# Patient Record
Sex: FEMALE | Race: BLACK OR AFRICAN AMERICAN | NOT HISPANIC OR LATINO | Employment: OTHER | URBAN - METROPOLITAN AREA
[De-identification: names, ages, dates, MRNs, and addresses within clinical notes are randomized per-mention and may not be internally consistent; named-entity substitution may affect disease eponyms.]

---

## 2017-01-26 DIAGNOSIS — E03.9 HYPOTHYROIDISM, UNSPECIFIED TYPE: Primary | ICD-10-CM

## 2017-01-26 NOTE — TELEPHONE ENCOUNTER
LOV  11-    Component      Latest Ref Rng & Units 2/21/2013   TSH      0.4 - 4.0 uIU/ml 4.31 (H)   Free T4      0.71 - 1.51 ng/dl 1.37

## 2017-01-26 NOTE — TELEPHONE ENCOUNTER
----- Message from Tamra Desai sent at 1/26/2017  1:40 PM CST -----  Contact: Daughter    Refill on: levothyroxine (SYNTHROID) 50 MCG tablet  3 month supply       :Patient would like her medication sent to [facility] on [address]. Please call

## 2017-01-27 ENCOUNTER — TELEPHONE (OUTPATIENT)
Dept: FAMILY MEDICINE | Facility: CLINIC | Age: 82
End: 2017-01-27

## 2017-01-27 DIAGNOSIS — G89.29 OTHER CHRONIC PAIN: ICD-10-CM

## 2017-01-27 DIAGNOSIS — R53.81 DEBILITY: Primary | ICD-10-CM

## 2017-01-27 DIAGNOSIS — R60.0 BILATERAL LEG EDEMA: ICD-10-CM

## 2017-01-27 RX ORDER — LEVOTHYROXINE SODIUM 50 UG/1
50 TABLET ORAL DAILY
Qty: 90 TABLET | Refills: 0 | Status: SHIPPED | OUTPATIENT
Start: 2017-01-27 | End: 2017-05-26 | Stop reason: SDUPTHER

## 2017-01-27 NOTE — TELEPHONE ENCOUNTER
Spoke with Jose Eduardo Goodson. She states that prescription was last filled by Dr. Calderon. She states that patient no longer wants to see Dr. Calderon. Advised will need labs to check TSH level.     Jose Eduardo states that patient has been very weak and having lots of hip pain and is unable to come in at this time. Patient has been seen by orthopedic. Hip surgery was not an option due to other medical conditions. She is only on pain medication which does not give her much relief.     Advised to contact [facility], may be able to have nurse come out to see the patient. Also advised possible appointment with Pain Management re: other options for hip pain    Please advise

## 2017-01-27 NOTE — TELEPHONE ENCOUNTER
----- Message from Saranyanoam Gandhi sent at 1/27/2017 10:36 AM CST -----  Contact: [facility] request an order for health service from Dr. Prieto upon the request of the pt. Please contact [phone] if any question. Thanks!

## 2017-01-30 ENCOUNTER — TELEPHONE (OUTPATIENT)
Dept: FAMILY MEDICINE | Facility: CLINIC | Age: 82
End: 2017-01-30

## 2017-01-30 DIAGNOSIS — M25.559 ARTHRALGIA OF HIP, UNSPECIFIED LATERALITY: Primary | ICD-10-CM

## 2017-01-30 DIAGNOSIS — M25.552 PAIN OF LEFT HIP JOINT: ICD-10-CM

## 2017-01-30 NOTE — TELEPHONE ENCOUNTER
W/c ordered, as far as her pain goes, we're limited in treatment because her kidney function is down, so she cannot take NSAIDs.  Since she's got home health, can she try PT at home?

## 2017-01-30 NOTE — TELEPHONE ENCOUNTER
Spoke with patient's daughter Jose Eduardo and she states that patient has prescription for Hydrocodone/Acetaminophen 5/325 mg, one tablet by mouth twice daily for pain . She is taking one one tablet daily. She states that she is only getting very minimal relief. She states that she does not want to get addicted to the medication.     She would like to know if there are any options to takinf the medication to get relief or take as prescribed.     She would also like an order for wheel chair ASAP to go to her sister's . Patient has a Rollator at home but she needs something with foot rest. Her feet drag the ground with her Rollator which the family paid out of pocket for.     Advised refill sent to [facility] 2017    Please advise

## 2017-01-30 NOTE — TELEPHONE ENCOUNTER
----- Message from Tamra Desai sent at 1/30/2017  8:34 AM CST -----  Contact: Jose Eduardo Goodson [phone]  Jose Eduardo Goodson [phone] returning your phone call. Please call thank you

## 2017-01-31 ENCOUNTER — TELEPHONE (OUTPATIENT)
Dept: FAMILY MEDICINE | Facility: CLINIC | Age: 82
End: 2017-01-31

## 2017-01-31 NOTE — TELEPHONE ENCOUNTER
----- Message from Jacqueline Alvarado sent at 1/30/2017 10:57 AM CST -----  [facility] states member called requesting home health services. Please include order, medical necessity form, diagnosis code, homebound status, and recent clinical information. Please fax to [phone]. [email or web address] phone [phone]. Thank you!

## 2017-02-07 ENCOUNTER — TELEPHONE (OUTPATIENT)
Dept: FAMILY MEDICINE | Facility: CLINIC | Age: 82
End: 2017-02-07

## 2017-02-07 DIAGNOSIS — I50.9 CONGESTIVE HEART FAILURE, UNSPECIFIED CONGESTIVE HEART FAILURE CHRONICITY, UNSPECIFIED CONGESTIVE HEART FAILURE TYPE: Primary | ICD-10-CM

## 2017-02-07 NOTE — TELEPHONE ENCOUNTER
----- Message from Thais Baca sent at 2/7/2017 11:39 AM CST -----  Contact: Daughter Jennifer  Please call daughter in regards to HH orders. PH states that they did not receive orders. Orders should also include PT orders. [phone]  Please fax to [phone]

## 2017-02-20 ENCOUNTER — TELEPHONE (OUTPATIENT)
Dept: FAMILY MEDICINE | Facility: CLINIC | Age: 82
End: 2017-02-20

## 2017-02-20 NOTE — TELEPHONE ENCOUNTER
----- Message from Maddison Arroyo sent at 2/20/2017 11:55 AM CST -----  Contact: self  [phone]  Pt is requesting to speak to you regarding home health orders. Pls call pt [phone]. Thanks.....Nan

## 2017-02-20 NOTE — TELEPHONE ENCOUNTER
----- Message from Lety Garcia sent at 2/20/2017 11:46 AM CST -----  Contact: [facility]  [facility] calling to get signed order for HH with PT, medical necessity, and clinic notes to be faxed to [phone]. This was request back in January.

## 2017-02-22 NOTE — TELEPHONE ENCOUNTER
Spoke with Tyler. Requesting chart notes from sooner visit. Advised last office visit 11-7-16.    Also need orders for Hh cosigned by MD and faxed back to N

## 2017-02-22 NOTE — TELEPHONE ENCOUNTER
----- Message from Saranya Gandhi sent at 2/21/2017  4:15 PM CST -----  Contact: [facility]  Request to speak to the nurse regarding about the order from home health nurse for the pt. Please contact [phone] ext [phone] Tyler. Thanks!

## 2017-02-24 NOTE — TELEPHONE ENCOUNTER
Orders co signed by Dr. Robb Cunningham and faxed to [facility] [phone]    Left message to voicemail for Tyler with [facility] [phone] ext [phone] re: above    Left message to patient's daughter, Jose Eduardo Goodson voicemail at [phone] re: above

## 2017-03-03 DIAGNOSIS — M16.0 PRIMARY OSTEOARTHRITIS OF BOTH HIPS: ICD-10-CM

## 2017-03-03 DIAGNOSIS — N18.30 CHRONIC KIDNEY DISEASE, STAGE III (MODERATE): Primary | ICD-10-CM

## 2017-03-03 RX ORDER — HYDRALAZINE HYDROCHLORIDE 50 MG/1
TABLET, FILM COATED ORAL
Refills: 3 | COMMUNITY
Start: 2017-01-04 | End: 2017-08-30 | Stop reason: ALTCHOICE

## 2017-03-03 RX ORDER — OXYCODONE AND ACETAMINOPHEN 5; 325 MG/1; MG/1
1 TABLET ORAL EVERY 12 HOURS PRN
Qty: 30 TABLET | Refills: 0 | Status: SHIPPED | OUTPATIENT
Start: 2017-03-03 | End: 2017-03-06

## 2017-03-03 RX ORDER — OXYCODONE AND ACETAMINOPHEN 5; 325 MG/1; MG/1
1 TABLET ORAL EVERY 6 HOURS PRN
Qty: 15 TABLET | Refills: 0 | Status: CANCELLED | OUTPATIENT
Start: 2017-03-03

## 2017-03-03 RX ORDER — LABETALOL 300 MG/1
TABLET, FILM COATED ORAL
Refills: 3 | COMMUNITY
Start: 2017-01-04

## 2017-03-03 NOTE — TELEPHONE ENCOUNTER
----- Message from Mary Kate Huffman sent at 3/3/2017  2:40 PM CST -----  Contact: daughter  Pt needs hydrocodone-acetaminophen 5-325mg (NORCO) 5-325 mg per tablet refilled. Please call [phone]. Thanks

## 2017-03-03 NOTE — TELEPHONE ENCOUNTER
----- Message from Jacqueline Alvarado sent at 3/3/2017  1:23 PM CST -----  Refill: hydrocodone-acetaminophen 5-325mg (NORCO) 5-325 mg per tablet    Please call at [phone] when done. Thank you!

## 2017-03-06 DIAGNOSIS — M25.50 ARTHRALGIA, UNSPECIFIED JOINT: ICD-10-CM

## 2017-03-06 RX ORDER — HYDROCODONE BITARTRATE AND ACETAMINOPHEN 5; 325 MG/1; MG/1
1 TABLET ORAL 2 TIMES DAILY PRN
Qty: 30 TABLET | Refills: 0 | Status: SHIPPED | OUTPATIENT
Start: 2017-03-06 | End: 2017-03-16

## 2017-03-06 NOTE — TELEPHONE ENCOUNTER
Phone call from patient's daughter.     She states that prescription was sent in for Oxycodone    She would like refill for Hydrocodone instead    Please advise

## 2017-03-24 ENCOUNTER — OFFICE VISIT (OUTPATIENT)
Dept: FAMILY MEDICINE | Facility: CLINIC | Age: 82
End: 2017-03-24
Payer: MEDICARE

## 2017-03-24 ENCOUNTER — HOSPITAL ENCOUNTER (OUTPATIENT)
Dept: RADIOLOGY | Facility: HOSPITAL | Age: 82
Discharge: HOME | End: 2017-03-24
Attending: FAMILY MEDICINE
Payer: MEDICARE

## 2017-03-24 VITALS
RESPIRATION RATE: 16 BRPM | SYSTOLIC BLOOD PRESSURE: 142 MMHG | BODY MASS INDEX: 29.44 KG/M2 | DIASTOLIC BLOOD PRESSURE: 58 MMHG | WEIGHT: 160 LBS | HEART RATE: 68 BPM | HEIGHT: 62 IN

## 2017-03-24 DIAGNOSIS — L98.9 LEG SKIN LESION, LEFT: ICD-10-CM

## 2017-03-24 DIAGNOSIS — L30.9 DERMATITIS: ICD-10-CM

## 2017-03-24 DIAGNOSIS — M25.552 LEFT HIP PAIN: ICD-10-CM

## 2017-03-24 DIAGNOSIS — L98.421 SACRAL ULCER, LIMITED TO BREAKDOWN OF SKIN: Primary | ICD-10-CM

## 2017-03-24 DIAGNOSIS — Z99.3 WHEELCHAIR DEPENDENCE: ICD-10-CM

## 2017-03-24 PROBLEM — I27.20 PULMONARY HYPERTENSION: Status: ACTIVE | Noted: 2017-03-24

## 2017-03-24 PROCEDURE — 1125F AMNT PAIN NOTED PAIN PRSNT: CPT | Mod: S$GLB,,, | Performed by: FAMILY MEDICINE

## 2017-03-24 PROCEDURE — 99215 OFFICE O/P EST HI 40 MIN: CPT | Mod: S$GLB,,, | Performed by: FAMILY MEDICINE

## 2017-03-24 PROCEDURE — 3077F SYST BP >= 140 MM HG: CPT | Mod: S$GLB,,, | Performed by: FAMILY MEDICINE

## 2017-03-24 PROCEDURE — 99999 PR PBB SHADOW E&M-EST. PATIENT-LVL IV: CPT | Mod: PBBFAC,,, | Performed by: FAMILY MEDICINE

## 2017-03-24 PROCEDURE — 1160F RVW MEDS BY RX/DR IN RCRD: CPT | Mod: S$GLB,,, | Performed by: FAMILY MEDICINE

## 2017-03-24 PROCEDURE — 3078F DIAST BP <80 MM HG: CPT | Mod: S$GLB,,, | Performed by: FAMILY MEDICINE

## 2017-03-24 PROCEDURE — 1159F MED LIST DOCD IN RCRD: CPT | Mod: S$GLB,,, | Performed by: FAMILY MEDICINE

## 2017-03-24 PROCEDURE — 1157F ADVNC CARE PLAN IN RCRD: CPT | Mod: S$GLB,,, | Performed by: FAMILY MEDICINE

## 2017-03-24 PROCEDURE — 76882 US LMTD JT/FCL EVL NVASC XTR: CPT | Mod: 26,,, | Performed by: RADIOLOGY

## 2017-03-24 PROCEDURE — 99499 UNLISTED E&M SERVICE: CPT | Mod: S$GLB,,, | Performed by: FAMILY MEDICINE

## 2017-03-24 PROCEDURE — 76882 US LMTD JT/FCL EVL NVASC XTR: CPT | Mod: TC

## 2017-03-24 RX ORDER — NYSTATIN 100000 U/G
CREAM TOPICAL 2 TIMES DAILY
Qty: 30 G | Refills: 2 | Status: SHIPPED | OUTPATIENT
Start: 2017-03-24

## 2017-03-24 RX ORDER — ASPIRIN 81 MG/1
81 TABLET ORAL DAILY
Status: ON HOLD | COMMUNITY
End: 2017-07-01

## 2017-03-24 RX ORDER — TRIAMCINOLONE ACETONIDE 1 MG/G
CREAM TOPICAL 2 TIMES DAILY
Qty: 80 G | Refills: 0 | Status: SHIPPED | OUTPATIENT
Start: 2017-03-24 | End: 2017-07-24

## 2017-03-24 NOTE — PROGRESS NOTES
"Routine Office Visit    Patient Name: Christal Goodson    : 12/3/1931  MRN: [id number]    Subjective:  Christal is a 85 y.o. female who presents today for:  Patient here with 2 sisters.    1.  L leg lesion - noticed it an hour ago when she got here. +tenderness to palpation. States that she bumped it on the way here but unsure if it was there before.  No breakdown of skin.  Sisters are unaware of this as well.     2.  L hip pain -  - hip replacement. She states that when she sits on it, it hurts.  She hasn't seen a specialist for follow up in a few years.  In the last few weeks she's unable to have the strength to get up and transfers have been difficult for the family.  And so now she's wheelchair bound.  Family members state that they would have brought her in sooner but it is very difficult to transfer patient.  Do not have home health and wonder if patient would qualify.      3.  About 3-4 months ago she had to switch from walker to wheelchair because she lower body feels weak and "I can't stand"  Family members concerned about this decrease in strength and unable to ambulate on her own.     4.  Itchy skin - on her upper back, patient is constantly scratching.  Also on buttocks, patient has lesions and complaints of scratching there too.  Sister says there are light colored patches on her bottom.    Past Medical History  Past Medical History:   Diagnosis Date    Arthritis     Blood transfusion     CHF (congestive heart failure)     Coronary artery disease     Diabetes mellitus     General anesthetics causing adverse effect in therapeutic use     "woke up as they were putting me on the table"    Hyperlipidemia     Hypertension     Renal disorder     Thyroid disease        Past Surgical History  Past Surgical History:   Procedure Laterality Date    CATARACT EXTRACTION EXTRACAPSULAR W/ INTRAOCULAR LENS IMPLANTATION  2012    right eye    CORONARY ANGIOPLASTY WITH STENT PLACEMENT      JOINT " REPLACEMENT      Left total hip replacement in 1986    TONSILLECTOMY         Family History  Family History   Problem Relation Age of Onset    Diabetes Daughter     Hypertension Daughter        Social History  Social History     Social History    Marital status: Single     Spouse name: N/A    Number of children: N/A    Years of education: N/A     Occupational History    Not on file.     Social History Main Topics    Smoking status: Never Smoker    Smokeless tobacco: Not on file    Alcohol use No    Drug use: No    Sexual activity: Not Currently     Other Topics Concern    Not on file     Social History Narrative       Current Medications  Current Outpatient Prescriptions on File Prior to Visit   Medication Sig Dispense Refill    allopurinol (ZYLOPRIM) 100 MG tablet Take 1 tablet (100 mg total) by mouth once daily. 30 tablet 2    ferrous sulfate 325 mg (65 mg iron) Tab Take 1 tablet (325 mg total) by mouth daily with breakfast. 30 tablet 3    flaxseed oil Oil by Misc.(Non-Drug; Combo Route) route Daily.      hydrALAZINE (APRESOLINE) 50 MG tablet TAKE 1 TABLET 3 TIMES DAILY.  3    labetalol (NORMODYNE) 300 MG tablet TAKE 1 TABLET EVERY 12 HOURS DAILY.  3    levothyroxine (SYNTHROID) 50 MCG tablet Take 1 tablet (50 mcg total) by mouth once daily. 90 tablet 0    magnesium oxide (MAG-OX) 250 mg Tab Take 1 tablet (250 mg total) by mouth once daily. 30 tablet 2    vitamin E 400 UNIT capsule Take 400 Units by mouth once daily.      furosemide (LASIX) 40 MG tablet Take 1 tablet (40 mg total) by mouth 2 (two) times daily. 60 tablet 3    [DISCONTINUED] aspirin 325 MG tablet Take 1 tablet (325 mg total) by mouth once daily.  0    [DISCONTINUED] pantoprazole (PROTONIX) 40 MG tablet Take 1 tablet (40 mg total) by mouth 2 (two) times daily. 60 tablet 11     No current facility-administered medications on file prior to visit.        Allergies   Review of patient's allergies indicates:   Allergen Reactions  "   Bystolic [nebivolol]     Losartan     Diovan [valsartan] Rash       Review of Systems (Pertinent positives)  Constititutional: Weight loss, excess fatigue, chills, fever, night sweats, weakness, loss of appetite  Skin: Rash, itching, lesions, color changes  Ears: Earache, ringing in ears, discharge, hearing loss, hearing aid, popping, infection Nose: stuffy nose, mouth breathing, post-nasal drip,   Throat: hoarseness, bad breath, swelling, sore throat  Lungs: Cough, sputum, wheeze, frequent URI, SOA, Asthma  Heart: Chest pain, angina, palpitations, extra heart beats  Stomach/Intestine: Heartburn, Nausea, vomiting, diarrhea, indigestion, bloating, constipation  Bones/Muscles/Joints: Joint pain,, deformities, back pain, swelling, stiffness  Brain: Numbness, tingling, tremor, fainting, headaches, muscle weakness, frequent falls, paralysis  Kidney/Bladder: Pain with urination, frequent urination, urinating often at night, urgency, dribbling, blood in urine, discharge, infections.    BP (!) 142/58  Pulse 68  Resp 16  Ht 5' 2" (1.575 m)  Wt 72.6 kg (160 lb)  BMI 29.26 kg/m2    GENERAL APPEARANCE: in no apparent distress and well developed and well nourished.  In wheelchair.  UNABLE TO STAND OR BEAR WEIGHT AT ALL on legs even with 2 person assist.  NECK: normal, supple, no adenopathy, thyroid normal in size  RESPIRATORY: appears well, vitals normal, no respiratory distress, acyanotic, normal RR, chest clear, no wheezing, crepitations, rhonchi, normal symmetric air entry  HEART: regular rate and rhythm, S1, S2 normal, no murmur, click, rub or gallop.    NEUROLOGIC: normal without focal findings, CN II-XII are intact.    Extremities: warm/well perfused.  No abnormal hair patterns.  No clubbing, cyanosis.  +L leg - lateral shin - golf ball sized mass that is tender to palpation.  No bruising or erythema in that area.  No break in skin  SKIN: +large areas of hypopigmented skin over buttocks, one dime sized area of " stage 1 ulcer on L buttocks.   Lower back diffusely dry and white flaky areas.   Then upper back, with evidence of excoriations, mild dry skin  PSYCH: Alert, oriented x 3, thought content appropriate, speech normal, pleasant and cooperative, good eye contact, well groomed, recall good, concentration/judgement good and apparently average intelligence.    Assessment/Plan:  Christal Goodson is a 85 y.o. female who presents today for :    Christal was seen today for leg cramps, hip pain, extremity weakness and dry skin.    Diagnoses and all orders for this visit:    Sacral ulcer, limited to breakdown of skin  -     Ambulatory referral to Outpatient Case Management  -     nystatin (MYCOSTATIN) cream; Apply topically 2 (two) times daily.    Wheelchair dependence  -     Ambulatory referral to Outpatient Case Management  -     Patient with decreased ambulation over the last few weeks and she was getting around w/ a cane before.  May be due to her hip bothering her, so having her see orthopedics.    Leg skin lesion, left  -     US Soft Tissue Misc; Future  -     Ambulatory referral to Outpatient Case Management    Left hip pain  -     Ambulatory consult to Orthopedics  -     Ambulatory referral to Outpatient Case Management    Dermatitis  -     triamcinolone acetonide 0.1% (KENALOG) 0.1 % cream; Apply topically 2 (two) times daily.    F/u Ortho for hip pain  F/u in 2 weeks for sacral ulcer  Outpatient case management ordered.  Home PT appropriate for patient, and also should be seen by Ortho as well.

## 2017-03-24 NOTE — MR AVS SNAPSHOT
[facility]  [address] [place] [address]  Phone: [phone]                  Christal Goodson   3/24/2017 2:40 PM   Office Visit    Description:  Female : 12/3/1931   Provider:  Dianna Hillman MD   Department:  [facility]           Reason for Visit     Leg cramps     Hip Pain     Extremity Weakness     Dry Skin           Diagnoses this Visit        Comments    Leg skin lesion, left    -  Primary     Left hip pain         Wheelchair dependence         Sacral ulcer, limited to breakdown of skin         Dermatitis                To Do List           Future Appointments        Provider Department Dept Phone    3/24/2017 5:30 PM [facility] - ROOM 3 [facility] [phone]      Goals (5 Years of Data)     None       These Medications        Disp Refills Start End    nystatin (MYCOSTATIN) cream 30 g 2 3/24/2017     Apply topically 2 (two) times daily. - Topical (Top)    Pharmacy: [facility]/pharmacy #[id number] - [address] Ph #: [phone]       triamcinolone acetonide 0.1% (KENALOG) 0.1 % cream 80 g 0 3/24/2017 4/3/2017    Apply topically 2 (two) times daily. - Topical (Top)    [id number] [address] Ph #: [phone]         [facility] On Call     [facility] On Call Nurse Care Line -  Assistance  Registered nurses in the [facility] provide clinical advisement, health education, appointment booking, and other advisory services.  Call for this free service at [phone].             Medications           Message regarding Medications     Verify the changes and/or additions to your medication regime listed below are the same as discussed with your clinician today.  If any of these changes or additions are incorrect, please notify your healthcare provider.        START taking these NEW medications        Refills    nystatin (MYCOSTATIN) cream 2     "Sig: Apply topically 2 (two) times daily.    Class: Normal    Route: Topical (Top)    triamcinolone acetonide 0.1% (KENALOG) 0.1 % cream 0    Sig: Apply topically 2 (two) times daily.    Class: Normal    Route: Topical (Top)      STOP taking these medications     aspirin 325 MG tablet Take 1 tablet (325 mg total) by mouth once daily.    pantoprazole (PROTONIX) 40 MG tablet Take 1 tablet (40 mg total) by mouth 2 (two) times daily.           Verify that the below list of medications is an accurate representation of the medications you are currently taking.  If none reported, the list may be blank. If incorrect, please contact your healthcare provider. Carry this list with you in case of emergency.           Current Medications     allopurinol (ZYLOPRIM) 100 MG tablet Take 1 tablet (100 mg total) by mouth once daily.    aspirin (ECOTRIN) 81 MG EC tablet Take 81 mg by mouth once daily.    ferrous sulfate 325 mg (65 mg iron) Tab Take 1 tablet (325 mg total) by mouth daily with breakfast.    flaxseed oil Oil by Misc.(Non-Drug; Combo Route) route Daily.    hydrALAZINE (APRESOLINE) 50 MG tablet TAKE 1 TABLET 3 TIMES DAILY.    labetalol (NORMODYNE) 300 MG tablet TAKE 1 TABLET EVERY 12 HOURS DAILY.    levothyroxine (SYNTHROID) 50 MCG tablet Take 1 tablet (50 mcg total) by mouth once daily.    magnesium oxide (MAG-OX) 250 mg Tab Take 1 tablet (250 mg total) by mouth once daily.    vitamin E 400 UNIT capsule Take 400 Units by mouth once daily.    furosemide (LASIX) 40 MG tablet Take 1 tablet (40 mg total) by mouth 2 (two) times daily.    nystatin (MYCOSTATIN) cream Apply topically 2 (two) times daily.    triamcinolone acetonide 0.1% (KENALOG) 0.1 % cream Apply topically 2 (two) times daily.           Clinical Reference Information           Your Vitals Were     BP Pulse Resp Height Weight BMI    142/58 68 16 5' 2" (1.575 m) 72.6 kg (160 lb) 29.26 kg/m2      Blood Pressure          Most Recent Value    BP  (!)  142/58    "   Allergies as of 3/24/2017     Bystolic [Nebivolol]    Losartan    Diovan [Valsartan]      Immunizations Administered on Date of Encounter - 3/24/2017     None      Orders Placed During Today's Visit      Normal Orders This Visit    Ambulatory consult to Orthopedics     Ambulatory referral to Outpatient Case Management     Future Labs/Procedures Expected by Expires    US Soft Tissue Misc  3/24/2017 3/24/2018      [email or web address] Sign-Up     Activating your [facility] account is as easy as 1-2-3!     1) Visit [email or web address], select Sign Up Now, enter this activation code and your date of birth, then select Next.  [email or web address]  Expires: 5/8/2017  3:18 PM      2) Create a username and password to use when you visit [facility] in the future and select a security question in case you lose your password and select Next.    3) Enter your e-mail address and click Sign Up!    Additional Information  If you have questions, please e-mail [email or web address] or call [phone] to talk to our [facility] staff. Remember, [facility] is NOT to be used for urgent needs. For medical emergencies, dial 911.         Language Assistance Services     ATTENTION: Language assistance services are available, free of charge. Please call [phone].      ATENCIÓN: Si habla español, tiene a olvera disposición servicios gratuitos de asistencia lingüística. Llame al [phone].     CHÚ Ý: N?u b?n nói Ti?ng Vi?t, có các d?ch v? h? tr? ngôn ng? mi?n phí dành cho b?n. G?i s? [phone].         [facility] complies with applicable Federal civil rights laws and does not discriminate on the basis of race, color, national origin, age, disability, or sex.

## 2017-03-27 ENCOUNTER — TELEPHONE (OUTPATIENT)
Dept: FAMILY MEDICINE | Facility: CLINIC | Age: 82
End: 2017-03-27

## 2017-03-27 NOTE — LETTER
March 28, 2017    Christal DAVID Kellee  [address] [place] [address]             [facility]  [address] [facility] [address]  Phone: [phone]  Fax: [phone] Dear MsDarryl Kellee:    I have been unable to reach you by phone for your appointment to Orthopedic .  Please call me at the clinic [phone] to book your appointment.        If you have any questions or concerns, please don't hesitate to call.    Sincerely,        Cheryl Valle MA

## 2017-03-29 ENCOUNTER — TELEPHONE (OUTPATIENT)
Dept: FAMILY MEDICINE | Facility: CLINIC | Age: 82
End: 2017-03-29

## 2017-03-29 ENCOUNTER — OUTPATIENT CASE MANAGEMENT (OUTPATIENT)
Dept: ADMINISTRATIVE | Facility: OTHER | Age: 82
End: 2017-03-29

## 2017-03-29 NOTE — PROGRESS NOTES
Please note the following patients information has been forwarded to [facility] for case mgmt or  by Outpatient complex care mgmt.    Please see the media section of patients chart for additional details.    Please contact ext [phone] with any questions.    Thank you,    Janice Dean, SSC

## 2017-03-29 NOTE — TELEPHONE ENCOUNTER
Please contact patient or family members and inform them that:    - ultrasound of her leg showed a hematoma of her lower leg (blood collection) which is NOT a blood clot in the vein. Likely due to trauma because she bumped her leg. This should improve with ice, rest, and elevation of the leg.    Sincerely  Dr Hillman

## 2017-03-30 ENCOUNTER — TELEPHONE (OUTPATIENT)
Dept: FAMILY MEDICINE | Facility: CLINIC | Age: 82
End: 2017-03-30

## 2017-03-30 NOTE — TELEPHONE ENCOUNTER
Left voicemail on patient's daughter Jose Eduardo advising her that both her and her sister FMLA forms are ready for pickup. Also spoke with Marilee, she states that she will come by and get them.

## 2017-03-30 NOTE — TELEPHONE ENCOUNTER
----- Message from Janice Dean sent at 3/29/2017  6:08 PM CDT -----  Please note the following patients information has been forwarded to [facility] for case mgmt or  by Outpatient complex care mgmt.    Please see the media section of patients chart for additional details.    Please contact ext [phone] with any questions.    Thank you,    Janice Dean, SSC

## 2017-04-04 ENCOUNTER — TELEPHONE (OUTPATIENT)
Dept: FAMILY MEDICINE | Facility: CLINIC | Age: 82
End: 2017-04-04

## 2017-04-04 DIAGNOSIS — R53.81 DEBILITY: ICD-10-CM

## 2017-04-04 DIAGNOSIS — Z96.649 HISTORY OF HIP REPLACEMENT, UNSPECIFIED LATERALITY: ICD-10-CM

## 2017-04-04 DIAGNOSIS — Z96.641 HISTORY OF RIGHT HIP REPLACEMENT: ICD-10-CM

## 2017-04-04 DIAGNOSIS — R26.2 UNABLE TO AMBULATE: Primary | ICD-10-CM

## 2017-04-04 NOTE — TELEPHONE ENCOUNTER
----- Message from Lety Garcia sent at 4/4/2017  9:47 AM CDT -----  Contact: PHN  PHN calling to discuss findings in pt home. Please call Lexi at [phone] til 1p or 3 to 5

## 2017-04-04 NOTE — TELEPHONE ENCOUNTER
Spoke with maria guadalupe and she informed me that she went out to see patient and notice that patient is bed bound and has not been out of bed since November last year. Patient is not weight bearing. Patient is in need of wheel chair and patient will need medical necessity orders for transportation due to her being non- weight bearing and can't make her appointments due to this. She also recommend Home health service for Physical therapy and Nurse evaluation for care planning.Please submit notes to support orders.

## 2017-04-17 ENCOUNTER — TELEPHONE (OUTPATIENT)
Dept: FAMILY MEDICINE | Facility: CLINIC | Age: 82
End: 2017-04-17

## 2017-04-17 DIAGNOSIS — Z74.8 ASSISTANCE WITH TRANSPORTATION: Primary | ICD-10-CM

## 2017-04-17 NOTE — TELEPHONE ENCOUNTER
Ginger from [facility] called and stated she received paperwork for pt for non emergency ambulance transportation to  with [facility] on 4/25/17; she needs signed orders in order to be approved; please fax to [facility] once completed

## 2017-04-28 ENCOUNTER — HOSPITAL ENCOUNTER (INPATIENT)
Facility: HOSPITAL | Age: 82
LOS: 10 days | Discharge: HOME | DRG: 853 | End: 2017-05-08
Attending: EMERGENCY MEDICINE | Admitting: INTERNAL MEDICINE
Payer: MEDICARE

## 2017-04-28 DIAGNOSIS — R53.1 WEAKNESS: ICD-10-CM

## 2017-04-28 DIAGNOSIS — K85.10 ACUTE BILIARY PANCREATITIS: ICD-10-CM

## 2017-04-28 DIAGNOSIS — R60.0 BILATERAL LEG EDEMA: ICD-10-CM

## 2017-04-28 DIAGNOSIS — N30.00 ACUTE CYSTITIS WITHOUT HEMATURIA: ICD-10-CM

## 2017-04-28 DIAGNOSIS — E03.9 HYPOTHYROIDISM, UNSPECIFIED TYPE: Chronic | ICD-10-CM

## 2017-04-28 DIAGNOSIS — A41.9 SEPSIS, DUE TO UNSPECIFIED ORGANISM: ICD-10-CM

## 2017-04-28 DIAGNOSIS — I27.20 PULMONARY HYPERTENSION: ICD-10-CM

## 2017-04-28 DIAGNOSIS — I25.10 CORONARY ATHEROSCLEROSIS OF UNSPECIFIED TYPE OF VESSEL, NATIVE OR GRAFT: ICD-10-CM

## 2017-04-28 DIAGNOSIS — I50.30 DIASTOLIC CONGESTIVE HEART FAILURE: ICD-10-CM

## 2017-04-28 DIAGNOSIS — E03.4 HYPOTHYROIDISM DUE TO ACQUIRED ATROPHY OF THYROID: ICD-10-CM

## 2017-04-28 DIAGNOSIS — E78.00 HYPERCHOLESTEREMIA: Chronic | ICD-10-CM

## 2017-04-28 DIAGNOSIS — N18.30 CHRONIC KIDNEY DISEASE, STAGE III (MODERATE): ICD-10-CM

## 2017-04-28 DIAGNOSIS — D63.8 ANEMIA OF CHRONIC DISEASE: ICD-10-CM

## 2017-04-28 DIAGNOSIS — I10 ESSENTIAL HYPERTENSION, BENIGN: Chronic | ICD-10-CM

## 2017-04-28 DIAGNOSIS — E11.9 DIABETES MELLITUS TYPE 2 IN NONOBESE: ICD-10-CM

## 2017-04-28 DIAGNOSIS — I25.10 CORONARY ARTERY DISEASE, ANGINA PRESENCE UNSPECIFIED, UNSPECIFIED VESSEL OR LESION TYPE, UNSPECIFIED WHETHER NATIVE OR TRANSPLANTED HEART: Chronic | ICD-10-CM

## 2017-04-28 DIAGNOSIS — K85.10 ACUTE BILIARY PANCREATITIS, UNSPECIFIED COMPLICATION STATUS: Primary | ICD-10-CM

## 2017-04-28 DIAGNOSIS — I12.9 BENIGN HYPERTENSIVE KIDNEY DISEASE WITH CHRONIC KIDNEY DISEASE STAGE I THROUGH STAGE IV, OR UNSPECIFIED(403.10): ICD-10-CM

## 2017-04-28 DIAGNOSIS — E55.9 MILD VITAMIN D DEFICIENCY: ICD-10-CM

## 2017-04-28 LAB
ALBUMIN SERPL BCP-MCNC: 3.3 G/DL
ALP SERPL-CCNC: 150 U/L
ALT SERPL W/O P-5'-P-CCNC: 64 U/L
AMORPH CRY URNS QL MICRO: ABNORMAL
AMYLASE SERPL-CCNC: 592 U/L
ANION GAP SERPL CALC-SCNC: 9 MMOL/L
APTT BLDCRRT: 29.5 SEC
AST SERPL-CCNC: 105 U/L
BACTERIA #/AREA URNS HPF: ABNORMAL /HPF
BASOPHILS # BLD AUTO: 0 K/UL
BASOPHILS NFR BLD: 0 %
BILIRUB SERPL-MCNC: 7.7 MG/DL
BILIRUB UR QL STRIP: NEGATIVE
BUN SERPL-MCNC: 25 MG/DL
CALCIUM SERPL-MCNC: 8.9 MG/DL
CHLORIDE SERPL-SCNC: 108 MMOL/L
CLARITY UR: ABNORMAL
CO2 SERPL-SCNC: 24 MMOL/L
COLOR UR: ABNORMAL
CREAT SERPL-MCNC: 1.5 MG/DL
DIFFERENTIAL METHOD: ABNORMAL
EOSINOPHIL # BLD AUTO: 0 K/UL
EOSINOPHIL NFR BLD: 0.1 %
ERYTHROCYTE [DISTWIDTH] IN BLOOD BY AUTOMATED COUNT: 15.8 %
EST. GFR  (AFRICAN AMERICAN): 36 ML/MIN/1.73 M^2
EST. GFR  (NON AFRICAN AMERICAN): 32 ML/MIN/1.73 M^2
GLUCOSE SERPL-MCNC: 69 MG/DL
GLUCOSE UR QL STRIP: NEGATIVE
HCT VFR BLD AUTO: 24.1 %
HGB BLD-MCNC: 8.6 G/DL
HGB UR QL STRIP: ABNORMAL
HYALINE CASTS #/AREA URNS LPF: 0 /LPF
INR PPP: 1.3
KETONES UR QL STRIP: NEGATIVE
LACTATE SERPL-SCNC: 1 MMOL/L
LEUKOCYTE ESTERASE UR QL STRIP: NEGATIVE
LIPASE SERPL-CCNC: >1000 U/L
LYMPHOCYTES # BLD AUTO: 0.2 K/UL
LYMPHOCYTES NFR BLD: 1.9 %
MAGNESIUM SERPL-MCNC: 1.8 MG/DL
MCH RBC QN AUTO: 30.8 PG
MCHC RBC AUTO-ENTMCNC: 35.7 %
MCV RBC AUTO: 86 FL
MICROSCOPIC COMMENT: ABNORMAL
MONOCYTES # BLD AUTO: 0.3 K/UL
MONOCYTES NFR BLD: 3 %
NEUTROPHILS # BLD AUTO: 8.6 K/UL
NEUTROPHILS NFR BLD: 94.8 %
NITRITE UR QL STRIP: NEGATIVE
PH UR STRIP: 6 [PH] (ref 5–8)
PLATELET # BLD AUTO: 114 K/UL
PMV BLD AUTO: 11 FL
POTASSIUM SERPL-SCNC: 4.3 MMOL/L
PROT SERPL-MCNC: 6.2 G/DL
PROT UR QL STRIP: ABNORMAL
PROTHROMBIN TIME: 13.4 SEC
RBC # BLD AUTO: 2.79 M/UL
RBC #/AREA URNS HPF: 2 /HPF (ref 0–4)
SODIUM SERPL-SCNC: 141 MMOL/L
SP GR UR STRIP: 1.01 (ref 1–1.03)
SQUAMOUS #/AREA URNS HPF: 5 /HPF
T4 FREE SERPL-MCNC: 1.33 NG/DL
TSH SERPL DL<=0.005 MIU/L-ACNC: 4.41 UIU/ML
URN SPEC COLLECT METH UR: ABNORMAL
UROBILINOGEN UR STRIP-ACNC: ABNORMAL EU/DL
WBC # BLD AUTO: 9.06 K/UL
WBC #/AREA URNS HPF: 2 /HPF (ref 0–5)

## 2017-04-28 PROCEDURE — 87086 URINE CULTURE/COLONY COUNT: CPT

## 2017-04-28 PROCEDURE — 83735 ASSAY OF MAGNESIUM: CPT

## 2017-04-28 PROCEDURE — 84439 ASSAY OF FREE THYROXINE: CPT

## 2017-04-28 PROCEDURE — 80053 COMPREHEN METABOLIC PANEL: CPT

## 2017-04-28 PROCEDURE — 96365 THER/PROPH/DIAG IV INF INIT: CPT

## 2017-04-28 PROCEDURE — 87088 URINE BACTERIA CULTURE: CPT

## 2017-04-28 PROCEDURE — 85730 THROMBOPLASTIN TIME PARTIAL: CPT

## 2017-04-28 PROCEDURE — 36415 COLL VENOUS BLD VENIPUNCTURE: CPT

## 2017-04-28 PROCEDURE — 25000003 PHARM REV CODE 250: Performed by: EMERGENCY MEDICINE

## 2017-04-28 PROCEDURE — 84443 ASSAY THYROID STIM HORMONE: CPT

## 2017-04-28 PROCEDURE — 83690 ASSAY OF LIPASE: CPT

## 2017-04-28 PROCEDURE — 83605 ASSAY OF LACTIC ACID: CPT

## 2017-04-28 PROCEDURE — 96361 HYDRATE IV INFUSION ADD-ON: CPT

## 2017-04-28 PROCEDURE — 81000 URINALYSIS NONAUTO W/SCOPE: CPT

## 2017-04-28 PROCEDURE — 87040 BLOOD CULTURE FOR BACTERIA: CPT | Mod: 59

## 2017-04-28 PROCEDURE — 63600175 PHARM REV CODE 636 W HCPCS: Performed by: EMERGENCY MEDICINE

## 2017-04-28 PROCEDURE — 82150 ASSAY OF AMYLASE: CPT

## 2017-04-28 PROCEDURE — 96372 THER/PROPH/DIAG INJ SC/IM: CPT

## 2017-04-28 PROCEDURE — 99285 EMERGENCY DEPT VISIT HI MDM: CPT | Mod: 25

## 2017-04-28 PROCEDURE — 93005 ELECTROCARDIOGRAM TRACING: CPT

## 2017-04-28 PROCEDURE — 11000001 HC ACUTE MED/SURG PRIVATE ROOM

## 2017-04-28 PROCEDURE — 85025 COMPLETE CBC W/AUTO DIFF WBC: CPT

## 2017-04-28 PROCEDURE — 96376 TX/PRO/DX INJ SAME DRUG ADON: CPT

## 2017-04-28 PROCEDURE — 83036 HEMOGLOBIN GLYCOSYLATED A1C: CPT

## 2017-04-28 PROCEDURE — 87186 SC STD MICRODIL/AGAR DIL: CPT | Mod: 59

## 2017-04-28 PROCEDURE — 96375 TX/PRO/DX INJ NEW DRUG ADDON: CPT

## 2017-04-28 PROCEDURE — 87077 CULTURE AEROBIC IDENTIFY: CPT

## 2017-04-28 PROCEDURE — 85610 PROTHROMBIN TIME: CPT

## 2017-04-28 RX ORDER — VALSARTAN 160 MG/1
160 TABLET ORAL DAILY
Status: ON HOLD | COMMUNITY
End: 2017-05-08 | Stop reason: HOSPADM

## 2017-04-28 RX ORDER — PANTOPRAZOLE SODIUM 40 MG/1
80 TABLET, DELAYED RELEASE ORAL
Status: COMPLETED | OUTPATIENT
Start: 2017-04-28 | End: 2017-04-28

## 2017-04-28 RX ORDER — HYDROCODONE BITARTRATE AND ACETAMINOPHEN 5; 325 MG/1; MG/1
1 TABLET ORAL EVERY 6 HOURS PRN
Status: ON HOLD | COMMUNITY
End: 2017-05-08 | Stop reason: HOSPADM

## 2017-04-28 RX ORDER — LABETALOL 100 MG/1
300 TABLET, FILM COATED ORAL EVERY 12 HOURS
Status: DISCONTINUED | OUTPATIENT
Start: 2017-04-28 | End: 2017-04-28

## 2017-04-28 RX ORDER — CEFEPIME HYDROCHLORIDE 1 G/50ML
1 INJECTION, SOLUTION INTRAVENOUS
Status: COMPLETED | OUTPATIENT
Start: 2017-04-28 | End: 2017-04-28

## 2017-04-28 RX ORDER — VALSARTAN 80 MG/1
160 TABLET ORAL DAILY
Status: DISCONTINUED | OUTPATIENT
Start: 2017-04-29 | End: 2017-04-28

## 2017-04-28 RX ORDER — HYDROMORPHONE HYDROCHLORIDE 2 MG/ML
1 INJECTION, SOLUTION INTRAMUSCULAR; INTRAVENOUS; SUBCUTANEOUS
Status: COMPLETED | OUTPATIENT
Start: 2017-04-28 | End: 2017-04-28

## 2017-04-28 RX ORDER — MAG HYDROX/ALUMINUM HYD/SIMETH 200-200-20
60 SUSPENSION, ORAL (FINAL DOSE FORM) ORAL
Status: COMPLETED | OUTPATIENT
Start: 2017-04-28 | End: 2017-04-28

## 2017-04-28 RX ORDER — METRONIDAZOLE 500 MG/100ML
500 INJECTION, SOLUTION INTRAVENOUS
Status: DISCONTINUED | OUTPATIENT
Start: 2017-04-28 | End: 2017-04-29 | Stop reason: ALTCHOICE

## 2017-04-28 RX ORDER — CEFEPIME HYDROCHLORIDE 1 G/50ML
1 INJECTION, SOLUTION INTRAVENOUS DAILY
Status: DISCONTINUED | OUTPATIENT
Start: 2017-04-29 | End: 2017-04-29 | Stop reason: ALTCHOICE

## 2017-04-28 RX ORDER — ACETAMINOPHEN 500 MG
1000 TABLET ORAL
Status: COMPLETED | OUTPATIENT
Start: 2017-04-28 | End: 2017-04-28

## 2017-04-28 RX ORDER — HYDRALAZINE HYDROCHLORIDE 20 MG/ML
10 INJECTION INTRAMUSCULAR; INTRAVENOUS EVERY 6 HOURS PRN
Status: DISCONTINUED | OUTPATIENT
Start: 2017-04-28 | End: 2017-05-08 | Stop reason: HOSPADM

## 2017-04-28 RX ORDER — DEXTROSE MONOHYDRATE 25 G/50ML
12.5 INJECTION, SOLUTION INTRAVENOUS
Status: DISCONTINUED | OUTPATIENT
Start: 2017-04-28 | End: 2017-05-06

## 2017-04-28 RX ORDER — CHOLECALCIFEROL (VITAMIN D3) 25 MCG
1000 TABLET ORAL DAILY
COMMUNITY
End: 2017-09-03

## 2017-04-28 RX ORDER — LEVOTHYROXINE SODIUM 50 UG/1
50 TABLET ORAL DAILY
Status: DISCONTINUED | OUTPATIENT
Start: 2017-04-29 | End: 2017-05-08 | Stop reason: HOSPADM

## 2017-04-28 RX ORDER — ACETAMINOPHEN 325 MG/1
650 TABLET ORAL EVERY 6 HOURS PRN
Status: DISCONTINUED | OUTPATIENT
Start: 2017-04-28 | End: 2017-05-02

## 2017-04-28 RX ORDER — DEXTROSE MONOHYDRATE AND SODIUM CHLORIDE 5; .9 G/100ML; G/100ML
INJECTION, SOLUTION INTRAVENOUS CONTINUOUS
Status: DISCONTINUED | OUTPATIENT
Start: 2017-04-28 | End: 2017-04-29

## 2017-04-28 RX ORDER — HYDROMORPHONE HYDROCHLORIDE 2 MG/ML
1 INJECTION, SOLUTION INTRAMUSCULAR; INTRAVENOUS; SUBCUTANEOUS
Status: DISCONTINUED | OUTPATIENT
Start: 2017-04-28 | End: 2017-05-02

## 2017-04-28 RX ORDER — GLUCAGON 1 MG
1 KIT INJECTION
Status: DISCONTINUED | OUTPATIENT
Start: 2017-04-28 | End: 2017-05-06

## 2017-04-28 RX ORDER — HYDRALAZINE HYDROCHLORIDE 20 MG/ML
20 INJECTION INTRAMUSCULAR; INTRAVENOUS
Status: DISCONTINUED | OUTPATIENT
Start: 2017-04-28 | End: 2017-04-28

## 2017-04-28 RX ORDER — PANTOPRAZOLE SODIUM 40 MG/1
40 TABLET, DELAYED RELEASE ORAL 2 TIMES DAILY
Status: DISCONTINUED | OUTPATIENT
Start: 2017-04-28 | End: 2017-05-08 | Stop reason: HOSPADM

## 2017-04-28 RX ORDER — MEROPENEM AND SODIUM CHLORIDE 500 MG/50ML
500 INJECTION, SOLUTION INTRAVENOUS
Status: DISCONTINUED | OUTPATIENT
Start: 2017-04-28 | End: 2017-04-29 | Stop reason: ALTCHOICE

## 2017-04-28 RX ORDER — HYDRALAZINE HYDROCHLORIDE 25 MG/1
50 TABLET, FILM COATED ORAL EVERY 8 HOURS
Status: DISCONTINUED | OUTPATIENT
Start: 2017-04-28 | End: 2017-04-28

## 2017-04-28 RX ORDER — DICYCLOMINE HYDROCHLORIDE 10 MG/ML
20 INJECTION INTRAMUSCULAR
Status: COMPLETED | OUTPATIENT
Start: 2017-04-28 | End: 2017-04-28

## 2017-04-28 RX ORDER — INSULIN ASPART 100 [IU]/ML
1-10 INJECTION, SOLUTION INTRAVENOUS; SUBCUTANEOUS EVERY 6 HOURS PRN
Status: DISCONTINUED | OUTPATIENT
Start: 2017-04-28 | End: 2017-05-06

## 2017-04-28 RX ADMIN — HYDROMORPHONE HYDROCHLORIDE 1 MG: 2 INJECTION INTRAMUSCULAR; INTRAVENOUS; SUBCUTANEOUS at 04:04

## 2017-04-28 RX ADMIN — PANTOPRAZOLE SODIUM 80 MG: 40 TABLET, DELAYED RELEASE ORAL at 04:04

## 2017-04-28 RX ADMIN — DICYCLOMINE HYDROCHLORIDE 20 MG: 10 INJECTION INTRAMUSCULAR at 05:04

## 2017-04-28 RX ADMIN — METRONIDAZOLE 500 MG: 500 INJECTION, SOLUTION INTRAVENOUS at 08:04

## 2017-04-28 RX ADMIN — MEROPENEM AND SODIUM CHLORIDE 500 MG: 500 INJECTION, SOLUTION INTRAVENOUS at 11:04

## 2017-04-28 RX ADMIN — HYDROMORPHONE HYDROCHLORIDE 1 MG: 2 INJECTION INTRAMUSCULAR; INTRAVENOUS; SUBCUTANEOUS at 08:04

## 2017-04-28 RX ADMIN — PANTOPRAZOLE SODIUM 40 MG: 40 TABLET, DELAYED RELEASE ORAL at 11:04

## 2017-04-28 RX ADMIN — SODIUM CHLORIDE 1000 ML: 0.9 INJECTION, SOLUTION INTRAVENOUS at 07:04

## 2017-04-28 RX ADMIN — CEFEPIME 1 G: 1 INJECTION, POWDER, FOR SOLUTION INTRAMUSCULAR; INTRAVENOUS at 05:04

## 2017-04-28 RX ADMIN — ACETAMINOPHEN 1000 MG: 500 TABLET ORAL at 04:04

## 2017-04-28 RX ADMIN — DEXTROSE AND SODIUM CHLORIDE: 5; .9 INJECTION, SOLUTION INTRAVENOUS at 11:04

## 2017-04-28 RX ADMIN — ALUMINUM HYDROXIDE, MAGNESIUM HYDROXIDE, AND SIMETHICONE 60 ML: 200; 200; 20 SUSPENSION ORAL at 04:04

## 2017-04-28 NOTE — ED PROVIDER NOTES
"Encounter Date: 4/28/2017    SCRIBE #1 NOTE: I, Yung Agrawal, am scribing for, and in the presence of, Joe Maradiaga MD. Other sections scribed: HPI, ROS.       History     Chief Complaint   Patient presents with    Abdominal Pain     pt c/o lower abd pain and nausea/vomiting since last night. Pt states that she has had to strain to have a bowel movement this morning.      Review of patient's allergies indicates:   Allergen Reactions    Bystolic [nebivolol]     Losartan     Diovan [valsartan] Rash     HPI Comments: CC: Multiple Medical Complaints  HPI: This 85 y.o. female with DM, CAD s/p stents, CHF, HTN, HLD, CKD, hypothyroidism and Hx of L hip replacement presents to the ED with numerous complaints. Pt c/o severe periumbilical abdominal pain that developed last night, chronic pain across lumbar back, frontal HA today that was severe and is now mild, nausea and vomiting (x2 last night, x2 today) since last night, constipation (strained with BM today), occasional cough, severe chronic L hip pain, and chronic spasms to bilateral lower extremities. Pt states that her L hip is becoming dislodged from joint, so she no longer walks. She denies fever, chest pain, SOB, dysuria.      The history is provided by the patient.     Past Medical History:   Diagnosis Date    Arthritis     Blood transfusion     CHF (congestive heart failure)     Coronary artery disease     Diabetes mellitus     General anesthetics causing adverse effect in therapeutic use     "woke up as they were putting me on the table"    Hyperlipidemia     Hypertension     Renal disorder     Thyroid disease      Past Surgical History:   Procedure Laterality Date    CATARACT EXTRACTION EXTRACAPSULAR W/ INTRAOCULAR LENS IMPLANTATION  Sept 2012    right eye    CORONARY ANGIOPLASTY WITH STENT PLACEMENT  2011    JOINT REPLACEMENT      Left total hip replacement in 1986    TONSILLECTOMY       Family History   Problem Relation Age of Onset    " Diabetes Daughter     Hypertension Daughter      Social History   Substance Use Topics    Smoking status: Never Smoker    Smokeless tobacco: None    Alcohol use No     Review of Systems   Constitutional: Negative for chills and fever.   HENT: Negative for ear pain and sore throat.    Eyes: Negative for pain.   Respiratory: Positive for cough (occasional). Negative for shortness of breath.    Cardiovascular: Negative for chest pain.   Gastrointestinal: Positive for abdominal pain (periumbilical), nausea and vomiting.   Genitourinary: Negative for dysuria.   Musculoskeletal: Positive for arthralgias (chronic, L hip), back pain (chronic, lumbar) and myalgias (chronic spasms to BLE).   Skin: Negative for wound.   Neurological: Positive for headaches (frontal). Negative for syncope.       Physical Exam   Initial Vitals   BP Pulse Resp Temp SpO2   04/28/17 1550 04/28/17 1550 04/28/17 1550 04/28/17 1550 04/28/17 1550   134/72 88 18 100.9 °F (38.3 °C) 96 %     Physical Exam  The patient was examined specifically for the following:   General:No significant distress, Good color, Warm and dry. Head and neck:Scalp atraumatic, Neck supple. Neurological:Appropriate conversation, Gross motor deficits. Eyes:Conjugate gaze, Clear corneas. ENT: No epistaxis. Cardiac: Regular rate and rhythm, Grossly normal heart tones. Pulmonary: Wheezing, Rales. Gastrointestinal: Abdominal tenderness, Abdominal distention. Musculoskeletal: Extremity deformity, Apparent pain with range of motion of the joints. Skin: Rash.   The findings on examination were normal except for the following: The patient has moderate low epigastric periumbilical abdominal tenderness.  There is no real guarding rebound mass or distention.  The lungs are clear and free wheezing rales of the rhonchi.  Heart tones are normal.  The patient is regular rate and rhythm.  There is no abdominal distention.  The patient's temperatures 100.9.  She has pale range of motion of the  left hip, really both lower extremities.  Lungs are clear and free wheezing rales of the rhonchi the neck is supple.  The patient has mild vague diffuse tenderness of her low back.  ED Course   Procedures  Labs Reviewed   TSH - Abnormal; Notable for the following:        Result Value    TSH 4.414 (*)     All other components within normal limits   LIPASE - Abnormal; Notable for the following:     Lipase >1000 (*)     All other components within normal limits   AMYLASE - Abnormal; Notable for the following:     Amylase 592 (*)     All other components within normal limits   COMPREHENSIVE METABOLIC PANEL - Abnormal; Notable for the following:     Glucose 69 (*)     BUN, Bld 25 (*)     Creatinine 1.5 (*)     Albumin 3.3 (*)     Total Bilirubin 7.7 (*)     Alkaline Phosphatase 150 (*)      (*)     ALT 64 (*)     eGFR if  36 (*)     eGFR if non  32 (*)     All other components within normal limits   PROTIME-INR - Abnormal; Notable for the following:     Prothrombin Time 13.4 (*)     INR 1.3 (*)     All other components within normal limits   CBC W/ AUTO DIFFERENTIAL - Abnormal; Notable for the following:     RBC 2.79 (*)     Hemoglobin 8.6 (*)     Hematocrit 24.1 (*)     RDW 15.8 (*)     Platelets 114 (*)     Gran # 8.6 (*)     Lymph # 0.2 (*)     Gran% 94.8 (*)     Lymph% 1.9 (*)     Mono% 3.0 (*)     All other components within normal limits   URINALYSIS - Abnormal; Notable for the following:     Appearance, UA Hazy (*)     Protein, UA 2+ (*)     Occult Blood UA 1+ (*)     Urobilinogen, UA 4.0-6.0 (*)     All other components within normal limits   URINALYSIS MICROSCOPIC - Abnormal; Notable for the following:     Bacteria, UA Moderate (*)     All other components within normal limits   CULTURE, URINE   CULTURE, BLOOD   CULTURE, BLOOD   LACTIC ACID, PLASMA   MAGNESIUM   APTT   T4, FREE   HEMOGLOBIN A1C   POCT GLUCOSE MONITORING CONTINUOUS     EKG Readings: (Independently  Interpreted)   This patient is in a normal sinus rhythm with a heart rate of 85.  The SD QRS and QT intervals are normal.  There are no significant ST segment or T-wave changes.  There is no definite evidence of acute myocardial infarction or malignant arrhythmia.       X-Rays:   Independently Interpreted Readings:   Other Readings:  CT of the abdomen reveals that stranding around the pancreas.  There are no dilated ducts.  The patient has gallstones.    Medical decision making: Given the above this patient has acute pancreatitis.  Her lipase is greater than 1000.  She also has an elevated bilirubin and alkaline phosphatase raising an issue of choledocholithiasis and ductal obstruction.  Gallstones of the most likely cause of this patient's pancreatitis.  The patient does have moderate bacteriuria.  She'll be treated with meropenem.  We will consult gastroenterology and general surgery.  We will treat the patient for pain.  Sepsis is considered but seems unlikely.  The patient does have a significant anemia.  The patient has an elevated bilirubin I'm concerned about cholestasis.             Scribe Attestation:   Scribe #1: I performed the above scribed service and the documentation accurately describes the services I performed. I attest to the accuracy of the note.    Attending Attestation:           Physician Attestation for Scribe:  Physician Attestation Statement for Scribe #1: I, Joe Maradiaga MD, reviewed documentation, as scribed by Yung Agrawal in my presence, and it is both accurate and complete.                 ED Course     Clinical Impression:   The encounter diagnosis was Acute biliary pancreatitis, unspecified complication status.          Joe Maradiaga MD  04/28/17 2005

## 2017-04-28 NOTE — ED TRIAGE NOTES
Pt arrived to Ed due to lower back pain, and constipation. Pt states having last BM yesterday. States having abdominal pain with nausea. Pt reports having Ha as well.

## 2017-04-28 NOTE — IP AVS SNAPSHOT
[place] [address] [place] [address]  Phone: [phone]           Patient Discharge Instructions   Our goal is to set you up for success. This packet includes information on your condition, medications, and your home care.  It will help you care for yourself to prevent having to return to the hospital.     Please ask your nurse if you have any questions.      There are many details to remember when preparing to leave the hospital. Here is what you will need to do:    1. Take your medicine. If you are prescribed medications, review your Medication List on the following pages. You may have new medications to  at the pharmacy and others that you'll need to stop taking. Review the instructions for how and when to take your medications. Talk with your doctor or nurses if you are unsure of what to do.     2. Go to your follow-up appointments. Specific follow-up information is listed in the following pages. Your may be contacted by a nurse or clinical provider about future appointments. Be sure we have all of the phone numbers to reach you. Please contact your provider's office if you are unable to make an appointment.     3. Watch for warning signs. Your doctor or nurse will give you detailed warning signs to watch for and when to call for assistance. These instructions may also include educational information about your condition. If you experience any of warning signs to your health, call your doctor.               ** Verify the list of medication(s) below is accurate and up to date. Carry this with you in case of emergency. If your medications have changed, please notify your healthcare provider.             Medication List      START taking these medications        Additional Info                      acetaminophen 325 MG tablet   Commonly known as:  TYLENOL   Refills:  0   Dose:  650 mg    Last time this was given:  1,000 mg on 4/28/2017  4:30 PM   Instructions:  Take 2  tablets (650 mg total) by mouth every 6 (six) hours as needed (Mild pain/Temp>100.4).     Begin Date    AM    Noon    PM    Bedtime       ciprofloxacin HCl 500 MG tablet   Commonly known as:  CIPRO   Quantity:  14 tablet   Refills:  0   Dose:  500 mg    Instructions:  Take 1 tablet (500 mg total) by mouth 2 (two) times daily.     Begin Date    AM    Noon    PM    Bedtime       polyethylene glycol 17 gram Pwpk   Commonly known as:  GLYCOLAX   Quantity:  24 each   Refills:  0   Dose:  17 g    Last time this was given:  17 g on 5/8/2017 12:33 PM   Instructions:  Take 17 g by mouth 2 (two) times daily as needed (Constipation).     Begin Date    AM    Noon    PM    Bedtime       tramadol 50 mg tablet   Commonly known as:  ULTRAM   Quantity:  20 tablet   Refills:  0   Dose:  50 mg    Instructions:  Take 1 tablet (50 mg total) by mouth every 6 (six) hours as needed (Moderate to severe pain).     Begin Date    AM    Noon    PM    Bedtime         CHANGE how you take these medications        Additional Info                      furosemide 40 MG tablet   Commonly known as:  LASIX   Quantity:  15 tablet   Refills:  3   Dose:  20 mg   What changed:  how much to take    Last time this was given:  20 mg on 5/8/2017  9:05 AM   Instructions:  Take 0.5 tablets (20 mg total) by mouth 2 (two) times daily.     Begin Date    AM    Noon    PM    Bedtime         CONTINUE taking these medications        Additional Info                      allopurinol 100 MG tablet   Commonly known as:  ZYLOPRIM   Quantity:  30 tablet   Refills:  2   Dose:  100 mg    Last time this was given:  100 mg on 5/8/2017  9:05 AM   Instructions:  Take 1 tablet (100 mg total) by mouth once daily.     Begin Date    AM    Noon    PM    Bedtime       aspirin 81 MG EC tablet   Commonly known as:  ECOTRIN   Refills:  0   Dose:  81 mg    Instructions:  Take 81 mg by mouth once daily.     Begin Date    AM    Noon    PM    Bedtime       ferrous sulfate 325 mg (65 mg iron)  Tab tablet   Quantity:  30 tablet   Refills:  3   Dose:  325 mg    Instructions:  Take 1 tablet (325 mg total) by mouth daily with breakfast.     Begin Date    AM    Noon    PM    Bedtime       flaxseed oil Oil   Refills:  0    Instructions:  by Misc.(Non-Drug; Combo Route) route Daily.     Begin Date    AM    Noon    PM    Bedtime       hydrALAZINE 50 MG tablet   Commonly known as:  APRESOLINE   Refills:  3    Last time this was given:  50 mg on 5/8/2017  4:36 PM   Instructions:  TAKE 1 TABLET 3 TIMES DAILY.     Begin Date    AM    Noon    PM    Bedtime       labetalol 300 MG tablet   Commonly known as:  NORMODYNE   Refills:  3    Last time this was given:  300 mg on 5/8/2017  9:04 AM   Instructions:  TAKE 1 TABLET EVERY 12 HOURS DAILY.     Begin Date    AM    Noon    PM    Bedtime       levothyroxine 50 MCG tablet   Commonly known as:  SYNTHROID   Quantity:  90 tablet   Refills:  0   Dose:  50 mcg    Last time this was given:  50 mcg on 5/8/2017  6:00 AM   Instructions:  Take 1 tablet (50 mcg total) by mouth once daily.     Begin Date    AM    Noon    PM    Bedtime       magnesium oxide 250 mg Tab   Commonly known as:  MAG-OX   Quantity:  30 tablet   Refills:  2   Dose:  250 mg    Instructions:  Take 1 tablet (250 mg total) by mouth once daily.     Begin Date    AM    Noon    PM    Bedtime       nystatin cream   Commonly known as:  MYCOSTATIN   Quantity:  30 g   Refills:  2    Instructions:  Apply topically 2 (two) times daily.     Begin Date    AM    Noon    PM    Bedtime       triamcinolone acetonide 0.1% 0.1 % cream   Commonly known as:  KENALOG   Quantity:  80 g   Refills:  0    Instructions:  Apply topically 2 (two) times daily.     Begin Date    AM    Noon    PM    Bedtime       vitamin D 1000 units Tab   Refills:  0   Dose:  1000 Units    Instructions:  Take 1,000 Units by mouth once daily.     Begin Date    AM    Noon    PM    Bedtime       vitamin E 400 UNIT capsule   Refills:  0   Dose:  400 Units     Instructions:  Take 400 Units by mouth once daily.     Begin Date    AM    Noon    PM    Bedtime         STOP taking these medications     hydrocodone-acetaminophen 5-325mg 5-325 mg per tablet   Commonly known as:  NORCO       valsartan 160 MG tablet   Commonly known as:  DIOVAN            Where to Get Your Medications      You can get these medications from any pharmacy     Bring a paper prescription for each of these medications     ciprofloxacin HCl 500 MG tablet    furosemide 40 MG tablet    polyethylene glycol 17 gram Pwpk    tramadol 50 mg tablet       You don't need a prescription for these medications     acetaminophen 325 MG tablet                  Please bring to all follow up appointments:    1. A copy of your discharge instructions.  2. All medicines you are currently taking in their original bottles.  3. Identification and insurance card.    Please arrive 15 minutes ahead of scheduled appointment time.    Please call 24 hours in advance if you must reschedule your appointment and/or time.        Your Scheduled Appointments     May 11, 2017  1:40 PM CDT   Consult with [facility] MD   [facility] ([facility] )    [address]  [place] [address]   [phone]            May 12, 2017  9:20 AM CDT   Hospital Follow Up with Juan Alberto Prieto MD   [facility] ([facility])    [address] [facility] [address]   [phone]            May 18, 2017 10:50 AM CDT   Post Op-OCC with Jalen Jacobo MD   [facility] ([facility])    [address] [facility] [address]  [facility] [address]   [phone]              Follow-up Information     Follow up with Juan Ablerto Prieto MD On 5/12/2017.    Specialty:  Internal Medicine    Why:  out patient services:  9:20 a.m,. follow up from the hospital    Contact information:    [address] [facility] [address]  [phone]          Follow up with Jalen Jacobo MD On 5/18/2017.     "Specialties:  General Surgery, Bariatrics    Why:  out patient services:  10:00 a.m. follow up from the hospital    Contact information:    [address]  [address]  [facility]  [place] [address]  [phone]          Follow up with [facility] On 5/9/2017.    Specialties:  DME Provider, Home Health Services    Why:  Home Health:      Contact information:    [address]  [address]  [place] [address]  [phone]          Follow up with [facility]    Specialty:  DME Provider    Why:  DME:  johnathan lift and wheel chair    Contact information:    [address] [facility]  [address]  [facility] [address]  [phone]          Discharge Instructions     Future Orders    Activity as tolerated     Call MD for:  difficulty breathing or increased cough     Call MD for:  increased confusion or weakness     Call MD for:  persistent dizziness, light-headedness, or visual disturbances     Call MD for:  persistent nausea and vomiting or diarrhea     Call MD for:  redness, tenderness, or signs of infection (pain, swelling, redness, odor or green/yellow discharge around incision site)     Call MD for:  severe uncontrolled pain     Call MD for:  temperature >100.4     Diet general     Questions:    Total calories:      Fat restriction, if any:      Protein restriction, if any:      Na restriction, if any:      Fluid restriction:      Additional restrictions:      PATIENT (JOHNATHAN) LIFT FOR HOME USE     Questions:    Height:  5' 2" (1.575 m)    Weight:  59.2 kg (130 lb 8.6 oz)    Does patient have medical equipment at home?:  rollator Comment - scooter / scooter / scooter    bedside commode    shower chair    Length of need (1-99 months):  99    WHEELCHAIR FOR HOME USE     Questions:    Hours in W/C per day:  8    Type of Wheelchair:  Standard    Size(Width):  18"(STD adult)    Leg Support:  STD footrests    Arm Height:      Desired seat depth:      Back height:      Lower leg length:      Actual seat " "depth:      Lap Belt:  Velcro    Accessories:      Cushion:  Basic    Justification for cushion:      Height:  5' 2" (1.575 m)    Weight:  59.2 kg (130 lb 8.6 oz)    Does patient have medical equipment at home?:  rollator Comment - scooter / scooter / scooter    bedside commode    shower chair    Length of need (1-99 months):  99    Please check all that apply:  Caregiver is capable and willing to operate wheelchair safely.        Primary Diagnosis     Your primary diagnosis was:  Pancreas Inflammation      Admission Information     Date & Time Provider Department CSN    4/28/2017  3:52 PM Max Faria MD [facility] [id number]      Care Providers     Provider Role Specialty Primary office phone    Max Faria MD Attending Provider Hospitalist [phone]    Jalen Jacobo MD Consulting Physician  General Surgery [phone]    Barrie Hui MD Consulting Physician  Gastroenterology [phone]    Ibrahima Linda MD Consulting Physician  Cardiology [phone]    Barrie Hui MD Surgeon  Gastroenterology [phone]    Jalen Jacobo MD Surgeon  General Surgery [phone]      Important Medicare Message          Most Recent Value    Important Message from Medicare Regarding Discharge Appeal Rights  Given to patient/caregiver, Explained to patient/caregiver, Signed/date by patient/caregiver, Other (comments) [DTR, Liana] yes 05/08/2017 1048      Your Vitals Were     BP Pulse Temp Resp Height Weight    167/72 73 98.2 °F (36.8 °C) (Oral) 18 5' 2" (1.575 m) 59.2 kg (130 lb 8.6 oz)    SpO2 BMI             96% 23.88 kg/m2         Recent Lab Values        11/10/2012 4/28/2017                        4:40 AM  4:56 PM          A1C 5.1 &lt;4.0 (L)          Comment for A1C at  4:56 PM on 4/28/2017:  According to ADA guidelines, hemoglobin A1C <7.0% represents  optimal control in non-pregnant diabetic patients.  Different  metrics may apply to specific populations.   Standards of " Medical Care in Diabetes - 2016.  For the purpose of screening for the presence of diabetes:  <5.7%     Consistent with the absence of diabetes  5.7-6.4%  Consistent with increasing risk for diabetes   (prediabetes)  >or=6.5%  Consistent with diabetes  Currently no consensus exists for use of hemoglobin A1C  for diagnosis of diabetes for children.        Pending Labs     Order Current Status    Specimen to Pathology - Surgery In process    Prepare RBC 1 Unit Preliminary result    Prepare RBC 1 Unit Preliminary result    Prepare RBC 1 Unit Preliminary result      Allergies as of 5/8/2017        Reactions    Bystolic [Nebivolol]     Losartan     Diovan [Valsartan] Rash      [facility]     [facility] Nurse Care Line - 24/7 Assistance  Unless otherwise directed by your provider, please contact [facility], our nurse care line that is available for 24/7 assistance.     Registered nurses in the [facility] provide clinical advisement, health education, appointment booking, and other advisory services.  Call for this free service at [phone].        Advance Directives     An advance directive is a document which, in the event you are no longer able to make decisions for yourself, tells your healthcare team what kind of treatment you do or do not want to receive, or who you would like to make those decisions for you.  If you do not currently have an advance directive, [facility] encourages you to create one.  For more information call:  [phone]), [phone] ([phone]),  or log on to [email or web address].        Language Assistance Services     ATTENTION: Language assistance services are available, free of charge. Please call [phone].      ATENCIÓN: Si habla español, tiene a olvera disposición servicios gratuitos de asistencia lingüística. Llame al [phone].     CHÚ Ý: N?u b?n nói Ti?ng Vi?t, có các d?ch v? h? tr? ngôn ng? mi?n phí dành cho b?n. G?i s?  [phone].        Blood Transfusion Reaction Signs and Symptoms     The blood you have received has been matched for you as carefully as possible. Most patients who receive a blood transfusion do not experience problems. However, there can be a delayed reaction that happens a few weeks after your blood transfusion. Contact your physician immediately if you experience any NEW SYMPTOMS listed below:     Fever greater than 100.4 degrees    Chills   Yellow color to your skin or eyes(Jaundice)   Back pain, chest pain, or pain at the infusion site   Weakness (more than usual)   Discomfort or uneasiness more than usual (Malaise)   Nausea or vomiting   Shortness of breath, wheezing, or coughing   Higher or lower blood pressure than normal   Skin rash, itching, skin redness, or localized swelling (example: hands or feet)   Urinating less than normal   Urine appears reddish or orange and is darker than normal      Remember that some these signs may already exist for you--such as having chronic back pain or high blood pressure. You only need to look for and report to your doctor any new occurrences since your blood transfusion that are of concern.        Chronic Kindey Disease Education             Diabetes Discharge Instructions                                   [facility] Sign-Up     Activating your [facility] account is as easy as 1-2-3!     1) Visit [email or web address], select Sign Up Now, enter this activation code and your date of birth, then select Next.  [email or web address]  Expires: 6/22/2017  7:31 PM      2) Create a username and password to use when you visit [facility] in the future and select a security question in case you lose your password and select Next.    3) Enter your e-mail address and click Sign Up!    Additional Information  If you have questions, please e-mail [email or web address] or call [phone] to talk to our [facility] staff. Remember, [facility] is NOT to be used for urgent needs. For  medical emergencies, dial 911.          [facility] complies with applicable Federal civil rights laws and does not discriminate on the basis of race, color, national origin, age, disability, or sex.

## 2017-04-29 PROBLEM — A41.9 SEPSIS: Status: ACTIVE | Noted: 2017-04-29

## 2017-04-29 LAB
ALBUMIN SERPL BCP-MCNC: 2.9 G/DL
ALP SERPL-CCNC: 121 U/L
ALT SERPL W/O P-5'-P-CCNC: 53 U/L
AMYLASE SERPL-CCNC: 606 U/L
ANION GAP SERPL CALC-SCNC: 9 MMOL/L
AST SERPL-CCNC: 75 U/L
BASOPHILS # BLD AUTO: 0 K/UL
BASOPHILS NFR BLD: 0 %
BILIRUB SERPL-MCNC: 7.7 MG/DL
BUN SERPL-MCNC: 29 MG/DL
CALCIUM SERPL-MCNC: 8.3 MG/DL
CHLORIDE SERPL-SCNC: 109 MMOL/L
CO2 SERPL-SCNC: 23 MMOL/L
CREAT SERPL-MCNC: 2.1 MG/DL
DIFFERENTIAL METHOD: ABNORMAL
EOSINOPHIL # BLD AUTO: 0 K/UL
EOSINOPHIL NFR BLD: 0.5 %
ERYTHROCYTE [DISTWIDTH] IN BLOOD BY AUTOMATED COUNT: 16.2 %
EST. GFR  (AFRICAN AMERICAN): 24 ML/MIN/1.73 M^2
EST. GFR  (NON AFRICAN AMERICAN): 21 ML/MIN/1.73 M^2
GLUCOSE SERPL-MCNC: 103 MG/DL
HCT VFR BLD AUTO: 22.6 %
HGB BLD-MCNC: 7.9 G/DL
LIPASE SERPL-CCNC: 375 U/L
LYMPHOCYTES # BLD AUTO: 0.3 K/UL
LYMPHOCYTES NFR BLD: 7.7 %
MCH RBC QN AUTO: 30.7 PG
MCHC RBC AUTO-ENTMCNC: 35 %
MCV RBC AUTO: 88 FL
MONOCYTES # BLD AUTO: 0.3 K/UL
MONOCYTES NFR BLD: 6.2 %
NEUTROPHILS # BLD AUTO: 3.5 K/UL
NEUTROPHILS NFR BLD: 85.4 %
PLATELET # BLD AUTO: 96 K/UL
PMV BLD AUTO: 11.1 FL
POCT GLUCOSE: 103 MG/DL (ref 70–110)
POCT GLUCOSE: 45 MG/DL (ref 70–110)
POCT GLUCOSE: 57 MG/DL (ref 70–110)
POCT GLUCOSE: 65 MG/DL (ref 70–110)
POCT GLUCOSE: 74 MG/DL (ref 70–110)
POTASSIUM SERPL-SCNC: 4.6 MMOL/L
PROT SERPL-MCNC: 5.8 G/DL
RBC # BLD AUTO: 2.57 M/UL
SODIUM SERPL-SCNC: 141 MMOL/L
WBC # BLD AUTO: 4.05 K/UL

## 2017-04-29 PROCEDURE — 83690 ASSAY OF LIPASE: CPT

## 2017-04-29 PROCEDURE — 36415 COLL VENOUS BLD VENIPUNCTURE: CPT

## 2017-04-29 PROCEDURE — 82150 ASSAY OF AMYLASE: CPT

## 2017-04-29 PROCEDURE — 11000001 HC ACUTE MED/SURG PRIVATE ROOM

## 2017-04-29 PROCEDURE — 63600175 PHARM REV CODE 636 W HCPCS: Performed by: HOSPITALIST

## 2017-04-29 PROCEDURE — 25000003 PHARM REV CODE 250: Performed by: HOSPITALIST

## 2017-04-29 PROCEDURE — 85025 COMPLETE CBC W/AUTO DIFF WBC: CPT

## 2017-04-29 PROCEDURE — 80053 COMPREHEN METABOLIC PANEL: CPT

## 2017-04-29 PROCEDURE — 25000003 PHARM REV CODE 250: Performed by: EMERGENCY MEDICINE

## 2017-04-29 RX ORDER — LABETALOL 100 MG/1
300 TABLET, FILM COATED ORAL EVERY 12 HOURS
Status: DISCONTINUED | OUTPATIENT
Start: 2017-04-29 | End: 2017-05-08 | Stop reason: HOSPADM

## 2017-04-29 RX ORDER — CIPROFLOXACIN 2 MG/ML
400 INJECTION, SOLUTION INTRAVENOUS
Status: DISCONTINUED | OUTPATIENT
Start: 2017-04-29 | End: 2017-05-08 | Stop reason: HOSPADM

## 2017-04-29 RX ORDER — SODIUM CHLORIDE, SODIUM LACTATE, POTASSIUM CHLORIDE, CALCIUM CHLORIDE 600; 310; 30; 20 MG/100ML; MG/100ML; MG/100ML; MG/100ML
INJECTION, SOLUTION INTRAVENOUS CONTINUOUS
Status: DISCONTINUED | OUTPATIENT
Start: 2017-04-29 | End: 2017-04-30

## 2017-04-29 RX ADMIN — LABETALOL HYDROCHLORIDE 300 MG: 100 TABLET, FILM COATED ORAL at 12:04

## 2017-04-29 RX ADMIN — PIPERACILLIN, TAZOBACTAM 4.5 G: 4; .5 INJECTION, POWDER, LYOPHILIZED, FOR SOLUTION INTRAVENOUS at 08:04

## 2017-04-29 RX ADMIN — LABETALOL HYDROCHLORIDE 300 MG: 100 TABLET, FILM COATED ORAL at 09:04

## 2017-04-29 RX ADMIN — CIPROFLOXACIN 400 MG: 2 INJECTION, SOLUTION INTRAVENOUS at 06:04

## 2017-04-29 RX ADMIN — CEFEPIME 1 G: 1 INJECTION, POWDER, FOR SOLUTION INTRAMUSCULAR; INTRAVENOUS at 05:04

## 2017-04-29 RX ADMIN — SODIUM CHLORIDE, SODIUM LACTATE, POTASSIUM CHLORIDE, AND CALCIUM CHLORIDE: .6; .31; .03; .02 INJECTION, SOLUTION INTRAVENOUS at 08:04

## 2017-04-29 RX ADMIN — PANTOPRAZOLE SODIUM 40 MG: 40 TABLET, DELAYED RELEASE ORAL at 09:04

## 2017-04-29 RX ADMIN — METRONIDAZOLE 500 MG: 500 INJECTION, SOLUTION INTRAVENOUS at 03:04

## 2017-04-29 RX ADMIN — PIPERACILLIN, TAZOBACTAM 4.5 G: 4; .5 INJECTION, POWDER, LYOPHILIZED, FOR SOLUTION INTRAVENOUS at 07:04

## 2017-04-29 RX ADMIN — DEXTROSE MONOHYDRATE 12.5 G: 25 INJECTION, SOLUTION INTRAVENOUS at 07:04

## 2017-04-29 RX ADMIN — LEVOTHYROXINE SODIUM 50 MCG: 50 TABLET ORAL at 05:04

## 2017-04-29 NOTE — PROGRESS NOTES
[facility] Hospital Medicine  Progress Note    Patient Name: Christal Goodson  MRN: [id number]  Patient Class: IP- Inpatient   Admission Date: 4/28/2017  Length of Stay: 1 days  Attending Physician: Tita Simpson MD  Primary Care Provider: Juan Alberto Prieto MD        Subjective:     Principal Problem:Acute biliary pancreatitis    HPI:  Christal Goodson is a 85 y.o. female that (in part)  has a past medical history of Arthritis; Blood transfusion; CHF (congestive heart failure); Coronary artery disease; Diabetes mellitus; General anesthetics causing adverse effect in therapeutic use; Hyperlipidemia; Hypertension; Renal disorder; and Thyroid disease. Presents to [facility] Emergency Department complaining of abdominal pain as described below in detail.   Description of symptoms    Location:  Periumbilical  Onset:  Acute onset last night  Character:  Severe deep aching cramping pain  Frequency:  First episode  Duration:  Constant  Associated Symptoms:  Headache, nausea, vomiting, constipation, fever, cough  Radiation:  Radiates to epigastrium  Exacerbating factors:  Palpation, vomiting  Relieving factors:  Antiemetics given in the ED along with the medicine     In the emergency department routine laboratory studies were obtained followed by CT of the abdomen which demonstrated that the pancreas is mildly enlarged with peripancreatic inflammatory stranding in addition to gallstones I would prefer to see some of the later asked.  Lipase levels were elevated.  Right upper quadrant ultrasound obtained.  Concern for gallstone pancreatitis.       Hospital Course:  Ms. Goodson was admitted with biliary pancreatitis and sepsis with bacteremia. Pt was treated with Zosyn/Cipro, given IVF with good response in BP. Pt was treated with bowel rest, pain control and supportive care. GI and Surgery consulted, and lipase/LFTs monitoring.    Interval History: Pt report improvement in midepigastric  abdominal pain as well as decrease in back pain.    Review of Systems   Constitutional: Negative for chills.   Respiratory: Negative for shortness of breath.    Cardiovascular: Negative for chest pain.   Gastrointestinal: Positive for abdominal pain and constipation. Negative for nausea and vomiting.     Objective:     Vital Signs (Most Recent):  Temp: 98.2 °F (36.8 °C) (04/29/17 0816)  Pulse: 66 (04/29/17 0816)  Resp: 18 (04/29/17 0816)  BP: (!) 182/83 (04/29/17 0816)  SpO2: 97 % (04/29/17 0816) Vital Signs (24h Range):  Temp:  [98 °F (36.7 °C)-100.9 °F (38.3 °C)] 98.2 °F (36.8 °C)  Pulse:  [58-88] 66  Resp:  [18] 18  SpO2:  [93 %-99 %] 97 %  BP: ()/(50-83) 182/83     Weight: 59.2 kg (130 lb 9.5 oz)  Body mass index is 23.89 kg/(m^2).    Intake/Output Summary (Last 24 hours) at 04/29/17 1152  Last data filed at 04/29/17 0600   Gross per 24 hour   Intake          3647.92 ml   Output                0 ml   Net          3647.92 ml      Physical Exam   Constitutional: She appears well-developed and well-nourished.   HENT:   Head: Normocephalic and atraumatic.   Eyes: EOM are normal. Pupils are equal, round, and reactive to light.   +scleral icterus   Neck: Normal range of motion. Neck supple.   Cardiovascular: Normal rate and regular rhythm.    Pulmonary/Chest: Effort normal and breath sounds normal.   Abdominal:   Soft, mild +TTP in the mid-epigastric region, no rebound or guarding, ND, +BS       Significant Labs: All pertinent labs within the past 24 hours have been reviewed.    Significant Imaging: I have reviewed and interpreted all pertinent imaging results/findings within the past 24 hours.    Assessment/Plan:      * Acute biliary pancreatitis  Pt with biliary pancreatitis, clinically improving with bowel rest and lipase trending down. Will change IVF to lactated ringers but continue NPO status, supportive care and pain control. Continue monitoring of LFTS and lipase daily, appreciate GI and Surgery input  with plans for ERCP on Monday and surgery soon thereafter. Continue Zosyn/Cipro given sepsis with bacteremia which will be discussed below.    Sepsis  Bacteremia  Assessment and Plan:  Pt met criteria for sepsis with fever, and hypotension. Pt with likely biliary source of bacteremia with GNR. Continue Zosyn/Cipro and plan for repeat blood cultures in am.    CAD (coronary artery disease)  Pt is without chest pain and initially blood pressures were low, will restart BP medications as tolerated with restarting of labetolol today.    Essential hypertension, benign  Initially held all anti-HTN due to hypotension, but BP now becoming hypertensive, resume labetolol.    Hypercholesteremia  Given abnormal LFTs, will hold statin for now.    Chronic kidney disease, stage III (moderate)  ARF   Assessment and Plan:  Pt with worsening creatinine likely due to sepsis with hypotension, continue IVF and will repeat labs in am.    Diabetes mellitus type 2 in nonobese  Closely monitor blood glucose and make adjustments to insulin regimen as necessary. HgbA1C pending.    Hypothyroidism  Clinically euthyroid, continue home regimen.     Pulmonary hypertension  No acute issues. Monitor.      VTE Risk Mitigation         Ordered     Medium Risk of VTE  Once      04/28/17 2228     Place sequential compression device  Until discontinued      04/28/17 2228          Tita Simpson MD  Department of Hospital Medicine   [facility]

## 2017-04-29 NOTE — ASSESSMENT & PLAN NOTE
Bacteremia  Assessment and Plan:  Pt met criteria for sepsis with fever, and hypotension. Pt with likely biliary source of bacteremia with GNR. Continue Zosyn/Cipro and plan for repeat blood cultures in am.

## 2017-04-29 NOTE — ASSESSMENT & PLAN NOTE
Closely monitor blood glucose and make adjustments to insulin regimen as necessary. HgbA1C pending.

## 2017-04-29 NOTE — ASSESSMENT & PLAN NOTE
Acute pancreatitis; likely biliary etiology  · Evidenced by hx, physical exam, imaging, and laboratory studies  · Differential of common etiology includes: alcohol, gallstones, metabolic disorders (hereditary pancreatitis, hypercalcemia, elevated triglycerides, malnutrition), abdominal trauma, penetrating ulcers, carcinoma of the head of pancreas, drugs (diuretics, gliptins, tetracycline, sulfonamides, estrogens, azathioprine and mercaptopurine, pentamidine, salicylates, steroids),  infections  (mumps, viral hepatitis, coxsackie B virus, cytomegalovirus, Mycoplasma pneumoniae, Ascaris), structural abnormalities (choledochocele, pancreas divisum), or autoimmune pancreatitis    · Lipase = >1,000    Pedro's Criteria @ TIME OF ADMISSION  · Age >55 = yes  · WBC >16K = no  · BG >200 = no  · AST > 250 = no  · LDH > 350 = not ordered at the time of admission    Conclusion:  Patient has less than three criteria; does not meet ICU admission criteria.    · NPO / Bowel rest  · IV fluids  · Monitor serial lipase levels  · GI consult for possible ERCP  · General surgery consult for gallstones

## 2017-04-29 NOTE — ASSESSMENT & PLAN NOTE
Pt is without chest pain and initially blood pressures were low, will restart BP medications as tolerated with restarting of labetolol today.

## 2017-04-29 NOTE — ED NOTES
General surgery consult called in to General Surgery. Spoke with Robb and Dr. Jacobo. He will see pt in the am.

## 2017-04-29 NOTE — CONSULTS
CC - abdominal pain    History of present illness: The patient is an 85 year old female with a history of HTN, DM, CHF and CKD presenting with abdominal pain, nausea and vomiting.  She reports acute onset of severe, constant, aching periumbilical pain with radiation to her back that began 2 days ago.  Associated nausea and non-bloody emesis.  No fever or chills.  She developed constipation over the last week associated with some straining.  No melena or hematochezia.  She denies a history of similar symptoms.  CT abd/pelvis consistent with acute, uncomplicated pancreatitis.  US shows cholelithiasis but no cholecystitis or biliary ductal dilatation.  Bilirubin = 7.7.  She denies ETOH use.      Past medical history:  Hypertension.  Diabetes mellitus, type 2.  Congestive heart failure.  Chronic kidney disease.    Surgical history:  Tonsillectomy.  Total hip replacement, left.  Cataract extraction, right eye.  Percutaneous coronary intervention with stent placement.    Social history:  Tobacco use: denies.  Alcohol use: denies.  Illicit drug use: denies.    Family history:   Sister recently  from either liver or colon cancer.    Medications:   aspirin (ECOTRIN) 81 MG EC tablet Take 81 mg by mouth once daily.    flaxseed oil Oil by Misc.(Non-Drug; Combo Route) route Daily.    furosemide (LASIX) 40 MG tablet Take 1 tablet (40 mg total) by mouth 2 (two) times daily.    hydrALAZINE (APRESOLINE) 50 MG tablet TAKE 1 TABLET 3 TIMES DAILY.    labetalol (NORMODYNE) 300 MG tablet TAKE 1 TABLET EVERY 12 HOURS DAILY.    vitamin E 400 UNIT capsule Take 400 Units by mouth once daily.    allopurinol (ZYLOPRIM) 100 MG tablet Take 1 tablet (100 mg total) by mouth once daily.    ferrous sulfate 325 mg (65 mg iron) Tab Take 1 tablet (325 mg total) by mouth daily with breakfast.    levothyroxine (SYNTHROID) 50 MCG tablet Take 1 tablet (50 mcg total) by mouth once daily.    magnesium oxide (MAG-OX) 250 mg Tab Take 1 tablet  (250 mg total) by mouth once daily.    nystatin (MYCOSTATIN) cream Apply topically 2 (two) times daily.    triamcinolone acetonide 0.1% (KENALOG) 0.1 % cream Apply topically 2 (two) times daily.     Allergies:   Bystolic.  Losartan.  Diovan.    Review of systems:  CONSTITUTIONAL: Negative for weakness, fever, weight loss, weight gain.  HEENT: Eyes: Negative for double/blurred vision. Ears: Negative pain or loss of hearing. Nose:Negative for nasal congestion. Negative for rhinorrhea Mouth: Negative for dry mouth/pain Throat: Negative for masses or hoarseness.  CARDIOVASCULAR: Negative for chest pain or palpitations.  RESPIRATORY: Negative for SOB, wheezes  GASTROINTESTINAL: See HPI  GENITOURINARY: Negative for dysuria/hematuria.  MUSCULOSKELETAL:+ joint pain (chronic), + back pain.  No muscle pain.  SKIN: Negative for rashes/lesions.  NEUROLOGIC: Negative for headaches, numbness/tingling..  ENDOCRINE: + diabetes.  HEMATOLOGIC: Negative for anemia or blood dyscrasias.    Physical exam:  General: Elderly female in no acute distress.  HEENT: PERRLA, EOMI, anicteric sclera  Cardiovascular: Non-displaced PMI, normal s1/s2, RRR, no M/G/R  Lungs: CTAB, normal excursion  Abdomen: soft, mild epigastric TTP, normoactive BS.  No rebound or guarding.  Extremities: No C/C/E, 2+ dorsalis pedis pulses bilaterally  Neuro: CN II-XII intact, no asterixes or tremors  Psych: AA&O x 3  Skin: No jaundice, rashes or lesions  Musculoskeletal: 5/5 strength bilaterally      Recent Labs  Lab 04/29/17  0328   WBC 4.05   RBC 2.57*   HGB 7.9*   HCT 22.6*   PLT 96*   MCV 88   MCH 30.7   MCHC 35.0   ALT= 64 --> 53  AST= 105 --> 75  ALP= 150 --> 121  Tbili= 7.7 --> 7.7  Lipase= > 1,000 --> 375    Imaging:   CT abd/pelvis reviewed.  US abd reviewed.    Impression: 85 year old female with a history of HTN, DM, CHF and CKD presenting with abdominal pain, nausea and vomiting.     Plan:  1.  Biliary pancreatitis - diagnosed based on imaging, labs  (lipase > 1,000 on admission) and clinical presentation.  No ETOH.  No new meds.  US shows cholelithiasis but no clear evidence of choledocholithiasis or biliary ductal dilatation (CBD 6 mm).  Tbili 7.7.  White count is normal and she is afebrile with a benign abdominal exam (no concerning signs for cholangitis).  Will plan for ERCP on Monday and watch her closely over the weekend.  Continue IVFs (would give LR), pain control and anti-emetics prn.  Surgery on board.  Addendum: Agree with above. She looks non-toxic and will follow closely with tyler on ERCP on Monday.

## 2017-04-29 NOTE — ASSESSMENT & PLAN NOTE
Initially held all anti-HTN due to hypotension, but BP now becoming hypertensive, resume labetolol.

## 2017-04-29 NOTE — ASSESSMENT & PLAN NOTE
· Goal while inpatient is a systolic blood pressure less than 130mmHg, given possible ACS  · BP in acceptable range at this time  · Continue current home regimen with hold parameters  · PRN antihypertensives available

## 2017-04-29 NOTE — H&P
[facility] Hospital Medicine  History & Physical    Patient Name: Christal Goodson  MRN: [id number]  Admission Date: 04/28/2017  Attending Physician: Jalen Barrientos MD, MPH      PCP:     Juan Alberto Prieto MD    CC:     Chief Complaint   Patient presents with    Abdominal Pain     pt c/o lower abd pain and nausea/vomiting since last night. Pt states that she has had to strain to have a bowel movement this morning.        HISTORY OF PRESENT ILLNESS:     Christal Goodson is a 85 y.o. female that (in part)  has a past medical history of Arthritis; Blood transfusion; CHF (congestive heart failure); Coronary artery disease; Diabetes mellitus; General anesthetics causing adverse effect in therapeutic use; Hyperlipidemia; Hypertension; Renal disorder; and Thyroid disease. Presents to [facility] Emergency Department complaining of abdominal pain as described below in detail.     Description of symptoms    Location:  Periumbilical  Onset:  Acute onset last night  Character:  Severe deep aching cramping pain  Frequency:  First episode  Duration:  Constant  Associated Symptoms:  Headache, nausea, vomiting, constipation, fever, cough  Radiation:  Radiates to epigastrium  Exacerbating factors:  Palpation, vomiting  Relieving factors:  Antiemetics given in the ED along with the medicine       In the emergency department routine laboratory studies were obtained followed by CT of the abdomen which demonstrated that the pancreas is mildly enlarged with peripancreatic inflammatory stranding in addition to gallstones I would prefer to see some of the later asked.  Lipase levels were elevated.  Right upper quadrant ultrasound obtained.  Concern for gallstone pancreatitis.     Hospital medicine has been asked to admit for further evaluation and treatment.       REVIEW OF SYSTEMS:     -- Constitutional: No fever or chills.  -- Eyes: No visual changes, diplopia, pain, tearing, blind spots, or  "discharge.   -- Ears, nose, mouth, throat, and face: No congestion, sore throat, epistaxis, d/c, bleeding gums, neck stiffness masses, or dental issues.  -- Respiratory: No cough, shortness of breath, hemoptysis, stridor, wheezing, or night sweats.  -- Cardiovascular: No chest pain, HERMAN, syncope, PND, edema, cyanosis, or palpitations.   -- Gastrointestinal: No vomiting, abdominal pain, dysphagia, hematemesis, melena, dyspepsia, or change in bowel habits.  -- Genitourinary: No hematuria, dysuria, frequency, urgency, nocturia, polyuria, stones, or incontinence.  -- Integument/breast: No rash, pruritis, pigmentation changes, dryness, or changes in hair  -- Hematologic/lymphatic: No easy bruising or lymphadenopathy.   -- Musculoskeletal: No acute arthralgias, acute myalgias, joint swelling, acute limitations of ROM, or acute muscular weakness.  -- Neurological: No seizures, headaches, incoordination, paraesthesias, ataxia, vertigo, or tremors.  -- Behavioral/Psych: No auditory or visual hallucinations, depression, or suicidal/homicidal ideations.  -- Endocrine: No heat or cold intolerance, polydipsia, or unintentional weight gain / loss.  -- Allergy/Immunologic: No recurrent infections or adverse reaction to food, insects, or difficulty breathing.    Pain Scale  6-7 on scale of 1 to 10    PAST MEDICAL / SURGICAL HISTORY:     Past Medical History:   Diagnosis Date    Arthritis     Blood transfusion     CHF (congestive heart failure)     Coronary artery disease     Diabetes mellitus     General anesthetics causing adverse effect in therapeutic use     "woke up as they were putting me on the table"    Hyperlipidemia     Hypertension     Renal disorder     Thyroid disease      Past Surgical History:   Procedure Laterality Date    CATARACT EXTRACTION EXTRACAPSULAR W/ INTRAOCULAR LENS IMPLANTATION  Sept 2012    right eye    CORONARY ANGIOPLASTY WITH STENT PLACEMENT  2011    JOINT REPLACEMENT      Left total hip " replacement in 1986    TONSILLECTOMY           FAMILY HISTORY:     Family History   Problem Relation Age of Onset    Diabetes Daughter     Hypertension Daughter          SOCIAL HISTORY:     Social History   Substance Use Topics    Smoking status: Never Smoker    Smokeless tobacco: None    Alcohol use No     Social History     Social History    Marital status: Single     Spouse name: N/A    Number of children: N/A    Years of education: N/A     Social History Main Topics    Smoking status: Never Smoker    Smokeless tobacco: None    Alcohol use No    Drug use: No    Sexual activity: Not Currently     Other Topics Concern    None     Social History Narrative         ALLERGIES:       Review of patient's allergies indicates:   Allergen Reactions    Bystolic [nebivolol]     Losartan     Diovan [valsartan] Rash         HEALTH SCREENING:     -- Prevnar 13 pneumonia vaccine =  no evidence of previous vaccination found in the medical record  -- Influenza vaccine =  no evidence of current flu vaccination found in the medical record for this flu season      HOME MEDICATIONS:     Prior to Admission medications    Medication Sig Start Date End Date Taking? Authorizing Provider   aspirin (ECOTRIN) 81 MG EC tablet Take 81 mg by mouth once daily.   Yes Historical Provider, MD   flaxseed oil Oil by Misc.(Non-Drug; Combo Route) route Daily.   Yes Historical Provider, MD   furosemide (LASIX) 40 MG tablet Take 1 tablet (40 mg total) by mouth 2 (two) times daily. 2/25/13  Yes Rosa Sawyer MD   hydrALAZINE (APRESOLINE) 50 MG tablet TAKE 1 TABLET 3 TIMES DAILY. 1/4/17  Yes Historical Provider, MD   hydrocodone-acetaminophen 5-325mg (NORCO) 5-325 mg per tablet Take 1 tablet by mouth every 6 (six) hours as needed for Pain.   Yes Historical Provider, MD   labetalol (NORMODYNE) 300 MG tablet TAKE 1 TABLET EVERY 12 HOURS DAILY. 1/4/17  Yes Historical Provider, MD   valsartan (DIOVAN) 160 MG tablet Take 160 mg by mouth  once daily.   Yes Historical Provider, MD   vitamin D 1000 units Tab Take 1,000 Units by mouth once daily.   Yes Historical Provider, MD   vitamin E 400 UNIT capsule Take 400 Units by mouth once daily.   Yes Historical Provider, MD   allopurinol (ZYLOPRIM) 100 MG tablet Take 1 tablet (100 mg total) by mouth once daily. 11/8/16   Juan Alberto Prieto MD   ferrous sulfate 325 mg (65 mg iron) Tab Take 1 tablet (325 mg total) by mouth daily with breakfast. 2/25/13   Rosa Sawyer MD   levothyroxine (SYNTHROID) 50 MCG tablet Take 1 tablet (50 mcg total) by mouth once daily. 1/27/17   Vesta Fernandez NP-C   magnesium oxide (MAG-OX) 250 mg Tab Take 1 tablet (250 mg total) by mouth once daily. 9/16/15   Miguel A Calderon MD   nystatin (MYCOSTATIN) cream Apply topically 2 (two) times daily. 3/24/17   Dianna Hillman MD   triamcinolone acetonide 0.1% (KENALOG) 0.1 % cream Apply topically 2 (two) times daily. 3/24/17 4/3/17  Dianna Hillman MD         [facility] MEDICATIONS:     Scheduled Meds:   metronidazole  500 mg Intravenous Q8H     Continuous Infusions:   PRN Meds:.dextrose 50%, glucagon (human recombinant), hydrALAZINE, HYDROmorphone, insulin aspart, sodium chloride 0.9%      PHYSICAL EXAM:     Wt Readings from Last 1 Encounters:   04/28/17 1550 59 kg (130 lb)     Body mass index is 23.78 kg/(m^2).  Vitals:    04/28/17 2048 04/28/17 2118 04/28/17 2128 04/28/17 2213   BP: (!) 110/59 (!) 79/50 (!) 107/56 121/62   BP Location:   Right arm    Patient Position:   Sitting    BP Method:   Automatic    Pulse: 74 74 74 72   Resp:   18 18   Temp:   99.1 °F (37.3 °C) 98 °F (36.7 °C)   TempSrc:   Oral Oral   SpO2: 96% 96% 96% 95%   Weight:       Height:              -- General appearance: Elderly female who is lying in bed.  Complains of mild to moderate abdominal discomfort.  well developed. appears stated age   -- Head: normocephalic, atraumatic   -- Eyes: conjunctivae clear. Extraocular muscles intact  -- Nose:  Nares normal. Septum midline.   -- Mouth/Throat: lips, mucosa, and tongue normal. no throat erythema.   -- Neck: supple, symmetrical, trachea midline, no JVD and thyroid not grossly enlarged, appears symmetric  -- Lungs: clear to auscultation bilaterally. normal respiratory effort. No use of accessory muscles.   -- Chest wall: no tenderness. equal bilateral chest rise   -- Heart: regular rate and rhythm. S1, S2 normal.  no click, rub or gallop   -- Abdomen: Epigastric tenderness.  Hypoactive bowel sounds.  soft,non-distended, non-tympanic  -- Extremities: Nonambulatory secondary to chronic left hip problems.  no cyanosis, clubbing or edema.   -- Pulses: 2+ and symmetric   -- Skin: color normal, texture normal, turgor normal. No rashes or lesions.   -- Neurologic: Globally decreased muscle strength and tone. No focal numbness or weakness. CNII-XII intact. [place] coma scale: eyes open spontaneously-4, oriented & converses-5, obeys commands-6.      LABORATORY STUDIES:     Recent Results (from the past 36 hour(s))   Lactic acid, plasma    Collection Time: 04/28/17  4:56 PM   Result Value Ref Range    Lactate (Lactic Acid) 1.0 0.5 - 2.2 mmol/L   Magnesium    Collection Time: 04/28/17  4:56 PM   Result Value Ref Range    Magnesium 1.8 1.6 - 2.6 mg/dL   TSH    Collection Time: 04/28/17  4:56 PM   Result Value Ref Range    TSH 4.414 (H) 0.400 - 4.000 uIU/mL   Lipase    Collection Time: 04/28/17  4:56 PM   Result Value Ref Range    Lipase >1000 (H) 4 - 60 U/L   Amylase    Collection Time: 04/28/17  4:56 PM   Result Value Ref Range    Amylase 592 (H) 20 - 110 U/L   Comprehensive metabolic panel    Collection Time: 04/28/17  4:56 PM   Result Value Ref Range    Sodium 141 136 - 145 mmol/L    Potassium 4.3 3.5 - 5.1 mmol/L    Chloride 108 95 - 110 mmol/L    CO2 24 23 - 29 mmol/L    Glucose 69 (L) 70 - 110 mg/dL    BUN, Bld 25 (H) 8 - 23 mg/dL    Creatinine 1.5 (H) 0.5 - 1.4 mg/dL    Calcium 8.9 8.7 - 10.5 mg/dL    Total Protein  6.2 6.0 - 8.4 g/dL    Albumin 3.3 (L) 3.5 - 5.2 g/dL    Total Bilirubin 7.7 (H) 0.1 - 1.0 mg/dL    Alkaline Phosphatase 150 (H) 55 - 135 U/L     (H) 10 - 40 U/L    ALT 64 (H) 10 - 44 U/L    Anion Gap 9 8 - 16 mmol/L    eGFR if African American 36 (A) >60 mL/min/1.73 m^2    eGFR if non African American 32 (A) >60 mL/min/1.73 m^2   APTT    Collection Time: 04/28/17  4:56 PM   Result Value Ref Range    aPTT 29.5 21.0 - 32.0 sec   Protime-INR    Collection Time: 04/28/17  4:56 PM   Result Value Ref Range    Prothrombin Time 13.4 (H) 9.0 - 12.5 sec    INR 1.3 (H) 0.8 - 1.2   CBC auto differential    Collection Time: 04/28/17  4:56 PM   Result Value Ref Range    WBC 9.06 3.90 - 12.70 K/uL    RBC 2.79 (L) 4.00 - 5.40 M/uL    Hemoglobin 8.6 (L) 12.0 - 16.0 g/dL    Hematocrit 24.1 (L) 37.0 - 48.5 %    MCV 86 82 - 98 fL    MCH 30.8 27.0 - 31.0 pg    MCHC 35.7 32.0 - 36.0 %    RDW 15.8 (H) 11.5 - 14.5 %    Platelets 114 (L) 150 - 350 K/uL    MPV 11.0 9.2 - 12.9 fL    Gran # 8.6 (H) 1.8 - 7.7 K/uL    Lymph # 0.2 (L) 1.0 - 4.8 K/uL    Mono # 0.3 0.3 - 1.0 K/uL    Eos # 0.0 0.0 - 0.5 K/uL    Baso # 0.00 0.00 - 0.20 K/uL    Gran% 94.8 (H) 38.0 - 73.0 %    Lymph% 1.9 (L) 18.0 - 48.0 %    Mono% 3.0 (L) 4.0 - 15.0 %    Eosinophil% 0.1 0.0 - 8.0 %    Basophil% 0.0 0.0 - 1.9 %    Differential Method Automated    T4, free    Collection Time: 04/28/17  4:56 PM   Result Value Ref Range    Free T4 1.33 0.71 - 1.51 ng/dL   Urinalysis Catheterized    Collection Time: 04/28/17  5:37 PM   Result Value Ref Range    Specimen UA Urine, Clean Catch     Color, UA Yenny Yellow, Straw, Yenny    Appearance, UA Hazy (A) Clear    pH, UA 6.0 5.0 - 8.0    Specific Gravity, UA 1.010 1.005 - 1.030    Protein, UA 2+ (A) Negative    Glucose, UA Negative Negative    Ketones, UA Negative Negative    Bilirubin (UA) Negative Negative    Occult Blood UA 1+ (A) Negative    Nitrite, UA Negative Negative    Urobilinogen, UA 4.0-6.0 (A) <2.0 EU/dL     Leukocytes, UA Negative Negative   Urinalysis Microscopic    Collection Time: 04/28/17  5:37 PM   Result Value Ref Range    RBC, UA 2 0 - 4 /hpf    WBC, UA 2 0 - 5 /hpf    Bacteria, UA Moderate (A) None-Occ /hpf    Squam Epithel, UA 5 /hpf    Hyaline Casts, UA 0 0-1/lpf /lpf    Amorphous, UA Few None-Moderate    Microscopic Comment SEE COMMENT        Lab Results   Component Value Date    INR 1.3 (H) 04/28/2017    INR 1.0 05/13/2016    INR 0.9 02/21/2013     Lab Results   Component Value Date    HGBA1C 5.1 11/10/2012     No results for input(s): POCTGLUCOSE in the last 72 hours.        MICROBIOLOGY DATA:     Urine Culture, Routine   Date Value Ref Range Status   02/21/2013 NO SIGNIFICANT GROWTH  Final   11/09/2012   Preliminary    MULTIPLE ORGANISMS ISOLATED. NONE IN PREDOMINANCE. REPEAT IF CLINICALLY NECESSARY.   09/20/2012   Final    40,000 ORGANISMS/ML -STREPTOCOCCUS AGALACTIAE (GROUP B)   09/20/2012 NO OTHER SIGNIFICANT ISOLATE.  Final       Microbiology x 7d:   Microbiology Results (last 7 days)     Procedure Component Value Units Date/Time    Urine culture [[id number]] Collected:  04/28/17 1737    Order Status:  Sent Specimen:  Urine from Urine, Catheterized Updated:  04/28/17 1748    Blood culture [[id number]] Collected:  04/28/17 1659    Order Status:  Sent Specimen:  Blood from Peripheral, Hand, Right Updated:  04/28/17 1714    Blood culture [[id number]] Collected:  04/28/17 1656    Order Status:  Sent Specimen:  Blood from Peripheral, Antecubital, Right Updated:  04/28/17 1713            IMAGING:     Imaging Results         US Abdomen Limited (Final result) Result time:  04/28/17 20:12:52    Final result by Kriss Alcantara MD (04/28/17 20:12:52)    Impression:      1. No acute cholecystitis.      Electronically signed by: KRISS ALCANTARA MD  Date:     04/28/17  Time:    20:12     Narrative:    Ultrasound abdomen Limited    Technique: Greyscale and color Doppler sonography    Comparison: CT April 28,  2017    Findings:  The pancreas is obscured by overlying bowel gas. The visualized aspects of the aorta and IVC are unremarkable.    The gallbladder contains stones and sludge. No gallbladder wall thickening or pericholecystic fluid. Sonographic Avila's sign is absent. The largest stone measures 0.6 cm in diameter. No intrahepatic or extrahepatic biliary dilation, and the common duct measures 0.6 cm in diameter.    The liver is normal in echotexture and size measuring 16 cm CC. The liver is not well-visualized, but no suspicious lesion.    The right kidney is mildly hyperechoic and small in size, measuring 8 cm CC. Midpole echogenic focus measures 1.2 cm consistent with renal cortical calcification seen on recent CT. No hydronephrosis.    Trace right pleural effusion.            CT Abdomen Pelvis  Without Contrast (Final result) Result time:  04/28/17 17:38:28    Final result by Petr Luna MD (04/28/17 17:38:28)    Impression:       The pancreas is mildly enlarged with peripancreatic inflammatory stranding. Findings could reflect acute pancreatitis. Recommend correlation with pancreatic enzymes.    Gallstones are seen within the gallbladder which is mildly distended. Ultrasound obtained as clinically warranted.    Trace ascites is seen within the right upper quadrant. Small amount of free fluid or ascites is seen within the pelvis.    Diffuse anasarca.     Chronic kidney disease.    Cardiomegaly and trace pericardial effusion.    Findings discussed with YINKA KIRKPATRICK at 17:35:45        Electronically signed by: PETR LUNA MD  Date:     04/28/17  Time:    17:38     Narrative:    Indication: Acute abdominal pain.    Routine noncontrast CT images of the abdomen and pelvis were obtained.    Findings: Heart is enlarged with trace pericardial effusion.    The lung bases are well aerated.    The liver is normal in size and attenuation on this noncontrast exam.  Layering gallstones are seen within the  gallbladder. Gallbladder is mildly distended. There is trace ascites. The pancreas is mildly enlarged with peripancreatic inflammatory stranding. Findings could reflect acute pancreatitis. Recommend correlation with pancreatic enzymes.  The stomach, spleen, adrenal glands are normal. Aorta demonstrates dense atherosclerotic disease and tortuosity consist with hypertension. Vascular calcifications are seen at the origins of the superior mesenteric and celiac arteries. Dystrophic calcification is seen within the right lower quadrant which appears chronic in nature.   Kidneys are small with dystrophic calcification seen throughout consistent with chronic kidney disease.  The bladder is incompletely distended.    The visualized loops of small and large bowel are unremarkable.The appendix is not definitely visualized in the right lower quadrant.  There is no inflammation. Free fluid is seen within the pelvis.    The uterus and ovaries are atrophic.   The bones demonstrate advanced degenerative changes and left hip replacement..            X-Ray Chest AP Portable (Final result) Result time:  04/28/17 16:29:36    Final result by Cherry Yoon MD (04/28/17 16:29:36)    Impression:     There is no focal pulmonary consolidation.      Electronically signed by: CHERRY YOON MD  Date:     04/28/17  Time:    16:29     Narrative:    Views: Portable    There is no pneumothorax, pleural effusion or focal consolidation.  The cardiac silhouette is enlarged. The trachea is midline. There are degenerative changes of the right shoulder.                CONSULTS:     IP CONSULT TO GI  IP CONSULT TO GENERAL SURGERY       ASSESSMENT & PLAN:     Primary Diagnosis:  Acute biliary pancreatitis    Active Hospital Problems    Diagnosis  POA    *Acute biliary pancreatitis [K85.10]  Yes     Priority: 1 - High    Diabetes mellitus type 2 in nonobese [E11.9]  Yes     Priority: 2     Chronic kidney disease, stage III (moderate) [N18.3]  Yes      Priority: 3      Followed by Dr. Hartley      Pulmonary hypertension [I27.2]  Yes    CAD (coronary artery disease) [I25.10]  Yes     Chronic    Essential hypertension, benign [I10]  Yes     Chronic    Hypercholesteremia [E78.00]  Yes     Chronic    Hypothyroidism [E03.9]  Yes     Chronic     Followed by Dr. Gandhi        Resolved Hospital Problems    Diagnosis Date Resolved POA   No resolved problems to display.         Acute biliary pancreatitis  Acute pancreatitis; likely biliary etiology  · Evidenced by hx, physical exam, imaging, and laboratory studies  · Differential of common etiology includes: alcohol, gallstones, metabolic disorders (hereditary pancreatitis, hypercalcemia, elevated triglycerides, malnutrition), abdominal trauma, penetrating ulcers, carcinoma of the head of pancreas, drugs (diuretics, gliptins, tetracycline, sulfonamides, estrogens, azathioprine and mercaptopurine, pentamidine, salicylates, steroids),  infections  (mumps, viral hepatitis, coxsackie B virus, cytomegalovirus, Mycoplasma pneumoniae, Ascaris), structural abnormalities (choledochocele, pancreas divisum), or autoimmune pancreatitis    · Lipase = >1,000    Marshall's Criteria @ TIME OF ADMISSION  · Age >55 = yes  · WBC >16K = no  · BG >200 = no  · AST > 250 = no  · LDH > 350 = not ordered at the time of admission    Conclusion:  Patient has less than three criteria; does not meet ICU admission criteria.    · NPO / Bowel rest  · IV fluids  · Monitor serial lipase levels  · GI consult for possible ERCP  · General surgery consult for gallstones        Diabetes mellitus type 2 in nonobese  · BG in acceptable range at this time  · Maintain w/ subcutaneous insulin management order set  · Hold oral diabetic meds  · NPO for now, then ADA 1800 kcal diet  · BG goal while in patient is <180mg/dL  · HgA1c = Pending          Chronic kidney disease, stage III (moderate)  Chronic Kidney Disease  · Renal dose medications  · Avoid nephrotoxic  agents  · Maintain euvolemic state        Hypothyroidism  · Clinically, patient is euthyroid   · Chemically, undetermined  · Obtain TSH, free T3, and free T4  · Continue current regimen      Hypercholesteremia  · Lipid panel - pending  · Cardiac diet  · Continue statin          CAD (coronary artery disease)  · Equivocal evidence of acute coronary syndrome at this time given presentation with mildly elevated troponin level and acute chest pain  · Maintain adequate blood pressure control  · Heart healthy diet  · Aspirin  · Statin  · Further management as outlined above          Essential hypertension, benign  · Goal while inpatient is a systolic blood pressure less than 130mmHg, given possible ACS  · BP in acceptable range at this time  · Continue current home regimen with hold parameters  · PRN antihypertensives available        Pulmonary hypertension  · No acute issues  · Maintain adequate blood pressure control  · keep euvolemic status            VTE Risk Mitigation     None            Adult PRN medications available   DVT prophylaxis given       DISPOSITION:     Will admit to the Hospital Medicine service for further evaluation and treatment.    Chart reviewed and updated where applicable.    High Risk Conditions:  Patient has a condition that poses threat to life and bodily function: Acute biliary pancreatitis      ===============================================================    Jalen Barrientos MD, MPH  Department of Hospital Medicine   [facility]  [phone] pg  (7pm - 6am)          This note is dictated using Dragon Medical 360 voice recognition software.  There are word recognition mistakes that are occasionally missed on review.

## 2017-04-29 NOTE — ASSESSMENT & PLAN NOTE
Pt with biliary pancreatitis, clinically improving with bowel rest and lipase trending down. Will change IVF to lactated ringers but continue NPO status, supportive care and pain control. Continue monitoring of LFTS and lipase daily, appreciate GI and Surgery input with plans for ERCP on Monday and surgery soon thereafter. Continue Zosyn/Cipro given sepsis with bacteremia which will be discussed below.

## 2017-04-29 NOTE — ASSESSMENT & PLAN NOTE
Chronic Kidney Disease  · Renal dose medications  · Avoid nephrotoxic agents  · Maintain euvolemic state

## 2017-04-29 NOTE — ASSESSMENT & PLAN NOTE
· BG in acceptable range at this time  · Maintain w/ subcutaneous insulin management order set  · Hold oral diabetic meds  · NPO for now, then ADA 1800 kcal diet  · BG goal while in patient is <180mg/dL  · HgA1c = Pending

## 2017-04-29 NOTE — SUBJECTIVE & OBJECTIVE
Interval History: Pt report improvement in midepigastric abdominal pain as well as decrease in back pain.    Review of Systems   Constitutional: Negative for chills.   Respiratory: Negative for shortness of breath.    Cardiovascular: Negative for chest pain.   Gastrointestinal: Positive for abdominal pain and constipation. Negative for nausea and vomiting.     Objective:     Vital Signs (Most Recent):  Temp: 98.2 °F (36.8 °C) (04/29/17 0816)  Pulse: 66 (04/29/17 0816)  Resp: 18 (04/29/17 0816)  BP: (!) 182/83 (04/29/17 0816)  SpO2: 97 % (04/29/17 0816) Vital Signs (24h Range):  Temp:  [98 °F (36.7 °C)-100.9 °F (38.3 °C)] 98.2 °F (36.8 °C)  Pulse:  [58-88] 66  Resp:  [18] 18  SpO2:  [93 %-99 %] 97 %  BP: ()/(50-83) 182/83     Weight: 59.2 kg (130 lb 9.5 oz)  Body mass index is 23.89 kg/(m^2).    Intake/Output Summary (Last 24 hours) at 04/29/17 1152  Last data filed at 04/29/17 0600   Gross per 24 hour   Intake          3647.92 ml   Output                0 ml   Net          3647.92 ml      Physical Exam   Constitutional: She appears well-developed and well-nourished.   HENT:   Head: Normocephalic and atraumatic.   Eyes: EOM are normal. Pupils are equal, round, and reactive to light.   +scleral icterus   Neck: Normal range of motion. Neck supple.   Cardiovascular: Normal rate and regular rhythm.    Pulmonary/Chest: Effort normal and breath sounds normal.   Abdominal:   Soft, mild +TTP in the mid-epigastric region, no rebound or guarding, ND, +BS       Significant Labs: All pertinent labs within the past 24 hours have been reviewed.    Significant Imaging: I have reviewed and interpreted all pertinent imaging results/findings within the past 24 hours.

## 2017-04-29 NOTE — CONSULTS
[facility]  Adult Nutrition  Consult Note    SUMMARY     Recommendations    Recommendation/Intervention:   1. Advance diet as able to 1800 ADA Cardiac   2. Boost glucose tid if po intake <50% with meals   3. RD to monitor     Goals: Initiate nutition in 24-48 hrs  Nutrition Goal Status: new  Communication of RD Recs: other (comment) (sticky note)    Continuum of Care Plan    Referral to Outpatient Services:  (D/C planning: ADA/Cardiac diet with supplements as needed)    Reason for Assessment    Reason for Assessment: nurse/nurse practitioner consult  Diagnosis:  (acute pancreatitis)  Relevent Medical History: DM, HTN, CAD, CHF, HLD   Interdisciplinary Rounds: did not attend     General Information Comments: Patient reports last po intake  in am. Denies problems chewing/swallowing. States she has had some weight loss but unsure of in how long a period. States UBW of 150#.         Nutrition Prescription Ordered    Current Diet Order: NPO         Evaluation of Received Nutrients/Fluid Intake     % Intake of Meals: NPO  % Intake of Estimated Needs: 0-25%    I/O: 3647/not recorded      Nutrition Risk Screen     Nutrition Risk Screen: no indicators present    Nutrition/Diet History     Food Preferences: Denies cultural, Caodaism, ethnic food preferences.  Factors Affecting Nutritional Intake: NPO    Labs/Tests/Procedures/Meds     Pertinent Labs Reviewed: reviewed  Pertinent Medications Reviewed: reviewed    Physical Findings    Overall Physical Appearance:  (GUADALUPE)  Oral/Mouth Cavity: WDL  Skin: intact (Zeeshan 14)    Anthropometrics     Height (inches): 61.97 in  Weight Method: Bed Scale  Weight (kg): 59.2 kg     Ideal Body Weight (IBW), Female: 109.85 lb     % Ideal Body Weight, Female (lb): 118.81 lb  BMI (kg/m2): 23.9  BMI Grade: 18.5-24.9 - normal  Usual Body Weight (UBW), k.18 kg  % Usual Body Weight: 86.83     Weight Loss: unintentional     Estimated/Assessed Needs    Weight Used For  Calorie Calculations: 59.2 kg (130 lb 8.2 oz)   Height (cm): 157.4 cm     Energy Need Method:  (1382-0369 kcal)    RMR ([place]-St. Jeor Equation): 995.96      Weight Used For Protein Calculations: 59.2 kg (130 lb 8.2 oz)  Protein Requirements: 60-70 g    Fluid Need Method: RDA Method       CHO Requirement: 200 g     Assessment and Plan    Nutrition Diagnosis    Problem: Inadequate Energy Intake  Etiology: Acute Pancreatitis  As Evidenced by: NPO   Nutrition Diagnosis Status: New      Monitor and Evaluation    Food and Nutrient Intake: energy intake, food and beverage intake  Food and Nutrient Adminstration: diet order  Anthropometric Measurements: weight, weight change  Biochemical Data, Medical Tests and Procedures: electrolyte and renal panel, glucose/endocrine profile  Nutrition-Focused Physical Findings: overall appearance    Nutrition Risk    Level of Risk:  (2 x week)    Nutrition Follow-Up    RD Follow-up?: Yes

## 2017-04-29 NOTE — PLAN OF CARE
04/29/17 1106   Discharge Assessment   Assessment Type Discharge Planning Assessment   Patient/Family In Agreement With Plan other (see comments)

## 2017-04-29 NOTE — CONSULTS
[facility]  General Surgery  Consult Note    Inpatient consult to General surgery  Consult performed by: JUJU ARRIOLA  Consult ordered by: YINKA KIRKPATRICK        Subjective:     Chief Complaint/Reason for Admission: biliary pancreatitis    History of Present Illness: 85 year old female with PMH of CHF, DM, HTN, and CKD presents with 2 day history of nausea vomiting and abdominal pain.  Improved with NPO and IVF.  No previous episodes.  Does not drink alcohol.    No current facility-administered medications on file prior to encounter.      Current Outpatient Prescriptions on File Prior to Encounter   Medication Sig    aspirin (ECOTRIN) 81 MG EC tablet Take 81 mg by mouth once daily.    flaxseed oil Oil by Misc.(Non-Drug; Combo Route) route Daily.    furosemide (LASIX) 40 MG tablet Take 1 tablet (40 mg total) by mouth 2 (two) times daily.    hydrALAZINE (APRESOLINE) 50 MG tablet TAKE 1 TABLET 3 TIMES DAILY.    labetalol (NORMODYNE) 300 MG tablet TAKE 1 TABLET EVERY 12 HOURS DAILY.    vitamin E 400 UNIT capsule Take 400 Units by mouth once daily.    allopurinol (ZYLOPRIM) 100 MG tablet Take 1 tablet (100 mg total) by mouth once daily.    ferrous sulfate 325 mg (65 mg iron) Tab Take 1 tablet (325 mg total) by mouth daily with breakfast.    levothyroxine (SYNTHROID) 50 MCG tablet Take 1 tablet (50 mcg total) by mouth once daily.    magnesium oxide (MAG-OX) 250 mg Tab Take 1 tablet (250 mg total) by mouth once daily.    nystatin (MYCOSTATIN) cream Apply topically 2 (two) times daily.    triamcinolone acetonide 0.1% (KENALOG) 0.1 % cream Apply topically 2 (two) times daily.       Review of patient's allergies indicates:   Allergen Reactions    Bystolic [nebivolol]     Losartan     Diovan [valsartan] Rash       Past Medical History:   Diagnosis Date    Arthritis     Blood transfusion     CHF (congestive heart failure)     Coronary artery disease     Diabetes mellitus     General  "anesthetics causing adverse effect in therapeutic use     "woke up as they were putting me on the table"    Hyperlipidemia     Hypertension     Renal disorder     Thyroid disease      Past Surgical History:   Procedure Laterality Date    CATARACT EXTRACTION EXTRACAPSULAR W/ INTRAOCULAR LENS IMPLANTATION  Sept 2012    right eye    CORONARY ANGIOPLASTY WITH STENT PLACEMENT  2011    JOINT REPLACEMENT      Left total hip replacement in 1986    TONSILLECTOMY       Family History     Problem Relation (Age of Onset)    Diabetes Daughter    Hypertension Daughter        Social History Main Topics    Smoking status: Never Smoker    Smokeless tobacco: Not on file    Alcohol use No    Drug use: No    Sexual activity: Not Currently     Review of Systems  Objective:     Vital Signs (Most Recent):  Temp: 98.2 °F (36.8 °C) (04/29/17 0816)  Pulse: 66 (04/29/17 0816)  Resp: 18 (04/29/17 0816)  BP: (!) 182/83 (04/29/17 0816)  SpO2: 97 % (04/29/17 0816) Vital Signs (24h Range):  Temp:  [98 °F (36.7 °C)-100.9 °F (38.3 °C)] 98.2 °F (36.8 °C)  Pulse:  [58-88] 66  Resp:  [18] 18  SpO2:  [93 %-99 %] 97 %  BP: ()/(50-83) 182/83     Weight: 59.2 kg (130 lb 9.5 oz)  Body mass index is 23.89 kg/(m^2).      Intake/Output Summary (Last 24 hours) at 04/29/17 1057  Last data filed at 04/29/17 0600   Gross per 24 hour   Intake          3647.92 ml   Output                0 ml   Net          3647.92 ml       Physical Exam    Significant Labs:  BMP:   Recent Labs  Lab 04/28/17  1656 04/29/17  0328   GLU 69* 103    141   K 4.3 4.6    109   CO2 24 23   BUN 25* 29*   CREATININE 1.5* 2.1*   CALCIUM 8.9 8.3*   MG 1.8  --      CBC:   Recent Labs  Lab 04/29/17  0328   WBC 4.05   RBC 2.57*   HGB 7.9*   HCT 22.6*   PLT 96*   MCV 88   MCH 30.7   MCHC 35.0       Significant Diagnostics:  CT: I have reviewed all pertinent results/findings within the past 24 hours.      Assessment/Plan:     Active Diagnoses:    Diagnosis Date Noted " POA    PRINCIPAL PROBLEM:  Acute biliary pancreatitis [K85.10] 04/28/2017 Yes    Diabetes mellitus type 2 in nonobese [E11.9] 04/28/2017 Yes    Pulmonary hypertension [I27.2] 03/24/2017 Yes    Chronic kidney disease, stage III (moderate) [N18.3] 09/12/2015 Yes    CAD (coronary artery disease) [I25.10] 02/25/2013 Yes     Chronic    Essential hypertension, benign [I10] 02/25/2013 Yes     Chronic    Hypercholesteremia [E78.00] 11/11/2012 Yes     Chronic    Hypothyroidism [E03.9] 11/09/2012 Yes     Chronic      Problems Resolved During this Admission:    Diagnosis Date Noted Date Resolved POA       Thank you for your consult. Will follow for resolution of pancreatitis and plan lap cholecystectomy prior to discharge as long as her medical problems can be controlled well enough to allow her to tolerate general anesthesia. will consult cardiology for cardiac optimization as well    Jalen Jacobo MD  General Surgery  [facility]

## 2017-04-29 NOTE — ED NOTES
Patient placed on continuous cardiac monitor, automatic blood pressure cuff and continuous pulse oximeter. Pt returned from ultrasound. Family at bedside.

## 2017-04-29 NOTE — ASSESSMENT & PLAN NOTE
· Equivocal evidence of acute coronary syndrome at this time given presentation with mildly elevated troponin level and acute chest pain  · Maintain adequate blood pressure control  · Heart healthy diet  · Aspirin  · Statin  · Further management as outlined above

## 2017-04-29 NOTE — ED NOTES
Gastro consult called in to [facility] GI 4/28 at 0116. Spoke with Bibi. Dr. Finch will see pt in the am.

## 2017-04-29 NOTE — ASSESSMENT & PLAN NOTE
ARF   Assessment and Plan:  Pt with worsening creatinine likely due to sepsis with hypotension, continue IVF and will repeat labs in am.

## 2017-04-30 PROBLEM — D63.8 ANEMIA OF CHRONIC DISEASE: Status: ACTIVE | Noted: 2017-04-30

## 2017-04-30 LAB
ABO + RH BLD: NORMAL
ALBUMIN SERPL BCP-MCNC: 2.8 G/DL
ALP SERPL-CCNC: 93 U/L
ALT SERPL W/O P-5'-P-CCNC: 39 U/L
ANION GAP SERPL CALC-SCNC: 7 MMOL/L
AST SERPL-CCNC: 37 U/L
BACTERIA BLD CULT: NORMAL
BASOPHILS # BLD AUTO: 0.01 K/UL
BASOPHILS NFR BLD: 0.2 %
BILIRUB SERPL-MCNC: 4.6 MG/DL
BLD GP AB SCN CELLS X3 SERPL QL: NORMAL
BLD PROD TYP BPU: NORMAL
BLOOD UNIT EXPIRATION DATE: NORMAL
BLOOD UNIT TYPE CODE: 5100
BLOOD UNIT TYPE: NORMAL
BUN SERPL-MCNC: 35 MG/DL
CALCIUM SERPL-MCNC: 8.3 MG/DL
CHLORIDE SERPL-SCNC: 111 MMOL/L
CO2 SERPL-SCNC: 22 MMOL/L
CODING SYSTEM: NORMAL
CREAT SERPL-MCNC: 2.3 MG/DL
DIFFERENTIAL METHOD: ABNORMAL
DISPENSE STATUS: NORMAL
EOSINOPHIL # BLD AUTO: 0.1 K/UL
EOSINOPHIL NFR BLD: 0.9 %
ERYTHROCYTE [DISTWIDTH] IN BLOOD BY AUTOMATED COUNT: 15.9 %
EST. GFR  (AFRICAN AMERICAN): 22 ML/MIN/1.73 M^2
EST. GFR  (NON AFRICAN AMERICAN): 19 ML/MIN/1.73 M^2
ESTIMATED AVG GLUCOSE: ABNORMAL MG/DL
GLUCOSE SERPL-MCNC: 85 MG/DL
HBA1C MFR BLD HPLC: <4 %
HCT VFR BLD AUTO: 20.9 %
HGB BLD-MCNC: 7.4 G/DL
LYMPHOCYTES # BLD AUTO: 0.4 K/UL
LYMPHOCYTES NFR BLD: 6.5 %
MCH RBC QN AUTO: 30.5 PG
MCHC RBC AUTO-ENTMCNC: 35.4 %
MCV RBC AUTO: 86 FL
MONOCYTES # BLD AUTO: 0.3 K/UL
MONOCYTES NFR BLD: 5.6 %
NEUTROPHILS # BLD AUTO: 5 K/UL
NEUTROPHILS NFR BLD: 86.6 %
PLATELET # BLD AUTO: 83 K/UL
PMV BLD AUTO: 11.4 FL
POCT GLUCOSE: 104 MG/DL (ref 70–110)
POCT GLUCOSE: 130 MG/DL (ref 70–110)
POCT GLUCOSE: 80 MG/DL (ref 70–110)
POCT GLUCOSE: 97 MG/DL (ref 70–110)
POTASSIUM SERPL-SCNC: 4.3 MMOL/L
PROT SERPL-MCNC: 5.6 G/DL
RBC # BLD AUTO: 2.43 M/UL
SODIUM SERPL-SCNC: 140 MMOL/L
TRANS ERYTHROCYTES VOL PATIENT: NORMAL ML
WBC # BLD AUTO: 5.72 K/UL

## 2017-04-30 PROCEDURE — 87040 BLOOD CULTURE FOR BACTERIA: CPT | Mod: 59

## 2017-04-30 PROCEDURE — 63600175 PHARM REV CODE 636 W HCPCS: Performed by: EMERGENCY MEDICINE

## 2017-04-30 PROCEDURE — 11000001 HC ACUTE MED/SURG PRIVATE ROOM

## 2017-04-30 PROCEDURE — 86920 COMPATIBILITY TEST SPIN: CPT

## 2017-04-30 PROCEDURE — 86850 RBC ANTIBODY SCREEN: CPT

## 2017-04-30 PROCEDURE — 25000003 PHARM REV CODE 250: Performed by: HOSPITALIST

## 2017-04-30 PROCEDURE — 85025 COMPLETE CBC W/AUTO DIFF WBC: CPT

## 2017-04-30 PROCEDURE — 63600175 PHARM REV CODE 636 W HCPCS: Performed by: HOSPITALIST

## 2017-04-30 PROCEDURE — 36430 TRANSFUSION BLD/BLD COMPNT: CPT

## 2017-04-30 PROCEDURE — 93306 TTE W/DOPPLER COMPLETE: CPT | Mod: 26,,, | Performed by: INTERNAL MEDICINE

## 2017-04-30 PROCEDURE — 25000003 PHARM REV CODE 250: Performed by: EMERGENCY MEDICINE

## 2017-04-30 PROCEDURE — 80053 COMPREHEN METABOLIC PANEL: CPT

## 2017-04-30 PROCEDURE — 86900 BLOOD TYPING SEROLOGIC ABO: CPT

## 2017-04-30 PROCEDURE — P9021 RED BLOOD CELLS UNIT: HCPCS

## 2017-04-30 PROCEDURE — 36415 COLL VENOUS BLD VENIPUNCTURE: CPT

## 2017-04-30 PROCEDURE — 93306 TTE W/DOPPLER COMPLETE: CPT

## 2017-04-30 RX ORDER — DEXTROSE MONOHYDRATE 50 MG/ML
INJECTION, SOLUTION INTRAVENOUS CONTINUOUS
Status: DISCONTINUED | OUTPATIENT
Start: 2017-04-30 | End: 2017-05-02

## 2017-04-30 RX ORDER — HYDROCODONE BITARTRATE AND ACETAMINOPHEN 500; 5 MG/1; MG/1
TABLET ORAL
Status: DISCONTINUED | OUTPATIENT
Start: 2017-04-30 | End: 2017-05-02 | Stop reason: SDUPTHER

## 2017-04-30 RX ADMIN — PANTOPRAZOLE SODIUM 40 MG: 40 TABLET, DELAYED RELEASE ORAL at 08:04

## 2017-04-30 RX ADMIN — PIPERACILLIN, TAZOBACTAM 4.5 G: 4; .5 INJECTION, POWDER, LYOPHILIZED, FOR SOLUTION INTRAVENOUS at 07:04

## 2017-04-30 RX ADMIN — HYDROMORPHONE HYDROCHLORIDE 1 MG: 2 INJECTION INTRAMUSCULAR; INTRAVENOUS; SUBCUTANEOUS at 02:04

## 2017-04-30 RX ADMIN — PIPERACILLIN, TAZOBACTAM 4.5 G: 4; .5 INJECTION, POWDER, LYOPHILIZED, FOR SOLUTION INTRAVENOUS at 08:04

## 2017-04-30 RX ADMIN — LABETALOL HYDROCHLORIDE 300 MG: 100 TABLET, FILM COATED ORAL at 08:04

## 2017-04-30 RX ADMIN — HYDRALAZINE HYDROCHLORIDE 10 MG: 20 INJECTION INTRAMUSCULAR; INTRAVENOUS at 09:04

## 2017-04-30 RX ADMIN — DEXTROSE: 5 SOLUTION INTRAVENOUS at 12:04

## 2017-04-30 RX ADMIN — LEVOTHYROXINE SODIUM 50 MCG: 50 TABLET ORAL at 06:04

## 2017-04-30 RX ADMIN — CIPROFLOXACIN 400 MG: 2 INJECTION, SOLUTION INTRAVENOUS at 06:04

## 2017-04-30 RX ADMIN — LABETALOL HYDROCHLORIDE 300 MG: 100 TABLET, FILM COATED ORAL at 09:04

## 2017-04-30 RX ADMIN — DEXTROSE MONOHYDRATE 12.5 G: 25 INJECTION, SOLUTION INTRAVENOUS at 12:04

## 2017-04-30 RX ADMIN — PANTOPRAZOLE SODIUM 40 MG: 40 TABLET, DELAYED RELEASE ORAL at 09:04

## 2017-04-30 NOTE — PROGRESS NOTES
[facility] Hospital Medicine  Progress Note    Patient Name: Christal Goodson  MRN: [id number]  Patient Class: IP- Inpatient   Admission Date: 4/28/2017  Length of Stay: 2 days  Attending Physician: Tita Simpson MD  Primary Care Provider: Juan Alberto Prieto MD        Subjective:     Principal Problem:Acute biliary pancreatitis    HPI:  Christal Goodson is a 85 y.o. female that (in part)  has a past medical history of Arthritis; Blood transfusion; CHF (congestive heart failure); Coronary artery disease; Diabetes mellitus; General anesthetics causing adverse effect in therapeutic use; Hyperlipidemia; Hypertension; Renal disorder; and Thyroid disease. Presents to [facility] Emergency Department complaining of abdominal pain as described below in detail.   Description of symptoms    Location:  Periumbilical  Onset:  Acute onset last night  Character:  Severe deep aching cramping pain  Frequency:  First episode  Duration:  Constant  Associated Symptoms:  Headache, nausea, vomiting, constipation, fever, cough  Radiation:  Radiates to epigastrium  Exacerbating factors:  Palpation, vomiting  Relieving factors:  Antiemetics given in the ED along with the medicine     In the emergency department routine laboratory studies were obtained followed by CT of the abdomen which demonstrated that the pancreas is mildly enlarged with peripancreatic inflammatory stranding in addition to gallstones I would prefer to see some of the later asked.  Lipase levels were elevated.  Right upper quadrant ultrasound obtained.  Concern for gallstone pancreatitis.       Hospital Course:  Ms. Goodson was admitted with biliary pancreatitis and sepsis with bacteremia. Pt was treated with Zosyn/Cipro, given IVF with good response in BP. Pt was treated with bowel rest, pain control and supportive care. GI and Surgery consulted, and lipase/LFTs monitoring.    Interval History: Pt report improvement in midepigastric  abdominal pain but almost fully resolved and much improved, passing lots of gas.    Review of Systems   Constitutional: Negative for chills.   Respiratory: Negative for shortness of breath.    Cardiovascular: Negative for chest pain.   Gastrointestinal: Positive for abdominal pain and constipation. Negative for nausea and vomiting.     Objective:     Vital Signs (Most Recent):  Temp: 97.5 °F (36.4 °C) (04/30/17 0809)  Pulse: 70 (04/30/17 0809)  Resp: 17 (04/30/17 0809)  BP: (!) 143/86 (04/30/17 0809)  SpO2: 97 % (04/30/17 0809) Vital Signs (24h Range):  Temp:  [97.5 °F (36.4 °C)-98.7 °F (37.1 °C)] 97.5 °F (36.4 °C)  Pulse:  [66-97] 70  Resp:  [17-20] 17  SpO2:  [93 %-97 %] 97 %  BP: (128-176)/(64-86) 143/86     Weight: 59.2 kg (130 lb 8.6 oz)  Body mass index is 23.88 kg/(m^2).    Intake/Output Summary (Last 24 hours) at 04/30/17 0937  Last data filed at 04/30/17 0530   Gross per 24 hour   Intake           501.25 ml   Output                0 ml   Net           501.25 ml      Physical Exam   Constitutional: She appears well-developed and well-nourished.   HENT:   Head: Normocephalic and atraumatic.   Eyes: EOM are normal. Pupils are equal, round, and reactive to light.   +scleral icterus   Neck: Normal range of motion. Neck supple.   Cardiovascular: Normal rate and regular rhythm.    Pulmonary/Chest: Effort normal and breath sounds normal.   Abdominal:   Soft, mild +TTP in the mid-epigastric region, no rebound or guarding, ND, +BS       Significant Labs: All pertinent labs within the past 24 hours have been reviewed.    Significant Imaging: I have reviewed and interpreted all pertinent imaging results/findings within the past 24 hours.    Assessment/Plan:      * Acute biliary pancreatitis  Pt with biliary pancreatitis, clinically improving with bowel rest and lipase trending down. Continue lactated ringers but continue NPO status, supportive care and pain control. Continue monitoring of LFTS and lipase daily,  appreciate GI and Surgery input with plans for ERCP on Monday and surgery soon thereafter. Cardiology recommended to proceed with surgical interventions, LFTS improving. Will transfuse 1 unit PRBC in anticipation of surgery. Pt agreeable to 1 unit today. Continue Zosyn/Cipro given sepsis with bacteremia which will be discussed below.    Sepsis  Bacteremia  Assessment and Plan:  Pt met criteria for sepsis with fever, and hypotension. Pt with likely biliary source of bacteremia with Klebsiella pneumoniae. Continue Zosyn/Cipro for now and repeat blood cultures done today.    CAD (coronary artery disease)  Pt is without chest pain and initially blood pressures were low, restarted labetolol yesterday. Will plan on transfusion of 1 unit PRBC.    Essential hypertension, benign  Initially held all anti-HTN due to hypotension, resumed labetolol.    Hypercholesteremia  Given abnormal LFTs, will hold statin for now.    Chronic kidney disease, stage III (moderate)  ARF   Assessment and Plan:  Pt with worsening creatinine likely due to sepsis with hypotension, continue IVF and will repeat labs in am. Baseline creatinine approximately 1.7, hopefully will improve soon, monitor.    Diabetes mellitus type 2 in nonobese  Closely monitor blood glucose and make adjustments to insulin regimen as necessary. HgbA1C pending.    Hypothyroidism  Clinically euthyroid, continue home regimen.     Anemia of chronic disease  H/H with slight drop but no bleeding, will transfuse 1 unit PRBC today in anticipation of procedures, monitor.    Pulmonary hypertension  No acute issues. Monitor.      VTE Risk Mitigation         Ordered     Medium Risk of VTE  Once      04/28/17 2228     Place sequential compression device  Until discontinued      04/28/17 2228          Tita Simpson MD  Department of Hospital Medicine   [facility]

## 2017-04-30 NOTE — PLAN OF CARE
Problem: Patient Care Overview  Goal: Plan of Care Review  Outcome: Ongoing (interventions implemented as appropriate)  No falls, trauma or injury this shift. Patient tuned every 2 hours to promote and prevent pressure ulcers. Pain managed with iv medications. Continue to monitor patient and continue to monitor patient.

## 2017-04-30 NOTE — PLAN OF CARE
Problem: Patient Care Overview  Goal: Plan of Care Review  Outcome: Ongoing (interventions implemented as appropriate)  2D echo pending, 1 unit PRBCs given, clear liq diet initiated, IVF cont, IV abx per order, safety maintained, able to address needs, bed low locked and in position, will cont plan of care

## 2017-04-30 NOTE — ASSESSMENT & PLAN NOTE
H/H with slight drop but no bleeding, will transfuse 1 unit PRBC today in anticipation of procedures, monitor.

## 2017-04-30 NOTE — ASSESSMENT & PLAN NOTE
Pt with biliary pancreatitis, clinically improving with bowel rest and lipase trending down. Continue lactated ringers but continue NPO status, supportive care and pain control. Continue monitoring of LFTS and lipase daily, appreciate GI and Surgery input with plans for ERCP on Monday and surgery soon thereafter. Cardiology recommended to proceed with surgical interventions, LFTS improving. Will transfuse 1 unit PRBC in anticipation of surgery. Pt agreeable to 1 unit today. Continue Zosyn/Cipro given sepsis with bacteremia which will be discussed below.

## 2017-04-30 NOTE — ASSESSMENT & PLAN NOTE
ARF   Assessment and Plan:  Pt with worsening creatinine likely due to sepsis with hypotension, continue IVF and will repeat labs in am. Baseline creatinine approximately 1.7, hopefully will improve soon, monitor.

## 2017-04-30 NOTE — PLAN OF CARE
Problem: Patient Care Overview  Goal: Plan of Care Review  Outcome: Ongoing (interventions implemented as appropriate)  Cards consulted, turn Q2, NPO, IVF cont, IV abx per order, safety maintained, able to address needs, bed low locked and in position, will cont plan of care

## 2017-04-30 NOTE — PLAN OF CARE
04/30/17 1308   Discharge Assessment   Assessment Type Discharge Planning Assessment   Confirmed/corrected address and phone number on facesheet? Yes   Assessment information obtained from? Patient;Caregiver   Communicated expected length of stay with patient/caregiver no   If Healthcare Directive is received, is it scanned into Epic? no (comment)  (TN gave advance directive papers to patient as requested)   Prior to hospitilization cognitive status: Alert/Oriented   Prior to hospitalization functional status: Independent;Assistive Equipment;Needs Assistance   Current cognitive status: Alert/Oriented;No Deficits   Current Functional Status: Independent;Assistive Equipment   Arrived From admitted as an inpatient   Lives With child(rodo), adult   Able to Return to Prior Arrangements yes   Is patient able to care for self after discharge? Yes;Unable to determine at this time (comments)   How many people do you have in your home that can help with your care after discharge? 1   Who are your caregiver(s) and their phone number(s)? (Marilee Goodson. dtr [phone])   Patient's perception of discharge disposition admitted as an inpatient   Readmission Within The Last 30 Days no previous admission in last 30 days   Patient currently being followed by outpatient case management? No   Patient currently receives home health services? No   Does the patient currently use HME? Yes   Patient currently receives private duty nursing? No   Patient currently receives any other outside agency services? No   Equipment Currently Used at Home bedside commode;rollator;shower chair   Do you have any problems affording any of your prescribed medications? No   Is the patient taking medications as prescribed? yes   Do you have any financial concerns preventing you from receiving the healthcare you need? No   Does the patient have transportation to healthcare appointments? Yes   Transportation Available family or friend will provide   On  "Dialysis? No   Does the patient receive services at the Coumadin Clinic? No   Are there any open cases? No   Discharge Plan A Home with family   Discharge Plan B Home with family;Home Health   Patient/Family In Agreement With Plan yes     TN completed discharge needs assessment. TN provided and reviewed with patient "Blue My Health Packet" , "Help At Home" and "Discharge Planning Begins on Admission" handouts. TN discussed with patient the things the patient is responsible for to manage patient's  healthcare at home. Patient verbalized understanding & teachback implemented. Patient prefers  afternoons doctor appointments.    CHF "Heart Failure Zones" discussed with patient / family member.  Information placed in blue "My Health Packet".  The  Green is the goal zone noted by no SOB or swelling; Yellow the Warning zone, that has the following signs and symptoms: swelling, more shortness of breath, and tiredness.  (The patient or family member will call MD's office) and Red zone:  Emergency Zone,  signs and symptoms:  Patient is struggling to breathe, is confused and has chest pain.  CALL 911.  TN addressed all questions  And utilized Teach Back.  TN / SW contact information given to pt / family member for further assistance.Hilda Ojeda RN, BSN, STN [facility]  4/30/2017            "

## 2017-04-30 NOTE — SUBJECTIVE & OBJECTIVE
Interval History: Pt report improvement in midepigastric abdominal pain but almost fully resolved and much improved, passing lots of gas.    Review of Systems   Constitutional: Negative for chills.   Respiratory: Negative for shortness of breath.    Cardiovascular: Negative for chest pain.   Gastrointestinal: Positive for abdominal pain and constipation. Negative for nausea and vomiting.     Objective:     Vital Signs (Most Recent):  Temp: 97.5 °F (36.4 °C) (04/30/17 0809)  Pulse: 70 (04/30/17 0809)  Resp: 17 (04/30/17 0809)  BP: (!) 143/86 (04/30/17 0809)  SpO2: 97 % (04/30/17 0809) Vital Signs (24h Range):  Temp:  [97.5 °F (36.4 °C)-98.7 °F (37.1 °C)] 97.5 °F (36.4 °C)  Pulse:  [66-97] 70  Resp:  [17-20] 17  SpO2:  [93 %-97 %] 97 %  BP: (128-176)/(64-86) 143/86     Weight: 59.2 kg (130 lb 8.6 oz)  Body mass index is 23.88 kg/(m^2).    Intake/Output Summary (Last 24 hours) at 04/30/17 0937  Last data filed at 04/30/17 0530   Gross per 24 hour   Intake           501.25 ml   Output                0 ml   Net           501.25 ml      Physical Exam   Constitutional: She appears well-developed and well-nourished.   HENT:   Head: Normocephalic and atraumatic.   Eyes: EOM are normal. Pupils are equal, round, and reactive to light.   +scleral icterus   Neck: Normal range of motion. Neck supple.   Cardiovascular: Normal rate and regular rhythm.    Pulmonary/Chest: Effort normal and breath sounds normal.   Abdominal:   Soft, mild +TTP in the mid-epigastric region, no rebound or guarding, ND, +BS       Significant Labs: All pertinent labs within the past 24 hours have been reviewed.    Significant Imaging: I have reviewed and interpreted all pertinent imaging results/findings within the past 24 hours.

## 2017-04-30 NOTE — CONSULTS
"HPI:  Ms.Gloria DAVID riggs 85 y.o.femalewho presents with a history of CAD, heart failure with preserved EF, Diabetes mellitus, and CKD III. She has not been hospitalized for her cardiac issues since 2015 and her volume status has been fairly well controlled as has her BP. She is not active and is limited by Left hip pain. She does not exercise and can not exercise due to the pain in her hip. She has had no anginal pain and no orthopnea. She stopped her lasix and has had some peripheral edema but none that is severe.     Her last echocardiogram was in 2015 and the EF and filling pressures were normal.   She has a history of hypothyroidism.   She presented with RUQ pain radiated to her back and she was found to have cholecystitis. She did have a drop in BP and her Cr went from 1.5-2.1. Her baseline was 1.6-1.7. Her BP has been normal to hypertensive today.    PMHx:  CURRENT PROBLEM LIST:  Patient Active Problem List   Diagnosis    Hypothyroidism    Vitamin D deficiency    Hypercholesteremia    CAD (coronary artery disease)    Essential hypertension, benign    Chronic kidney disease, stage III (moderate)    Type II or unspecified type diabetes mellitus with renal manifestations, not stated as uncontrolled    Benign hypertensive kidney disease with chronic kidney disease stage I through stage IV, or unspecified    Hypothyroidism due to acquired atrophy of thyroid    Coronary atherosclerosis of unspecified type of vessel, native or graft    Bilateral leg edema    Pulmonary hypertension    Acute biliary pancreatitis    Diabetes mellitus type 2 in nonobese    Sepsis     PAST MEDICAL Hx:  Past Medical History:   Diagnosis Date    Arthritis     Blood transfusion     CHF (congestive heart failure)     Coronary artery disease     Diabetes mellitus     General anesthetics causing adverse effect in therapeutic use     "woke up as they were putting me on the table"    Hyperlipidemia     Hypertension  "    Renal disorder     Thyroid disease      PAST SURGICAL Hx:  Past Surgical History:   Procedure Laterality Date    CATARACT EXTRACTION EXTRACAPSULAR W/ INTRAOCULAR LENS IMPLANTATION  Sept 2012    right eye    CORONARY ANGIOPLASTY WITH STENT PLACEMENT  2011    JOINT REPLACEMENT      Left total hip replacement in 1986    TONSILLECTOMY         Medications:    Current Facility-Administered Medications:     acetaminophen tablet 650 mg, 650 mg, Oral, Q6H PRN, Joe Maradiaga MD    ciprofloxacin (CIPRO)400mg/200ml D5W IVPB 400 mg, 400 mg, Intravenous, Q24H, Jalen Barrientos MD, Last Rate: 200 mL/hr at 04/29/17 0625, 400 mg at 04/29/17 0625    dextrose 50% (D50W) solution 12.5 g, 12.5 g, Intravenous, PRN, Joe Maradiaga MD    glucagon (human recombinant) injection 1 mg, 1 mg, Intramuscular, PRN, Joe Maradiaga MD    hydrALAZINE injection 10 mg, 10 mg, Intravenous, Q6H PRN, Jalen Barrientos MD    hydromorphone (PF) injection 1 mg, 1 mg, Intravenous, Q2H PRN, Joe Maradiaga MD, 1 mg at 04/28/17 2018    influenza vaccine 180 mcg/0.5 mL (for patients > 65) injection, 0.5 mL, Intramuscular, vaccine x 1 dose, Jalen Barrientos MD    insulin aspart pen 1-10 Units, 1-10 Units, Subcutaneous, Q6H PRN, Joe Maradiaga MD    labetalol tablet 300 mg, 300 mg, Oral, Q12H, Tita Simpson MD, 300 mg at 04/29/17 1215    lactated ringers infusion, , Intravenous, Continuous, Tita Simpson MD, Last Rate: 100 mL/hr at 04/29/17 1138    levothyroxine tablet 50 mcg, 50 mcg, Oral, Daily, Joe Maradiaga MD, 50 mcg at 04/29/17 0536    pantoprazole EC tablet 40 mg, 40 mg, Oral, BID, Joe Maradiaga MD, 40 mg at 04/28/17 2341    piperacillin-tazobactam 4.5 g in sodium chloride 0.9% 100 mL IVPB (ready to mix system), 4.5 g, Intravenous, Q12H, Jalen Barrientos MD, Last Rate: 25 mL/hr at 04/29/17 0854, 4.5 g at 04/29/17 0854    pneumoc 13-edwin conj-dip cr(PF) 0.5 mL 0.5 mL, 0.5 mL, Intramuscular, Prior to discharge,  "Jalen Barrientos MD    sodium chloride 0.9% bolus 500 mL, 500 mL, Intravenous, Q30 Min PRN, Joe Maradiaga MD    Allergies:  Review of patient's allergies indicates:   Allergen Reactions    Bystolic [nebivolol]     Losartan     Diovan [valsartan] Rash       FAMILY Hx:  Family History   Problem Relation Age of Onset    Diabetes Daughter     Hypertension Daughter        SOCIAL Hx:  Social History     Social History    Marital status: Single     Spouse name: N/A    Number of children: N/A    Years of education: N/A     Occupational History    Not on file.     Social History Main Topics    Smoking status: Never Smoker    Smokeless tobacco: Not on file    Alcohol use No    Drug use: No    Sexual activity: Not Currently     Other Topics Concern    Not on file     Social History Narrative     occasionally    REVIEW OF SYSTEMS:  A comprehensive review of systems was negative except for: Cardiovascular: positive for lower extremity edema  Gastrointestinal: positive for abdominal pain, nausea and vomiting    PHYSICAL EXAM:  /64  Pulse 71  Temp 98.3 °F (36.8 °C) (Oral)   Resp 17  Ht 5' 2" (1.575 m)  Wt 59.2 kg (130 lb 9.5 oz)  SpO2 95%  Breastfeeding? No  BMI 23.89 kg/m2    General: The patient is well devleoped and in no distress  HEENT: Head: Normocephalic, no lesions, without obvious abnormality.  Eye: Normal external eye, conjunctiva, lids cornea, JOSE.  Nose: Normal external nose, mucus membranes and septum.  Neck / Thyroid: Supple, no masses, nodes, nodules or enlargement.  Neck: no JVD, supple, symmetrical, trachea midline and slight Rt carotid bruit. Eval in past showed not over 50% stenosis  Chest: Normal chest wall and respirations. Clear to auscultation.  Lungs: chest clear, no wheezing, rales, normal symmetric air entry, Heart exam - S1, S2 normal, no murmur, no gallop, rate regular  Heart: RRR, S1 and S2 were normal. 1/6 systolic ejection murmur of aortic sclerosis, 2/6 diastolic " murmur of aortic insufficency. (both known since 2015)  Pulses: Normal upper and lower extremity pulses  Abdomen: Soft but exam was truncated because of known cholecystitis, bowel sounds were normal  Extremities: No cyanosis  Neurologic: alert, oriented, normal speech, no focal findings or movement disorder noted    RESULTS:  Triglycerides   Date Value Ref Range Status   11/10/2012 82 30 - 150 mg/dL Final     Comment:     The National Cholesterol Education Program (NCEP) has set the  following guidelines (reference values) for triglycerides:  Normal......................<150 mg/dL  Borderline High.............150-199 mg/dL  High........................200-499 mg/dL  Very High...................> or = 500 mg/dL     Cholesterol   Date Value Ref Range Status   11/10/2012 218 (H) 120 - 199 mg/dL Final     Comment:     The National Cholesterol Education Program (NCEP) has set the  following guidelines (reference ranges) for Cholesterol:  Optimal.....................<200 mg/dL  Borderline High.............200-239 mg/dL  High........................> or = 240 mg/dL     HDL   Date Value Ref Range Status   11/10/2012 32 (L) 40 - 75 mg/dL Final     Comment:     The National Cholesterol Education Program (NCEP) has set the  following guidelines (reference values) for HDL Cholesterol:  Low...............<40  Optimal...........>60     LDL Cholesterol   Date Value Ref Range Status   11/10/2012 170.0 (H) 63 - 159 mg/dL Final     Comment:     The National Cholesterol Education Program (NCEP) has set the  following guidelines (reference values) for LDL Cholesterol:  Optimal.......................<130 mg/dL  Borderline High...............130-159 mg/dL  High..........................160-189 mg/dL  Very High.....................>190 mg/dL     HDL/Chol Ratio   Date Value Ref Range Status   11/10/2012 14.7 (L) 20 - 50 % Final     CMP  Sodium   Date Value Ref Range Status   04/29/2017 141 136 - 145 mmol/L Final     Potassium   Date Value  Ref Range Status   04/29/2017 4.6 3.5 - 5.1 mmol/L Final     Chloride   Date Value Ref Range Status   04/29/2017 109 95 - 110 mmol/L Final     CO2   Date Value Ref Range Status   04/29/2017 23 23 - 29 mmol/L Final     Glucose   Date Value Ref Range Status   04/29/2017 103 70 - 110 mg/dL Final     BUN, Bld   Date Value Ref Range Status   04/29/2017 29 (H) 8 - 23 mg/dL Final     Creatinine   Date Value Ref Range Status   04/29/2017 2.1 (H) 0.5 - 1.4 mg/dL Final     Calcium   Date Value Ref Range Status   04/29/2017 8.3 (L) 8.7 - 10.5 mg/dL Final     Total Protein   Date Value Ref Range Status   04/29/2017 5.8 (L) 6.0 - 8.4 g/dL Final     Albumin   Date Value Ref Range Status   04/29/2017 2.9 (L) 3.5 - 5.2 g/dL Final     Total Bilirubin   Date Value Ref Range Status   04/29/2017 7.7 (H) 0.1 - 1.0 mg/dL Final     Comment:     For infants and newborns, interpretation of results should be based  on gestational age, weight and in agreement with clinical  observations.  Premature Infant recommended reference ranges:  Up to 24 hours.............<8.0 mg/dL  Up to 48 hours............<12.0 mg/dL  3-5 days..................<15.0 mg/dL  6-29 days.................<15.0 mg/dL       Alkaline Phosphatase   Date Value Ref Range Status   04/29/2017 121 55 - 135 U/L Final     AST   Date Value Ref Range Status   04/29/2017 75 (H) 10 - 40 U/L Final     ALT   Date Value Ref Range Status   04/29/2017 53 (H) 10 - 44 U/L Final     Anion Gap   Date Value Ref Range Status   04/29/2017 9 8 - 16 mmol/L Final     eGFR if    Date Value Ref Range Status   04/29/2017 24 (A) >60 mL/min/1.73 m^2 Final     eGFR if non    Date Value Ref Range Status   04/29/2017 21 (A) >60 mL/min/1.73 m^2 Final     Comment:     Calculation used to obtain the estimated glomerular filtration  rate (eGFR) is the CKD-EPI equation. Since race is unknown   in our information system, the eGFR values for   -American and  Non--American patients are given   for each creatinine result.       see below  Echo from 2015 normal EF mild aortic sclerosis with no stenosis and mild AI.  Echo from today is not available for review due to technical issues.    Lab Results   Component Value Date    WBC 4.05 04/29/2017    HGB 7.9 (L) 04/29/2017    HCT 22.6 (L) 04/29/2017    MCV 88 04/29/2017    PLT 96 (L) 04/29/2017       IMPRESSION:  Acute cholecystitis  Heart failure with preserved EF stable symptoms, new echo pending  CKD IV, chronic renal disease with UMU from hypotension and sepsis as described by hospital medicine.  CAD stable with no acute symptoms. ECG is unchanged from 2016  Ectopic atrial rhythm, chronic and stable    PLAN:  The patient is moderate risk from a CV perspective for moderate risk abdominal surgery. She should proceed to surgery with no further evaluation needed. Due to the sepsis, CKD, diabetes and advanced age she is at higher risk but the risk benefit favors surgical intervention since she has already had hypotension.    Continue labetalol 300mg twice daily. Would hold hydralizine if systolic BP is less than 120.  The patient has a significant anemia. We would suggest transfusing her in the pat- or intraoperative period to a target hemoglobin of 10 if possible to reduce any type II ischemia.     I would not diurese her at this time due to her UMU    Will review the echocardiogram once the study is available for review on the [facility] system.

## 2017-04-30 NOTE — PROGRESS NOTES
Subjective:  CC - abdominal pain    Reports minimal periumbilical pain this morning.  No further nausea or vomiting.  Asking for something to drink.  No fever or chills.  No overt bleeding.  Receiving 1 unit pRBCs this morning.    Objective:   Vitals:    04/30/17 1101   BP: (!) 159/70   Pulse: 68   Resp: 18   Temp: 98.2 °F (36.8 °C)     Physical Exam:  GEN: AA&O x3, no apparent distress   HEENT: EOMI, PERRL, anicteric sclera  Cardiovascular: normal s1/s2, RRR, no M/R/G   Chest: CTA B   Abdomen: soft, NTND, normoactive BS, no HSM  Extermities: No C/C/E. 2+ dorsalis pedis pulses bilaterally    Recent Labs  Lab 04/30/17  0341   WBC 5.72   RBC 2.43*   HGB 7.4*   HCT 20.9*   PLT 83*   MCV 86   MCH 30.5   MCHC 35.4   ALT= 64 --> 53 --> 39  AST= 105 --> 75 --> 37  ALP= 150 --> 121  Tbili= 7.7 --> 7.7 --> 4.6  Lipase= > 1,000 --> 375    Imaging:   CT abd/pelvis reviewed.  US abd reviewed.     Impression: 85 year old female with a history of HTN, DM, CHF and CKD presenting with abdominal pain, nausea and vomiting.      Plan:  1. Biliary pancreatitis - diagnosed based on imaging, labs (lipase > 1,000 on admission) and clinical presentation. No ETOH. No new meds. Appears to be improving clinically with LR, bowel rest, IV abx (blood cultures +GNR). US shows cholelithiasis but no clear evidence of choledocholithiasis or biliary ductal dilatation (CBD 6 mm).  Bilirubin significantly elevated on admission at 7.7, now trending down to 4.6 today (suspect stone may have passed).  She is currently afebrile with a normal white count and stable vitals.  Abdomen is soft, and she is non-toxic appearing.  Advance to clear liquid diet.  Will tentatively plan for ERCP tomorrow and recheck hepatic function panel in the morning. If bilirubin continues to trend downward, may not need ERCP.  Surgery on board - planning for lap betzaida in near future.  Addendum:P Chart  reviewed and patient examined. Will see if bilirubin keeps trending down but  ERCP is tenatatively scheduled.

## 2017-04-30 NOTE — ASSESSMENT & PLAN NOTE
Bacteremia  Assessment and Plan:  Pt met criteria for sepsis with fever, and hypotension. Pt with likely biliary source of bacteremia with Klebsiella pneumoniae. Continue Zosyn/Cipro for now and repeat blood cultures done today.

## 2017-04-30 NOTE — ASSESSMENT & PLAN NOTE
Pt is without chest pain and initially blood pressures were low, restarted labetolol yesterday. Will plan on transfusion of 1 unit PRBC.

## 2017-04-30 NOTE — PROGRESS NOTES
Pain improved, tolerating liquids.    Vitals:    04/29/17 2355 04/30/17 0452 04/30/17 0809 04/30/17 1101   BP: (!) 152/66 (!) 148/76 (!) 143/86 (!) 159/70   BP Location: Left arm Left arm     Patient Position: Lying Lying     BP Method: Automatic Automatic     Pulse: 66 97 70 68   Resp: 18 20 17 18   Temp: 98.4 °F (36.9 °C) 98.2 °F (36.8 °C) 97.5 °F (36.4 °C) 98.2 °F (36.8 °C)   TempSrc: Oral Oral Oral Oral   SpO2: 95% 96% 97% 96%   Weight:       Height:         A/O x 3  RRR  Soft, nontender    Labs reviewed    A/P biliary pancreatitis, clinically improving.  Needs lap betzaida when acute issues resolved, prior to discharge.  Pt tells me she does not want 2 procedures in the same day.  Choledocholithiasis, may have passed spontaneously.  ERCP tenatively planned for tomorrow.  HTN, CKD, CAD, DM--per primary

## 2017-05-01 PROBLEM — N39.0 URINARY TRACT INFECTION WITHOUT HEMATURIA: Status: ACTIVE | Noted: 2017-05-01

## 2017-05-01 LAB
ALBUMIN SERPL BCP-MCNC: 2.7 G/DL
ALBUMIN SERPL BCP-MCNC: 2.7 G/DL
ALP SERPL-CCNC: 84 U/L
ALP SERPL-CCNC: 84 U/L
ALT SERPL W/O P-5'-P-CCNC: 30 U/L
ALT SERPL W/O P-5'-P-CCNC: 30 U/L
ANION GAP SERPL CALC-SCNC: 9 MMOL/L
AORTIC VALVE REGURGITATION: ABNORMAL
AORTIC VALVE STENOSIS: ABNORMAL
AST SERPL-CCNC: 22 U/L
AST SERPL-CCNC: 22 U/L
BACTERIA UR CULT: NORMAL
BACTERIA UR CULT: NORMAL
BASOPHILS # BLD AUTO: 0.01 K/UL
BASOPHILS NFR BLD: 0.2 %
BILIRUB DIRECT SERPL-MCNC: 2.1 MG/DL
BILIRUB SERPL-MCNC: 3.4 MG/DL
BILIRUB SERPL-MCNC: 3.4 MG/DL
BUN SERPL-MCNC: 35 MG/DL
CALCIUM SERPL-MCNC: 8.3 MG/DL
CHLORIDE SERPL-SCNC: 110 MMOL/L
CO2 SERPL-SCNC: 19 MMOL/L
CREAT SERPL-MCNC: 2.2 MG/DL
DIASTOLIC DYSFUNCTION: YES
DIFFERENTIAL METHOD: ABNORMAL
EOSINOPHIL # BLD AUTO: 0.1 K/UL
EOSINOPHIL NFR BLD: 1.9 %
ERYTHROCYTE [DISTWIDTH] IN BLOOD BY AUTOMATED COUNT: 15.6 %
EST. GFR  (AFRICAN AMERICAN): 23 ML/MIN/1.73 M^2
EST. GFR  (NON AFRICAN AMERICAN): 20 ML/MIN/1.73 M^2
ESTIMATED PA SYSTOLIC PRESSURE: 61.51
GLOBAL PERICARDIAL EFFUSION: ABNORMAL
GLUCOSE SERPL-MCNC: 79 MG/DL
HCT VFR BLD AUTO: 24.4 %
HGB BLD-MCNC: 8.6 G/DL
LYMPHOCYTES # BLD AUTO: 0.4 K/UL
LYMPHOCYTES NFR BLD: 6.7 %
MCH RBC QN AUTO: 29.6 PG
MCHC RBC AUTO-ENTMCNC: 35.2 %
MCV RBC AUTO: 84 FL
MITRAL VALVE MOBILITY: NORMAL
MITRAL VALVE REGURGITATION: ABNORMAL
MONOCYTES # BLD AUTO: 0.6 K/UL
MONOCYTES NFR BLD: 8.9 %
NEUTROPHILS # BLD AUTO: 5.2 K/UL
NEUTROPHILS NFR BLD: 82.1 %
PLATELET # BLD AUTO: 71 K/UL
PMV BLD AUTO: ABNORMAL FL
POCT GLUCOSE: 65 MG/DL (ref 70–110)
POCT GLUCOSE: 71 MG/DL (ref 70–110)
POCT GLUCOSE: 77 MG/DL (ref 70–110)
POCT GLUCOSE: 93 MG/DL (ref 70–110)
POCT GLUCOSE: 95 MG/DL (ref 70–110)
POTASSIUM SERPL-SCNC: 4.2 MMOL/L
PROT SERPL-MCNC: 5.5 G/DL
PROT SERPL-MCNC: 5.5 G/DL
RBC # BLD AUTO: 2.91 M/UL
RETIRED EF AND QEF - SEE NOTES: 40 (ref 55–65)
SODIUM SERPL-SCNC: 138 MMOL/L
TRICUSPID VALVE REGURGITATION: ABNORMAL
WBC # BLD AUTO: 6.27 K/UL

## 2017-05-01 PROCEDURE — 80076 HEPATIC FUNCTION PANEL: CPT

## 2017-05-01 PROCEDURE — 25000003 PHARM REV CODE 250: Performed by: HOSPITALIST

## 2017-05-01 PROCEDURE — 25000003 PHARM REV CODE 250: Performed by: EMERGENCY MEDICINE

## 2017-05-01 PROCEDURE — 11000001 HC ACUTE MED/SURG PRIVATE ROOM

## 2017-05-01 PROCEDURE — 63600175 PHARM REV CODE 636 W HCPCS: Performed by: HOSPITALIST

## 2017-05-01 PROCEDURE — 85025 COMPLETE CBC W/AUTO DIFF WBC: CPT

## 2017-05-01 PROCEDURE — 94761 N-INVAS EAR/PLS OXIMETRY MLT: CPT

## 2017-05-01 PROCEDURE — 80053 COMPREHEN METABOLIC PANEL: CPT

## 2017-05-01 PROCEDURE — 36415 COLL VENOUS BLD VENIPUNCTURE: CPT

## 2017-05-01 RX ORDER — HYDRALAZINE HYDROCHLORIDE 25 MG/1
50 TABLET, FILM COATED ORAL EVERY 8 HOURS
Status: DISCONTINUED | OUTPATIENT
Start: 2017-05-01 | End: 2017-05-08 | Stop reason: HOSPADM

## 2017-05-01 RX ADMIN — PANTOPRAZOLE SODIUM 40 MG: 40 TABLET, DELAYED RELEASE ORAL at 08:05

## 2017-05-01 RX ADMIN — LABETALOL HYDROCHLORIDE 300 MG: 100 TABLET, FILM COATED ORAL at 08:05

## 2017-05-01 RX ADMIN — PIPERACILLIN, TAZOBACTAM 4.5 G: 4; .5 INJECTION, POWDER, LYOPHILIZED, FOR SOLUTION INTRAVENOUS at 08:05

## 2017-05-01 RX ADMIN — LEVOTHYROXINE SODIUM 50 MCG: 50 TABLET ORAL at 06:05

## 2017-05-01 RX ADMIN — HYDRALAZINE HYDROCHLORIDE 50 MG: 25 TABLET ORAL at 01:05

## 2017-05-01 RX ADMIN — DEXTROSE: 5 SOLUTION INTRAVENOUS at 06:05

## 2017-05-01 RX ADMIN — DEXTROSE MONOHYDRATE 12.5 G: 25 INJECTION, SOLUTION INTRAVENOUS at 01:05

## 2017-05-01 RX ADMIN — LABETALOL HYDROCHLORIDE 300 MG: 100 TABLET, FILM COATED ORAL at 09:05

## 2017-05-01 RX ADMIN — CIPROFLOXACIN 400 MG: 2 INJECTION, SOLUTION INTRAVENOUS at 06:05

## 2017-05-01 RX ADMIN — HYDRALAZINE HYDROCHLORIDE 50 MG: 25 TABLET ORAL at 09:05

## 2017-05-01 RX ADMIN — PANTOPRAZOLE SODIUM 40 MG: 40 TABLET, DELAYED RELEASE ORAL at 09:05

## 2017-05-01 NOTE — PROGRESS NOTES
Discussed the need for a lap betzaida with cholangiogram and she understands and agrees to proceed  Awaiting clearance by cardiology

## 2017-05-01 NOTE — PROGRESS NOTES
Subjective: The patient continues to feel fairly well. Her lipase and amylase are falling. Her HGB is up slightly with 1 unit of PRBC but still less than 10gm/dl. She has had no further sepsis signs and her BP is elevated since she has not been given PO blood pressure meds.    Objective:  / 78, pulse 62 resp 18 Temp 97.6  Chest clear to ascultation  Heart RRR with no s3. Positive s4. 2/6 systolic murmur in aortic area AS and 2/6 diastolic murmur of AI. PMI not displaced. JVD more elevated now about 12-13cm  Abd: bowel sounds still positive. Again abdomen not palpated  Ext: warm and well perfused, no cyanosis, 2+ pedal edema (slightly worse)  Neuro: non focal, patient is awake and alert and in no acute distress.      Results:  Echocardiogram from this admission reviewed. The EF is 50% with mid septal hypokinesis/akinesis. She has mild AS/AI as before. RV is enlarged and she has worse TR with now moderate TR and PASP of mid 50s. By TR jet estimate.    Amylase and lipase falling  Cr stable    Impression:  CAD no chest pain despite very low BP and anemia on admission. Now better with hypertension because hydralazine had not been restarted.   The patient is Moderate risk from a CV perspective for Moderate risk surgery as was described on 4/29/17. In light of her advanced age, Pancreatitis related to her gallstone, CKD III-IV any further cardiac assessment such as cardiac cathertization would delay her procedure and confer no survival advantage. If she had a PCI stent she would require plavix for at least 3 months for a bare metal stent.  She is not a candidate for surgical revascularization under any circumstance given her gallstone pancreatitis.      Plan:  I suggest proceeding with her planned surgical procedure. I would further suggest transfusion to a HGB of 10gm/dl and diurese her as she is having worsening tricuspid regurg due to an enlarged RV.  Follow 1-2 troponin level post op for prognostication  purposes.  Hydralazine and labetalol for BP control  Continue ASA 81mg daily  We are still following with you since 4/29/17

## 2017-05-01 NOTE — NURSING
Packed red blood cells complete, patient resting comfortable no sign or symptoms of acute distress.

## 2017-05-01 NOTE — PLAN OF CARE
Problem: Patient Care Overview  Goal: Plan of Care Review  05/01/2017     Recommendations     Recommendation/Intervention: 1) Advance diet to 1800 ADA Cardiac with Boost Plus supplement if po intake < 50% 2) If unable to advance diet, recommend ppn:  Clinimix 4.25/10 @70 cc/hr with 250 mL 20% IV Lipids daily, provides 1356 calories (306 with D5W infusing), 71.4 g protein, 1680 cc IV Fluid. Monitor lytes- replete as needed. Monitor Triglycerides weekly.  Goals: 1) Advance diet or provide nutrition support within 72 hours  Nutrition Goal Status: new  Communication of RD Recs: reviewed with SHERRI Mcmillan, MPH, RD, LDN

## 2017-05-01 NOTE — PROGRESS NOTES
The patient again reiterates that she has minimal to no abdominal pain.      Vitals:    04/30/17 2033 04/30/17 2208 04/30/17 2239 05/01/17 0100   BP: (!) 182/81 (!) 168/70 (!) 159/78 (!) 179/76   BP Location:       Patient Position:       BP Method:       Pulse: 67   68   Resp: 18   18   Temp: 98 °F (36.7 °C)   98.7 °F (37.1 °C)   TempSrc: Oral   Oral   SpO2: 97%   (!) 93%   Weight:       Height:          ciprofloxacin  400 mg Intravenous Q24H    labetalol  300 mg Oral Q12H    levothyroxine  50 mcg Oral Daily    pantoprazole  40 mg Oral BID    piperacillin-tazobactam 4.5 g in sodium chloride 0.9% 100 mL IVPB (ready to mix system)  4.5 g Intravenous Q12H     P.E.:  GEN: A x O x 3, NAD  HEENT: EOMI, PERRL, anicteric sclera  CV: RRR, no M/R/G  Chest: CTA B  Abdomen: soft, NTND, normoactive BS  Ext: No C/C/E. 2+ dorsalis pedis pulses B    Labs:  Recent Results (from the past 336 hour(s))   CBC auto differential    Collection Time: 05/01/17  3:29 AM   Result Value Ref Range    WBC 6.27 3.90 - 12.70 K/uL    Hemoglobin 8.6 (L) 12.0 - 16.0 g/dL    Hematocrit 24.4 (L) 37.0 - 48.5 %    Platelets 71 (L) 150 - 350 K/uL   CBC auto differential    Collection Time: 04/30/17  3:41 AM   Result Value Ref Range    WBC 5.72 3.90 - 12.70 K/uL    Hemoglobin 7.4 (L) 12.0 - 16.0 g/dL    Hematocrit 20.9 (L) 37.0 - 48.5 %    Platelets 83 (L) 150 - 350 K/uL   CBC auto differential    Collection Time: 04/29/17  3:28 AM   Result Value Ref Range    WBC 4.05 3.90 - 12.70 K/uL    Hemoglobin 7.9 (L) 12.0 - 16.0 g/dL    Hematocrit 22.6 (L) 37.0 - 48.5 %    Platelets 96 (L) 150 - 350 K/uL     CMP  Sodium   Date Value Ref Range Status   05/01/2017 138 136 - 145 mmol/L Final     Potassium   Date Value Ref Range Status   05/01/2017 4.2 3.5 - 5.1 mmol/L Final     Chloride   Date Value Ref Range Status   05/01/2017 110 95 - 110 mmol/L Final     CO2   Date Value Ref Range Status   05/01/2017 19 (L) 23 - 29 mmol/L Final     Glucose   Date Value Ref  Range Status   05/01/2017 79 70 - 110 mg/dL Final     BUN, Bld   Date Value Ref Range Status   05/01/2017 35 (H) 8 - 23 mg/dL Final     Creatinine   Date Value Ref Range Status   05/01/2017 2.2 (H) 0.5 - 1.4 mg/dL Final     Calcium   Date Value Ref Range Status   05/01/2017 8.3 (L) 8.7 - 10.5 mg/dL Final     Total Protein   Date Value Ref Range Status   05/01/2017 5.5 (L) 6.0 - 8.4 g/dL Final   05/01/2017 5.5 (L) 6.0 - 8.4 g/dL Final     Albumin   Date Value Ref Range Status   05/01/2017 2.7 (L) 3.5 - 5.2 g/dL Final   05/01/2017 2.7 (L) 3.5 - 5.2 g/dL Final     Total Bilirubin   Date Value Ref Range Status   05/01/2017 3.4 (H) 0.1 - 1.0 mg/dL Final     Comment:     For infants and newborns, interpretation of results should be based  on gestational age, weight and in agreement with clinical  observations.  Premature Infant recommended reference ranges:  Up to 24 hours.............<8.0 mg/dL  Up to 48 hours............<12.0 mg/dL  3-5 days..................<15.0 mg/dL  6-29 days.................<15.0 mg/dL     05/01/2017 3.4 (H) 0.1 - 1.0 mg/dL Final     Comment:     For infants and newborns, interpretation of results should be based  on gestational age, weight and in agreement with clinical  observations.  Premature Infant recommended reference ranges:  Up to 24 hours.............<8.0 mg/dL  Up to 48 hours............<12.0 mg/dL  3-5 days..................<15.0 mg/dL  6-29 days.................<15.0 mg/dL       Alkaline Phosphatase   Date Value Ref Range Status   05/01/2017 84 55 - 135 U/L Final   05/01/2017 84 55 - 135 U/L Final     AST   Date Value Ref Range Status   05/01/2017 22 10 - 40 U/L Final   05/01/2017 22 10 - 40 U/L Final     ALT   Date Value Ref Range Status   05/01/2017 30 10 - 44 U/L Final   05/01/2017 30 10 - 44 U/L Final     Anion Gap   Date Value Ref Range Status   05/01/2017 9 8 - 16 mmol/L Final     eGFR if    Date Value Ref Range Status   05/01/2017 23 (A) >60 mL/min/1.73 m^2  Final     eGFR if non    Date Value Ref Range Status   05/01/2017 20 (A) >60 mL/min/1.73 m^2 Final     Comment:     Calculation used to obtain the estimated glomerular filtration  rate (eGFR) is the CKD-EPI equation. Since race is unknown   in our information system, the eGFR values for   -American and Non--American patients are given   for each creatinine result.         No results for input(s): INR, APTT in the last 24 hours.    Invalid input(s): PT    CT of Abdomen/Pelvis:  Impression:            The pancreas is mildly enlarged with peripancreatic inflammatory stranding. Findings could reflect acute pancreatitis. Recommend correlation with pancreatic enzymes.    Gallstones are seen within the gallbladder which is mildly distended. Ultrasound obtained as clinically warranted.    Trace ascites is seen within the right upper quadrant. Small amount of free fluid or ascites is seen within the pelvis.    Diffuse anasarca.     Chronic kidney disease.    Cardiomegaly and trace pericardial effusion.     A/P:  The patient is an 85 year old woman with a history of HTN, CHF, DM, and chronic renal insufficiency presenting with biliary pancreatitis.  1.  Biliary Pancreatitis - an ultrasound showed cholelithiasis and a common bile duct of 6 mm.  Regardless, as her total bilirubin was 7.7 for two consecutive days, it was suspected that she had choledocholithiasis.  The patient was afebrile and her WBC count was normal, but she grew out gram negative rods in her blood cultures.  Lactated ringers and intravenous antibiotics were continued.  Her pain resolved, her bilirubin has decreased to 3.4, and her repeat blood culture preliminarily show no growth to date.  She likely already passed a common bile duct stone.  The patient was transfused 1 unit if pRBCs.  The patient was discussed with Dr. Jacobo.  She is clinically better and she can undergo a cholecystectomy with an intraoperative cholangiogram.   The patient will be monitored.

## 2017-05-01 NOTE — PROGRESS NOTES
Patient follows with Dr. Linda (\[facility]\"").  I have asked Dr. Simpson to switch consult to their service.

## 2017-05-01 NOTE — NURSING
Blood pressure elevated 182/87 hydralazine 10mg IV administered per orders . Monitor placed per orders.

## 2017-05-01 NOTE — PROGRESS NOTES
[facility]  Adult Nutrition  Progress Note    SUMMARY     Recommendations    Recommendation/Intervention: 1) Advance diet to 1800 ADA Cardiac with Boost Plus supplement if po intake < 50% 2) If unable to advance diet, recommend ppn:  Clinimix 4.25/10 @70 cc/hr with 250 mL 20% IV Lipids daily, provides 1356 calories (306 with D5W infusing), 71.4 g protein, 1680 cc IV Fluid. Monitor lytes- replete as needed. Monitor Triglycerides weekly.  Goals: 1) Advance diet or provide nutrition support within 72 hours  Nutrition Goal Status: new  Communication of RD Recs: reviewed with RN    Continuum of Care Plan    Referral to Outpatient Services:  (D/C planning: ADA/Cardiac diet with supplements as needed)    Reason for Assessment    Reason for Assessment: RD follow-up  Diagnosis:  (acute pancreatitis)  Relevent Medical History: DM, HTN, CAD, CHF, HLD   Interdisciplinary Rounds: did not attend    General Information Comments: Patient's diet not advanced. Recommend ppn if unable to advance.    Nutrition Prescription Ordered    Current Diet Order: NPO    Evaluation of Received Nutrients/Fluid Intake     Other Calories (kcal): 306    Energy Calories Required: not meeting needs  Protein Required: not meeting needs    IV Fluid (mL): 1800  Fluid Required: exceed needs (Net I/O +2215.8)    Nutrition Risk Screen     Nutrition Risk Screen: no indicators present    Nutrition/Diet History    Food Preferences: denies cultural    Factors Affecting Nutritional Intake: NPO      Labs/Tests/Procedures/Meds       Pertinent Labs Reviewed: reviewed  BMP  Lab Results   Component Value Date     05/01/2017    K 4.2 05/01/2017     05/01/2017    CO2 19 (L) 05/01/2017    BUN 35 (H) 05/01/2017    CREATININE 2.2 (H) 05/01/2017    CALCIUM 8.3 (L) 05/01/2017    ANIONGAP 9 05/01/2017    ESTGFRAFRICA 23 (A) 05/01/2017    EGFRNONAA 20 (A) 05/01/2017     Lab Results   Component Value Date    CALCIUM 8.3 (L) 05/01/2017    PHOS 4.0  2015     Pertinent Medications Reviewed: reviewed       Physical Findings    Overall Physical Appearance:  (GUADALUPE)  Oral/Mouth Cavity: WDL  Skin: intact    Anthropometrics    Height (inches): 61.97 in  Weight Method: Bed Scale  Weight (kg): 59.2 kg  Ideal Body Weight (IBW), Female: 109.85 lb  % Ideal Body Weight, Female (lb): 118.81 lb  BMI (kg/m2): 23.9  BMI Grade: 18.5-24.9 - normal  Usual Body Weight (UBW), k.18 kg  % Usual Body Weight: 86.83  Weight Loss: unintentional    Estimated/Assessed Needs    Weight Used For Calorie Calculations: 59.2 kg (130 lb 8.2 oz)   Height (cm): 157.4 cm  Energy Need Method: Kcal/kg (1500 - 1800)  RMR ([place]-St. Jeor Equation): 995.96  Weight Used For Protein Calculations: 59.2 kg (130 lb 8.2 oz)  Protein Requirements: 60-70 g  Fluid Need Method: RDA Method  CHO Requirement: 200 g     Nutrition Diagnosis     Problem: Inadequate Energy Intake  Etiology: Acute Pancreatitis  As Evidenced by: NPO   Nutrition Diagnosis Status: continues    Monitor and Evaluation    Food and Nutrient Intake: energy intake, food and beverage intake, parenteral nutrition intake  Food and Nutrient Adminstration: diet order, enteral and parenteral nutrition administration (supplement order)  Anthropometric Measurements: weight, weight change  Biochemical Data, Medical Tests and Procedures: electrolyte and renal panel, gastrointestinal profile, glucose/endocrine profile, lipid profile  Nutrition-Focused Physical Findings: overall appearance    Nutrition Risk    Level of Risk: other (see comments) (f/u 2x weekly)    Nutrition Follow-Up    RD Follow-up?: Yes

## 2017-05-01 NOTE — PLAN OF CARE
Problem: Fall Risk (Adult)  Intervention: Safety Promotion/Fall Prevention    05/01/17 1800   Safety Interventions   Safety Promotion/Fall Prevention assistive device/personal item within reach;bed alarm set;Fall Risk reviewed with patient/family;lighting adjusted;medications reviewed;nonskid shoes/socks when out of bed;room near unit station;side rails raised x 2         Goal: Absence of Falls  Patient will demonstrate the desired outcomes by discharge/transition of care.   Outcome: Ongoing (interventions implemented as appropriate)    05/01/17 1848   Fall Risk (Adult)   Absence of Falls making progress toward outcome         Problem: Pressure Ulcer Risk (Zeeshan Scale) (Adult,Obstetrics,Pediatric)  Intervention: Turn/Reposition Often    05/01/17 1800 05/01/17 1848   Skin Interventions   Pressure Reduction Techniques --  frequent weight shift encouraged;weight shift assistance provided   Positioning   Body Position weight shift assistance provided --          Goal: Skin Integrity  Patient will demonstrate the desired outcomes by discharge/transition of care.   Outcome: Ongoing (interventions implemented as appropriate)    05/01/17 1848   Pressure Ulcer Risk (Zeeshan Scale) (Adult,Obstetrics,Pediatric)   Skin Integrity making progress toward outcome

## 2017-05-02 LAB
ALBUMIN SERPL BCP-MCNC: 2.7 G/DL
ALP SERPL-CCNC: 72 U/L
ALT SERPL W/O P-5'-P-CCNC: 22 U/L
AMYLASE SERPL-CCNC: 96 U/L
ANION GAP SERPL CALC-SCNC: 8 MMOL/L
AST SERPL-CCNC: 18 U/L
BASOPHILS # BLD AUTO: 0.01 K/UL
BASOPHILS NFR BLD: 0.2 %
BILIRUB SERPL-MCNC: 2.8 MG/DL
BLD PROD TYP BPU: NORMAL
BLD PROD TYP BPU: NORMAL
BLOOD UNIT EXPIRATION DATE: NORMAL
BLOOD UNIT EXPIRATION DATE: NORMAL
BLOOD UNIT TYPE CODE: 5100
BLOOD UNIT TYPE CODE: 5100
BLOOD UNIT TYPE: NORMAL
BLOOD UNIT TYPE: NORMAL
BUN SERPL-MCNC: 29 MG/DL
CALCIUM SERPL-MCNC: 8.4 MG/DL
CHLORIDE SERPL-SCNC: 109 MMOL/L
CO2 SERPL-SCNC: 21 MMOL/L
CODING SYSTEM: NORMAL
CODING SYSTEM: NORMAL
CREAT SERPL-MCNC: 2.1 MG/DL
DIFFERENTIAL METHOD: ABNORMAL
DISPENSE STATUS: NORMAL
DISPENSE STATUS: NORMAL
EOSINOPHIL # BLD AUTO: 0.1 K/UL
EOSINOPHIL NFR BLD: 2.1 %
ERYTHROCYTE [DISTWIDTH] IN BLOOD BY AUTOMATED COUNT: 15.4 %
EST. GFR  (AFRICAN AMERICAN): 24 ML/MIN/1.73 M^2
EST. GFR  (NON AFRICAN AMERICAN): 21 ML/MIN/1.73 M^2
GLUCOSE SERPL-MCNC: 83 MG/DL
HCT VFR BLD AUTO: 24.1 %
HGB BLD-MCNC: 8.7 G/DL
LIPASE SERPL-CCNC: 78 U/L
LYMPHOCYTES # BLD AUTO: 0.5 K/UL
LYMPHOCYTES NFR BLD: 9.1 %
MCH RBC QN AUTO: 30.4 PG
MCHC RBC AUTO-ENTMCNC: 36.1 %
MCV RBC AUTO: 84 FL
MONOCYTES # BLD AUTO: 0.7 K/UL
MONOCYTES NFR BLD: 13.2 %
NEUTROPHILS # BLD AUTO: 4.2 K/UL
NEUTROPHILS NFR BLD: 75 %
PLATELET # BLD AUTO: 81 K/UL
PMV BLD AUTO: 12.5 FL
POCT GLUCOSE: 110 MG/DL (ref 70–110)
POCT GLUCOSE: 128 MG/DL (ref 70–110)
POCT GLUCOSE: 85 MG/DL (ref 70–110)
POTASSIUM SERPL-SCNC: 4.1 MMOL/L
PROT SERPL-MCNC: 5.6 G/DL
RBC # BLD AUTO: 2.86 M/UL
SODIUM SERPL-SCNC: 138 MMOL/L
TRANS ERYTHROCYTES VOL PATIENT: NORMAL ML
TRANS ERYTHROCYTES VOL PATIENT: NORMAL ML
WBC # BLD AUTO: 5.61 K/UL

## 2017-05-02 PROCEDURE — 63600175 PHARM REV CODE 636 W HCPCS: Performed by: HOSPITALIST

## 2017-05-02 PROCEDURE — 83690 ASSAY OF LIPASE: CPT

## 2017-05-02 PROCEDURE — 36430 TRANSFUSION BLD/BLD COMPNT: CPT

## 2017-05-02 PROCEDURE — 36415 COLL VENOUS BLD VENIPUNCTURE: CPT

## 2017-05-02 PROCEDURE — 85025 COMPLETE CBC W/AUTO DIFF WBC: CPT

## 2017-05-02 PROCEDURE — 25000003 PHARM REV CODE 250: Performed by: HOSPITALIST

## 2017-05-02 PROCEDURE — 94640 AIRWAY INHALATION TREATMENT: CPT

## 2017-05-02 PROCEDURE — 11000001 HC ACUTE MED/SURG PRIVATE ROOM

## 2017-05-02 PROCEDURE — P9021 RED BLOOD CELLS UNIT: HCPCS

## 2017-05-02 PROCEDURE — 82150 ASSAY OF AMYLASE: CPT

## 2017-05-02 PROCEDURE — 25000003 PHARM REV CODE 250: Performed by: EMERGENCY MEDICINE

## 2017-05-02 PROCEDURE — 80053 COMPREHEN METABOLIC PANEL: CPT

## 2017-05-02 PROCEDURE — 25000242 PHARM REV CODE 250 ALT 637 W/ HCPCS: Performed by: HOSPITALIST

## 2017-05-02 RX ORDER — IPRATROPIUM BROMIDE AND ALBUTEROL SULFATE 2.5; .5 MG/3ML; MG/3ML
3 SOLUTION RESPIRATORY (INHALATION) EVERY 6 HOURS PRN
Status: DISCONTINUED | OUTPATIENT
Start: 2017-05-02 | End: 2017-05-08 | Stop reason: HOSPADM

## 2017-05-02 RX ORDER — MORPHINE SULFATE 10 MG/ML
3 INJECTION INTRAMUSCULAR; INTRAVENOUS; SUBCUTANEOUS EVERY 4 HOURS PRN
Status: DISCONTINUED | OUTPATIENT
Start: 2017-05-02 | End: 2017-05-08 | Stop reason: HOSPADM

## 2017-05-02 RX ORDER — HYDROCODONE BITARTRATE AND ACETAMINOPHEN 500; 5 MG/1; MG/1
TABLET ORAL
Status: DISCONTINUED | OUTPATIENT
Start: 2017-05-02 | End: 2017-05-02 | Stop reason: SDUPTHER

## 2017-05-02 RX ORDER — HYDROCODONE BITARTRATE AND ACETAMINOPHEN 500; 5 MG/1; MG/1
TABLET ORAL
Status: DISCONTINUED | OUTPATIENT
Start: 2017-05-02 | End: 2017-05-08 | Stop reason: HOSPADM

## 2017-05-02 RX ORDER — OXYCODONE AND ACETAMINOPHEN 5; 325 MG/1; MG/1
1 TABLET ORAL EVERY 4 HOURS PRN
Status: DISCONTINUED | OUTPATIENT
Start: 2017-05-02 | End: 2017-05-08 | Stop reason: HOSPADM

## 2017-05-02 RX ORDER — CYCLOBENZAPRINE HCL 5 MG
5 TABLET ORAL 3 TIMES DAILY PRN
Status: DISCONTINUED | OUTPATIENT
Start: 2017-05-02 | End: 2017-05-08 | Stop reason: HOSPADM

## 2017-05-02 RX ORDER — FUROSEMIDE 10 MG/ML
20 INJECTION INTRAMUSCULAR; INTRAVENOUS ONCE AS NEEDED
Status: DISCONTINUED | OUTPATIENT
Start: 2017-05-02 | End: 2017-05-02

## 2017-05-02 RX ORDER — ACETAMINOPHEN 325 MG/1
650 TABLET ORAL EVERY 6 HOURS PRN
Status: DISCONTINUED | OUTPATIENT
Start: 2017-05-02 | End: 2017-05-08 | Stop reason: HOSPADM

## 2017-05-02 RX ORDER — FUROSEMIDE 10 MG/ML
20 INJECTION INTRAMUSCULAR; INTRAVENOUS 2 TIMES DAILY
Status: DISCONTINUED | OUTPATIENT
Start: 2017-05-02 | End: 2017-05-06

## 2017-05-02 RX ORDER — ALLOPURINOL 100 MG/1
100 TABLET ORAL DAILY
Status: DISCONTINUED | OUTPATIENT
Start: 2017-05-02 | End: 2017-05-08 | Stop reason: HOSPADM

## 2017-05-02 RX ORDER — FERROUS SULFATE 325(65) MG
325 TABLET, DELAYED RELEASE (ENTERIC COATED) ORAL DAILY
Status: DISCONTINUED | OUTPATIENT
Start: 2017-05-02 | End: 2017-05-08 | Stop reason: HOSPADM

## 2017-05-02 RX ADMIN — FUROSEMIDE 20 MG: 10 INJECTION, SOLUTION INTRAMUSCULAR; INTRAVENOUS at 08:05

## 2017-05-02 RX ADMIN — HYDRALAZINE HYDROCHLORIDE 10 MG: 20 INJECTION INTRAMUSCULAR; INTRAVENOUS at 12:05

## 2017-05-02 RX ADMIN — HYDRALAZINE HYDROCHLORIDE 50 MG: 25 TABLET ORAL at 05:05

## 2017-05-02 RX ADMIN — LABETALOL HYDROCHLORIDE 300 MG: 100 TABLET, FILM COATED ORAL at 09:05

## 2017-05-02 RX ADMIN — IPRATROPIUM BROMIDE AND ALBUTEROL SULFATE 3 ML: .5; 3 SOLUTION RESPIRATORY (INHALATION) at 11:05

## 2017-05-02 RX ADMIN — LABETALOL HYDROCHLORIDE 300 MG: 100 TABLET, FILM COATED ORAL at 08:05

## 2017-05-02 RX ADMIN — FERROUS SULFATE TAB EC 325 MG (65 MG FE EQUIVALENT) 325 MG: 325 (65 FE) TABLET DELAYED RESPONSE at 05:05

## 2017-05-02 RX ADMIN — LEVOTHYROXINE SODIUM 50 MCG: 50 TABLET ORAL at 05:05

## 2017-05-02 RX ADMIN — ALLOPURINOL 100 MG: 100 TABLET ORAL at 05:05

## 2017-05-02 RX ADMIN — DEXTROSE: 5 SOLUTION INTRAVENOUS at 04:05

## 2017-05-02 RX ADMIN — CIPROFLOXACIN 400 MG: 2 INJECTION, SOLUTION INTRAVENOUS at 09:05

## 2017-05-02 RX ADMIN — HYDRALAZINE HYDROCHLORIDE 50 MG: 25 TABLET ORAL at 08:05

## 2017-05-02 RX ADMIN — PANTOPRAZOLE SODIUM 40 MG: 40 TABLET, DELAYED RELEASE ORAL at 09:05

## 2017-05-02 RX ADMIN — MORPHINE SULFATE 3 MG: 10 INJECTION INTRAVENOUS at 01:05

## 2017-05-02 RX ADMIN — PANTOPRAZOLE SODIUM 40 MG: 40 TABLET, DELAYED RELEASE ORAL at 08:05

## 2017-05-02 NOTE — ASSESSMENT & PLAN NOTE
Closely monitor blood glucose and make adjustments to insulin regimen as necessary. HgbA1C <4.0.

## 2017-05-02 NOTE — PLAN OF CARE
Problem: Patient Care Overview  Goal: Plan of Care Review  Outcome: Ongoing (interventions implemented as appropriate)  Patient remains free from injury and falls. Turned every 2 hours via pillows, no skin breakdown noted. IVF infusing as ordered. Multiple small liquid BM's throughout shift. Surgical bath and linen change done. Plan of care continued.

## 2017-05-02 NOTE — PROGRESS NOTES
Patient and family requesting a lucero lift for home use.  .Hilda Ojeda RN, BSN, STN [facility]  5/2/2017

## 2017-05-02 NOTE — SUBJECTIVE & OBJECTIVE
Interval History: Pt report midepigastric abdominal pain resolved, passing lots of gas. No only pain issue is her chronic hip pain.    Review of Systems   Constitutional: Negative for chills.   Respiratory: Negative for shortness of breath.    Cardiovascular: Negative for chest pain.   Gastrointestinal: Negative for abdominal pain, constipation, nausea and vomiting.     Objective:     Vital Signs (Most Recent):  Temp: 99 °F (37.2 °C) (05/01/17 1900)  Pulse: 74 (05/01/17 1900)  Resp: 20 (05/01/17 1900)  BP: (!) 171/76 (05/01/17 1900)  SpO2: 98 % (05/01/17 1900) Vital Signs (24h Range):  Temp:  [97.6 °F (36.4 °C)-99.1 °F (37.3 °C)] 99 °F (37.2 °C)  Pulse:  [62-76] 74  Resp:  [17-20] 20  SpO2:  [93 %-100 %] 98 %  BP: (171-185)/(70-79) 171/76     Weight: 59.2 kg (130 lb 8.6 oz)  Body mass index is 23.88 kg/(m^2).    Intake/Output Summary (Last 24 hours) at 05/01/17 2328  Last data filed at 05/01/17 2000   Gross per 24 hour   Intake           3292.5 ml   Output                0 ml   Net           3292.5 ml      Physical Exam   Constitutional: She appears well-developed and well-nourished.   HENT:   Head: Normocephalic and atraumatic.   Eyes: EOM are normal. Pupils are equal, round, and reactive to light.   +scleral icterus   Neck: Normal range of motion. Neck supple.   Cardiovascular: Normal rate and regular rhythm.    Pulmonary/Chest: Effort normal and breath sounds normal.   Abdominal:   Soft, mild +TTP in the mid-epigastric region, no rebound or guarding, ND, +BS       Significant Labs: All pertinent labs within the past 24 hours have been reviewed.    Significant Imaging: I have reviewed and interpreted all pertinent imaging results/findings within the past 24 hours.

## 2017-05-02 NOTE — PROGRESS NOTES
Pt complains of feeling light-headed.  Blood infusion paused, pt and vital signs assess /71 P 68 T 98.3 Resp 22 O2 sats 100% MD AMIRA notified.

## 2017-05-02 NOTE — ASSESSMENT & PLAN NOTE
Pt with biliary pancreatitis, clinically improving with bowel rest and lipase trending down.  Continue supportive care and pain control. Continue monitoring of LFTS and lipase daily, appreciate GI and Surgery input. Pt likely passed stone given improvement in LFTS and ERCP no longer indicated. Pt transfused 1 unit of PRBC and Cardiology recommended to proceed with surgical intervention after Hgb>10.  Will transfuse 2 more units of blood. Surgery plans on lap cholecystectomy tomorrow with cholangiogram. Continue Zosyn/Cipro for now given sepsis with bacteremia which will be discussed below.

## 2017-05-02 NOTE — SUBJECTIVE & OBJECTIVE
Interval History: Complaining of bilateral hip pain.    Review of Systems   Constitutional: Negative for chills and fever.   HENT: Negative for ear discharge and ear pain.    Eyes: Negative for pain and itching.   Respiratory: Negative for cough and choking.      Objective:     Vital Signs (Most Recent):  Temp: 98.3 °F (36.8 °C) (05/02/17 1334)  Pulse: 68 (05/02/17 1334)  Resp: (!) 22 (05/02/17 1334)  BP: (!) 165/71 (05/02/17 1334)  SpO2: 100 % (05/02/17 1334) Vital Signs (24h Range):  Temp:  [97.8 °F (36.6 °C)-99.1 °F (37.3 °C)] 98.3 °F (36.8 °C)  Pulse:  [64-74] 68  Resp:  [17-22] 22  SpO2:  [95 %-100 %] 100 %  BP: (144-184)/(62-81) 165/71     Weight: 59.2 kg (130 lb 8.6 oz)  Body mass index is 23.88 kg/(m^2).    Intake/Output Summary (Last 24 hours) at 05/02/17 1421  Last data filed at 05/02/17 0500   Gross per 24 hour   Intake             2400 ml   Output                0 ml   Net             2400 ml      Physical Exam   Constitutional: She is oriented to person, place, and time. She appears well-developed and well-nourished.   HENT:   Head: Normocephalic and atraumatic.   Eyes: EOM are normal. Pupils are equal, round, and reactive to light.   +scleral icterus   Neck: Normal range of motion. Neck supple.   Cardiovascular: Normal rate and regular rhythm.    Pulmonary/Chest: Effort normal and breath sounds normal.   Abdominal:   Soft, mild +TTP in the mid-epigastric region, no rebound or guarding, ND, +BS   Neurological: She is alert and oriented to person, place, and time.   Skin: Skin is warm and dry.       Significant Labs:   BMP:   Recent Labs  Lab 05/02/17  0405   GLU 83      K 4.1      CO2 21*   BUN 29*   CREATININE 2.1*   CALCIUM 8.4*     CBC:   Recent Labs  Lab 05/01/17  0329 05/02/17  0405   WBC 6.27 5.61   HGB 8.6* 8.7*   HCT 24.4* 24.1*   PLT 71* 81*

## 2017-05-02 NOTE — ASSESSMENT & PLAN NOTE
Pt is without chest pain and initially blood pressures were low, restarted labetolol and hydralazine.

## 2017-05-02 NOTE — ASSESSMENT & PLAN NOTE
Pt with biliary pancreatitis, clinically improving with bowel rest and lipase trending down. Continue lactated ringers but will advance to clears from NPO status today, and continue supportive care and pain control. Continue monitoring of LFTS and lipase daily, appreciate GI and Surgery input. Pt likely passed stone given improvement in LFTS and ERCP no longer indicated. Pt transfused 1 unit of PRBC and Cardiology recommended to proceed with surgical intervention. Surgery plans on lap cholecystectomy tomorrow with cholangiogram. Continue Zosyn/Cipro for now given sepsis with bacteremia which will be discussed below.

## 2017-05-02 NOTE — ASSESSMENT & PLAN NOTE
H/H with slight drop but no bleeding, s/p transfusion of 1 unit PRBC yesterday in anticipation of procedures. H/H with expected rise, monitor.

## 2017-05-02 NOTE — PROGRESS NOTES
The patient again reiterates that she has no abdominal pain.  She reports feeling short of breath but states this is not new.    Vitals:    05/01/17 1530 05/01/17 1900 05/01/17 2337 05/02/17 0444   BP: (!) 173/73 (!) 171/76 (!) 144/64 (!) 160/75   BP Location: Left arm Left arm Left arm Left arm   Patient Position: Lying Lying Lying Lying   BP Method: Automatic Automatic Automatic Automatic   Pulse: 68 74 66 64   Resp: 18 20 20 18   Temp: 99.1 °F (37.3 °C) 99 °F (37.2 °C) 98.6 °F (37 °C) 97.9 °F (36.6 °C)   TempSrc: Oral Oral Oral Oral   SpO2: 100% 98% 95% 95%   Weight:       Height:          ciprofloxacin  400 mg Intravenous Q24H    hydrALAZINE  50 mg Oral Q8H    labetalol  300 mg Oral Q12H    levothyroxine  50 mcg Oral Daily    pantoprazole  40 mg Oral BID    piperacillin-tazobactam 4.5 g in sodium chloride 0.9% 100 mL IVPB (ready to mix system)  4.5 g Intravenous Q12H     P.E.:  GEN: A x O x 3, NAD  HEENT: EOMI, PERRL, anicteric sclera  CV: RRR, no M/R/G  Chest: CTA B  Abdomen: soft, NTND, normoactive BS  Ext: No C/C/E. 2+ dorsalis pedis pulses B    Labs:  Recent Results (from the past 336 hour(s))   CBC auto differential    Collection Time: 05/02/17  4:05 AM   Result Value Ref Range    WBC 5.61 3.90 - 12.70 K/uL    Hemoglobin 8.7 (L) 12.0 - 16.0 g/dL    Hematocrit 24.1 (L) 37.0 - 48.5 %    Platelets 81 (L) 150 - 350 K/uL   CBC auto differential    Collection Time: 05/01/17  3:29 AM   Result Value Ref Range    WBC 6.27 3.90 - 12.70 K/uL    Hemoglobin 8.6 (L) 12.0 - 16.0 g/dL    Hematocrit 24.4 (L) 37.0 - 48.5 %    Platelets 71 (L) 150 - 350 K/uL   CBC auto differential    Collection Time: 04/30/17  3:41 AM   Result Value Ref Range    WBC 5.72 3.90 - 12.70 K/uL    Hemoglobin 7.4 (L) 12.0 - 16.0 g/dL    Hematocrit 20.9 (L) 37.0 - 48.5 %    Platelets 83 (L) 150 - 350 K/uL     CMP  Sodium   Date Value Ref Range Status   05/02/2017 138 136 - 145 mmol/L Final     Potassium   Date Value Ref Range Status    05/02/2017 4.1 3.5 - 5.1 mmol/L Final     Chloride   Date Value Ref Range Status   05/02/2017 109 95 - 110 mmol/L Final     CO2   Date Value Ref Range Status   05/02/2017 21 (L) 23 - 29 mmol/L Final     Glucose   Date Value Ref Range Status   05/02/2017 83 70 - 110 mg/dL Final     BUN, Bld   Date Value Ref Range Status   05/02/2017 29 (H) 8 - 23 mg/dL Final     Creatinine   Date Value Ref Range Status   05/02/2017 2.1 (H) 0.5 - 1.4 mg/dL Final     Calcium   Date Value Ref Range Status   05/02/2017 8.4 (L) 8.7 - 10.5 mg/dL Final     Total Protein   Date Value Ref Range Status   05/02/2017 5.6 (L) 6.0 - 8.4 g/dL Final     Albumin   Date Value Ref Range Status   05/02/2017 2.7 (L) 3.5 - 5.2 g/dL Final     Total Bilirubin   Date Value Ref Range Status   05/02/2017 2.8 (H) 0.1 - 1.0 mg/dL Final     Comment:     For infants and newborns, interpretation of results should be based  on gestational age, weight and in agreement with clinical  observations.  Premature Infant recommended reference ranges:  Up to 24 hours.............<8.0 mg/dL  Up to 48 hours............<12.0 mg/dL  3-5 days..................<15.0 mg/dL  6-29 days.................<15.0 mg/dL       Alkaline Phosphatase   Date Value Ref Range Status   05/02/2017 72 55 - 135 U/L Final     AST   Date Value Ref Range Status   05/02/2017 18 10 - 40 U/L Final     ALT   Date Value Ref Range Status   05/02/2017 22 10 - 44 U/L Final     Anion Gap   Date Value Ref Range Status   05/02/2017 8 8 - 16 mmol/L Final     eGFR if    Date Value Ref Range Status   05/02/2017 24 (A) >60 mL/min/1.73 m^2 Final     eGFR if non    Date Value Ref Range Status   05/02/2017 21 (A) >60 mL/min/1.73 m^2 Final     Comment:     Calculation used to obtain the estimated glomerular filtration  rate (eGFR) is the CKD-EPI equation. Since race is unknown   in our information system, the eGFR values for   -American and Non--American patients are given    for each creatinine result.         No results for input(s): INR, APTT in the last 24 hours.    Invalid input(s): PT    CT of Abdomen/Pelvis:  Impression:            The pancreas is mildly enlarged with peripancreatic inflammatory stranding. Findings could reflect acute pancreatitis. Recommend correlation with pancreatic enzymes.    Gallstones are seen within the gallbladder which is mildly distended. Ultrasound obtained as clinically warranted.    Trace ascites is seen within the right upper quadrant. Small amount of free fluid or ascites is seen within the pelvis.    Diffuse anasarca.     Chronic kidney disease.    Cardiomegaly and trace pericardial effusion.     A/P:  The patient is an 85 year old woman with a history of HTN, CHF, DM, and chronic renal insufficiency presenting with biliary pancreatitis.  1.  Biliary Pancreatitis - an ultrasound showed cholelithiasis and a common bile duct of 6 mm.  Regardless, as her total bilirubin was 7.7 for two consecutive days, it was suspected that she had choledocholithiasis.  The patient was afebrile and her WBC count was normal, but she grew out gram negative rods in her blood cultures.  Lactated ringers and intravenous antibiotics were continued.  Her pain resolved, her bilirubin has decreased to 2.8, and her repeat blood culture preliminarily show no growth to date.  She likely already passed a common bile duct stone.  The patient was transfused 1 unit if pRBCs and Cardiology recommends transfusing another unit of pRBCs.  The patient was discussed with Dr. Jacobo and Dr. Rodrigues.  She is clinically better and she can undergo a cholecystectomy with an intraoperative cholangiogram.  The patient will be monitored.

## 2017-05-02 NOTE — ASSESSMENT & PLAN NOTE
Bacteremia due to Klebsiella pneumoniae  Assessment and Plan:  Pt met criteria for sepsis with fever, and hypotension. Pt with likely biliary source of bacteremia with Klebsiella pneumoniae. Continue Zosyn/Cipro for now and repeat blood cultures done NGTD. Will plan to de-escalate to Cipro alone post-op. Pt will need to continue Cipro for 2 weeks from date of negative blood culture.

## 2017-05-02 NOTE — PROGRESS NOTES
[facility] Hospital Medicine  Progress Note    Patient Name: Christal Goodson  MRN: [id number]  Patient Class: IP- Inpatient   Admission Date: 4/28/2017  Length of Stay: 3 days  Attending Physician: Tita Simpson MD  Primary Care Provider: Juan Alberto Prieto MD        Subjective:     Principal Problem:Acute biliary pancreatitis    HPI:  Christal Goodson is a 85 y.o. female that (in part)  has a past medical history of Arthritis; Blood transfusion; CHF (congestive heart failure); Coronary artery disease; Diabetes mellitus; General anesthetics causing adverse effect in therapeutic use; Hyperlipidemia; Hypertension; Renal disorder; and Thyroid disease. Presents to [facility] Emergency Department complaining of abdominal pain as described below in detail.   Description of symptoms    Location:  Periumbilical  Onset:  Acute onset last night  Character:  Severe deep aching cramping pain  Frequency:  First episode  Duration:  Constant  Associated Symptoms:  Headache, nausea, vomiting, constipation, fever, cough  Radiation:  Radiates to epigastrium  Exacerbating factors:  Palpation, vomiting  Relieving factors:  Antiemetics given in the ED along with the medicine     In the emergency department routine laboratory studies were obtained followed by CT of the abdomen which demonstrated that the pancreas is mildly enlarged with peripancreatic inflammatory stranding in addition to gallstones I would prefer to see some of the later asked.  Lipase levels were elevated.  Right upper quadrant ultrasound obtained.  Concern for gallstone pancreatitis.       Hospital Course:  Ms. Goodson was admitted with biliary pancreatitis and sepsis with bacteremia with GNR. Pt was treated with Zosyn/Cipro, given IVF with good response in BP. Pt was treated with bowel rest, pain control and supportive care. GI and Surgery following, and lipase/LFTs improving. Repeat blood culture NGTD.     Interval History: Pt  report midepigastric abdominal pain resolved, passing lots of gas. No only pain issue is her chronic hip pain.    Review of Systems   Constitutional: Negative for chills.   Respiratory: Negative for shortness of breath.    Cardiovascular: Negative for chest pain.   Gastrointestinal: Negative for abdominal pain, constipation, nausea and vomiting.     Objective:     Vital Signs (Most Recent):  Temp: 99 °F (37.2 °C) (05/01/17 1900)  Pulse: 74 (05/01/17 1900)  Resp: 20 (05/01/17 1900)  BP: (!) 171/76 (05/01/17 1900)  SpO2: 98 % (05/01/17 1900) Vital Signs (24h Range):  Temp:  [97.6 °F (36.4 °C)-99.1 °F (37.3 °C)] 99 °F (37.2 °C)  Pulse:  [62-76] 74  Resp:  [17-20] 20  SpO2:  [93 %-100 %] 98 %  BP: (171-185)/(70-79) 171/76     Weight: 59.2 kg (130 lb 8.6 oz)  Body mass index is 23.88 kg/(m^2).    Intake/Output Summary (Last 24 hours) at 05/01/17 2328  Last data filed at 05/01/17 2000   Gross per 24 hour   Intake           3292.5 ml   Output                0 ml   Net           3292.5 ml      Physical Exam   Constitutional: She appears well-developed and well-nourished.   HENT:   Head: Normocephalic and atraumatic.   Eyes: EOM are normal. Pupils are equal, round, and reactive to light.   +scleral icterus   Neck: Normal range of motion. Neck supple.   Cardiovascular: Normal rate and regular rhythm.    Pulmonary/Chest: Effort normal and breath sounds normal.   Abdominal:   Soft, mild +TTP in the mid-epigastric region, no rebound or guarding, ND, +BS       Significant Labs: All pertinent labs within the past 24 hours have been reviewed.    Significant Imaging: I have reviewed and interpreted all pertinent imaging results/findings within the past 24 hours.    Assessment/Plan:      * Acute biliary pancreatitis  Pt with biliary pancreatitis, clinically improving with bowel rest and lipase trending down. Continue lactated ringers but will advance to clears from NPO status today, and continue supportive care and pain control.  Continue monitoring of LFTS and lipase daily, appreciate GI and Surgery input. Pt likely passed stone given improvement in LFTS and ERCP no longer indicated. Pt transfused 1 unit of PRBC and Cardiology recommended to proceed with surgical intervention. Surgery plans on lap cholecystectomy tomorrow with cholangiogram. Continue Zosyn/Cipro for now given sepsis with bacteremia which will be discussed below.    Sepsis due to Klebsiella pneumoniae  Bacteremia due to Klebsiella pneumoniae  Assessment and Plan:  Pt met criteria for sepsis with fever, and hypotension. Pt with likely biliary source of bacteremia with Klebsiella pneumoniae. Continue Zosyn/Cipro for now and repeat blood cultures done NGTD. Will plan to de-escalate to Cipro alone post-op. Pt will need to continue Cipro for 2 weeks from date of negative blood culture.    CAD (coronary artery disease)  Pt is without chest pain and initially blood pressures were low, restarted labetolol and hydralazine.     Essential hypertension, benign  Initially held all anti-HTN due to hypotension, resumed labetolol and hydralazine.    Hypercholesteremia  Given abnormal LFTs, will hold statin for now.    Chronic kidney disease, stage III (moderate)  ARF   Assessment and Plan:  Pt with worsening creatinine likely due to sepsis with hypotension, continue IVF and will repeat labs. Baseline creatinine approximately 1.7, slowly improving, monitor.    Diabetes mellitus type 2 in nonobese  Closely monitor blood glucose and make adjustments to insulin regimen as necessary. HgbA1C <4.0.    Hypothyroidism  Clinically euthyroid, continue home regimen.     Anemia of chronic disease  H/H with slight drop but no bleeding, s/p transfusion of 1 unit PRBC yesterday in anticipation of procedures. H/H with expected rise, monitor.    Pulmonary hypertension  No acute issues. Monitor.    Urinary tract infection without hematuria  Due to E. Coli and Proteus mirabilis, pan-sensitive. Continue current  abx regimen with plan for de-escalation to Cipro alone post-op.      VTE Risk Mitigation         Ordered     Medium Risk of VTE  Once      04/28/17 2228     Place sequential compression device  Until discontinued      04/28/17 2228          Tita Simpson MD  Department of Hospital Medicine   [facility]

## 2017-05-02 NOTE — PROGRESS NOTES
[facility] Hospital Medicine  Progress Note    Patient Name: Christal Goodson  MRN: [id number]  Patient Class: IP- Inpatient   Admission Date: 4/28/2017  Length of Stay: 4 days  Attending Physician: Max Faria MD  Primary Care Provider: Juan Alberto Prieto MD        Subjective:     Principal Problem:Acute biliary pancreatitis    HPI:  hCristal Goodson is a 85 y.o. female that (in part)  has a past medical history of Arthritis; Blood transfusion; CHF (congestive heart failure); Coronary artery disease; Diabetes mellitus; General anesthetics causing adverse effect in therapeutic use; Hyperlipidemia; Hypertension; Renal disorder; and Thyroid disease. Presents to [facility] Emergency Department complaining of abdominal pain as described below in detail.   Description of symptoms    Location:  Periumbilical  Onset:  Acute onset last night  Character:  Severe deep aching cramping pain  Frequency:  First episode  Duration:  Constant  Associated Symptoms:  Headache, nausea, vomiting, constipation, fever, cough  Radiation:  Radiates to epigastrium  Exacerbating factors:  Palpation, vomiting  Relieving factors:  Antiemetics given in the ED along with the medicine     In the emergency department routine laboratory studies were obtained followed by CT of the abdomen which demonstrated that the pancreas is mildly enlarged with peripancreatic inflammatory stranding in addition to gallstones I would prefer to see some of the later asked.  Lipase levels were elevated.  Right upper quadrant ultrasound obtained.  Concern for gallstone pancreatitis.       Hospital Course:  Ms. Goodson was admitted with biliary pancreatitis and sepsis with bacteremia with GNR. Pt was treated with Zosyn/Cipro, given IVF with good response in BP. Pt was treated with bowel rest, pain control and supportive care. GI and Surgery following, and lipase/LFTs improving. Repeat blood culture NGTD.  Plan for Lap betzaida,  but Cardiology recommending blood transfusion prior to surgery.  2 units of blood ordered.    Interval History: Complaining of bilateral hip pain.    Review of Systems   Constitutional: Negative for chills and fever.   HENT: Negative for ear discharge and ear pain.    Eyes: Negative for pain and itching.   Respiratory: Negative for cough and choking.      Objective:     Vital Signs (Most Recent):  Temp: 98.3 °F (36.8 °C) (05/02/17 1334)  Pulse: 68 (05/02/17 1334)  Resp: (!) 22 (05/02/17 1334)  BP: (!) 165/71 (05/02/17 1334)  SpO2: 100 % (05/02/17 1334) Vital Signs (24h Range):  Temp:  [97.8 °F (36.6 °C)-99.1 °F (37.3 °C)] 98.3 °F (36.8 °C)  Pulse:  [64-74] 68  Resp:  [17-22] 22  SpO2:  [95 %-100 %] 100 %  BP: (144-184)/(62-81) 165/71     Weight: 59.2 kg (130 lb 8.6 oz)  Body mass index is 23.88 kg/(m^2).    Intake/Output Summary (Last 24 hours) at 05/02/17 1421  Last data filed at 05/02/17 0500   Gross per 24 hour   Intake             2400 ml   Output                0 ml   Net             2400 ml      Physical Exam   Constitutional: She is oriented to person, place, and time. She appears well-developed and well-nourished.   HENT:   Head: Normocephalic and atraumatic.   Eyes: EOM are normal. Pupils are equal, round, and reactive to light.   +scleral icterus   Neck: Normal range of motion. Neck supple.   Cardiovascular: Normal rate and regular rhythm.    Pulmonary/Chest: Effort normal and breath sounds normal.   Abdominal:   Soft, mild +TTP in the mid-epigastric region, no rebound or guarding, ND, +BS   Neurological: She is alert and oriented to person, place, and time.   Skin: Skin is warm and dry.       Significant Labs:   BMP:   Recent Labs  Lab 05/02/17  0405   GLU 83      K 4.1      CO2 21*   BUN 29*   CREATININE 2.1*   CALCIUM 8.4*     CBC:   Recent Labs  Lab 05/01/17  0329 05/02/17  0405   WBC 6.27 5.61   HGB 8.6* 8.7*   HCT 24.4* 24.1*   PLT 71* 81*           Assessment/Plan:      * Acute biliary  pancreatitis  Pt with biliary pancreatitis, clinically improving with bowel rest and lipase trending down.  Continue supportive care and pain control. Continue monitoring of LFTS and lipase daily, appreciate GI and Surgery input. Pt likely passed stone given improvement in LFTS and ERCP no longer indicated. Pt transfused 1 unit of PRBC and Cardiology recommended to proceed with surgical intervention after Hgb>10.  Will transfuse 2 more units of blood. Surgery plans on lap cholecystectomy tomorrow with cholangiogram. Continue Zosyn/Cipro for now given sepsis with bacteremia which will be discussed below.      Hypothyroidism  Clinically euthyroid, continue home regimen.       Hypercholesteremia  Given abnormal LFTs, will hold statin for now.          CAD (coronary artery disease)  Pt is without chest pain and initially blood pressures were low, restarted labetolol and hydralazine now that patient hypertensive.        Essential hypertension, benign  Initially held all anti-HTN due to hypotension, resumed labetolol and hydralazine.      Chronic kidney disease, stage III (moderate)  ARF   Assessment and Plan:  Pt with worsening creatinine likely due to sepsis with hypotension, continue IVF and will repeat labs. Baseline creatinine approximately 1.7, slowly improving, monitor.        Pulmonary hypertension  No acute issues. Monitor.        Diabetes mellitus type 2 in nonobese  Closely monitor blood glucose and make adjustments to insulin regimen as necessary. HgbA1C <4.0.          Sepsis  Bacteremia due to Klebsiella pneumoniae  Assessment and Plan:  Pt met criteria for sepsis with fever, and hypotension. Pt with likely biliary source of bacteremia with Klebsiella pneumoniae. Continue Zosyn/Cipro for now and repeat blood cultures done NGTD. Will plan to de-escalate to Cipro alone post-op. Pt will need to continue Cipro for 2 weeks from date of negative blood culture.      Anemia of chronic disease  H/H with slight drop  but no bleeding.  Transfuse to keep Hbg>10      Urinary tract infection without hematuria  Due to E. Coli and Proteus mirabilis, pan-sensitive. Continue current abx regimen with plan for de-escalation to Cipro alone post-op.      VTE Risk Mitigation         Ordered     Medium Risk of VTE  Once      04/28/17 2228     Place sequential compression device  Until discontinued      04/28/17 2228          Max Faria MD  Department of Hospital Medicine   [facility]

## 2017-05-02 NOTE — ASSESSMENT & PLAN NOTE
ARF   Assessment and Plan:  Pt with worsening creatinine likely due to sepsis with hypotension, continue IVF and will repeat labs. Baseline creatinine approximately 1.7, slowly improving, monitor.

## 2017-05-02 NOTE — PROGRESS NOTES
Pt complains of pain of 9/10.  Repositioned and IV pain meds administered.  Blood infusion continued.  /63.  NAD noted.  Will continue to monitor pt.

## 2017-05-02 NOTE — ASSESSMENT & PLAN NOTE
Pt is without chest pain and initially blood pressures were low, restarted labetolol and hydralazine now that patient hypertensive.

## 2017-05-02 NOTE — ASSESSMENT & PLAN NOTE
Due to E. Coli and Proteus mirabilis, pan-sensitive. Continue current abx regimen with plan for de-escalation to Cipro alone post-op.

## 2017-05-03 ENCOUNTER — ANESTHESIA EVENT (OUTPATIENT)
Dept: ENDOSCOPY | Facility: HOSPITAL | Age: 82
DRG: 853 | End: 2017-05-03
Payer: MEDICARE

## 2017-05-03 ENCOUNTER — ANESTHESIA (OUTPATIENT)
Dept: ENDOSCOPY | Facility: HOSPITAL | Age: 82
DRG: 853 | End: 2017-05-03
Payer: MEDICARE

## 2017-05-03 ENCOUNTER — SURGERY (OUTPATIENT)
Age: 82
End: 2017-05-03

## 2017-05-03 LAB
ALBUMIN SERPL BCP-MCNC: 2.8 G/DL
ALP SERPL-CCNC: 76 U/L
ALT SERPL W/O P-5'-P-CCNC: 21 U/L
ANION GAP SERPL CALC-SCNC: 11 MMOL/L
AST SERPL-CCNC: 15 U/L
BACTERIA BLD CULT: NORMAL
BASOPHILS # BLD AUTO: 0.01 K/UL
BASOPHILS NFR BLD: 0.1 %
BILIRUB SERPL-MCNC: 3 MG/DL
BUN SERPL-MCNC: 29 MG/DL
CALCIUM SERPL-MCNC: 8.4 MG/DL
CHLORIDE SERPL-SCNC: 107 MMOL/L
CO2 SERPL-SCNC: 19 MMOL/L
CREAT SERPL-MCNC: 2.1 MG/DL
DIFFERENTIAL METHOD: ABNORMAL
EOSINOPHIL # BLD AUTO: 0.1 K/UL
EOSINOPHIL NFR BLD: 1 %
ERYTHROCYTE [DISTWIDTH] IN BLOOD BY AUTOMATED COUNT: 15.3 %
EST. GFR  (AFRICAN AMERICAN): 24 ML/MIN/1.73 M^2
EST. GFR  (NON AFRICAN AMERICAN): 21 ML/MIN/1.73 M^2
GLUCOSE SERPL-MCNC: 115 MG/DL
HCT VFR BLD AUTO: 31.9 %
HGB BLD-MCNC: 11.3 G/DL
LYMPHOCYTES # BLD AUTO: 0.3 K/UL
LYMPHOCYTES NFR BLD: 4.7 %
MCH RBC QN AUTO: 29.8 PG
MCHC RBC AUTO-ENTMCNC: 35.4 %
MCV RBC AUTO: 84 FL
MONOCYTES # BLD AUTO: 0.7 K/UL
MONOCYTES NFR BLD: 9.7 %
NEUTROPHILS # BLD AUTO: 6.1 K/UL
NEUTROPHILS NFR BLD: 83.8 %
PLATELET # BLD AUTO: 81 K/UL
PMV BLD AUTO: 10.9 FL
POCT GLUCOSE: 127 MG/DL (ref 70–110)
POCT GLUCOSE: 95 MG/DL (ref 70–110)
POTASSIUM SERPL-SCNC: 3.9 MMOL/L
PROT SERPL-MCNC: 5.9 G/DL
RBC # BLD AUTO: 3.79 M/UL
SODIUM SERPL-SCNC: 137 MMOL/L
WBC # BLD AUTO: 7.29 K/UL

## 2017-05-03 PROCEDURE — 36415 COLL VENOUS BLD VENIPUNCTURE: CPT

## 2017-05-03 PROCEDURE — 63600175 PHARM REV CODE 636 W HCPCS: Performed by: NURSE ANESTHETIST, CERTIFIED REGISTERED

## 2017-05-03 PROCEDURE — 25000003 PHARM REV CODE 250: Performed by: EMERGENCY MEDICINE

## 2017-05-03 PROCEDURE — 63600175 PHARM REV CODE 636 W HCPCS

## 2017-05-03 PROCEDURE — 11000001 HC ACUTE MED/SURG PRIVATE ROOM

## 2017-05-03 PROCEDURE — 37000008 HC ANESTHESIA 1ST 15 MINUTES: Performed by: INTERNAL MEDICINE

## 2017-05-03 PROCEDURE — 94002 VENT MGMT INPAT INIT DAY: CPT

## 2017-05-03 PROCEDURE — 80053 COMPREHEN METABOLIC PANEL: CPT

## 2017-05-03 PROCEDURE — 37000009 HC ANESTHESIA EA ADD 15 MINS: Performed by: INTERNAL MEDICINE

## 2017-05-03 PROCEDURE — 27200999 HC RETRIEVAL BALLOON, ERCP: Performed by: INTERNAL MEDICINE

## 2017-05-03 PROCEDURE — D9220A PRA ANESTHESIA: Mod: CRNA,,, | Performed by: NURSE ANESTHETIST, CERTIFIED REGISTERED

## 2017-05-03 PROCEDURE — 27200949 HC CANNULATOME: Performed by: INTERNAL MEDICINE

## 2017-05-03 PROCEDURE — 25000003 PHARM REV CODE 250: Performed by: ANESTHESIOLOGY

## 2017-05-03 PROCEDURE — 36430 TRANSFUSION BLD/BLD COMPNT: CPT

## 2017-05-03 PROCEDURE — C1769 GUIDE WIRE: HCPCS | Performed by: INTERNAL MEDICINE

## 2017-05-03 PROCEDURE — 63600175 PHARM REV CODE 636 W HCPCS: Performed by: HOSPITALIST

## 2017-05-03 PROCEDURE — 25000003 PHARM REV CODE 250

## 2017-05-03 PROCEDURE — 25000003 PHARM REV CODE 250: Performed by: HOSPITALIST

## 2017-05-03 PROCEDURE — 43264 ERCP REMOVE DUCT CALCULI: CPT | Performed by: INTERNAL MEDICINE

## 2017-05-03 PROCEDURE — 43262 ENDO CHOLANGIOPANCREATOGRAPH: CPT | Performed by: INTERNAL MEDICINE

## 2017-05-03 PROCEDURE — 85025 COMPLETE CBC W/AUTO DIFF WBC: CPT

## 2017-05-03 PROCEDURE — D9220A PRA ANESTHESIA: Mod: ANES,,, | Performed by: ANESTHESIOLOGY

## 2017-05-03 RX ORDER — GLYCOPYRROLATE 0.2 MG/ML
INJECTION INTRAMUSCULAR; INTRAVENOUS
Status: DISCONTINUED
Start: 2017-05-03 | End: 2017-05-03 | Stop reason: WASHOUT

## 2017-05-03 RX ORDER — FENTANYL CITRATE 50 UG/ML
INJECTION, SOLUTION INTRAMUSCULAR; INTRAVENOUS
Status: COMPLETED
Start: 2017-05-03 | End: 2017-05-03

## 2017-05-03 RX ORDER — ROCURONIUM BROMIDE 10 MG/ML
INJECTION, SOLUTION INTRAVENOUS
Status: DISCONTINUED
Start: 2017-05-03 | End: 2017-05-03 | Stop reason: WASHOUT

## 2017-05-03 RX ORDER — PHENYLEPHRINE HYDROCHLORIDE 10 MG/ML
INJECTION INTRAVENOUS
Status: DISCONTINUED | OUTPATIENT
Start: 2017-05-03 | End: 2017-05-03

## 2017-05-03 RX ORDER — PHENYLEPHRINE HCL IN 0.9% NACL 1 MG/10 ML
SYRINGE (ML) INTRAVENOUS
Status: DISPENSED
Start: 2017-05-03 | End: 2017-05-04

## 2017-05-03 RX ORDER — WATER FOR INJECTION,STERILE
VIAL (ML) INJECTION
Status: DISPENSED
Start: 2017-05-03 | End: 2017-05-04

## 2017-05-03 RX ORDER — SODIUM CHLORIDE, SODIUM LACTATE, POTASSIUM CHLORIDE, CALCIUM CHLORIDE 600; 310; 30; 20 MG/100ML; MG/100ML; MG/100ML; MG/100ML
INJECTION, SOLUTION INTRAVENOUS CONTINUOUS
Status: DISCONTINUED | OUTPATIENT
Start: 2017-05-03 | End: 2017-05-03

## 2017-05-03 RX ORDER — GLUCAGON 1 MG
KIT INJECTION
Status: DISPENSED
Start: 2017-05-03 | End: 2017-05-04

## 2017-05-03 RX ORDER — FENTANYL CITRATE 50 UG/ML
INJECTION, SOLUTION INTRAMUSCULAR; INTRAVENOUS
Status: DISPENSED
Start: 2017-05-03 | End: 2017-05-04

## 2017-05-03 RX ORDER — INDOMETHACIN 50 MG/1
100 SUPPOSITORY RECTAL ONCE
Status: DISCONTINUED | OUTPATIENT
Start: 2017-05-03 | End: 2017-05-08 | Stop reason: HOSPADM

## 2017-05-03 RX ORDER — LIDOCAINE HYDROCHLORIDE 20 MG/ML
INJECTION, SOLUTION EPIDURAL; INFILTRATION; INTRACAUDAL; PERINEURAL
Status: COMPLETED
Start: 2017-05-03 | End: 2017-05-03

## 2017-05-03 RX ORDER — ETOMIDATE 2 MG/ML
INJECTION INTRAVENOUS
Status: COMPLETED
Start: 2017-05-03 | End: 2017-05-03

## 2017-05-03 RX ORDER — PROPOFOL 10 MG/ML
VIAL (ML) INTRAVENOUS
Status: DISCONTINUED
Start: 2017-05-03 | End: 2017-05-03 | Stop reason: WASHOUT

## 2017-05-03 RX ORDER — ONDANSETRON 2 MG/ML
INJECTION INTRAMUSCULAR; INTRAVENOUS
Status: DISCONTINUED | OUTPATIENT
Start: 2017-05-03 | End: 2017-05-03

## 2017-05-03 RX ORDER — NEOSTIGMINE METHYLSULFATE 1 MG/ML
INJECTION, SOLUTION INTRAVENOUS
Status: DISCONTINUED
Start: 2017-05-03 | End: 2017-05-03 | Stop reason: WASHOUT

## 2017-05-03 RX ORDER — SUCCINYLCHOLINE CHLORIDE 20 MG/ML
INJECTION INTRAMUSCULAR; INTRAVENOUS
Status: COMPLETED
Start: 2017-05-03 | End: 2017-05-03

## 2017-05-03 RX ORDER — ONDANSETRON 2 MG/ML
INJECTION INTRAMUSCULAR; INTRAVENOUS
Status: DISPENSED
Start: 2017-05-03 | End: 2017-05-04

## 2017-05-03 RX ADMIN — SUCCINYLCHOLINE CHLORIDE 80 MG: 20 INJECTION, SOLUTION INTRAMUSCULAR; INTRAVENOUS at 01:05

## 2017-05-03 RX ADMIN — FUROSEMIDE 20 MG: 10 INJECTION, SOLUTION INTRAMUSCULAR; INTRAVENOUS at 09:05

## 2017-05-03 RX ADMIN — MORPHINE SULFATE 3 MG: 10 INJECTION INTRAVENOUS at 11:05

## 2017-05-03 RX ADMIN — CIPROFLOXACIN 400 MG: 2 INJECTION, SOLUTION INTRAVENOUS at 09:05

## 2017-05-03 RX ADMIN — LABETALOL HYDROCHLORIDE 300 MG: 100 TABLET, FILM COATED ORAL at 10:05

## 2017-05-03 RX ADMIN — SODIUM CHLORIDE, SODIUM LACTATE, POTASSIUM CHLORIDE, AND CALCIUM CHLORIDE: .6; .31; .03; .02 INJECTION, SOLUTION INTRAVENOUS at 12:05

## 2017-05-03 RX ADMIN — FUROSEMIDE 20 MG: 10 INJECTION, SOLUTION INTRAMUSCULAR; INTRAVENOUS at 05:05

## 2017-05-03 RX ADMIN — HYDRALAZINE HYDROCHLORIDE 50 MG: 25 TABLET ORAL at 10:05

## 2017-05-03 RX ADMIN — PIPERACILLIN, TAZOBACTAM 4.5 G: 4; .5 INJECTION, POWDER, LYOPHILIZED, FOR SOLUTION INTRAVENOUS at 04:05

## 2017-05-03 RX ADMIN — FENTANYL CITRATE 50 MCG: 50 INJECTION INTRAMUSCULAR; INTRAVENOUS at 03:05

## 2017-05-03 RX ADMIN — PHENYLEPHRINE HYDROCHLORIDE 100 MCG: 10 INJECTION INTRAVENOUS at 01:05

## 2017-05-03 RX ADMIN — FERROUS SULFATE TAB EC 325 MG (65 MG FE EQUIVALENT) 325 MG: 325 (65 FE) TABLET DELAYED RESPONSE at 09:05

## 2017-05-03 RX ADMIN — ALLOPURINOL 100 MG: 100 TABLET ORAL at 09:05

## 2017-05-03 RX ADMIN — LEVOTHYROXINE SODIUM 50 MCG: 50 TABLET ORAL at 05:05

## 2017-05-03 RX ADMIN — PANTOPRAZOLE SODIUM 40 MG: 40 TABLET, DELAYED RELEASE ORAL at 10:05

## 2017-05-03 RX ADMIN — PIPERACILLIN, TAZOBACTAM 4.5 G: 4; .5 INJECTION, POWDER, LYOPHILIZED, FOR SOLUTION INTRAVENOUS at 05:05

## 2017-05-03 RX ADMIN — LABETALOL HYDROCHLORIDE 300 MG: 100 TABLET, FILM COATED ORAL at 09:05

## 2017-05-03 RX ADMIN — ETOMIDATE 8 MG: 2 INJECTION, SOLUTION INTRAVENOUS at 01:05

## 2017-05-03 RX ADMIN — HYDRALAZINE HYDROCHLORIDE 50 MG: 25 TABLET ORAL at 05:05

## 2017-05-03 RX ADMIN — FENTANYL CITRATE 100 MCG: 50 INJECTION INTRAMUSCULAR; INTRAVENOUS at 01:05

## 2017-05-03 RX ADMIN — LIDOCAINE HYDROCHLORIDE 60 MG: 20 INJECTION, SOLUTION INTRAVENOUS at 01:05

## 2017-05-03 RX ADMIN — ONDANSETRON 4 MG: 2 INJECTION, SOLUTION INTRAMUSCULAR; INTRAVENOUS at 02:05

## 2017-05-03 RX ADMIN — PANTOPRAZOLE SODIUM 40 MG: 40 TABLET, DELAYED RELEASE ORAL at 09:05

## 2017-05-03 NOTE — PROGRESS NOTES
[facility] Medicine  Progress Note    Patient Name: Christal Goodson  MRN: [id number]  Patient Class: IP- Inpatient   Admission Date: 4/28/2017  Length of Stay: 5 days  Attending Physician: Max Faria MD  Primary Care Provider: Juan Alberto Prieto MD        Subjective:     Principal Problem:Acute biliary pancreatitis    HPI:  Christal Goodson is a 85 y.o. female that (in part)  has a past medical history of Arthritis; Blood transfusion; CHF (congestive heart failure); Coronary artery disease; Diabetes mellitus; General anesthetics causing adverse effect in therapeutic use; Hyperlipidemia; Hypertension; Renal disorder; and Thyroid disease. Presents to [facility] Emergency Department complaining of abdominal pain as described below in detail.   Description of symptoms    Location:  Periumbilical  Onset:  Acute onset last night  Character:  Severe deep aching cramping pain  Frequency:  First episode  Duration:  Constant  Associated Symptoms:  Headache, nausea, vomiting, constipation, fever, cough  Radiation:  Radiates to epigastrium  Exacerbating factors:  Palpation, vomiting  Relieving factors:  Antiemetics given in the ED along with the medicine     In the emergency department routine laboratory studies were obtained followed by CT of the abdomen which demonstrated that the pancreas is mildly enlarged with peripancreatic inflammatory stranding in addition to gallstones I would prefer to see some of the later asked.  Lipase levels were elevated.  Right upper quadrant ultrasound obtained.  Concern for gallstone pancreatitis.       Hospital Course:  Ms. Goodson was admitted with biliary pancreatitis and sepsis with bacteremia with GNR. Pt was treated with Zosyn/Cipro, given IVF with good response in BP. Pt was treated with bowel rest, pain control and supportive care. GI and Surgery following, and lipase/LFTs improving. Repeat blood culture NGTD.  Plan for Lap betzaida,  but Cardiology recommending blood transfusion prior to surgery.  2 units of blood ordered.  ERCP today.    Interval History: Feeling better today.    Review of Systems   Constitutional: Negative for chills and fever.   HENT: Negative for ear discharge and ear pain.    Eyes: Negative for pain and itching.   Respiratory: Negative for cough and choking.      Objective:     Vital Signs (Most Recent):  Temp: 99.1 °F (37.3 °C) (05/03/17 1209)  Pulse: 65 (05/03/17 1209)  Resp: 18 (05/03/17 1209)  BP: (!) 173/77 (05/03/17 1209)  SpO2: 98 % (05/03/17 1209) Vital Signs (24h Range):  Temp:  [97.8 °F (36.6 °C)-99.1 °F (37.3 °C)] 99.1 °F (37.3 °C)  Pulse:  [65-87] 65  Resp:  [16-22] 18  SpO2:  [95 %-100 %] 98 %  BP: (109-173)/(60-85) 173/77     Weight: 59.2 kg (130 lb 8.6 oz)  Body mass index is 23.88 kg/(m^2).    Intake/Output Summary (Last 24 hours) at 05/03/17 1232  Last data filed at 05/03/17 0430   Gross per 24 hour   Intake           650.08 ml   Output                0 ml   Net           650.08 ml      Physical Exam   Constitutional: She is oriented to person, place, and time. She appears well-developed and well-nourished.   HENT:   Head: Normocephalic and atraumatic.   Eyes: EOM are normal. Pupils are equal, round, and reactive to light.   +scleral icterus   Neck: Normal range of motion. Neck supple.   Cardiovascular: Normal rate and regular rhythm.    Pulmonary/Chest: Effort normal and breath sounds normal.   Abdominal:   Soft, mild +TTP in the mid-epigastric region, no rebound or guarding, ND, +BS   Neurological: She is alert and oriented to person, place, and time.   Skin: Skin is warm and dry.       Significant Labs:   BMP:     Recent Labs  Lab 05/03/17  0506   *      K 3.9      CO2 19*   BUN 29*   CREATININE 2.1*   CALCIUM 8.4*     CBC:     Recent Labs  Lab 05/02/17  0405 05/03/17  0506   WBC 5.61 7.29   HGB 8.7* 11.3*   HCT 24.1* 31.9*   PLT 81* 81*           Assessment/Plan:      * Acute biliary  pancreatitis  Pt with biliary pancreatitis, clinically improving with bowel rest and lipase trending down.  Continue supportive care and pain control. Continue monitoring of LFTS and lipase daily, appreciate GI and Surgery input. Pt transfused 1 unit of PRBC and Cardiology recommended to proceed with surgical intervention after Hgb>10.  Transfused 2 more units of blood. Continue Zosyn/Cipro for now given sepsis with bacteremia which will be discussed below.  Hgb now >10.  T Bili remains high and Surgery recommending ERCP prior to surgery.  ERCP today.      Hypothyroidism  Clinically euthyroid, continue home regimen.       Hypercholesteremia  Given abnormal LFTs, will hold statin for now.          CAD (coronary artery disease)  Pt is without chest pain and initially blood pressures were low, restarted labetolol and hydralazine now that patient hypertensive.        Essential hypertension, benign  Initially held all anti-HTN due to hypotension, resumed labetolol and hydralazine.      Chronic kidney disease, stage III (moderate)  ARF   Assessment and Plan:  Pt with worsening creatinine likely due to sepsis with hypotension, continue IVF and will repeat labs. Baseline creatinine approximately 1.7, slowly improving, monitor.        Pulmonary hypertension  No acute issues. Monitor.        Diabetes mellitus type 2 in nonobese  Closely monitor blood glucose and make adjustments to insulin regimen as necessary. HgbA1C <4.0.          Sepsis  Bacteremia due to Klebsiella pneumoniae  Assessment and Plan:  Pt met criteria for sepsis with fever, and hypotension. Pt with likely biliary source of bacteremia with Klebsiella pneumoniae. Continue Zosyn/Cipro for now and repeat blood cultures done NGTD. Will plan to de-escalate to Cipro alone post-op. Pt will need to continue Cipro for 2 weeks from date of negative blood culture.      Anemia of chronic disease  H/H with slight drop but no bleeding.  Transfuse to keep Hbg>10 prior to  surgery.      Urinary tract infection without hematuria  Due to E. Coli and Proteus mirabilis, pan-sensitive. Continue current abx regimen with plan for de-escalation to Cipro alone post-op.      VTE Risk Mitigation         Ordered     Medium Risk of VTE  Once      04/28/17 2228     Place sequential compression device  Until discontinued      04/28/17 2228          Max Faria MD  Department of Hospital Medicine   [facility]

## 2017-05-03 NOTE — TRANSFER OF CARE
"Anesthesia Transfer of Care Note    Patient: Christal Goodson    Procedure(s) Performed: Procedure(s) (LRB):  ERCP (N/A)    Patient location: PACU    Anesthesia Type: general    Transport from OR: Continuous ECG monitoring in transport. Transported from OR intubated on 100% O2 by AMBU with adequate controlled ventilation. Continuous SpO2 monitoring in transport. Upon arrival to PACU/ICU, patient attached to ventilator and auscultated to confirm bilateral breath sounds and adequate TV    Post pain: adequate analgesia    Post assessment: no apparent anesthetic complications    Post vital signs: stable    Level of consciousness: agitated and sedated    Nausea/Vomiting: no nausea/vomiting    Complications: respiratory complications          Last vitals:   Visit Vitals    /67 (BP Location: Left arm, Patient Position: Lying, BP Method: Automatic)    Pulse 61    Temp 36.4 °C (97.5 °F) (Axillary)    Resp (!) 8    Ht 5' 2" (1.575 m)    Wt 59.2 kg (130 lb 8.6 oz)    SpO2 100%    Breastfeeding No    BMI 23.88 kg/m2     "

## 2017-05-03 NOTE — SUBJECTIVE & OBJECTIVE
Interval History: Feeling better today.    Review of Systems   Constitutional: Negative for chills and fever.   HENT: Negative for ear discharge and ear pain.    Eyes: Negative for pain and itching.   Respiratory: Negative for cough and choking.      Objective:     Vital Signs (Most Recent):  Temp: 99.1 °F (37.3 °C) (05/03/17 1209)  Pulse: 65 (05/03/17 1209)  Resp: 18 (05/03/17 1209)  BP: (!) 173/77 (05/03/17 1209)  SpO2: 98 % (05/03/17 1209) Vital Signs (24h Range):  Temp:  [97.8 °F (36.6 °C)-99.1 °F (37.3 °C)] 99.1 °F (37.3 °C)  Pulse:  [65-87] 65  Resp:  [16-22] 18  SpO2:  [95 %-100 %] 98 %  BP: (109-173)/(60-85) 173/77     Weight: 59.2 kg (130 lb 8.6 oz)  Body mass index is 23.88 kg/(m^2).    Intake/Output Summary (Last 24 hours) at 05/03/17 1232  Last data filed at 05/03/17 0430   Gross per 24 hour   Intake           650.08 ml   Output                0 ml   Net           650.08 ml      Physical Exam   Constitutional: She is oriented to person, place, and time. She appears well-developed and well-nourished.   HENT:   Head: Normocephalic and atraumatic.   Eyes: EOM are normal. Pupils are equal, round, and reactive to light.   +scleral icterus   Neck: Normal range of motion. Neck supple.   Cardiovascular: Normal rate and regular rhythm.    Pulmonary/Chest: Effort normal and breath sounds normal.   Abdominal:   Soft, mild +TTP in the mid-epigastric region, no rebound or guarding, ND, +BS   Neurological: She is alert and oriented to person, place, and time.   Skin: Skin is warm and dry.       Significant Labs:   BMP:     Recent Labs  Lab 05/03/17  0506   *      K 3.9      CO2 19*   BUN 29*   CREATININE 2.1*   CALCIUM 8.4*     CBC:     Recent Labs  Lab 05/02/17  0405 05/03/17  0506   WBC 5.61 7.29   HGB 8.7* 11.3*   HCT 24.1* 31.9*   PLT 81* 81*

## 2017-05-03 NOTE — PROGRESS NOTES
Received patient in pacu and placed on servoi with settings charted.  7.0 ETT retaped at 21 cm at right side.  BS bilateral slightly coarse.  Suctioned thick white secretions.  SPO2 100%.

## 2017-05-03 NOTE — PROGRESS NOTES
The patient has been examined and the H&P has been reviewed:  I concur with the findings and changes have been noted since the H&P was written: As the patient's bilirubin remains elevated, there is a high suspicion for choledocholithiasis and she can undergo an ERCP.      Anesthesia/Surgery risks, benefits and alternative options discussed and understood by patient/family.

## 2017-05-03 NOTE — OR NURSING
Patient extubated per Sonja BUSTILLOS CRNA, placed on 40% simple face mask.  Tolerating well.  VSS

## 2017-05-03 NOTE — NURSING
Patient returns from Endoscopy. Respirations even and unlabored on R.A. Patient has no pain at this time but is experiencing some nausea. Changed patient's gown, repositioned her in bed, a few small sips of water given, all needs met. Patient's daughters at the bedside. VSS. Will continue to monitor.

## 2017-05-03 NOTE — PLAN OF CARE
Problem: Patient Care Overview  Goal: Plan of Care Review  Outcome: Ongoing (interventions implemented as appropriate)  Patient received last unit of blood this shift. Tolerated well. Voiding per bedpan with incontinence episode. Respiratory treatment ordered PRN for wheezing. Pain to left hip, refused pain medication when offered. NPO since midnight. Zozyn administered as ordered. Plan of care continued.

## 2017-05-03 NOTE — DISCHARGE SUMMARY
[facility]  Discharge Summary      Admit Date: 4/28/2017    Discharge Date and Time:  05/03/2017 2:44 PM    Attending Physician: Max Faria MD     Reason for Admission: Biliary Pancreatitis    Procedures Performed: Procedure(s) (LRB):  ERCP (N/A)    Hospital Course (synopsis of major diagnoses, care, treatment, and services provided during the course of the hospital stay): Ongoing    Consults: GI    Significant Diagnostic Studies: ERCP    Final Diagnoses:    Principal Problem: Acute biliary pancreatitis   Secondary Diagnoses: Choledocholithiasis    Discharged Condition: good    Disposition: Admitted as an Inpatient    Follow Up/Patient Instructions: Follow-up with referring physician             Resume previous diet and activity.    Medications:  Transfer Medications (for Discharge Readmit only):   Current Facility-Administered Medications   Medication Dose Route Frequency Provider Last Rate Last Dose    0.9%  NaCl infusion (for blood administration)   Intravenous Q24H PRN Max Faria MD        acetaminophen tablet 650 mg  650 mg Oral Q6H PRN Max Faria MD        albuterol-ipratropium 2.5mg-0.5mg/3mL nebulizer solution 3 mL  3 mL Nebulization Q6H PRN Jalen Barrientos MD   3 mL at 05/02/17 2327    allopurinol tablet 100 mg  100 mg Oral Daily Max Faria MD   100 mg at 05/03/17 0936    ciprofloxacin (CIPRO)400mg/200ml D5W IVPB 400 mg  400 mg Intravenous Q24H Jalen Barrientos  mL/hr at 05/03/17 0936 400 mg at 05/03/17 0936    cyclobenzaprine tablet 5 mg  5 mg Oral TID PRN Max Faria MD        dextrose 50% (D50W) solution 12.5 g  12.5 g Intravenous PRN Joe Maradiaga MD   12.5 g at 05/01/17 0154    ferrous sulfate EC tablet 325 mg  325 mg Oral Daily Max Faria MD   325 mg at 05/03/17 0936    furosemide injection 20 mg  20 mg Intravenous BID Max Faria MD   20 mg at 05/03/17 0937    glucagon (human recombinant) 1 mg injection              glucagon (human recombinant) injection 1 mg  1 mg Intramuscular PRN Joe Maradiaga MD        glycopyrrolate (ROBINUL) 0.2 mg/mL injection             hydrALAZINE injection 10 mg  10 mg Intravenous Q6H PRN Jalen Barrientos MD   10 mg at 05/02/17 1233    hydrALAZINE tablet 50 mg  50 mg Oral Q8H Tita Simpson MD   50 mg at 05/03/17 0530    indomethacin 50 mg suppository 100 mg  100 mg Rectal Once Barrie Hui MD        influenza vaccine 180 mcg/0.5 mL (for patients > 65) injection  0.5 mL Intramuscular vaccine x 1 dose Jalen Barrientos MD        insulin aspart pen 1-10 Units  1-10 Units Subcutaneous Q6H PRN Joe Maradiaga MD        labetalol tablet 300 mg  300 mg Oral Q12H Tita Simpson MD   300 mg at 05/03/17 0936    lactated ringers infusion   Intravenous Continuous Robe Moctezuma  mL/hr at 05/03/17 1222      levothyroxine tablet 50 mcg  50 mcg Oral Daily Joe Maradiaga MD   50 mcg at 05/03/17 0531    morphine injection 3 mg  3 mg Intravenous Q4H PRN Max Faria MD   3 mg at 05/03/17 1108    neostigmine (PROSTIGMINE) 1 mg/mL injection             omnipaque 300 iohexol 300 mg iodine/mL             ondansetron 4 mg/2 mL injection             oxycodone-acetaminophen 5-325 mg per tablet 1 tablet  1 tablet Oral Q4H PRN Max Faria MD        pantoprazole EC tablet 40 mg  40 mg Oral BID Joe Maradiaga MD   40 mg at 05/03/17 0937    phenylephrine HCl in 0.9% NaCl 1 mg/10 mL (100 mcg/mL) syringe             piperacillin-tazobactam 4.5 g in sodium chloride 0.9% 100 mL IVPB (ready to mix system)  4.5 g Intravenous Q12H Jalen Barrientos MD 25 mL/hr at 05/03/17 0430 4.5 g at 05/03/17 0430    pneumoc 13-edwin conj-dip cr(PF) 0.5 mL 0.5 mL  0.5 mL Intramuscular Prior to discharge Jalen Barrientos MD        rocuronium (ZEMURON) 10 mg/mL injection             sterile water injection (sterile water injection) injection              Facility-Administered Medications  Ordered in Other Encounters   Medication Dose Route Frequency Provider Last Rate Last Dose    phenylephrine injection    PRN Morteza Bagley CRNA   100 mcg at 05/03/17 1327     No discharge procedures on file.  Follow-up Information     Follow up with Juan Alberto Prieto MD.    Specialty:  Internal Medicine    Contact information:    [address] [facility] [address]  [phone]          Follow up with Ibrahima Linda MD.    Specialty:  Cardiology    Contact information:    [address]  [address]  [facility] CARDIOLOGY  [facility] [address]  [phone]          Follow up with Barrie Hui MD.    Specialty:  Gastroenterology    Contact information:    [address] [place] [address]  [address]  [facility]  [facility] [address] [phone]

## 2017-05-03 NOTE — PROGRESS NOTES
Patient given 20mg of Lasix post first unit of blood. Patient wheezing and short of breath. Oxygen sat 97%. Still needs another unit. Will notify MD.

## 2017-05-03 NOTE — ASSESSMENT & PLAN NOTE
Pt with biliary pancreatitis, clinically improving with bowel rest and lipase trending down.  Continue supportive care and pain control. Continue monitoring of LFTS and lipase daily, appreciate GI and Surgery input. Pt transfused 1 unit of PRBC and Cardiology recommended to proceed with surgical intervention after Hgb>10.  Transfused 2 more units of blood. Continue Zosyn/Cipro for now given sepsis with bacteremia which will be discussed below.  Hgb now >10.  T Bili remains high and Surgery recommending ERCP prior to surgery.  ERCP today.

## 2017-05-03 NOTE — PLAN OF CARE
Problem: Patient Care Overview  Goal: Plan of Care Review  Outcome: Ongoing (interventions implemented as appropriate)  Patient is awake, alert and oriented x 4. Plan of care is to control pain with prn pain medication. Patient received (Morphine 3mg IV) x 1 this shift. Patient is on IV antibiotics to treat infection (Cipro 400mg & Zosyn 4.5g). Patient had an ERCP procedure today and at this time is still in Endoscopy. SCD's in place to prevent blood clots. Will continue to monitor patient and control pain. All P.O. Medications given this A.M with a small sip of water without difficulty. Patient repositioned q 2 hours to prevent skin breakdown.

## 2017-05-03 NOTE — NURSING
"Patient denied nausea medication but did request for "something hot to drink." Kitchen/Dietary is closed but prepared patient some hot chicken broth from the nourishment station.   "

## 2017-05-04 ENCOUNTER — TELEPHONE (OUTPATIENT)
Dept: FAMILY MEDICINE | Facility: CLINIC | Age: 82
End: 2017-05-04

## 2017-05-04 PROBLEM — R53.1 WEAKNESS: Status: ACTIVE | Noted: 2017-05-04

## 2017-05-04 LAB
ALBUMIN SERPL BCP-MCNC: 2.7 G/DL
ALP SERPL-CCNC: 66 U/L
ALT SERPL W/O P-5'-P-CCNC: 18 U/L
ANION GAP SERPL CALC-SCNC: 11 MMOL/L
AST SERPL-CCNC: 17 U/L
BASOPHILS # BLD AUTO: 0.01 K/UL
BASOPHILS NFR BLD: 0.2 %
BILIRUB SERPL-MCNC: 2.2 MG/DL
BUN SERPL-MCNC: 29 MG/DL
CALCIUM SERPL-MCNC: 8.5 MG/DL
CHLORIDE SERPL-SCNC: 107 MMOL/L
CO2 SERPL-SCNC: 19 MMOL/L
CREAT SERPL-MCNC: 2.2 MG/DL
DIFFERENTIAL METHOD: ABNORMAL
EOSINOPHIL # BLD AUTO: 0.1 K/UL
EOSINOPHIL NFR BLD: 1.8 %
ERYTHROCYTE [DISTWIDTH] IN BLOOD BY AUTOMATED COUNT: 15.8 %
EST. GFR  (AFRICAN AMERICAN): 23 ML/MIN/1.73 M^2
EST. GFR  (NON AFRICAN AMERICAN): 20 ML/MIN/1.73 M^2
GLUCOSE SERPL-MCNC: 58 MG/DL
HCT VFR BLD AUTO: 32 %
HGB BLD-MCNC: 11.4 G/DL
LYMPHOCYTES # BLD AUTO: 0.5 K/UL
LYMPHOCYTES NFR BLD: 7.3 %
MCH RBC QN AUTO: 30.2 PG
MCHC RBC AUTO-ENTMCNC: 35.6 %
MCV RBC AUTO: 85 FL
MONOCYTES # BLD AUTO: 0.9 K/UL
MONOCYTES NFR BLD: 14.1 %
NEUTROPHILS # BLD AUTO: 4.7 K/UL
NEUTROPHILS NFR BLD: 76 %
PLATELET # BLD AUTO: 90 K/UL
PMV BLD AUTO: 11.2 FL
POCT GLUCOSE: 100 MG/DL (ref 70–110)
POCT GLUCOSE: 129 MG/DL (ref 70–110)
POCT GLUCOSE: 56 MG/DL (ref 70–110)
POCT GLUCOSE: 67 MG/DL (ref 70–110)
POCT GLUCOSE: 86 MG/DL (ref 70–110)
POCT GLUCOSE: 96 MG/DL (ref 70–110)
POTASSIUM SERPL-SCNC: 3.7 MMOL/L
PROT SERPL-MCNC: 5.7 G/DL
RBC # BLD AUTO: 3.78 M/UL
SODIUM SERPL-SCNC: 137 MMOL/L
WBC # BLD AUTO: 6.19 K/UL

## 2017-05-04 PROCEDURE — 25000003 PHARM REV CODE 250: Performed by: HOSPITALIST

## 2017-05-04 PROCEDURE — 63600175 PHARM REV CODE 636 W HCPCS: Performed by: HOSPITALIST

## 2017-05-04 PROCEDURE — 36415 COLL VENOUS BLD VENIPUNCTURE: CPT

## 2017-05-04 PROCEDURE — 80053 COMPREHEN METABOLIC PANEL: CPT

## 2017-05-04 PROCEDURE — 85025 COMPLETE CBC W/AUTO DIFF WBC: CPT

## 2017-05-04 PROCEDURE — 25000003 PHARM REV CODE 250: Performed by: EMERGENCY MEDICINE

## 2017-05-04 PROCEDURE — 11000001 HC ACUTE MED/SURG PRIVATE ROOM

## 2017-05-04 RX ADMIN — PANTOPRAZOLE SODIUM 40 MG: 40 TABLET, DELAYED RELEASE ORAL at 09:05

## 2017-05-04 RX ADMIN — LABETALOL HYDROCHLORIDE 300 MG: 100 TABLET, FILM COATED ORAL at 09:05

## 2017-05-04 RX ADMIN — DEXTROSE MONOHYDRATE 12.5 G: 25 INJECTION, SOLUTION INTRAVENOUS at 12:05

## 2017-05-04 RX ADMIN — PIPERACILLIN, TAZOBACTAM 4.5 G: 4; .5 INJECTION, POWDER, LYOPHILIZED, FOR SOLUTION INTRAVENOUS at 03:05

## 2017-05-04 RX ADMIN — PIPERACILLIN, TAZOBACTAM 4.5 G: 4; .5 INJECTION, POWDER, LYOPHILIZED, FOR SOLUTION INTRAVENOUS at 04:05

## 2017-05-04 RX ADMIN — CIPROFLOXACIN 400 MG: 2 INJECTION, SOLUTION INTRAVENOUS at 09:05

## 2017-05-04 RX ADMIN — FERROUS SULFATE TAB EC 325 MG (65 MG FE EQUIVALENT) 325 MG: 325 (65 FE) TABLET DELAYED RESPONSE at 09:05

## 2017-05-04 RX ADMIN — FUROSEMIDE 20 MG: 10 INJECTION, SOLUTION INTRAMUSCULAR; INTRAVENOUS at 09:05

## 2017-05-04 RX ADMIN — LEVOTHYROXINE SODIUM 50 MCG: 50 TABLET ORAL at 05:05

## 2017-05-04 RX ADMIN — ALLOPURINOL 100 MG: 100 TABLET ORAL at 09:05

## 2017-05-04 RX ADMIN — DEXTROSE MONOHYDRATE 12.5 G: 25 INJECTION, SOLUTION INTRAVENOUS at 06:05

## 2017-05-04 RX ADMIN — HYDRALAZINE HYDROCHLORIDE 50 MG: 25 TABLET ORAL at 03:05

## 2017-05-04 RX ADMIN — HYDRALAZINE HYDROCHLORIDE 50 MG: 25 TABLET ORAL at 05:05

## 2017-05-04 RX ADMIN — FUROSEMIDE 20 MG: 10 INJECTION, SOLUTION INTRAMUSCULAR; INTRAVENOUS at 05:05

## 2017-05-04 RX ADMIN — HYDRALAZINE HYDROCHLORIDE 50 MG: 25 TABLET ORAL at 09:05

## 2017-05-04 NOTE — PROGRESS NOTES
[facility]  Adult Nutrition  Progress Note    SUMMARY     Recommendations    Recommendation/Intervention: 1) Advance diet to 1800 ADA Cardiac, Dental Soft Diet s/p procedure 2) Rec Boost Plus supplement if po intake < 50% 2) Educate patient on Gallbladder Nutrition Therapy 3) Monitor po intake/tolerance   Goals:  Patient will meet >=85% of meals upon diet advancement  Status:  new    Continuum of Care Plan    Referral to Outpatient Services:  (D/C planning: ADA/Cardiac, Dental Soft diet with supplements as needed)    Reason for Assessment    Reason for Assessment: RD follow-up  Diagnosis:  (acute pancreatitis)  Relevent Medical History: DM, HTN, CAD, CHF, HLD      General Information Comments: Patient's diet advanced. NPO effective tonight for cholecystectomy. Patient stated she ate well and tolerated her diet. No complaints of nausea, abdominal pain.      Nutrition Prescription Ordered    Current Diet Order: Diabetic 2000 calories, Cardiac, Dental Soft    Evaluation of Received Nutrients/Fluid Intake    Energy Calories Required: meeting needs  Protein Required: meeting needs  Fluid Required: exceed needs (Net I/O +440)     Tolerance: tolerating     Nutrition Risk Screen     Nutrition Risk Screen: no indicators present    Nutrition/Diet History    Food Preferences: denies cultural    Labs/Tests/Procedures/Meds       Pertinent Labs Reviewed: reviewed     Pertinent Medications Reviewed: reviewed       Physical Findings    Oral/Mouth Cavity: WDL  Skin: intact    Anthropometrics    Height (inches): 61.97 in  Weight Method: Bed Scale  Weight (kg): 59.2 kg  Ideal Body Weight (IBW), Female: 109.85 lb  % Ideal Body Weight, Female (lb): 118.81 lb  BMI (kg/m2): 23.9  BMI Grade: 18.5-24.9 - normal  Usual Body Weight (UBW), k.18 kg  % Usual Body Weight: 86.83     Weight Loss: unintentional    Estimated/Assessed Needs    Weight Used For Calorie Calculations: 59.2 kg (130 lb 8.2 oz)   Height (cm): 157.4  cm  Energy Need Method: Kcal/kg (1500 - 1800)  RMR ([place]-St. Jeor Equation): 995.96  Weight Used For Protein Calculations: 59.2 kg (130 lb 8.2 oz)  Protein Requirements: 60-70 g  Fluid Need Method: RDA Method  CHO Requirement: 200 g     Nutrition Diagnosis      Problem: Inadequate Energy Intake  Etiology: Acute Pancreatitis  As Evidenced by: NPO   Nutrition Diagnosis Status: improving (diet advanced)    Monitor and Evaluation    Food and Nutrient Intake: energy intake, food and beverage intake, parenteral nutrition intake  Food and Nutrient Adminstration: diet order, enteral and parenteral nutrition administration (supplement order)  Anthropometric Measurements: weight, weight change  Biochemical Data, Medical Tests and Procedures: electrolyte and renal panel, gastrointestinal profile, glucose/endocrine profile, lipid profile  Nutrition-Focused Physical Findings: overall appearance  % Intake of Estimated Energy Needs: 75 - 100 %  % Meal Intake: 75%    Nutrition Risk    Level of Risk: other (see comments) (f/u 2x weekly)    Nutrition Follow-Up    RD Follow-up?: Yes

## 2017-05-04 NOTE — PROGRESS NOTES
[facility] Hospital Medicine  Progress Note    Patient Name: Christal Goodson  MRN: [id number]  Patient Class: IP- Inpatient   Admission Date: 4/28/2017  Length of Stay: 6 days  Attending Physician: Max Faria MD  Primary Care Provider: Juan Alberto Prieto MD        Subjective:     Principal Problem:Acute biliary pancreatitis    HPI:  Christal Goodson is a 85 y.o. female that (in part)  has a past medical history of Arthritis; Blood transfusion; CHF (congestive heart failure); Coronary artery disease; Diabetes mellitus; General anesthetics causing adverse effect in therapeutic use; Hyperlipidemia; Hypertension; Renal disorder; and Thyroid disease. Presents to [facility] Emergency Department complaining of abdominal pain as described below in detail.   Description of symptoms    Location:  Periumbilical  Onset:  Acute onset last night  Character:  Severe deep aching cramping pain  Frequency:  First episode  Duration:  Constant  Associated Symptoms:  Headache, nausea, vomiting, constipation, fever, cough  Radiation:  Radiates to epigastrium  Exacerbating factors:  Palpation, vomiting  Relieving factors:  Antiemetics given in the ED along with the medicine     In the emergency department routine laboratory studies were obtained followed by CT of the abdomen which demonstrated that the pancreas is mildly enlarged with peripancreatic inflammatory stranding in addition to gallstones I would prefer to see some of the later asked.  Lipase levels were elevated.  Right upper quadrant ultrasound obtained.  Concern for gallstone pancreatitis.       Hospital Course:  Ms. Goodson was admitted with biliary pancreatitis and sepsis with bacteremia with GNR. Pt was treated with Zosyn/Cipro, given IVF with good response in BP. Pt was treated with bowel rest, pain control and supportive care. GI and Surgery following, and lipase/LFTs improving. Repeat blood culture NGTD.  Plan for Lap betzaida,  but Cardiology recommending blood transfusion prior to surgery.  2 units of blood ordered.  ERCP with stone extraction.    Interval History: Doing well.  No new complaints.    Review of Systems   Constitutional: Negative for chills and fever.   HENT: Negative for ear discharge and ear pain.    Eyes: Negative for pain and itching.   Respiratory: Negative for cough and choking.      Objective:     Vital Signs (Most Recent):  Temp: 97.7 °F (36.5 °C) (05/04/17 0730)  Pulse: 63 (05/04/17 0803)  Resp: 18 (05/04/17 0730)  BP: (!) 144/68 (05/04/17 0730)  SpO2: 97 % (05/04/17 0730) Vital Signs (24h Range):  Temp:  [97.2 °F (36.2 °C)-98.1 °F (36.7 °C)] 97.7 °F (36.5 °C)  Pulse:  [60-66] 63  Resp:  [12-20] 18  SpO2:  [94 %-100 %] 97 %  BP: (113-173)/(54-78) 144/68     Weight: 59.2 kg (130 lb 8.6 oz)  Body mass index is 23.88 kg/(m^2).    Intake/Output Summary (Last 24 hours) at 05/04/17 1423  Last data filed at 05/04/17 0600   Gross per 24 hour   Intake              440 ml   Output                0 ml   Net              440 ml      Physical Exam   Constitutional: She is oriented to person, place, and time. She appears well-developed and well-nourished.   HENT:   Head: Normocephalic and atraumatic.   Eyes: EOM are normal. Pupils are equal, round, and reactive to light.   +scleral icterus   Neck: Normal range of motion. Neck supple.   Cardiovascular: Normal rate and regular rhythm.    Pulmonary/Chest: Effort normal and breath sounds normal.   Abdominal: Soft. Bowel sounds are normal.   Neurological: She is alert and oriented to person, place, and time.   Skin: Skin is warm and dry.       Significant Labs:   BMP:     Recent Labs  Lab 05/04/17  0441   GLU 58*      K 3.7      CO2 19*   BUN 29*   CREATININE 2.2*   CALCIUM 8.5*     CBC:     Recent Labs  Lab 05/03/17  0506 05/04/17  0441   WBC 7.29 6.19   HGB 11.3* 11.4*   HCT 31.9* 32.0*   PLT 81* 90*           Assessment/Plan:      * Acute biliary pancreatitis  Pt with  biliary pancreatitis, clinically improving with bowel rest and lipase trending down.  Continue supportive care and pain control. Continue monitoring of LFTS and lipase daily, appreciate GI and Surgery input. Pt transfused 1 unit of PRBC and Cardiology recommended to proceed with surgical intervention after Hgb>10.  Transfused 2 more units of blood. Continue Zosyn/Cipro for now given sepsis with bacteremia which will be discussed below.  Hgb now >10.  T Bili remains high and Surgery recommending ERCP prior to surgery.  ERCP on 5/3 with stone extraction.  Plan for lap betzaida tomorrow.  Patient obviously moderate to high risks of complications secondary to multiple medical issues and slight thrombocytopenia, but currently stable and would benefit from lap betzaida to avoid future complications.        Hypothyroidism  Clinically euthyroid, continue home regimen.       Hypercholesteremia  Given abnormal LFTs, will hold statin for now.          CAD (coronary artery disease)  Pt is without chest pain and initially blood pressures were low, restarted labetolol and hydralazine now that patient hypertensive.        Essential hypertension, benign  Initially held all anti-HTN due to hypotension, resumed labetolol and hydralazine.      Chronic kidney disease, stage III (moderate)  ARF   Assessment and Plan:  Pt with worsening creatinine likely due to sepsis with hypotension, continue IVF and will repeat labs. Baseline creatinine approximately 1.7, slowly improving, monitor.        Pulmonary hypertension  No acute issues. Monitor.        Diabetes mellitus type 2 in nonobese  Closely monitor blood glucose and make adjustments to insulin regimen as necessary. HgbA1C <4.0.          Sepsis  Bacteremia due to Klebsiella pneumoniae  Assessment and Plan:  Pt met criteria for sepsis with fever, and hypotension. Pt with likely biliary source of bacteremia with Klebsiella pneumoniae. Continue Zosyn/Cipro for now and repeat blood cultures done  NGTD. Will plan to de-escalate to Cipro alone post-op. Pt will need to continue Cipro for 2 weeks from date of negative blood culture.      Anemia of chronic disease  H/H stable.  Transfuse to keep Hbg>10 prior to surgery.      Urinary tract infection without hematuria  Due to E. Coli and Proteus mirabilis, pan-sensitive. Continue current abx regimen with plan for de-escalation to Cipro alone post-op.      Weakness        VTE Risk Mitigation         Ordered     Medium Risk of VTE  Once      04/28/17 2228     Place sequential compression device  Until discontinued      04/28/17 2228          Max Faria MD  Department of Hospital Medicine   [facility]

## 2017-05-04 NOTE — PLAN OF CARE
Problem: Patient Care Overview  Goal: Plan of Care Review  05/04/2017     Recommendations     Recommendation/Intervention: 1) Advance diet to 1800 ADA Cardiac, Dental Soft Diet s/p procedure 2) Rec Boost Plus supplement if po intake < 50% 2) Educate patient on Gallbladder Nutrition Therapy 3) Monitor po intake/tolerance   Goals:  Patient will meet >=85% of meals upon diet advancement  Status:  janey Mcmillan, MPH, RD, LDN

## 2017-05-04 NOTE — SUBJECTIVE & OBJECTIVE
Interval History: Doing well.  No new complaints.    Review of Systems   Constitutional: Negative for chills and fever.   HENT: Negative for ear discharge and ear pain.    Eyes: Negative for pain and itching.   Respiratory: Negative for cough and choking.      Objective:     Vital Signs (Most Recent):  Temp: 97.7 °F (36.5 °C) (05/04/17 0730)  Pulse: 63 (05/04/17 0803)  Resp: 18 (05/04/17 0730)  BP: (!) 144/68 (05/04/17 0730)  SpO2: 97 % (05/04/17 0730) Vital Signs (24h Range):  Temp:  [97.2 °F (36.2 °C)-98.1 °F (36.7 °C)] 97.7 °F (36.5 °C)  Pulse:  [60-66] 63  Resp:  [12-20] 18  SpO2:  [94 %-100 %] 97 %  BP: (113-173)/(54-78) 144/68     Weight: 59.2 kg (130 lb 8.6 oz)  Body mass index is 23.88 kg/(m^2).    Intake/Output Summary (Last 24 hours) at 05/04/17 1423  Last data filed at 05/04/17 0600   Gross per 24 hour   Intake              440 ml   Output                0 ml   Net              440 ml      Physical Exam   Constitutional: She is oriented to person, place, and time. She appears well-developed and well-nourished.   HENT:   Head: Normocephalic and atraumatic.   Eyes: EOM are normal. Pupils are equal, round, and reactive to light.   +scleral icterus   Neck: Normal range of motion. Neck supple.   Cardiovascular: Normal rate and regular rhythm.    Pulmonary/Chest: Effort normal and breath sounds normal.   Abdominal: Soft. Bowel sounds are normal.   Neurological: She is alert and oriented to person, place, and time.   Skin: Skin is warm and dry.       Significant Labs:   BMP:     Recent Labs  Lab 05/04/17  0441   GLU 58*      K 3.7      CO2 19*   BUN 29*   CREATININE 2.2*   CALCIUM 8.5*     CBC:     Recent Labs  Lab 05/03/17  0506 05/04/17  0441   WBC 7.29 6.19   HGB 11.3* 11.4*   HCT 31.9* 32.0*   PLT 81* 90*

## 2017-05-04 NOTE — PROGRESS NOTES
Pain improved.  ERCP with stone extraction yesterday.    Vitals:    05/03/17 1915 05/03/17 1949 05/04/17 0028 05/04/17 0532   BP:  (!) 155/72 (!) 113/54 (!) 173/74   BP Location:  Left arm Left arm Left arm   Patient Position:  Lying Lying Lying   BP Method:  Automatic Automatic Automatic   Pulse: 64 66 61 66   Resp:  20 18 20   Temp:  97.7 °F (36.5 °C) 97.8 °F (36.6 °C) 98.1 °F (36.7 °C)   TempSrc:  Oral Oral Oral   SpO2:  97% 98% 98%   Weight:       Height:         A/O x 3  RRR  Soft, momo tender    TB 2.2    A/P gallstone pancreatitis  Choledocholithiasis  Recommend lap betzaida prior to discharge, family seems a little resistant to it.  Moderate risk for surgery noted.  Plan for surgery tomorrow if ok with primary team

## 2017-05-04 NOTE — TELEPHONE ENCOUNTER
Patient's daughter is requesting home health orders, patient is currently admitted into the hospital. Patient's daughter was advised that home health orders should be written upon being discharged from the hospital. Please advised if otherwise.

## 2017-05-04 NOTE — ASSESSMENT & PLAN NOTE
Pt with biliary pancreatitis, clinically improving with bowel rest and lipase trending down.  Continue supportive care and pain control. Continue monitoring of LFTS and lipase daily, appreciate GI and Surgery input. Pt transfused 1 unit of PRBC and Cardiology recommended to proceed with surgical intervention after Hgb>10.  Transfused 2 more units of blood. Continue Zosyn/Cipro for now given sepsis with bacteremia which will be discussed below.  Hgb now >10.  T Bili remains high and Surgery recommending ERCP prior to surgery.  ERCP on 5/3 with stone extraction.  Plan for lap betzaida tomorrow.  Patient obviously moderate to high risks of complications secondary to multiple medical issues and slight thrombocytopenia, but currently stable and would benefit from lap betzaida to avoid future complications.

## 2017-05-04 NOTE — PROGRESS NOTES
Chief Complaint / Reason for Consult: Abnormal LFT's, Abd. Pain    Abd. Pain has improved    ROS: No CP, SOB, F/C    Patient Vitals for the past 24 hrs:   BP Temp Temp src Pulse Resp SpO2   05/04/17 0803 - - - 63 - -   05/04/17 0730 (!) 144/68 97.7 °F (36.5 °C) Oral 66 18 97 %   05/04/17 0532 (!) 173/74 98.1 °F (36.7 °C) Oral 66 20 98 %   05/04/17 0028 (!) 113/54 97.8 °F (36.6 °C) Oral 61 18 98 %   05/03/17 1949 (!) 155/72 97.7 °F (36.5 °C) Oral 66 20 97 %   05/03/17 1915 - - - 64 - -   05/03/17 1635 (!) 122/58 - - 62 17 (!) 94 %   05/03/17 1634 - - - 62 - (!) 94 %   05/03/17 1630 - - - 64 17 (!) 94 %   05/03/17 1622 (!) 168/78 97.2 °F (36.2 °C) - 64 14 96 %   05/03/17 1615 - - - 64 18 96 %   05/03/17 1600 (!) 115/58 - - 60 18 97 %   05/03/17 1545 113/61 - - 62 13 95 %   05/03/17 1536 124/67 97.5 °F (36.4 °C) Axillary 61 12 100 %   05/03/17 1530 - - - - 12 -   05/03/17 1527 - - - 66 - 98 %       Physical Exam:  Gen - Well developed, well nourished, no apparent distress  HEENT - Anicteric  CV - S1, S2, no murmurs/rubs  Lungs - CTA-B, normal excursion  Abd - Soft, NT, ND, normal BS's, no HSM.  Ext - No c/c/e  Neuro - No asterixis    Labs:    Recent Labs  Lab 05/04/17  0441   WBC 6.19   RBC 3.78*   HGB 11.4*   HCT 32.0*   PLT 90*   MCV 85   MCH 30.2   MCHC 35.6       Recent Labs  Lab 05/04/17  0441   CALCIUM 8.5*   PROT 5.7*      K 3.7   CO2 19*      BUN 29*   CREATININE 2.2*   ALKPHOS 66   ALT 18   AST 17   BILITOT 2.2*       Imaging:  Reviewed ERCP    Assessment:  This patient is a 85 y.o. female with:   1. Biliary Pancreatitis, secondary to Choledocholithiasis - s/p ERCP, with sphincterotomy/stone extraction.  Improved.  2. Abnormal LFT's - Improving, secondary to #1.  3. HTN, CHF, DM, CKD....    Recommendations:  1. Monitor abd. Exam.  2. Follow LFT's.  3. Surg planning for cholecystectomy tomorrow.  4. Will follow briefly.

## 2017-05-04 NOTE — ANESTHESIA POSTPROCEDURE EVALUATION
"Anesthesia Post Evaluation    Patient: Christal Goodson    Procedure(s) Performed: Procedure(s) (LRB):  ERCP (N/A)    Final Anesthesia Type: general  Patient location during evaluation: PACU  Patient participation: Yes- Able to Participate  Level of consciousness: awake and alert, awake and oriented  Post-procedure vital signs: reviewed and stable  Pain management: adequate  Airway patency: patent  PONV status at discharge: No PONV  Anesthetic complications: no    Brunilda-operative Events Comments: Pt remained intubated postoperatively for inadequate respiratory effort.  Pt's breathing improved with time in the recovery room and tolerated extubation shortly after arrival to PACU.  Cardiovascular status: blood pressure returned to baseline and hemodynamically stable  Respiratory status: unassisted, spontaneous ventilation and room air  Hydration status: euvolemic  Follow-up not needed.        Visit Vitals    BP (!) 144/68 (BP Location: Left arm, Patient Position: Lying, BP Method: Automatic)    Pulse 63    Temp 36.5 °C (97.7 °F) (Oral)    Resp 18    Ht 5' 2" (1.575 m)    Wt 59.2 kg (130 lb 8.6 oz)    SpO2 97%    Breastfeeding No    BMI 23.88 kg/m2       Pain/Tru Score: Pain Assessment Performed: Yes (5/4/2017 10:00 AM)  Presence of Pain: denies (5/4/2017 10:00 AM)  Pain Rating Prior to Med Admin: 8 (5/3/2017 11:08 AM)  Pain Rating Post Med Admin: 3 (5/3/2017 11:20 AM)  Tru Score: 10 (5/3/2017  7:15 PM)      "

## 2017-05-04 NOTE — PLAN OF CARE
Problem: Fall Risk (Adult)  Goal: Identify Related Risk Factors and Signs and Symptoms  Related risk factors and signs and symptoms are identified upon initiation of Human Response Clinical Practice Guideline (CPG)   Outcome: Ongoing (interventions implemented as appropriate)  Patient will remain free from falls,trauma,and injury while hospitalized.     Problem: Patient Care Overview  Goal: Plan of Care Review  Outcome: Ongoing (interventions implemented as appropriate)  Pt progressing. Oriented X4. Voiding well. Skin integrity maintained. Pain and nausea controlled. Vs stable. Adequate oral intake. Tolerating diet and Iv antibiotcs. Iv fluids maintained. Free of falls. Call light within reach. Bed in low position. No issues during shift. Continue plan of care. Daughter remains at bedside.        Problem: Pressure Ulcer Risk (Zeeshan Scale) (Adult,Obstetrics,Pediatric)  Goal: Identify Related Risk Factors and Signs and Symptoms  Related risk factors and signs and symptoms are identified upon initiation of Human Response Clinical Practice Guideline (CPG)   Outcome: Ongoing (interventions implemented as appropriate)  Skin integrity maintained. Patient turned every 2 hours.     Problem: Pain, Acute (Adult)  Goal: Identify Related Risk Factors and Signs and Symptoms  Related risk factors and signs and symptoms are identified upon initiation of Human Response Clinical Practice Guideline (CPG)   Outcome: Ongoing (interventions implemented as appropriate)  Complains of left leg pain with movement.

## 2017-05-05 ENCOUNTER — ANESTHESIA (OUTPATIENT)
Dept: SURGERY | Facility: HOSPITAL | Age: 82
DRG: 853 | End: 2017-05-05
Payer: MEDICARE

## 2017-05-05 ENCOUNTER — ANESTHESIA EVENT (OUTPATIENT)
Dept: SURGERY | Facility: HOSPITAL | Age: 82
DRG: 853 | End: 2017-05-05
Payer: MEDICARE

## 2017-05-05 ENCOUNTER — SURGERY (OUTPATIENT)
Age: 82
End: 2017-05-05

## 2017-05-05 LAB
ALBUMIN SERPL BCP-MCNC: 2.8 G/DL
ALP SERPL-CCNC: 80 U/L
ALT SERPL W/O P-5'-P-CCNC: 17 U/L
ANION GAP SERPL CALC-SCNC: 10 MMOL/L
AST SERPL-CCNC: 17 U/L
BACTERIA BLD CULT: NORMAL
BACTERIA BLD CULT: NORMAL
BASOPHILS # BLD AUTO: 0.01 K/UL
BASOPHILS NFR BLD: 0.2 %
BILIRUB SERPL-MCNC: 2.1 MG/DL
BUN SERPL-MCNC: 30 MG/DL
CALCIUM SERPL-MCNC: 8.7 MG/DL
CHLORIDE SERPL-SCNC: 107 MMOL/L
CO2 SERPL-SCNC: 21 MMOL/L
CREAT SERPL-MCNC: 2.3 MG/DL
DIFFERENTIAL METHOD: ABNORMAL
EOSINOPHIL # BLD AUTO: 0.2 K/UL
EOSINOPHIL NFR BLD: 2.5 %
ERYTHROCYTE [DISTWIDTH] IN BLOOD BY AUTOMATED COUNT: 16.1 %
EST. GFR  (AFRICAN AMERICAN): 22 ML/MIN/1.73 M^2
EST. GFR  (NON AFRICAN AMERICAN): 19 ML/MIN/1.73 M^2
GLUCOSE SERPL-MCNC: 80 MG/DL
HCT VFR BLD AUTO: 33.8 %
HGB BLD-MCNC: 11.6 G/DL
LYMPHOCYTES # BLD AUTO: 0.4 K/UL
LYMPHOCYTES NFR BLD: 6.9 %
MCH RBC QN AUTO: 29.8 PG
MCHC RBC AUTO-ENTMCNC: 34.3 %
MCV RBC AUTO: 87 FL
MONOCYTES # BLD AUTO: 0.8 K/UL
MONOCYTES NFR BLD: 13 %
NEUTROPHILS # BLD AUTO: 5 K/UL
NEUTROPHILS NFR BLD: 77.4 %
PLATELET # BLD AUTO: 99 K/UL
PMV BLD AUTO: 10.4 FL
POCT GLUCOSE: 62 MG/DL (ref 70–110)
POCT GLUCOSE: 80 MG/DL (ref 70–110)
POTASSIUM SERPL-SCNC: 3.7 MMOL/L
PROT SERPL-MCNC: 6 G/DL
RBC # BLD AUTO: 3.89 M/UL
SODIUM SERPL-SCNC: 138 MMOL/L
WBC # BLD AUTO: 6.4 K/UL

## 2017-05-05 PROCEDURE — 63600175 PHARM REV CODE 636 W HCPCS: Performed by: ANESTHESIOLOGY

## 2017-05-05 PROCEDURE — 37000009 HC ANESTHESIA EA ADD 15 MINS: Performed by: SURGERY

## 2017-05-05 PROCEDURE — 88304 TISSUE EXAM BY PATHOLOGIST: CPT | Mod: 26,,, | Performed by: PATHOLOGY

## 2017-05-05 PROCEDURE — 25000003 PHARM REV CODE 250: Performed by: HOSPITALIST

## 2017-05-05 PROCEDURE — 63600175 PHARM REV CODE 636 W HCPCS: Performed by: HOSPITALIST

## 2017-05-05 PROCEDURE — 0FT44ZZ RESECTION OF GALLBLADDER, PERCUTANEOUS ENDOSCOPIC APPROACH: ICD-10-PCS | Performed by: SURGERY

## 2017-05-05 PROCEDURE — 25000003 PHARM REV CODE 250: Performed by: REGISTERED NURSE

## 2017-05-05 PROCEDURE — 80053 COMPREHEN METABOLIC PANEL: CPT

## 2017-05-05 PROCEDURE — 36415 COLL VENOUS BLD VENIPUNCTURE: CPT

## 2017-05-05 PROCEDURE — 88304 TISSUE EXAM BY PATHOLOGIST: CPT | Performed by: PATHOLOGY

## 2017-05-05 PROCEDURE — 27100019 HC AMBU BAG ADULT/PED: Performed by: REGISTERED NURSE

## 2017-05-05 PROCEDURE — D9220A PRA ANESTHESIA: Mod: ANES,,, | Performed by: ANESTHESIOLOGY

## 2017-05-05 PROCEDURE — 11000001 HC ACUTE MED/SURG PRIVATE ROOM

## 2017-05-05 PROCEDURE — 37000008 HC ANESTHESIA 1ST 15 MINUTES: Performed by: SURGERY

## 2017-05-05 PROCEDURE — 94002 VENT MGMT INPAT INIT DAY: CPT

## 2017-05-05 PROCEDURE — 71000033 HC RECOVERY, INTIAL HOUR: Performed by: SURGERY

## 2017-05-05 PROCEDURE — 25000003 PHARM REV CODE 250: Performed by: SURGERY

## 2017-05-05 PROCEDURE — C1729 CATH, DRAINAGE: HCPCS | Performed by: SURGERY

## 2017-05-05 PROCEDURE — 94761 N-INVAS EAR/PLS OXIMETRY MLT: CPT

## 2017-05-05 PROCEDURE — 25000003 PHARM REV CODE 250: Performed by: EMERGENCY MEDICINE

## 2017-05-05 PROCEDURE — 27000221 HC OXYGEN, UP TO 24 HOURS

## 2017-05-05 PROCEDURE — 36000709 HC OR TIME LEV III EA ADD 15 MIN: Performed by: SURGERY

## 2017-05-05 PROCEDURE — 63600175 PHARM REV CODE 636 W HCPCS: Performed by: REGISTERED NURSE

## 2017-05-05 PROCEDURE — 36000708 HC OR TIME LEV III 1ST 15 MIN: Performed by: SURGERY

## 2017-05-05 PROCEDURE — 71000039 HC RECOVERY, EACH ADD'L HOUR: Performed by: SURGERY

## 2017-05-05 PROCEDURE — 85025 COMPLETE CBC W/AUTO DIFF WBC: CPT

## 2017-05-05 PROCEDURE — D9220A PRA ANESTHESIA: Mod: CRNA,,, | Performed by: REGISTERED NURSE

## 2017-05-05 PROCEDURE — 99900035 HC TECH TIME PER 15 MIN (STAT)

## 2017-05-05 PROCEDURE — 27201423 OPTIME MED/SURG SUP & DEVICES STERILE SUPPLY: Performed by: SURGERY

## 2017-05-05 RX ORDER — BUPIVACAINE HYDROCHLORIDE 5 MG/ML
INJECTION, SOLUTION EPIDURAL; INTRACAUDAL
Status: DISCONTINUED | OUTPATIENT
Start: 2017-05-05 | End: 2017-05-05 | Stop reason: HOSPADM

## 2017-05-05 RX ORDER — HYDROMORPHONE HYDROCHLORIDE 2 MG/ML
0.2 INJECTION, SOLUTION INTRAMUSCULAR; INTRAVENOUS; SUBCUTANEOUS EVERY 5 MIN PRN
Status: DISCONTINUED | OUTPATIENT
Start: 2017-05-05 | End: 2017-05-05 | Stop reason: HOSPADM

## 2017-05-05 RX ORDER — LIDOCAINE HYDROCHLORIDE AND EPINEPHRINE 10; 10 MG/ML; UG/ML
INJECTION, SOLUTION INFILTRATION; PERINEURAL
Status: DISCONTINUED | OUTPATIENT
Start: 2017-05-05 | End: 2017-05-05 | Stop reason: HOSPADM

## 2017-05-05 RX ORDER — SODIUM CHLORIDE 0.9 % (FLUSH) 0.9 %
3 SYRINGE (ML) INJECTION
Status: DISCONTINUED | OUTPATIENT
Start: 2017-05-05 | End: 2017-05-08 | Stop reason: HOSPADM

## 2017-05-05 RX ORDER — ROCURONIUM BROMIDE 10 MG/ML
INJECTION, SOLUTION INTRAVENOUS
Status: DISCONTINUED | OUTPATIENT
Start: 2017-05-05 | End: 2017-05-05

## 2017-05-05 RX ORDER — SODIUM CHLORIDE 0.9 % (FLUSH) 0.9 %
3 SYRINGE (ML) INJECTION EVERY 8 HOURS
Status: DISCONTINUED | OUTPATIENT
Start: 2017-05-05 | End: 2017-05-05 | Stop reason: HOSPADM

## 2017-05-05 RX ORDER — ONDANSETRON 2 MG/ML
4 INJECTION INTRAMUSCULAR; INTRAVENOUS EVERY 8 HOURS PRN
Status: DISCONTINUED | OUTPATIENT
Start: 2017-05-05 | End: 2017-05-05 | Stop reason: HOSPADM

## 2017-05-05 RX ORDER — ONDANSETRON 2 MG/ML
INJECTION INTRAMUSCULAR; INTRAVENOUS
Status: DISCONTINUED | OUTPATIENT
Start: 2017-05-05 | End: 2017-05-05

## 2017-05-05 RX ORDER — METOCLOPRAMIDE HYDROCHLORIDE 5 MG/ML
10 INJECTION INTRAMUSCULAR; INTRAVENOUS EVERY 10 MIN PRN
Status: DISCONTINUED | OUTPATIENT
Start: 2017-05-05 | End: 2017-05-05 | Stop reason: HOSPADM

## 2017-05-05 RX ORDER — NEOSTIGMINE METHYLSULFATE 1 MG/ML
INJECTION, SOLUTION INTRAVENOUS
Status: DISCONTINUED | OUTPATIENT
Start: 2017-05-05 | End: 2017-05-05

## 2017-05-05 RX ORDER — FENTANYL CITRATE 50 UG/ML
INJECTION, SOLUTION INTRAMUSCULAR; INTRAVENOUS
Status: DISCONTINUED | OUTPATIENT
Start: 2017-05-05 | End: 2017-05-05

## 2017-05-05 RX ORDER — SODIUM CHLORIDE, SODIUM LACTATE, POTASSIUM CHLORIDE, CALCIUM CHLORIDE 600; 310; 30; 20 MG/100ML; MG/100ML; MG/100ML; MG/100ML
INJECTION, SOLUTION INTRAVENOUS CONTINUOUS PRN
Status: DISCONTINUED | OUTPATIENT
Start: 2017-05-05 | End: 2017-05-05

## 2017-05-05 RX ORDER — GLYCOPYRROLATE 0.2 MG/ML
INJECTION INTRAMUSCULAR; INTRAVENOUS
Status: DISCONTINUED | OUTPATIENT
Start: 2017-05-05 | End: 2017-05-05

## 2017-05-05 RX ORDER — MEPERIDINE HYDROCHLORIDE 50 MG/ML
12.5 INJECTION INTRAMUSCULAR; INTRAVENOUS; SUBCUTANEOUS ONCE AS NEEDED
Status: DISCONTINUED | OUTPATIENT
Start: 2017-05-05 | End: 2017-05-05 | Stop reason: HOSPADM

## 2017-05-05 RX ORDER — LIDOCAINE HCL/PF 100 MG/5ML
SYRINGE (ML) INTRAVENOUS
Status: DISCONTINUED | OUTPATIENT
Start: 2017-05-05 | End: 2017-05-05

## 2017-05-05 RX ORDER — OXYCODONE AND ACETAMINOPHEN 5; 325 MG/1; MG/1
1 TABLET ORAL
Status: DISCONTINUED | OUTPATIENT
Start: 2017-05-05 | End: 2017-05-05 | Stop reason: HOSPADM

## 2017-05-05 RX ORDER — PROPOFOL 10 MG/ML
VIAL (ML) INTRAVENOUS
Status: DISCONTINUED | OUTPATIENT
Start: 2017-05-05 | End: 2017-05-05

## 2017-05-05 RX ORDER — ETOMIDATE 2 MG/ML
INJECTION INTRAVENOUS
Status: DISCONTINUED | OUTPATIENT
Start: 2017-05-05 | End: 2017-05-05

## 2017-05-05 RX ADMIN — SODIUM CHLORIDE, SODIUM LACTATE, POTASSIUM CHLORIDE, AND CALCIUM CHLORIDE: .6; .31; .03; .02 INJECTION, SOLUTION INTRAVENOUS at 02:05

## 2017-05-05 RX ADMIN — FENTANYL CITRATE 50 MCG: 50 INJECTION INTRAMUSCULAR; INTRAVENOUS at 01:05

## 2017-05-05 RX ADMIN — ROCURONIUM BROMIDE 15 MG: 10 INJECTION, SOLUTION INTRAVENOUS at 01:05

## 2017-05-05 RX ADMIN — LABETALOL HYDROCHLORIDE 300 MG: 100 TABLET, FILM COATED ORAL at 08:05

## 2017-05-05 RX ADMIN — ONDANSETRON 4 MG: 2 INJECTION, SOLUTION INTRAMUSCULAR; INTRAVENOUS at 02:05

## 2017-05-05 RX ADMIN — SODIUM CHLORIDE, SODIUM LACTATE, POTASSIUM CHLORIDE, AND CALCIUM CHLORIDE: .6; .31; .03; .02 INJECTION, SOLUTION INTRAVENOUS at 12:05

## 2017-05-05 RX ADMIN — HYDROMORPHONE HYDROCHLORIDE 0.2 MG: 2 INJECTION INTRAMUSCULAR; INTRAVENOUS; SUBCUTANEOUS at 04:05

## 2017-05-05 RX ADMIN — EPHEDRINE SULFATE 10 MG: 50 INJECTION, SOLUTION INTRAMUSCULAR; INTRAVENOUS; SUBCUTANEOUS at 02:05

## 2017-05-05 RX ADMIN — ROCURONIUM BROMIDE 25 MG: 10 INJECTION, SOLUTION INTRAVENOUS at 01:05

## 2017-05-05 RX ADMIN — FUROSEMIDE 20 MG: 10 INJECTION, SOLUTION INTRAMUSCULAR; INTRAVENOUS at 08:05

## 2017-05-05 RX ADMIN — PROPOFOL 100 MG: 10 INJECTION, EMULSION INTRAVENOUS at 01:05

## 2017-05-05 RX ADMIN — CIPROFLOXACIN 400 MG: 2 INJECTION, SOLUTION INTRAVENOUS at 10:05

## 2017-05-05 RX ADMIN — PANTOPRAZOLE SODIUM 40 MG: 40 TABLET, DELAYED RELEASE ORAL at 10:05

## 2017-05-05 RX ADMIN — EPHEDRINE SULFATE 5 MG: 50 INJECTION, SOLUTION INTRAMUSCULAR; INTRAVENOUS; SUBCUTANEOUS at 02:05

## 2017-05-05 RX ADMIN — EPHEDRINE SULFATE 15 MG: 50 INJECTION, SOLUTION INTRAMUSCULAR; INTRAVENOUS; SUBCUTANEOUS at 02:05

## 2017-05-05 RX ADMIN — EPHEDRINE SULFATE 5 MG: 50 INJECTION, SOLUTION INTRAMUSCULAR; INTRAVENOUS; SUBCUTANEOUS at 01:05

## 2017-05-05 RX ADMIN — PIPERACILLIN, TAZOBACTAM 4.5 G: 4; .5 INJECTION, POWDER, LYOPHILIZED, FOR SOLUTION INTRAVENOUS at 05:05

## 2017-05-05 RX ADMIN — NEOSTIGMINE METHYLSULFATE 5 MG: 1 INJECTION INTRAVENOUS at 02:05

## 2017-05-05 RX ADMIN — ETOMIDATE 5 MG: 2 INJECTION, SOLUTION INTRAVENOUS at 01:05

## 2017-05-05 RX ADMIN — FUROSEMIDE 20 MG: 10 INJECTION, SOLUTION INTRAMUSCULAR; INTRAVENOUS at 05:05

## 2017-05-05 RX ADMIN — LIDOCAINE HYDROCHLORIDE 5 ML: 20 INJECTION, SOLUTION INTRAVENOUS at 01:05

## 2017-05-05 RX ADMIN — OXYCODONE HYDROCHLORIDE AND ACETAMINOPHEN 1 TABLET: 5; 325 TABLET ORAL at 10:05

## 2017-05-05 RX ADMIN — EPHEDRINE SULFATE 20 MG: 50 INJECTION, SOLUTION INTRAMUSCULAR; INTRAVENOUS; SUBCUTANEOUS at 02:05

## 2017-05-05 RX ADMIN — HYDROMORPHONE HYDROCHLORIDE 0.2 MG: 2 INJECTION INTRAMUSCULAR; INTRAVENOUS; SUBCUTANEOUS at 03:05

## 2017-05-05 RX ADMIN — LIDOCAINE HYDROCHLORIDE AND EPINEPHRINE 50 ML: 10; 10 INJECTION, SOLUTION INFILTRATION; PERINEURAL at 01:05

## 2017-05-05 RX ADMIN — MORPHINE SULFATE 3 MG: 10 INJECTION INTRAVENOUS at 05:05

## 2017-05-05 RX ADMIN — ROCURONIUM BROMIDE 10 MG: 10 INJECTION, SOLUTION INTRAVENOUS at 02:05

## 2017-05-05 RX ADMIN — GLYCOPYRROLATE 0.6 MG: 0.2 INJECTION, SOLUTION INTRAMUSCULAR; INTRAVENOUS at 02:05

## 2017-05-05 RX ADMIN — BUPIVACAINE HYDROCHLORIDE 50 ML: 5 INJECTION, SOLUTION EPIDURAL; INTRACAUDAL; PERINEURAL at 01:05

## 2017-05-05 NOTE — PROGRESS NOTES
Problem: Fall Risk (Adult)  Goal: Identify Related Risk Factors and Signs and Symptoms  Related risk factors and signs and symptoms are identified upon initiation of Human Response Clinical Practice Guideline (CPG)   Outcome: Ongoing (interventions implemented as appropriate)  Patient will remain free from falls,trauma,and injury while hospitalized.      Problem: Patient Care Overview  Goal: Plan of Care Review  Outcome: Ongoing (interventions implemented as appropriate)  Pt progressing. Oriented X4. Voiding well. Skin integrity maintained. Denies pain and nausea during shift. Vs stable. Adequate oral intake. Tolerating diet and Iv antibiotcs. Npo diet initiated after midnight. Free of falls. Call light within reach. Bed in low position. No issues during shift. Continue plan of care. Daughter remains at bedside.         Problem: Pressure Ulcer Risk (Zeeshan Scale) (Adult,Obstetrics,Pediatric)  Goal: Identify Related Risk Factors and Signs and Symptoms  Related risk factors and signs and symptoms are identified upon initiation of Human Response Clinical Practice Guideline (CPG)   Outcome: Ongoing (interventions implemented as appropriate)  Skin integrity maintained. Patient turned every 2 hours.      Problem: Pain, Acute (Adult)  Goal: Identify Related Risk Factors and Signs and Symptoms  Related risk factors and signs and symptoms are identified upon initiation of Human Response Clinical Practice Guideline (CPG)   Outcome: Ongoing (interventions implemented as appropriate)  Complains of left leg pain with movement.

## 2017-05-05 NOTE — PLAN OF CARE
Problem: Fall Risk (Adult)  Intervention: Review Medications/Identify Contributors to Fall Risk    05/05/17 1812   Safety Interventions   Medication Review/Management medications reviewed       Intervention: Patient Rounds    05/05/17 1627   Safety Interventions   Patient Rounds bed in low position;bed wheels locked;ID band on;visualized patient       Intervention: Safety Promotion/Fall Prevention    05/05/17 0800   Safety Interventions   Safety Promotion/Fall Prevention bed alarm set           Problem: Pressure Ulcer Risk (Zeeshan Scale) (Adult,Obstetrics,Pediatric)  Intervention: Prevent/Minimize Sheer/Friction Injuries    05/04/17 1915   Skin Interventions   Pressure Reduction Devices positioning supports utilized   Pressure Reduction Techniques frequent weight shift encouraged;weight shift assistance provided       Intervention: Turn/Reposition Often    05/04/17 1915 05/05/17 0800   Skin Interventions   Pressure Reduction Techniques frequent weight shift encouraged;weight shift assistance provided --    Positioning   Body Position --  side-lying, right           Problem: Pain, Acute (Adult)  Intervention: Monitor/Manage Analgesia    05/05/17 1812   Manage Acute Burn Pain   Bowel Intervention ambulation promoted       Intervention: Mutually Develop/Implement Acute Pain Management Plan    05/04/17 1915 05/05/17 1812   Pain/Comfort/Sleep Interventions   Pain Management Interventions --  medication   Cognitive Interventions   Sensory Stimulation Regulation care clustered --

## 2017-05-05 NOTE — ANESTHESIA PREPROCEDURE EVALUATION
"                                                                                                             05/05/2017  Christal Goodson is a 85 y.o., female.    Anesthesia Evaluation    I have reviewed the Patient Summary Reports.     I have reviewed the Medications.     Review of Systems  Anesthesia Hx:  Hx of Anesthetic complications    Social:  Non-Smoker, No Alcohol Use    Cardiovascular:   Hypertension CAD   CHF ECG has been reviewed. Echo 4/30/2017:  CONCLUSIONS     1 - Mildly to moderately depressed left ventricular systolic function (EF 40-45%).  Cannot exclude septal WMA vs "right ventricularization.".     2 - Concentric hypertrophy.     3 - Impaired LV relaxation, elevated LAP (grade 2 diastolic dysfunction).     4 - Right ventricular enlargement with hypertrophy, with low normal systolic function.     5 - Mild to moderate aortic stenosis, RAVI = 1.48 cm2, peak velocity = 2.32 m/s, mean gradient = 13.49 mmHg.     6 - Mild aortic regurgitation.     7 - Trivial to mild mitral regurgitation.     8 - Moderate to severe tricuspid regurgitation.  TV leaflet malcoaptation.     9 - Pulmonary hypertension. The estimated PA systolic pressure is 62 mmHg.     10 - Consider NAJMA +/- cath for further eval if clinically indicated.    Renal/:   Chronic Renal Disease, CRI    Endocrine:   Diabetes, type 2 Hypothyroidism  Diabetes, Type 2 Diabetes        Physical Exam  General:  Well nourished    Airway/Jaw/Neck:  Airway Findings: Mouth Opening: Normal Tongue: Normal  General Airway Assessment: Adult  Mallampati: III  TM Distance: Normal, at least 6 cm  Jaw/Neck Findings:  Neck ROM: Normal ROM      Dental:  Dental Findings: Edentulous   Chest/Lungs:  Chest/Lungs Findings: Clear to auscultation, Normal Respiratory Rate     Heart/Vascular:  Heart Findings: Rate: Normal        Mental Status:  Mental Status Findings:  Cooperative, Alert and Oriented         Anesthesia Plan  Type of Anesthesia, risks & benefits " discussed:  Anesthesia Type:  general  Patient's Preference:   Intra-op Monitoring Plan:   Intra-op Monitoring Plan Comments:   Post Op Pain Control Plan:   Post Op Pain Control Plan Comments:   Induction:   IV  Beta Blocker:  Patient is on a Beta-Blocker and has received one dose within the past 24 hours (No further documentation required).       Informed Consent: Patient understands risks and agrees with Anesthesia plan.  Questions answered. Anesthesia consent signed with patient.  ASA Score: 3     Day of Surgery Review of History & Physical: I have interviewed and examined the patient. I have reviewed the patient's H&P dated:  There are no significant changes.          Ready For Surgery From Anesthesia Perspective.

## 2017-05-05 NOTE — PROGRESS NOTES
Patient received from OR intubated with Ambu in progress.  Patient placed on pNewton 12/500/+5/65%

## 2017-05-05 NOTE — ASSESSMENT & PLAN NOTE
ARF   Assessment and Plan:  Pt with worsening creatinine likely due to sepsis with hypotension, continue IVF and will repeat labs. Baseline creatinine approximately 1.7, monitor.

## 2017-05-05 NOTE — PROGRESS NOTES
"Gastroenterology    CC: Abd pain    HPI 85 y.o. female with no reported abd pain this morning.  No N/V.  + flatus      Past Medical History:   Diagnosis Date    Arthritis     Blood transfusion     CHF (congestive heart failure)     Coronary artery disease     Diabetes mellitus     General anesthetics causing adverse effect in therapeutic use     "woke up as they were putting me on the table"    Hyperlipidemia     Hypertension     Renal disorder     Thyroid disease          Review of Systems  General ROS: negative for chills, fever  Cardiovascular ROS: no chest pain or dyspnea     Physical Examination  BP (!) 152/74 (BP Location: Left arm, Patient Position: Lying, BP Method: Automatic)  Pulse 85  Temp 98.3 °F (36.8 °C) (Oral)   Resp 18  Ht 5' 2" (1.575 m)  Wt 59.2 kg (130 lb 8.6 oz)  SpO2 (!) 94%  Breastfeeding? No  BMI 23.88 kg/m2  General appearance: alert, cooperative, no distress  HENT: Normocephalic, atraumatic, neck symmetrical, no nasal discharge   Eyes: conjunctivae/corneas clear, no icterus, EOM's intact  Lungs: resp non-labored  Heart: regular rate   Abdomen: soft, non-tender; ND, no rebound  Extremities: extremities symmetric; no clubbing, cyanosis, or edema  Neurologic: Alert and oriented X 3, MAEW    Labs:  Bili mildly elevated    Assessment:     This patient is a 85 y.o. female with:   1. Biliary Pancreatitis, secondary to Choledocholithiasis - s/p ERCP, with sphincterotomy/stone extraction. Improved clinically.   2. Abnormal LFT's - Improving, secondary to #1. Bili slowly trending down.  3. HTN, CHF, DM, CKD....    Plan:   - Cholecystectomy per Surgery      "

## 2017-05-05 NOTE — ASSESSMENT & PLAN NOTE
Pt with biliary pancreatitis, clinically improving with bowel rest and lipase trending down.  Continue supportive care and pain control. Continue monitoring of LFTS and lipase daily, appreciate GI and Surgery input. Pt transfused 1 unit of PRBC and Cardiology recommended to proceed with surgical intervention after Hgb>10.  Transfused 2 more units of blood. Continue Zosyn/Cipro for now given sepsis with bacteremia which will be discussed below.  Hgb now >10.  T Bili remains high and Surgery recommending ERCP prior to surgery.  ERCP on 5/3 with stone extraction.  Plan for lap betzaida today.  Patient obviously moderate to high risks of complications secondary to multiple medical issues and slight thrombocytopenia, but currently stable and would benefit from lap betzaida to avoid future complications.

## 2017-05-05 NOTE — BRIEF OP NOTE
[facility]  Surgery Department  Operative Note    SUMMARY     Date of Procedure: 5/5/2017     Procedure: Procedure(s) (LRB):  CHOLECYSTECTOMY-LAPAROSCOPIC (N/A)     Surgeon(s) and Role:     * Jalen Jacobo MD - Primary    Assisting Surgeon: None    Pre-Operative Diagnosis: Acute biliary pancreatitis [K85.10]    Post-Operative Diagnosis: Post-Op Diagnosis Codes:     * Acute biliary pancreatitis [K85.10]  Cirrhosis    Anesthesia: General    Technical Procedures Used: pt with contractures making positioning difficult.  Noncompliant abdominal wall, abdomen reached maximal distention at 1 liter of CO2.  Pt repositioned, redraped, finally able to complete laparoscopically.  200 cc of bilious ascites in abdomen at onset.  19 Fr danilo left in RUQ.    Description of the Findings of the Procedure: as above    Significant Surgical Tasks Conducted by the Assistant(s), if Applicable: n/a    Complications: No    Estimated Blood Loss (EBL): 25 ml           Implants: * No implants in log *    Specimens:   Specimen     None                  Condition: Good    Disposition: PACU - hemodynamically stable.    Attestation: I performed the procedure.

## 2017-05-05 NOTE — SUBJECTIVE & OBJECTIVE
Interval History: No issues overnight.    Review of Systems   Constitutional: Negative for chills and fever.   HENT: Negative for ear discharge and ear pain.    Eyes: Negative for pain and itching.   Respiratory: Negative for cough and choking.      Objective:     Vital Signs (Most Recent):  Temp: 98.3 °F (36.8 °C) (05/05/17 0459)  Pulse: 85 (05/05/17 0459)  Resp: 18 (05/05/17 0459)  BP: (!) 152/74 (05/05/17 0459)  SpO2: (!) 94 % (05/05/17 0459) Vital Signs (24h Range):  Temp:  [98.1 °F (36.7 °C)-98.6 °F (37 °C)] 98.3 °F (36.8 °C)  Pulse:  [63-88] 85  Resp:  [18-20] 18  SpO2:  [93 %-97 %] 94 %  BP: (137-160)/(65-74) 152/74     Weight: 59.2 kg (130 lb 8.6 oz)  Body mass index is 23.88 kg/(m^2).    Intake/Output Summary (Last 24 hours) at 05/05/17 0737  Last data filed at 05/05/17 0600   Gross per 24 hour   Intake              200 ml   Output                0 ml   Net              200 ml      Physical Exam   Constitutional: She is oriented to person, place, and time. She appears well-developed and well-nourished.   HENT:   Head: Normocephalic and atraumatic.   Eyes: EOM are normal. Pupils are equal, round, and reactive to light.   +scleral icterus   Neck: Normal range of motion. Neck supple.   Cardiovascular: Normal rate and regular rhythm.    Pulmonary/Chest: Effort normal and breath sounds normal.   Abdominal: Soft. Bowel sounds are normal.   Neurological: She is alert and oriented to person, place, and time.   Skin: Skin is warm and dry.       Significant Labs:   BMP:     Recent Labs  Lab 05/05/17 0440   GLU 80      K 3.7      CO2 21*   BUN 30*   CREATININE 2.3*   CALCIUM 8.7     CBC:     Recent Labs  Lab 05/04/17  0441 05/05/17 0440   WBC 6.19 6.40   HGB 11.4* 11.6*   HCT 32.0* 33.8*   PLT 90* 99*

## 2017-05-05 NOTE — NURSING
Patient to floor. VS stable. Dressing to BONG drain c/d/i. Patient c/o pain 10/10 in abdomen, PRN pain meds administered. Family by side. No acute distress noted.

## 2017-05-05 NOTE — OR NURSING
Patient resting with eyes closed, abdominal dressings, dry and intact.  BONG drain to right side of abdomen draining serosanguineous.

## 2017-05-05 NOTE — TRANSFER OF CARE
"Anesthesia Transfer of Care Note    Patient: Christal Goodson    Procedure(s) Performed: Procedure(s) (LRB):  CHOLECYSTECTOMY-LAPAROSCOPIC (N/A)    Patient location: PACU    Anesthesia Type: general    Transport from OR: Transported from OR intubated on 100% O2 by AMBU with adequate controlled ventilation. Transported from OR intubated on 100% O2 by AMBU with assisted ventilation    Post pain: adequate analgesia    Post assessment: no apparent anesthetic complications and tolerated procedure well    Post vital signs: stable    Level of consciousness: sedated    Nausea/Vomiting: no nausea/vomiting    Complications: none    Transfer of care protocol was followed      Last vitals:   Visit Vitals    BP (!) 98/53    Pulse 66    Temp 35.6 °C (96.1 °F) (Tympanic)    Resp 12    Ht 5' 2" (1.575 m)    Wt 59.2 kg (130 lb 8.6 oz)    SpO2 99%    Breastfeeding No    BMI 23.88 kg/m2     "

## 2017-05-05 NOTE — PROGRESS NOTES
[facility] Hospital Medicine  Progress Note    Patient Name: Christal Goodson  MRN: [id number]  Patient Class: IP- Inpatient   Admission Date: 4/28/2017  Length of Stay: 7 days  Attending Physician: Max Faria MD  Primary Care Provider: Juan Alberto Prieto MD        Subjective:     Principal Problem:Acute biliary pancreatitis    HPI:  Christal Goodson is a 85 y.o. female that (in part)  has a past medical history of Arthritis; Blood transfusion; CHF (congestive heart failure); Coronary artery disease; Diabetes mellitus; General anesthetics causing adverse effect in therapeutic use; Hyperlipidemia; Hypertension; Renal disorder; and Thyroid disease. Presents to [facility] complaining of abdominal pain as described below in detail.   Description of symptoms    Location:  Periumbilical  Onset:  Acute onset last night  Character:  Severe deep aching cramping pain  Frequency:  First episode  Duration:  Constant  Associated Symptoms:  Headache, nausea, vomiting, constipation, fever, cough  Radiation:  Radiates to epigastrium  Exacerbating factors:  Palpation, vomiting  Relieving factors:  Antiemetics given in the ED along with the medicine     In the emergency department routine laboratory studies were obtained followed by CT of the abdomen which demonstrated that the pancreas is mildly enlarged with peripancreatic inflammatory stranding in addition to gallstones I would prefer to see some of the later asked.  Lipase levels were elevated.  Right upper quadrant ultrasound obtained.  Concern for gallstone pancreatitis.       Hospital Course:  Ms. Goodson was admitted with biliary pancreatitis and sepsis with bacteremia with GNR. Pt was treated with Zosyn/Cipro, given IVF with good response in BP. Pt was treated with bowel rest, pain control and supportive care. GI and Surgery following, and lipase/LFTs improving. Repeat blood culture NGTD.  Plan for Lap betzaida,  but Cardiology recommending blood transfusion prior to surgery.  2 units of blood ordered.  ERCP with stone extraction prior to lap betzaiad.    Interval History: No issues overnight.    Review of Systems   Constitutional: Negative for chills and fever.   HENT: Negative for ear discharge and ear pain.    Eyes: Negative for pain and itching.   Respiratory: Negative for cough and choking.      Objective:     Vital Signs (Most Recent):  Temp: 98.3 °F (36.8 °C) (05/05/17 0459)  Pulse: 85 (05/05/17 0459)  Resp: 18 (05/05/17 0459)  BP: (!) 152/74 (05/05/17 0459)  SpO2: (!) 94 % (05/05/17 0459) Vital Signs (24h Range):  Temp:  [98.1 °F (36.7 °C)-98.6 °F (37 °C)] 98.3 °F (36.8 °C)  Pulse:  [63-88] 85  Resp:  [18-20] 18  SpO2:  [93 %-97 %] 94 %  BP: (137-160)/(65-74) 152/74     Weight: 59.2 kg (130 lb 8.6 oz)  Body mass index is 23.88 kg/(m^2).    Intake/Output Summary (Last 24 hours) at 05/05/17 0737  Last data filed at 05/05/17 0600   Gross per 24 hour   Intake              200 ml   Output                0 ml   Net              200 ml      Physical Exam   Constitutional: She is oriented to person, place, and time. She appears well-developed and well-nourished.   HENT:   Head: Normocephalic and atraumatic.   Eyes: EOM are normal. Pupils are equal, round, and reactive to light.   +scleral icterus   Neck: Normal range of motion. Neck supple.   Cardiovascular: Normal rate and regular rhythm.    Pulmonary/Chest: Effort normal and breath sounds normal.   Abdominal: Soft. Bowel sounds are normal.   Neurological: She is alert and oriented to person, place, and time.   Skin: Skin is warm and dry.       Significant Labs:   BMP:     Recent Labs  Lab 05/05/17 0440   GLU 80      K 3.7      CO2 21*   BUN 30*   CREATININE 2.3*   CALCIUM 8.7     CBC:     Recent Labs  Lab 05/04/17  0441 05/05/17 0440   WBC 6.19 6.40   HGB 11.4* 11.6*   HCT 32.0* 33.8*   PLT 90* 99*           Assessment/Plan:      * Acute biliary pancreatitis  Pt  with biliary pancreatitis, clinically improving with bowel rest and lipase trending down.  Continue supportive care and pain control. Continue monitoring of LFTS and lipase daily, appreciate GI and Surgery input. Pt transfused 1 unit of PRBC and Cardiology recommended to proceed with surgical intervention after Hgb>10.  Transfused 2 more units of blood. Continue Zosyn/Cipro for now given sepsis with bacteremia which will be discussed below.  Hgb now >10.  T Bili remains high and Surgery recommending ERCP prior to surgery.  ERCP on 5/3 with stone extraction.  Plan for lap betzaida today.  Patient obviously moderate to high risks of complications secondary to multiple medical issues and slight thrombocytopenia, but currently stable and would benefit from lap betzaida to avoid future complications.        Hypothyroidism  Clinically euthyroid, continue home regimen.       Hypercholesteremia  Given abnormal LFTs, will hold statin for now.          CAD (coronary artery disease)  Pt is without chest pain and initially blood pressures were low, restarted labetolol and hydralazine.        Essential hypertension, benign  Continue current regimen.      Chronic kidney disease, stage III (moderate)  ARF   Assessment and Plan:  Pt with worsening creatinine likely due to sepsis with hypotension, continue IVF and will repeat labs. Baseline creatinine approximately 1.7, monitor.        Pulmonary hypertension  No acute issues. Monitor.        Diabetes mellitus type 2 in nonobese  Closely monitor blood glucose and make adjustments to insulin regimen as necessary. HgbA1C <4.0.          Sepsis  Bacteremia due to Klebsiella pneumoniae  Assessment and Plan:  Pt met criteria for sepsis with fever, and hypotension. Pt with likely biliary source of bacteremia with Klebsiella pneumoniae. Continue Zosyn/Cipro for now and repeat blood cultures done NGTD. Will plan to de-escalate to Cipro alone post-op. Pt will need to continue Cipro for 2 weeks from  date of negative blood culture.      Anemia of chronic disease  H/H stable.  Transfuse to keep Hbg>10 prior to surgery.      Urinary tract infection without hematuria  Due to E. Coli and Proteus mirabilis, pan-sensitive. Continue current abx regimen with plan for de-escalation to Cipro alone post-op.      Weakness        VTE Risk Mitigation         Ordered     Medium Risk of VTE  Once      04/28/17 2228     Place sequential compression device  Until discontinued      04/28/17 2228          Max Faria MD  Department of Hospital Medicine   [facility]

## 2017-05-06 LAB
ALBUMIN SERPL BCP-MCNC: 2.5 G/DL
ALP SERPL-CCNC: 57 U/L
ALT SERPL W/O P-5'-P-CCNC: 20 U/L
ANION GAP SERPL CALC-SCNC: 10 MMOL/L
AST SERPL-CCNC: 31 U/L
BASOPHILS # BLD AUTO: 0.01 K/UL
BASOPHILS NFR BLD: 0.1 %
BILIRUB SERPL-MCNC: 1.8 MG/DL
BUN SERPL-MCNC: 28 MG/DL
CALCIUM SERPL-MCNC: 8.6 MG/DL
CHLORIDE SERPL-SCNC: 106 MMOL/L
CO2 SERPL-SCNC: 22 MMOL/L
CREAT SERPL-MCNC: 2.1 MG/DL
DIFFERENTIAL METHOD: ABNORMAL
EOSINOPHIL # BLD AUTO: 0 K/UL
EOSINOPHIL NFR BLD: 0.2 %
ERYTHROCYTE [DISTWIDTH] IN BLOOD BY AUTOMATED COUNT: 16.1 %
EST. GFR  (AFRICAN AMERICAN): 24 ML/MIN/1.73 M^2
EST. GFR  (NON AFRICAN AMERICAN): 21 ML/MIN/1.73 M^2
GLUCOSE SERPL-MCNC: 50 MG/DL
HCT VFR BLD AUTO: 33.3 %
HGB BLD-MCNC: 11.5 G/DL
LYMPHOCYTES # BLD AUTO: 0.4 K/UL
LYMPHOCYTES NFR BLD: 3 %
MCH RBC QN AUTO: 30 PG
MCHC RBC AUTO-ENTMCNC: 34.5 %
MCV RBC AUTO: 87 FL
MONOCYTES # BLD AUTO: 0.7 K/UL
MONOCYTES NFR BLD: 5.1 %
NEUTROPHILS # BLD AUTO: 11.7 K/UL
NEUTROPHILS NFR BLD: 91.3 %
PLATELET # BLD AUTO: 127 K/UL
PMV BLD AUTO: 10.6 FL
POCT GLUCOSE: 56 MG/DL (ref 70–110)
POCT GLUCOSE: 87 MG/DL (ref 70–110)
POCT GLUCOSE: 91 MG/DL (ref 70–110)
POTASSIUM SERPL-SCNC: 3.9 MMOL/L
PROT SERPL-MCNC: 5.5 G/DL
RBC # BLD AUTO: 3.83 M/UL
SODIUM SERPL-SCNC: 138 MMOL/L
WBC # BLD AUTO: 12.8 K/UL

## 2017-05-06 PROCEDURE — 25000003 PHARM REV CODE 250: Performed by: HOSPITALIST

## 2017-05-06 PROCEDURE — 63600175 PHARM REV CODE 636 W HCPCS: Performed by: HOSPITALIST

## 2017-05-06 PROCEDURE — 94640 AIRWAY INHALATION TREATMENT: CPT

## 2017-05-06 PROCEDURE — 85025 COMPLETE CBC W/AUTO DIFF WBC: CPT

## 2017-05-06 PROCEDURE — 25000003 PHARM REV CODE 250: Performed by: EMERGENCY MEDICINE

## 2017-05-06 PROCEDURE — 94761 N-INVAS EAR/PLS OXIMETRY MLT: CPT

## 2017-05-06 PROCEDURE — 11000001 HC ACUTE MED/SURG PRIVATE ROOM

## 2017-05-06 PROCEDURE — 80074 ACUTE HEPATITIS PANEL: CPT

## 2017-05-06 PROCEDURE — 25000242 PHARM REV CODE 250 ALT 637 W/ HCPCS: Performed by: HOSPITALIST

## 2017-05-06 PROCEDURE — 99900035 HC TECH TIME PER 15 MIN (STAT)

## 2017-05-06 PROCEDURE — 80053 COMPREHEN METABOLIC PANEL: CPT

## 2017-05-06 PROCEDURE — 36415 COLL VENOUS BLD VENIPUNCTURE: CPT

## 2017-05-06 RX ORDER — FUROSEMIDE 20 MG/1
20 TABLET ORAL DAILY
Status: DISCONTINUED | OUTPATIENT
Start: 2017-05-06 | End: 2017-05-08 | Stop reason: HOSPADM

## 2017-05-06 RX ORDER — DEXTROSE MONOHYDRATE AND SODIUM CHLORIDE 5; .45 G/100ML; G/100ML
INJECTION, SOLUTION INTRAVENOUS CONTINUOUS
Status: DISCONTINUED | OUTPATIENT
Start: 2017-05-06 | End: 2017-05-06

## 2017-05-06 RX ADMIN — LABETALOL HYDROCHLORIDE 300 MG: 100 TABLET, FILM COATED ORAL at 08:05

## 2017-05-06 RX ADMIN — OXYCODONE HYDROCHLORIDE AND ACETAMINOPHEN 1 TABLET: 5; 325 TABLET ORAL at 08:05

## 2017-05-06 RX ADMIN — IPRATROPIUM BROMIDE AND ALBUTEROL SULFATE 3 ML: .5; 3 SOLUTION RESPIRATORY (INHALATION) at 08:05

## 2017-05-06 RX ADMIN — FUROSEMIDE 20 MG: 20 TABLET ORAL at 12:05

## 2017-05-06 RX ADMIN — PANTOPRAZOLE SODIUM 40 MG: 40 TABLET, DELAYED RELEASE ORAL at 09:05

## 2017-05-06 RX ADMIN — CIPROFLOXACIN 400 MG: 2 INJECTION, SOLUTION INTRAVENOUS at 10:05

## 2017-05-06 RX ADMIN — FERROUS SULFATE TAB EC 325 MG (65 MG FE EQUIVALENT) 325 MG: 325 (65 FE) TABLET DELAYED RESPONSE at 08:05

## 2017-05-06 RX ADMIN — PIPERACILLIN, TAZOBACTAM 4.5 G: 4; .5 INJECTION, POWDER, LYOPHILIZED, FOR SOLUTION INTRAVENOUS at 05:05

## 2017-05-06 RX ADMIN — HYDRALAZINE HYDROCHLORIDE 50 MG: 25 TABLET ORAL at 01:05

## 2017-05-06 RX ADMIN — PIPERACILLIN, TAZOBACTAM 4.5 G: 4; .5 INJECTION, POWDER, LYOPHILIZED, FOR SOLUTION INTRAVENOUS at 04:05

## 2017-05-06 RX ADMIN — FUROSEMIDE 20 MG: 10 INJECTION, SOLUTION INTRAMUSCULAR; INTRAVENOUS at 08:05

## 2017-05-06 RX ADMIN — LEVOTHYROXINE SODIUM 50 MCG: 50 TABLET ORAL at 06:05

## 2017-05-06 RX ADMIN — ALLOPURINOL 100 MG: 100 TABLET ORAL at 08:05

## 2017-05-06 RX ADMIN — LABETALOL HYDROCHLORIDE 300 MG: 100 TABLET, FILM COATED ORAL at 09:05

## 2017-05-06 RX ADMIN — HYDRALAZINE HYDROCHLORIDE 50 MG: 25 TABLET ORAL at 09:05

## 2017-05-06 RX ADMIN — PANTOPRAZOLE SODIUM 40 MG: 40 TABLET, DELAYED RELEASE ORAL at 08:05

## 2017-05-06 RX ADMIN — OXYCODONE HYDROCHLORIDE AND ACETAMINOPHEN 1 TABLET: 5; 325 TABLET ORAL at 01:05

## 2017-05-06 NOTE — PROGRESS NOTES
Chief Complaint / Reason for Consult: Abnormal LFT's, Abd. Pain    No acute events overnight    ROS: No CP, SOB, F/C    Patient Vitals for the past 24 hrs:   BP Temp Temp src Pulse Resp SpO2   05/06/17 0409 (!) 142/69 97.8 °F (36.6 °C) Oral 75 20 98 %   05/06/17 0004 139/63 98.1 °F (36.7 °C) Oral 84 18 (!) 94 %   05/05/17 2000 - - - 96 - 96 %   05/05/17 1920 139/65 97.4 °F (36.3 °C) Oral 74 18 96 %   05/05/17 1700 (!) 121/56 - - 62 - 97 %   05/05/17 1627 (!) 132/58 - - (!) 59 - (!) 94 %   05/05/17 1600 (!) 138/56 - - (!) 58 15 95 %   05/05/17 1545 (!) 138/56 - - (!) 54 15 95 %   05/05/17 1530 (!) 129/52 - - (!) 59 15 95 %   05/05/17 1514 (!) 98/53 96.1 °F (35.6 °C) Tympanic 66 12 99 %   05/05/17 0848 (!) 205/80 98.4 °F (36.9 °C) Oral 67 18 96 %   05/05/17 0845 - - - - - 97 %       Physical Exam:  Gen - Well developed, elderly, no apparent distress  HEENT - Anicteric  CV - S1, S2, no murmurs/rubs  Lungs - CTA-B, normal excursion  Abd - Soft, NT, ND, normal BS's, no HSM, + Drain.  Ext - No c/c/e  Neuro - No asterixis    Labs:    Recent Labs  Lab 05/06/17  0359   WBC 12.80*   RBC 3.83*   HGB 11.5*   HCT 33.3*   *   MCV 87   MCH 30.0   MCHC 34.5       Recent Labs  Lab 05/06/17  0359   CALCIUM 8.6*   PROT 5.5*      K 3.9   CO2 22*      BUN 28*   CREATININE 2.1*   ALKPHOS 57   ALT 20   AST 31   BILITOT 1.8*       Imaging:  Reviewed ERCP    Assessment:  This patient is a 85 y.o. female with:   1. Biliary Pancreatitis, secondary to Choledocholithiasis - s/p ERCP, with sphincterotomy/stone extraction. S/p Lap Latrice.  Improved.  2. Abnormal LFT's - Improving, secondary to #1.  3. Cirrhosis, per Op Note - Possibly related to NAFLD.    4. HTN, CHF, DM, CKD....    Recommendations:  1. Monitor Abd. Exam.  2. Post-op care.  3. Diet per surgery.  4. Check Viral Hep serologies.  5. Further liver evaluation as outpatient.  6. Will follow briefly.

## 2017-05-06 NOTE — PLAN OF CARE
Problem: Patient Care Overview  Goal: Plan of Care Review  Outcome: Ongoing (interventions implemented as appropriate)        05/06/17 8259   Coping/Psychosocial   Plan Of Care Reviewed With patient   Patient AAOX4.  VS stable. Dressing to BONG drain; draining serosangiounous fluid.  Patient c/o pain 10/10 in abdomen, PRN pain meds administered. Pain relieved after given oral pain meds.  Family by side. No acute distress noted. Will continue to monitor.

## 2017-05-06 NOTE — ASSESSMENT & PLAN NOTE
Pt with biliary pancreatitis, clinically improving with bowel rest and lipase trending down.  Continue supportive care and pain control. Continue monitoring of LFTS and lipase daily, appreciate GI and Surgery input. Pt transfused 1 unit of PRBC and Cardiology recommended to proceed with surgical intervention after Hgb>10.  Transfused 2 more units of blood. Continue Zosyn/Cipro for now given sepsis with bacteremia which will be discussed below.  Hgb now >10.  T Bili remains high and Surgery recommending ERCP prior to surgery.  ERCP on 5/3 with stone extraction.  Lap betzaida on 5/5.  BONG drain left in place.  Advance diet.

## 2017-05-06 NOTE — SUBJECTIVE & OBJECTIVE
Interval History: Pain well controlled.    Review of Systems   Constitutional: Negative for chills and fever.   HENT: Negative for ear discharge and ear pain.    Eyes: Negative for pain and itching.   Respiratory: Negative for cough and choking.      Objective:     Vital Signs (Most Recent):  Temp: 97.9 °F (36.6 °C) (05/06/17 1200)  Pulse: 72 (05/06/17 1200)  Resp: 19 (05/06/17 1200)  BP: (!) 150/65 (05/06/17 1200)  SpO2: 96 % (05/06/17 1200) Vital Signs (24h Range):  Temp:  [96.1 °F (35.6 °C)-98.1 °F (36.7 °C)] 97.9 °F (36.6 °C)  Pulse:  [54-96] 72  Resp:  [12-20] 19  SpO2:  [93 %-99 %] 96 %  BP: ()/(52-71) 150/65     Weight: 59.2 kg (130 lb 8.6 oz)  Body mass index is 23.88 kg/(m^2).    Intake/Output Summary (Last 24 hours) at 05/06/17 1239  Last data filed at 05/06/17 0845   Gross per 24 hour   Intake             1790 ml   Output              700 ml   Net             1090 ml      Physical Exam   Constitutional: She is oriented to person, place, and time. She appears well-developed and well-nourished.   HENT:   Head: Normocephalic and atraumatic.   Eyes: EOM are normal. Pupils are equal, round, and reactive to light.   +scleral icterus   Neck: Normal range of motion. Neck supple.   Cardiovascular: Normal rate and regular rhythm.    Pulmonary/Chest: Effort normal and breath sounds normal.   Abdominal: Soft. Bowel sounds are normal.   Neurological: She is alert and oriented to person, place, and time.   Skin: Skin is warm and dry.       Significant Labs:   BMP:     Recent Labs  Lab 05/06/17  0359   GLU 50*      K 3.9      CO2 22*   BUN 28*   CREATININE 2.1*   CALCIUM 8.6*     CBC:     Recent Labs  Lab 05/05/17  0440 05/06/17  0359   WBC 6.40 12.80*   HGB 11.6* 11.5*   HCT 33.8* 33.3*   PLT 99* 127*

## 2017-05-06 NOTE — PROGRESS NOTES
[facility] Medicine  Progress Note    Patient Name: Christal Goodson  MRN: [id number]  Patient Class: IP- Inpatient   Admission Date: 4/28/2017  Length of Stay: 8 days  Attending Physician: Max Faria MD  Primary Care Provider: Juan Alberto Prieto MD        Subjective:     Principal Problem:Acute biliary pancreatitis    HPI:  Christal Goodson is a 85 y.o. female that (in part)  has a past medical history of Arthritis; Blood transfusion; CHF (congestive heart failure); Coronary artery disease; Diabetes mellitus; General anesthetics causing adverse effect in therapeutic use; Hyperlipidemia; Hypertension; Renal disorder; and Thyroid disease. Presents to [facility] Emergency Department complaining of abdominal pain as described below in detail.   Description of symptoms    Location:  Periumbilical  Onset:  Acute onset last night  Character:  Severe deep aching cramping pain  Frequency:  First episode  Duration:  Constant  Associated Symptoms:  Headache, nausea, vomiting, constipation, fever, cough  Radiation:  Radiates to epigastrium  Exacerbating factors:  Palpation, vomiting  Relieving factors:  Antiemetics given in the ED along with the medicine     In the emergency department routine laboratory studies were obtained followed by CT of the abdomen which demonstrated that the pancreas is mildly enlarged with peripancreatic inflammatory stranding in addition to gallstones I would prefer to see some of the later asked.  Lipase levels were elevated.  Right upper quadrant ultrasound obtained.  Concern for gallstone pancreatitis.       Hospital Course:  Ms. Goodson was admitted with biliary pancreatitis and sepsis with bacteremia with GNR. Pt was treated with Zosyn/Cipro, given IVF with good response in BP. Pt was treated with bowel rest, pain control and supportive care. GI and Surgery following, and lipase/LFTs improving. Repeat blood culture NGTD.  Plan for Lap betzaida,  but Cardiology recommending blood transfusion prior to surgery.  2 units of blood ordered.  ERCP with stone extraction prior to lap betzaida.  S/P lap betzaida.      Interval History: Pain well controlled.    Review of Systems   Constitutional: Negative for chills and fever.   HENT: Negative for ear discharge and ear pain.    Eyes: Negative for pain and itching.   Respiratory: Negative for cough and choking.      Objective:     Vital Signs (Most Recent):  Temp: 97.9 °F (36.6 °C) (05/06/17 1200)  Pulse: 72 (05/06/17 1200)  Resp: 19 (05/06/17 1200)  BP: (!) 150/65 (05/06/17 1200)  SpO2: 96 % (05/06/17 1200) Vital Signs (24h Range):  Temp:  [96.1 °F (35.6 °C)-98.1 °F (36.7 °C)] 97.9 °F (36.6 °C)  Pulse:  [54-96] 72  Resp:  [12-20] 19  SpO2:  [93 %-99 %] 96 %  BP: ()/(52-71) 150/65     Weight: 59.2 kg (130 lb 8.6 oz)  Body mass index is 23.88 kg/(m^2).    Intake/Output Summary (Last 24 hours) at 05/06/17 1239  Last data filed at 05/06/17 0845   Gross per 24 hour   Intake             1790 ml   Output              700 ml   Net             1090 ml      Physical Exam   Constitutional: She is oriented to person, place, and time. She appears well-developed and well-nourished.   HENT:   Head: Normocephalic and atraumatic.   Eyes: EOM are normal. Pupils are equal, round, and reactive to light.   +scleral icterus   Neck: Normal range of motion. Neck supple.   Cardiovascular: Normal rate and regular rhythm.    Pulmonary/Chest: Effort normal and breath sounds normal.   Abdominal: Soft. Bowel sounds are normal.   Neurological: She is alert and oriented to person, place, and time.   Skin: Skin is warm and dry.       Significant Labs:   BMP:     Recent Labs  Lab 05/06/17  0359   GLU 50*      K 3.9      CO2 22*   BUN 28*   CREATININE 2.1*   CALCIUM 8.6*     CBC:     Recent Labs  Lab 05/05/17  0440 05/06/17  0359   WBC 6.40 12.80*   HGB 11.6* 11.5*   HCT 33.8* 33.3*   PLT 99* 127*           Assessment/Plan:      * Acute  biliary pancreatitis  Pt with biliary pancreatitis, clinically improving with bowel rest and lipase trending down.  Continue supportive care and pain control. Continue monitoring of LFTS and lipase daily, appreciate GI and Surgery input. Pt transfused 1 unit of PRBC and Cardiology recommended to proceed with surgical intervention after Hgb>10.  Transfused 2 more units of blood. Continue Zosyn/Cipro for now given sepsis with bacteremia which will be discussed below.  Hgb now >10.  T Bili remains high and Surgery recommending ERCP prior to surgery.  ERCP on 5/3 with stone extraction.  Lap betzaida on 5/5.  BONG drain left in place.  Advance diet.        Hypothyroidism  Clinically euthyroid, continue home regimen.       Hypercholesteremia  Given abnormal LFTs, will hold statin for now.          CAD (coronary artery disease)  Pt is without chest pain and initially blood pressures were low, restarted labetolol and hydralazine.        Essential hypertension, benign  Continue current regimen.      Chronic kidney disease, stage III (moderate)  ARF   Assessment and Plan:  Pt with worsening creatinine likely due to sepsis with hypotension, continue IVF and will repeat labs. Baseline creatinine approximately 1.7, monitor.        Pulmonary hypertension  No acute issues. Monitor.        Diabetes mellitus type 2 in nonobese  HgbA1C <4.0.  Will stop accuchecks and sliding scale.  Regular diet.          Sepsis  Bacteremia due to Klebsiella pneumoniae  Assessment and Plan:  Pt met criteria for sepsis with fever, and hypotension. Pt with likely biliary source of bacteremia with Klebsiella pneumoniae. Continue Zosyn/Cipro for now and repeat blood cultures done NGTD. Will plan to de-escalate to Cipro alone post-op. Pt will need to continue Cipro for 2 weeks from date of negative blood culture.      Anemia of chronic disease  H/H stable.        Urinary tract infection without hematuria  Due to E. Coli and Proteus mirabilis, pan-sensitive.  Continue current abx regimen with plan for de-escalation to Cipro alone post-op.      Weakness  At baseline, she can sit up at bedside.  Will get PT/OT evaluation.      VTE Risk Mitigation         Ordered     Medium Risk of VTE  Once      04/28/17 2228     Place sequential compression device  Until discontinued      04/28/17 2228          Max Faria MD  Department of Hospital Medicine   [facility]

## 2017-05-07 LAB
ALBUMIN SERPL BCP-MCNC: 2.5 G/DL
ALP SERPL-CCNC: 54 U/L
ALT SERPL W/O P-5'-P-CCNC: 15 U/L
ANION GAP SERPL CALC-SCNC: 11 MMOL/L
AST SERPL-CCNC: 20 U/L
BASOPHILS # BLD AUTO: 0.02 K/UL
BASOPHILS NFR BLD: 0.2 %
BILIRUB SERPL-MCNC: 1.7 MG/DL
BUN SERPL-MCNC: 28 MG/DL
CALCIUM SERPL-MCNC: 8.2 MG/DL
CHLORIDE SERPL-SCNC: 105 MMOL/L
CO2 SERPL-SCNC: 21 MMOL/L
CREAT SERPL-MCNC: 2.2 MG/DL
DIFFERENTIAL METHOD: ABNORMAL
EOSINOPHIL # BLD AUTO: 0.1 K/UL
EOSINOPHIL NFR BLD: 1 %
ERYTHROCYTE [DISTWIDTH] IN BLOOD BY AUTOMATED COUNT: 16 %
EST. GFR  (AFRICAN AMERICAN): 23 ML/MIN/1.73 M^2
EST. GFR  (NON AFRICAN AMERICAN): 20 ML/MIN/1.73 M^2
GLUCOSE SERPL-MCNC: 83 MG/DL
HCT VFR BLD AUTO: 31.7 %
HGB BLD-MCNC: 10.9 G/DL
LYMPHOCYTES # BLD AUTO: 0.6 K/UL
LYMPHOCYTES NFR BLD: 5.5 %
MCH RBC QN AUTO: 29.5 PG
MCHC RBC AUTO-ENTMCNC: 34.4 %
MCV RBC AUTO: 86 FL
MONOCYTES # BLD AUTO: 0.6 K/UL
MONOCYTES NFR BLD: 5.5 %
NEUTROPHILS # BLD AUTO: 10.1 K/UL
NEUTROPHILS NFR BLD: 87.8 %
PLATELET # BLD AUTO: 113 K/UL
PMV BLD AUTO: 9.9 FL
POCT GLUCOSE: 120 MG/DL (ref 70–110)
POCT GLUCOSE: 128 MG/DL (ref 70–110)
POCT GLUCOSE: 86 MG/DL (ref 70–110)
POTASSIUM SERPL-SCNC: 3.7 MMOL/L
PROT SERPL-MCNC: 5.1 G/DL
RBC # BLD AUTO: 3.69 M/UL
SODIUM SERPL-SCNC: 137 MMOL/L
WBC # BLD AUTO: 11.56 K/UL

## 2017-05-07 PROCEDURE — 97162 PT EVAL MOD COMPLEX 30 MIN: CPT

## 2017-05-07 PROCEDURE — 25000003 PHARM REV CODE 250: Performed by: HOSPITALIST

## 2017-05-07 PROCEDURE — 25000003 PHARM REV CODE 250: Performed by: EMERGENCY MEDICINE

## 2017-05-07 PROCEDURE — 36415 COLL VENOUS BLD VENIPUNCTURE: CPT

## 2017-05-07 PROCEDURE — 94799 UNLISTED PULMONARY SVC/PX: CPT

## 2017-05-07 PROCEDURE — 63600175 PHARM REV CODE 636 W HCPCS: Performed by: HOSPITALIST

## 2017-05-07 PROCEDURE — 80053 COMPREHEN METABOLIC PANEL: CPT

## 2017-05-07 PROCEDURE — 11000001 HC ACUTE MED/SURG PRIVATE ROOM

## 2017-05-07 PROCEDURE — 85025 COMPLETE CBC W/AUTO DIFF WBC: CPT

## 2017-05-07 RX ADMIN — LABETALOL HYDROCHLORIDE 300 MG: 100 TABLET, FILM COATED ORAL at 09:05

## 2017-05-07 RX ADMIN — LEVOTHYROXINE SODIUM 50 MCG: 50 TABLET ORAL at 06:05

## 2017-05-07 RX ADMIN — HYDRALAZINE HYDROCHLORIDE 50 MG: 25 TABLET ORAL at 09:05

## 2017-05-07 RX ADMIN — PANTOPRAZOLE SODIUM 40 MG: 40 TABLET, DELAYED RELEASE ORAL at 09:05

## 2017-05-07 RX ADMIN — PIPERACILLIN, TAZOBACTAM 4.5 G: 4; .5 INJECTION, POWDER, LYOPHILIZED, FOR SOLUTION INTRAVENOUS at 05:05

## 2017-05-07 RX ADMIN — FUROSEMIDE 20 MG: 20 TABLET ORAL at 09:05

## 2017-05-07 RX ADMIN — HYDRALAZINE HYDROCHLORIDE 50 MG: 25 TABLET ORAL at 02:05

## 2017-05-07 RX ADMIN — CYCLOBENZAPRINE HYDROCHLORIDE 5 MG: 5 TABLET, FILM COATED ORAL at 11:05

## 2017-05-07 RX ADMIN — CIPROFLOXACIN 400 MG: 2 INJECTION, SOLUTION INTRAVENOUS at 09:05

## 2017-05-07 RX ADMIN — ALLOPURINOL 100 MG: 100 TABLET ORAL at 09:05

## 2017-05-07 RX ADMIN — PIPERACILLIN, TAZOBACTAM 4.5 G: 4; .5 INJECTION, POWDER, LYOPHILIZED, FOR SOLUTION INTRAVENOUS at 06:05

## 2017-05-07 RX ADMIN — HYDRALAZINE HYDROCHLORIDE 50 MG: 25 TABLET ORAL at 06:05

## 2017-05-07 RX ADMIN — FERROUS SULFATE TAB EC 325 MG (65 MG FE EQUIVALENT) 325 MG: 325 (65 FE) TABLET DELAYED RESPONSE at 09:05

## 2017-05-07 NOTE — PLAN OF CARE
Problem: Patient Care Overview  Goal: Plan of Care Review  Patient sats 95% on RA/ not in any distress

## 2017-05-07 NOTE — ASSESSMENT & PLAN NOTE
Pt with biliary pancreatitis, clinically improving with bowel rest and lipase trending down.  Continue supportive care and pain control. Continue monitoring of LFTS and lipase daily, appreciate GI and Surgery input. Pt transfused 1 unit of PRBC and Cardiology recommended to proceed with surgical intervention after Hgb>10.  Transfused 2 more units of blood. Continue Zosyn/Cipro for now given sepsis with bacteremia which will be discussed below.  Hgb now >10.  T Bili remains high and Surgery recommending ERCP prior to surgery.  ERCP on 5/3 with stone extraction.  Lap betzaida on 5/5.  BONG drain left in place.  Tolerating diet.  PT/OT evaluation.  Probably home tomorrow.

## 2017-05-07 NOTE — SUBJECTIVE & OBJECTIVE
Interval History: Doing well.  Tolerating diet.    Review of Systems   Constitutional: Negative for chills and fever.   HENT: Negative for ear discharge and ear pain.    Eyes: Negative for pain and itching.   Respiratory: Negative for cough and choking.      Objective:     Vital Signs (Most Recent):  Temp: 98.3 °F (36.8 °C) (05/07/17 0746)  Pulse: 79 (05/07/17 0746)  Resp: 19 (05/07/17 0746)  BP: (!) 149/66 (05/07/17 0746)  SpO2: 98 % (05/07/17 0823) Vital Signs (24h Range):  Temp:  [97.9 °F (36.6 °C)-98.8 °F (37.1 °C)] 98.3 °F (36.8 °C)  Pulse:  [72-84] 79  Resp:  [18-19] 19  SpO2:  [93 %-99 %] 98 %  BP: (117-153)/(59-70) 149/66     Weight: 59.2 kg (130 lb 8.6 oz)  Body mass index is 23.88 kg/(m^2).    Intake/Output Summary (Last 24 hours) at 05/07/17 0955  Last data filed at 05/07/17 0100   Gross per 24 hour   Intake              780 ml   Output              300 ml   Net              480 ml      Physical Exam   Constitutional: She is oriented to person, place, and time. She appears well-developed and well-nourished.   HENT:   Head: Normocephalic and atraumatic.   Eyes: EOM are normal. Pupils are equal, round, and reactive to light.   +scleral icterus   Neck: Normal range of motion. Neck supple.   Cardiovascular: Normal rate and regular rhythm.    Pulmonary/Chest: Effort normal and breath sounds normal.   Abdominal: Soft. Bowel sounds are normal.   Neurological: She is alert and oriented to person, place, and time.   Skin: Skin is warm and dry.       Significant Labs:   BMP:     Recent Labs  Lab 05/07/17  0431   GLU 83      K 3.7      CO2 21*   BUN 28*   CREATININE 2.2*   CALCIUM 8.2*     CBC:     Recent Labs  Lab 05/06/17  0359 05/07/17  0431   WBC 12.80* 11.56   HGB 11.5* 10.9*   HCT 33.3* 31.7*   * 113*

## 2017-05-07 NOTE — PROGRESS NOTES
Met with pt and dtr Jose Eduardo Goodson at pt's bedside. Pt is requesting a rustam lift for inside and can be used outside to get in her car. She has missed doctor appointments because no one is able to lift her. Pt states that she cannot walk. Pt is also requesting home health.    SW waiting on PT recommendations.  Orders are in for rustam lift.

## 2017-05-07 NOTE — PT/OT/SLP EVAL
"Physical Therapy  Evaluation    Christal Goodson   MRN: [id number]   Admitting Diagnosis: Acute biliary pancreatitis    PT Received On: 17  PT Start Time: 1530     PT Stop Time: 1543    PT Total Time (min): 13 min       Billable Minutes:  Evaluation 13    Diagnosis: Acute biliary pancreatitis      Past Medical History:   Diagnosis Date    Arthritis     Blood transfusion     CHF (congestive heart failure)     Coronary artery disease     Diabetes mellitus     General anesthetics causing adverse effect in therapeutic use     "woke up as they were putting me on the table"    Hyperlipidemia     Hypertension     Renal disorder     Thyroid disease       Past Surgical History:   Procedure Laterality Date    CATARACT EXTRACTION EXTRACAPSULAR W/ INTRAOCULAR LENS IMPLANTATION  2012    right eye    CORONARY ANGIOPLASTY WITH STENT PLACEMENT      JOINT REPLACEMENT      Left total hip replacement in     TONSILLECTOMY         Referring physician: Dr. Max Faria  Date referred to PT: 17    General Precautions: Standard, fall      Braces:  Daughter reports pt has a (L) hip brace at home, however, has not worn it since fitted.            Patient History:  Lives With: child(rodo), adult (daughter)  Home Accessibility:  (lift)  Equipment Currently Used at Home: rollator, bedside commode, shower chair (scooter)  DME owned (not currently used): none    Previous Level of Function:  Ambulation Skills: unable to perform  Transfer Skills: needs device and assist  ADL Skills: unable to perform    Subjective:  Communicated with nurse prior to session.    Chief Complaint: Pain (L) hip  Patient goals: To be able to walk.    Pain Ratin/10 (Pain with movement LLE, pt guarding and resists)                    Objective:   Patient found with: soriano catheter     Cognitive Exam:  Oriented to: Person, Place and Situation    Follows Commands/attention: Follows one-step commands  Communication: clear/fluent  Safety " awareness/insight to disability: intact    Physical Exam:      Skin integrity: pt reports pressure ulcer that's healing.  Edema: None noted     Sensation:   Intact    Upper Extremity Range of Motion:    Lower Extremity Range of Motion:  Right Lower Extremity: decreased  Left Lower Extremity: decreased    Lower Extremity Strength:  Right Lower Extremity: decreased  Left Lower Extremity: decreased     Fine motor coordination: NT      Gross motor coordination: NT    Functional Mobility:  Bed Mobility:  Rolling/Turning to Left: Total assistance  Rolling/Turning Right: Total assistance    Transfers: NT       Gait: NT       Stairs: NT      Balance:   Static Sit: NT  Dynamic Sit: NT  Static Stand: NT  Dynamic stand: NT    Therapeutic Activities and Exercises:  Attempted ROM exs however, pt resistant due c/o pain.    AM-PAC 6 CLICK MOBILITY  How much help from another person does this patient currently need?   1 = Unable, Total/Dependent Assistance  2 = A lot, Maximum/Moderate Assistance  3 = A little, Minimum/Contact Guard/Supervision  4 = None, Modified Ashe/Independent    Turning over in bed (including adjusting bedclothes, sheets and blankets)?: 1  Sitting down on and standing up from a chair with arms (e.g., wheelchair, bedside commode, etc.): 2  Moving from lying on back to sitting on the side of the bed?: 2  Moving to and from a bed to a chair (including a wheelchair)?: 2  Need to walk in hospital room?: 1  Climbing 3-5 steps with a railing?: 1  Total Score: 9     AM-PAC Raw Score CMS G-Code Modifier Level of Impairment Assistance   6 % Total / Unable   7 - 9 CM 80 - 100% Maximal Assist   10 - 14 CL 60 - 80% Moderate Assist   15 - 19 CK 40 - 60% Moderate Assist   20 - 22 CJ 20 - 40% Minimal Assist   23 CI 1-20% SBA / CGA   24 CH 0% Independent/ Mod I     Patient left right sidelying with all lines intact, call button in reach, daughter present and pillow between knees.    Assessment:   Christal HERNANDEZ  Kellee is a 85 y.o. female with a medical diagnosis of Acute biliary pancreatitis and presents with decreased mobility.    Rehab identified problem list/impairments: Rehab identified problem list/impairments: weakness, impaired functional mobilty, gait instability, decreased lower extremity function, impaired balance, pain, decreased ROM    Rehab potential is poor.    Activity tolerance: Poor    Discharge recommendations: Discharge Facility/Level Of Care Needs: home with home health     Barriers to discharge:  None    Equipment recommendations: Equipment Needed After Discharge: lift device     GOALS:   Physical Therapy Goals     Not on file      Multidisciplinary Problems (Resolved)        Problem: Physical Therapy Goal    Goal Priority Disciplines Outcome Goal Variances Interventions   Physical Therapy Goal   (Resolved)     PT/OT, PT Outcome(s) achieved               PLAN:    Patient to be seen  (Eval only; pt bedbound/TA/non-ambulatory since 11/16)   Plan of Care expires: 05/07/17  Plan of Care reviewed with: patient, daughter (Daughter reports (L) hip not stable and non-operable, has brace but unable to get it on due pain and inability to straighten (L) leg)          Terinne G Coleman, PT  05/07/2017

## 2017-05-07 NOTE — PROGRESS NOTES
Chief Complaint / Reason for Consult: Abd. Pain, Abnormal LFT's    No complaints this AM.  Tolerating PO    ROS: No CP, SOB, F/C    Patient Vitals for the past 24 hrs:   BP Temp Temp src Pulse Resp SpO2   05/07/17 0454 (!) 141/66 98.3 °F (36.8 °C) Oral 77 18 99 %   05/07/17 0030 (!) 117/59 98.8 °F (37.1 °C) Other (see 75 18 95 %   05/06/17 2026 (!) 153/70 98.3 °F (36.8 °C) Oral 80 18 (!) 93 %   05/06/17 2000 - - - - - 95 %   05/06/17 1649 (!) 148/66 98.1 °F (36.7 °C) Oral 84 19 95 %   05/06/17 1200 (!) 150/65 97.9 °F (36.6 °C) Oral 72 19 96 %   05/06/17 0828 - - - 82 18 98 %   05/06/17 0752 (!) 164/71 97.6 °F (36.4 °C) Oral 79 20 (!) 93 %       Physical Exam:  Gen - Well developed, elderly, no apparent distress  HEENT - Anicteric  CV - S1, S2, no murmurs/rubs  Lungs - CTA-B, normal excursion  Abd - Soft, NT, ND, normal BS's, no HSM, + Drain.  Ext - No c/c/e  Neuro - No asterixis    Labs:    Recent Labs  Lab 05/07/17  0431   WBC 11.56   RBC 3.69*   HGB 10.9*   HCT 31.7*   *   MCV 86   MCH 29.5   MCHC 34.4       Recent Labs  Lab 05/07/17  0431   CALCIUM 8.2*   PROT 5.1*      K 3.7   CO2 21*      BUN 28*   CREATININE 2.2*   ALKPHOS 54*   ALT 15   AST 20   BILITOT 1.7*       Imaging:  Reviewed ERCP    Assessment:  This patient is a 85 y.o. female with:   1. Biliary Pancreatitis, secondary to Choledocholithiasis - s/p ERCP, with sphincterotomy/stone extraction. S/p Lap Latrice. Improved.  2. Abnormal LFT's - Improving, secondary to #1.  3. Cirrhosis, per Op Note - Possibly related to NAFLD.    4. HTN, CHF, DM, CKD....    Recommendations:  1. Monitor Abd. Exam.  2. Post-op care.  3. Diet per surgery.  4. F/U with Viral Hep serologies.  5. Further liver evaluation as outpatient.  6. Hopefully home soon.

## 2017-05-07 NOTE — PLAN OF CARE
Problem: Physical Therapy Goal  Goal: Physical Therapy Goal  Outcome: Outcome(s) achieved Date Met:  05/07/17  Pt seen for initial evaluation only.  Reviewed positioning, LE ROM and bed mobility with daughter.  Daughter understands however, reports pt non-compliant at home.  PT recommends rustam lift for home.

## 2017-05-07 NOTE — PLAN OF CARE
Problem: Fall Risk (Adult)  Intervention: Safety Promotion/Fall Prevention    05/07/17 1600   Safety Interventions   Safety Promotion/Fall Prevention assistive device/personal item within reach;bed alarm set;Fall Risk reviewed with patient/family;lighting adjusted;medications reviewed;nonskid shoes/socks when out of bed;room near unit station;side rails raised x 2         Goal: Absence of Falls  Patient will demonstrate the desired outcomes by discharge/transition of care.   Outcome: Ongoing (interventions implemented as appropriate)    05/07/17 1626   Fall Risk (Adult)   Absence of Falls making progress toward outcome         Problem: Pressure Ulcer Risk (Zeeshan Scale) (Adult,Obstetrics,Pediatric)  Intervention: Turn/Reposition Often    05/06/17 2134 05/07/17 1600   Skin Interventions   Pressure Reduction Techniques frequent weight shift encouraged --    Positioning   Body Position --  weight shift assistance provided         Goal: Skin Integrity  Patient will demonstrate the desired outcomes by discharge/transition of care.   Outcome: Ongoing (interventions implemented as appropriate)    05/07/17 1626   Pressure Ulcer Risk (Zeeshan Scale) (Adult,Obstetrics,Pediatric)   Skin Integrity making progress toward outcome         Problem: Pain, Acute (Adult)  Intervention: Monitor/Manage Analgesia    05/07/17 1626   Manage Acute Burn Pain   Bowel Intervention adequate fluid intake promoted   Safety Interventions   Medication Review/Management medications reviewed         Goal: Acceptable Pain Control/Comfort Level  Patient will demonstrate the desired outcomes by discharge/transition of care.   Outcome: Ongoing (interventions implemented as appropriate)    05/07/17 1626   Pain, Acute (Adult)   Acceptable Pain Control/Comfort Level making progress toward outcome

## 2017-05-07 NOTE — PROGRESS NOTES
[facility] Hospital Medicine  Progress Note    Patient Name: Christal Goodson  MRN: [id number]  Patient Class: IP- Inpatient   Admission Date: 4/28/2017  Length of Stay: 9 days  Attending Physician: Max Faria MD  Primary Care Provider: Juan Alberto Prieto MD        Subjective:     Principal Problem:Acute biliary pancreatitis    HPI:  Christal Goodson is a 85 y.o. female that (in part)  has a past medical history of Arthritis; Blood transfusion; CHF (congestive heart failure); Coronary artery disease; Diabetes mellitus; General anesthetics causing adverse effect in therapeutic use; Hyperlipidemia; Hypertension; Renal disorder; and Thyroid disease. Presents to [facility] Emergency Department complaining of abdominal pain as described below in detail.   Description of symptoms    Location:  Periumbilical  Onset:  Acute onset last night  Character:  Severe deep aching cramping pain  Frequency:  First episode  Duration:  Constant  Associated Symptoms:  Headache, nausea, vomiting, constipation, fever, cough  Radiation:  Radiates to epigastrium  Exacerbating factors:  Palpation, vomiting  Relieving factors:  Antiemetics given in the ED along with the medicine     In the emergency department routine laboratory studies were obtained followed by CT of the abdomen which demonstrated that the pancreas is mildly enlarged with peripancreatic inflammatory stranding in addition to gallstones I would prefer to see some of the later asked.  Lipase levels were elevated.  Right upper quadrant ultrasound obtained.  Concern for gallstone pancreatitis.       Hospital Course:  Ms. Goodson was admitted with biliary pancreatitis and sepsis with bacteremia with GNR. Pt was treated with Zosyn/Cipro, given IVF with good response in BP. Pt was treated with bowel rest, pain control and supportive care. GI and Surgery following, and lipase/LFTs improving. Repeat blood culture NGTD.  Plan for Lap betzaida,  but Cardiology recommending blood transfusion prior to surgery.  2 units of blood ordered.  ERCP with stone extraction prior to lap betzaida.  S/P lap betzaida.      Interval History: Doing well.  Tolerating diet.    Review of Systems   Constitutional: Negative for chills and fever.   HENT: Negative for ear discharge and ear pain.    Eyes: Negative for pain and itching.   Respiratory: Negative for cough and choking.      Objective:     Vital Signs (Most Recent):  Temp: 98.3 °F (36.8 °C) (05/07/17 0746)  Pulse: 79 (05/07/17 0746)  Resp: 19 (05/07/17 0746)  BP: (!) 149/66 (05/07/17 0746)  SpO2: 98 % (05/07/17 0823) Vital Signs (24h Range):  Temp:  [97.9 °F (36.6 °C)-98.8 °F (37.1 °C)] 98.3 °F (36.8 °C)  Pulse:  [72-84] 79  Resp:  [18-19] 19  SpO2:  [93 %-99 %] 98 %  BP: (117-153)/(59-70) 149/66     Weight: 59.2 kg (130 lb 8.6 oz)  Body mass index is 23.88 kg/(m^2).    Intake/Output Summary (Last 24 hours) at 05/07/17 0955  Last data filed at 05/07/17 0100   Gross per 24 hour   Intake              780 ml   Output              300 ml   Net              480 ml      Physical Exam   Constitutional: She is oriented to person, place, and time. She appears well-developed and well-nourished.   HENT:   Head: Normocephalic and atraumatic.   Eyes: EOM are normal. Pupils are equal, round, and reactive to light.   +scleral icterus   Neck: Normal range of motion. Neck supple.   Cardiovascular: Normal rate and regular rhythm.    Pulmonary/Chest: Effort normal and breath sounds normal.   Abdominal: Soft. Bowel sounds are normal.   Neurological: She is alert and oriented to person, place, and time.   Skin: Skin is warm and dry.       Significant Labs:   BMP:     Recent Labs  Lab 05/07/17  0431   GLU 83      K 3.7      CO2 21*   BUN 28*   CREATININE 2.2*   CALCIUM 8.2*     CBC:     Recent Labs  Lab 05/06/17  0359 05/07/17  0431   WBC 12.80* 11.56   HGB 11.5* 10.9*   HCT 33.3* 31.7*   * 113*           Assessment/Plan:      *  Acute biliary pancreatitis  Pt with biliary pancreatitis, clinically improving with bowel rest and lipase trending down.  Continue supportive care and pain control. Continue monitoring of LFTS and lipase daily, appreciate GI and Surgery input. Pt transfused 1 unit of PRBC and Cardiology recommended to proceed with surgical intervention after Hgb>10.  Transfused 2 more units of blood. Continue Zosyn/Cipro for now given sepsis with bacteremia which will be discussed below.  Hgb now >10.  T Bili remains high and Surgery recommending ERCP prior to surgery.  ERCP on 5/3 with stone extraction.  Lap betzaida on 5/5.  BONG drain left in place.  Tolerating diet.  PT/OT evaluation.  Probably home tomorrow.      Hypothyroidism  Clinically euthyroid, continue home regimen.       Hypercholesteremia  Given abnormal LFTs, will hold statin for now.          CAD (coronary artery disease)  Pt is without chest pain and initially blood pressures were low, restarted labetolol and hydralazine.        Essential hypertension, benign  Continue current regimen.      Chronic kidney disease, stage III (moderate)  ARF   Assessment and Plan:  Pt with worsening creatinine likely due to sepsis with hypotension, continue IVF and will repeat labs. Baseline creatinine approximately 1.7, monitor.        Pulmonary hypertension  No acute issues. Monitor.        Diabetes mellitus type 2 in nonobese  HgbA1C <4.0.  Will stop accuchecks and sliding scale.  Regular diet.          Sepsis  Bacteremia due to Klebsiella pneumoniae  Assessment and Plan:  Pt met criteria for sepsis with fever, and hypotension. Pt with likely biliary source of bacteremia with Klebsiella pneumoniae. Continue Zosyn/Cipro for now and repeat blood cultures done NGTD. Will plan to de-escalate to Cipro alone post-op. Pt will need to continue Cipro for 2 weeks from date of negative blood culture.      Anemia of chronic disease  H/H stable.        Urinary tract infection without hematuria  Due  to E. Coli and Proteus mirabilis, pan-sensitive. Continue current abx regimen with plan for de-escalation to Cipro alone post-op.      Weakness  At baseline, she can sit up at bedside.  Will get PT/OT evaluation.      VTE Risk Mitigation         Ordered     Medium Risk of VTE  Once      04/28/17 2228     Place sequential compression device  Until discontinued      04/28/17 2228          Max Faria MD  Department of Hospital Medicine   [facility]

## 2017-05-08 VITALS
TEMPERATURE: 98 F | HEIGHT: 62 IN | HEART RATE: 73 BPM | RESPIRATION RATE: 18 BRPM | OXYGEN SATURATION: 96 % | SYSTOLIC BLOOD PRESSURE: 167 MMHG | WEIGHT: 130.56 LBS | DIASTOLIC BLOOD PRESSURE: 72 MMHG | BODY MASS INDEX: 24.03 KG/M2

## 2017-05-08 PROBLEM — A41.9 SEPSIS: Status: RESOLVED | Noted: 2017-04-29 | Resolved: 2017-05-08

## 2017-05-08 PROBLEM — N39.0 URINARY TRACT INFECTION WITHOUT HEMATURIA: Status: RESOLVED | Noted: 2017-05-01 | Resolved: 2017-05-08

## 2017-05-08 PROBLEM — K85.10 ACUTE BILIARY PANCREATITIS: Status: RESOLVED | Noted: 2017-04-28 | Resolved: 2017-05-08

## 2017-05-08 LAB
ALBUMIN SERPL BCP-MCNC: 2.2 G/DL
ALP SERPL-CCNC: 57 U/L
ALT SERPL W/O P-5'-P-CCNC: 11 U/L
ANION GAP SERPL CALC-SCNC: 6 MMOL/L
AST SERPL-CCNC: 15 U/L
BASOPHILS # BLD AUTO: 0.01 K/UL
BASOPHILS NFR BLD: 0.1 %
BILIRUB SERPL-MCNC: 1.5 MG/DL
BUN SERPL-MCNC: 27 MG/DL
CALCIUM SERPL-MCNC: 7.9 MG/DL
CHLORIDE SERPL-SCNC: 107 MMOL/L
CO2 SERPL-SCNC: 24 MMOL/L
CREAT SERPL-MCNC: 2.2 MG/DL
DIFFERENTIAL METHOD: ABNORMAL
EOSINOPHIL # BLD AUTO: 0.1 K/UL
EOSINOPHIL NFR BLD: 1.2 %
ERYTHROCYTE [DISTWIDTH] IN BLOOD BY AUTOMATED COUNT: 15.8 %
EST. GFR  (AFRICAN AMERICAN): 23 ML/MIN/1.73 M^2
EST. GFR  (NON AFRICAN AMERICAN): 20 ML/MIN/1.73 M^2
GLUCOSE SERPL-MCNC: 101 MG/DL
HAV IGM SERPL QL IA: NEGATIVE
HBV CORE IGM SERPL QL IA: NEGATIVE
HBV SURFACE AG SERPL QL IA: NEGATIVE
HCT VFR BLD AUTO: 29.1 %
HCV AB SERPL QL IA: NEGATIVE
HGB BLD-MCNC: 10.4 G/DL
LYMPHOCYTES # BLD AUTO: 0.6 K/UL
LYMPHOCYTES NFR BLD: 4.8 %
MCH RBC QN AUTO: 30.1 PG
MCHC RBC AUTO-ENTMCNC: 35.7 %
MCV RBC AUTO: 84 FL
MONOCYTES # BLD AUTO: 0.7 K/UL
MONOCYTES NFR BLD: 6.5 %
NEUTROPHILS # BLD AUTO: 9.9 K/UL
NEUTROPHILS NFR BLD: 87 %
PLATELET # BLD AUTO: 120 K/UL
PMV BLD AUTO: 10.3 FL
POCT GLUCOSE: 101 MG/DL (ref 70–110)
POCT GLUCOSE: 135 MG/DL (ref 70–110)
POCT GLUCOSE: 93 MG/DL (ref 70–110)
POTASSIUM SERPL-SCNC: 3.4 MMOL/L
PROT SERPL-MCNC: 5.1 G/DL
RBC # BLD AUTO: 3.45 M/UL
SODIUM SERPL-SCNC: 137 MMOL/L
WBC # BLD AUTO: 11.37 K/UL

## 2017-05-08 PROCEDURE — G8989 SELF CARE D/C STATUS: HCPCS | Mod: CK

## 2017-05-08 PROCEDURE — 80053 COMPREHEN METABOLIC PANEL: CPT

## 2017-05-08 PROCEDURE — G8987 SELF CARE CURRENT STATUS: HCPCS | Mod: CK

## 2017-05-08 PROCEDURE — 36415 COLL VENOUS BLD VENIPUNCTURE: CPT

## 2017-05-08 PROCEDURE — 25000003 PHARM REV CODE 250: Performed by: HOSPITALIST

## 2017-05-08 PROCEDURE — 97166 OT EVAL MOD COMPLEX 45 MIN: CPT

## 2017-05-08 PROCEDURE — 97535 SELF CARE MNGMENT TRAINING: CPT

## 2017-05-08 PROCEDURE — G8988 SELF CARE GOAL STATUS: HCPCS | Mod: CK

## 2017-05-08 PROCEDURE — 25000003 PHARM REV CODE 250: Performed by: EMERGENCY MEDICINE

## 2017-05-08 PROCEDURE — 63600175 PHARM REV CODE 636 W HCPCS: Performed by: HOSPITALIST

## 2017-05-08 PROCEDURE — 85025 COMPLETE CBC W/AUTO DIFF WBC: CPT

## 2017-05-08 RX ORDER — POLYETHYLENE GLYCOL 3350 17 G/17G
17 POWDER, FOR SOLUTION ORAL 2 TIMES DAILY
Status: DISCONTINUED | OUTPATIENT
Start: 2017-05-08 | End: 2017-05-08 | Stop reason: HOSPADM

## 2017-05-08 RX ORDER — FUROSEMIDE 40 MG/1
20 TABLET ORAL 2 TIMES DAILY
Qty: 15 TABLET | Refills: 3 | Status: SHIPPED | OUTPATIENT
Start: 2017-05-08 | End: 2017-08-01

## 2017-05-08 RX ORDER — ACETAMINOPHEN 325 MG/1
650 TABLET ORAL EVERY 6 HOURS PRN
Refills: 0 | COMMUNITY
Start: 2017-05-08

## 2017-05-08 RX ORDER — POLYETHYLENE GLYCOL 3350 17 G/17G
17 POWDER, FOR SOLUTION ORAL 2 TIMES DAILY PRN
Qty: 24 EACH | Refills: 0 | Status: SHIPPED | OUTPATIENT
Start: 2017-05-08

## 2017-05-08 RX ORDER — TRAMADOL HYDROCHLORIDE 50 MG/1
50 TABLET ORAL EVERY 6 HOURS PRN
Qty: 20 TABLET | Refills: 0 | Status: SHIPPED | OUTPATIENT
Start: 2017-05-08 | End: 2017-05-18

## 2017-05-08 RX ORDER — CIPROFLOXACIN 500 MG/1
500 TABLET ORAL 2 TIMES DAILY
Qty: 14 TABLET | Refills: 0 | Status: SHIPPED | OUTPATIENT
Start: 2017-05-08 | End: 2017-05-15

## 2017-05-08 RX ADMIN — HYDRALAZINE HYDROCHLORIDE 50 MG: 25 TABLET ORAL at 06:05

## 2017-05-08 RX ADMIN — LEVOTHYROXINE SODIUM 50 MCG: 50 TABLET ORAL at 06:05

## 2017-05-08 RX ADMIN — CIPROFLOXACIN 400 MG: 2 INJECTION, SOLUTION INTRAVENOUS at 09:05

## 2017-05-08 RX ADMIN — PANTOPRAZOLE SODIUM 40 MG: 40 TABLET, DELAYED RELEASE ORAL at 09:05

## 2017-05-08 RX ADMIN — FERROUS SULFATE TAB EC 325 MG (65 MG FE EQUIVALENT) 325 MG: 325 (65 FE) TABLET DELAYED RESPONSE at 09:05

## 2017-05-08 RX ADMIN — HYDRALAZINE HYDROCHLORIDE 50 MG: 25 TABLET ORAL at 04:05

## 2017-05-08 RX ADMIN — LABETALOL HYDROCHLORIDE 300 MG: 100 TABLET, FILM COATED ORAL at 09:05

## 2017-05-08 RX ADMIN — POLYETHYLENE GLYCOL 3350 17 G: 17 POWDER, FOR SOLUTION ORAL at 12:05

## 2017-05-08 RX ADMIN — FUROSEMIDE 20 MG: 20 TABLET ORAL at 09:05

## 2017-05-08 RX ADMIN — PIPERACILLIN, TAZOBACTAM 4.5 G: 4; .5 INJECTION, POWDER, LYOPHILIZED, FOR SOLUTION INTRAVENOUS at 03:05

## 2017-05-08 RX ADMIN — ALLOPURINOL 100 MG: 100 TABLET ORAL at 09:05

## 2017-05-08 NOTE — PLAN OF CARE
[facility]    HOME HEALTH ORDERS  FACE TO FACE ENCOUNTER    Patient Name: Christal Goodson  YOB: 1931    PCP: Juan Alberto Prieto MD   PCP Address: [address] / [address] [address]  PCP Phone Number: [phone]  PCP Fax: [phone]       Encounter Date: 05/08/2017    Admit to Home Health    Diagnoses:  Active Hospital Problems    Diagnosis  POA    Weakness [R53.1]  Yes    Anemia of chronic disease [D63.8]  Yes    Diabetes mellitus type 2 in nonobese [E11.9]  Yes    Pulmonary hypertension [I27.2]  Yes    Chronic kidney disease, stage III (moderate) [N18.3]  Yes     Followed by Dr. Naeem PERALTA (coronary artery disease) [I25.10]  Yes     Chronic    Essential hypertension, benign [I10]  Yes     Chronic    Hypercholesteremia [E78.00]  Yes     Chronic    Hypothyroidism [E03.9]  Yes     Chronic     Followed by Dr. Gandhi        Resolved Hospital Problems    Diagnosis Date Resolved POA    *Acute biliary pancreatitis [K85.10] 05/08/2017 Yes    Urinary tract infection without hematuria [N39.0] 05/08/2017 Yes    Sepsis [A41.9] 05/08/2017 Yes       Future Appointments  Date Time Provider Department Center   5/11/2017 1:40 PM [facility]   5/12/2017 9:20 AM Juan Alberto Prieto MD [facility]     Follow-up Information     Follow up with Juan Alberto Prieto MD On 5/12/2017.    Specialty:  Internal Medicine    Why:  out patient services:  9:20 a.m,. follow up from the hospital    Contact information:    [address] [place] [address]  [phone]          Follow up with Jalen Jacobo MD In 2 weeks.    Specialties:  General Surgery, Bariatrics    Why:  out patient services    Contact information:    [address]  [address]  [place]  [place] [id number] [phone]              I have seen and examined this patient face to face today. My clinical findings that support the need for the home health skilled services and  home bound status are the following:  Weakness/numbness causing balance and gait disturbance due to Infection, Weakness/Debility and Surgery making it taxing to leave home.    Allergies:  Review of patient's allergies indicates:   Allergen Reactions    Bystolic [nebivolol]     Losartan     Diovan [valsartan] Rash       Diet: regular diet    Activities: activity as tolerated    Nursing:   SN to complete comprehensive assessment including routine vital signs. Instruct on disease process and s/s of complications to report to MD. Review/verify medication list sent home with the patient at time of discharge  and instruct patient/caregiver as needed. Frequency may be adjusted depending on start of care date.    Notify MD if SBP > 160 or < 90; DBP > 90 or < 50; HR > 120 or < 50; Temp > 101      CONSULTS:    Physical Therapy to evaluate and treat. Evaluate for home safety and equipment needs; Establish/upgrade home exercise program. Perform / instruct on therapeutic exercises, gait training, transfer training, and Range of Motion.  Occupational Therapy to evaluate and treat. Evaluate home environment for safety and equipment needs. Perform/Instruct on transfers, ADL training, ROM, and therapeutic exercises.  Aide to provide assistance with personal care, ADLs, and vital signs.    Medications: Review discharge medications with patient and family and provide education.      Current Discharge Medication List      START taking these medications    Details   acetaminophen (TYLENOL) 325 MG tablet Take 2 tablets (650 mg total) by mouth every 6 (six) hours as needed (Mild pain/Temp>100.4).  Refills: 0      ciprofloxacin HCl (CIPRO) 500 MG tablet Take 1 tablet (500 mg total) by mouth 2 (two) times daily.  Qty: 14 tablet, Refills: 0      polyethylene glycol (GLYCOLAX) 17 gram PwPk Take 17 g by mouth 2 (two) times daily as needed (Constipation).  Qty: 24 each, Refills: 0      tramadol (ULTRAM) 50 mg tablet Take 1 tablet (50 mg  total) by mouth every 6 (six) hours as needed (Moderate to severe pain).  Qty: 20 tablet, Refills: 0         CONTINUE these medications which have CHANGED    Details   furosemide (LASIX) 40 MG tablet Take 0.5 tablets (20 mg total) by mouth 2 (two) times daily.  Qty: 15 tablet, Refills: 3         CONTINUE these medications which have NOT CHANGED    Details   aspirin (ECOTRIN) 81 MG EC tablet Take 81 mg by mouth once daily.      flaxseed oil Oil by Misc.(Non-Drug; Combo Route) route Daily.      hydrALAZINE (APRESOLINE) 50 MG tablet TAKE 1 TABLET 3 TIMES DAILY.  Refills: 3      vitamin D 1000 units Tab Take 1,000 Units by mouth once daily.      vitamin E 400 UNIT capsule Take 400 Units by mouth once daily.      allopurinol (ZYLOPRIM) 100 MG tablet Take 1 tablet (100 mg total) by mouth once daily.  Qty: 30 tablet, Refills: 2    Associated Diagnoses: Chronic gout without tophus, unspecified cause, unspecified site      ferrous sulfate 325 mg (65 mg iron) Tab Take 1 tablet (325 mg total) by mouth daily with breakfast.  Qty: 30 tablet, Refills: 3      labetalol (NORMODYNE) 300 MG tablet TAKE 1 TABLET EVERY 12 HOURS DAILY.  Refills: 3      levothyroxine (SYNTHROID) 50 MCG tablet Take 1 tablet (50 mcg total) by mouth once daily.  Qty: 90 tablet, Refills: 0      magnesium oxide (MAG-OX) 250 mg Tab Take 1 tablet (250 mg total) by mouth once daily.  Qty: 30 tablet, Refills: 2      nystatin (MYCOSTATIN) cream Apply topically 2 (two) times daily.  Qty: 30 g, Refills: 2      triamcinolone acetonide 0.1% (KENALOG) 0.1 % cream Apply topically 2 (two) times daily.  Qty: 80 g, Refills: 0    Associated Diagnoses: Dermatitis         STOP taking these medications       hydrocodone-acetaminophen 5-325mg (NORCO) 5-325 mg per tablet Comments:   Reason for Stopping:         valsartan (DIOVAN) 160 MG tablet Comments:   Reason for Stopping:               I certify that this patient is confined to her home and needs intermittent skilled nursing  care, physical therapy and occupational therapy.

## 2017-05-08 NOTE — CONSULTS
Answered consult for Home health, pt preference form [facility] per dtr Liana with pt present.  [facility] requested.  Sent to PHN..Hilda Ojeda RN, BSN, STN CCM  5/8/2017

## 2017-05-08 NOTE — PLAN OF CARE
Problem: Patient Care Overview  Goal: Plan of Care Review  Outcome: Ongoing (interventions implemented as appropriate)  Pt remains free of falls and injuries. Pt s/p lap betzaida on 5/5, BONG drain to RLQ draining serous output. Incisions with dermabond dressing CDI. Pt turned Q2H to prevent further skin breakdown. Healing ulcer to buttocks noted, pink in color, no open skin. IV abx cont as scheduled. Bed pan provided as needed. BG WNL. Pt with contracted BLE, heels protected and elevated. SCDs on. Will cont to monitor.

## 2017-05-08 NOTE — PROGRESS NOTES
Pain controlled.  Tolerating diet.    Vitals:    05/08/17 0400 05/08/17 0740 05/08/17 0801 05/08/17 1206   BP: (!) 150/67 (!) 142/64     BP Location:       Patient Position:       BP Method:       Pulse: 74 74  77   Resp: 19 17 18   Temp: 98.5 °F (36.9 °C) 98.5 °F (36.9 °C)  98 °F (36.7 °C)   TempSrc: Oral Oral  Oral   SpO2: 95% 96% 98% 98%   Weight:       Height:         A/O x 3  RRR  Soft, momo tender, nondistended.  mayte serous, 200 ml in 24 hours    Labs ok    A/P s/p lap betzaida for biliary pancreatitis  Cirrhosis--high output is likely ascitic fluid    D/c drain.  Ok for discharge home

## 2017-05-08 NOTE — PROGRESS NOTES
TN Presented Written Discharge Post Operative information to assist patient& daughter with managing pt care at home.  Dtr will help at home..Hilda Ojeda RN, BSN, [facility]  5/8/2017    TN did teach back with pt and dtr...Hilda Ojeda RN, BSN, [facility]  5/8/2017

## 2017-05-08 NOTE — ANESTHESIA POSTPROCEDURE EVALUATION
"Anesthesia Post Evaluation    Patient: Christal Goodson    Procedure(s) Performed: Procedure(s) (LRB):  CHOLECYSTECTOMY-LAPAROSCOPIC (N/A)    Final Anesthesia Type: general  Patient location during evaluation: PACU  Patient participation: Yes- Able to Participate  Level of consciousness: awake and alert, oriented and awake  Post-procedure vital signs: reviewed and stable  Airway patency: patent  PONV status at discharge: No PONV  Anesthetic complications: no      Cardiovascular status: blood pressure returned to baseline  Respiratory status: unassisted, spontaneous ventilation and room air  Hydration status: euvolemic  Follow-up not needed.        Visit Vitals    BP (!) 142/64    Pulse 74    Temp 36.9 °C (98.5 °F) (Oral)    Resp 17    Ht 5' 2" (1.575 m)    Wt 59.2 kg (130 lb 8.6 oz)    SpO2 98%    Breastfeeding No    BMI 23.88 kg/m2       Pain/Tru Score: Pain Assessment Performed: Yes (5/8/2017  4:00 AM)  Presence of Pain: denies (5/8/2017  4:00 AM)      "

## 2017-05-08 NOTE — PROGRESS NOTES
Awaiting approval for rustam and w/c for home use..Hilda Ojeda RN, BSN, STN [facility]  5/8/2017

## 2017-05-08 NOTE — PROGRESS NOTES
OC Liana or med surg nurse will call House Supervisor if SPD w/c van service is not here by 8:00p.m. To make decision..Hilda Ojeda RN, BSN, STN [facility]  5/8/2017

## 2017-05-08 NOTE — OP NOTE
DATE OF PROCEDURE:  05/05/2017    SURGEON:  Jalen Jacobo M.D.    PREOPERATIVE DIAGNOSIS:  Biliary pancreatitis.    POSTOPERATIVE DIAGNOSES:  Biliary pancreatitis with the addition of cirrhosis.    PROCEDURE:  Laparoscopic cholecystectomy.    ANESTHESIA:  General endotracheal.    ESTIMATED BLOOD LOSS:  25 mL.    INDICATIONS FOR PROCEDURE:  The patient is an 85-year-old female who presented   to the Emergency Room with acute onset of abdominal pain, nausea and vomiting.    She was found to have pancreatitis with gallstones.  She has undergone an ERCP   with a 1.2 cm stone extracted from the common bile duct.  She now presents for   definitive treatment.  Risks and benefits of the procedure have been explained   to the patient who acknowledges these risks and wishes to proceed.    PROCEDURE IN DETAIL:  After administration of IV antibiotics and placement of   sequential compression devices, the patient was intubated by Anesthesia and   sterilely prepped and draped.  The patient had contractures of the lower   extremity making positioning difficult and she was initially placed with her   legs flat against the table, which rotated her slightly to the right and causing   the right upper quadrant to be slightly lower than the remainder of the   abdomen.  The table was rotated to counter this effect.  The umbilicus was   injected with local anesthetic prior to incision.  An incision was made.  A   small umbilical defect was spread and an 11-mm port was placed under direct   visualization with a 0-degree scope.  The port was connected to insufflation and   pneumoperitoneum to 15 mmHg was obtained.  A very small volume of gas,   approximately 1 liter, was used to achieve this pressure resulting in very small   space to work with.  There was bilious ascites noted in the abdomen upon entry.    Then, 5-mm ports were placed in the subxiphoid area in the right anterior   axillary line and the right midclavicular line.  The  ascites was suctioned out.    The liver was found to be noncompliant and did appear to be cirrhotic.  The   dome of the gallbladder was grasped and retracted superiorly.  An attempt at a   dissection was made by first  the peritoneum from the gallbladder to   mobilize it; however, with the small volume space, the lateral rotation and   noncompliant liver, there were simply not enough space to safely dissect.  At   this point, the plan was to convert to open cholecystectomy; however, with the   patient rotated, as she would still need to be repositioned to better access the   abdominal cavity.  For this reason, the drapes were broken down.  The patient   was repositioned on a beanbag.  Her contracted legs were up in the air making   positioning difficult, but this was required to the abdomen in a good position.    The legs were supported with pillows.  An armboard was placed to support the   right arm.  Once the patient was positioned and noted that the abdominal   incisions were more appropriate for a laparoscopic dissection, so the decision   was made to again attempt laparoscopic resection.  The patient was reprepped and   redraped.  The port was reinserted and connected to insufflation.  The 5-mm   ports were placed through the previous incisions.  The patient was placed in   reverse Trendelenburg and airplaned to the left.  There was still a very small   volume, the liver was noncompliant; however, with the repositioning there was   much more space available to work with.  The distal gallbladder was easily   visualized.  [place] was opened with electrocautery and dissected with   a combination of blunt dissection and electrocautery.  Several small branches   of the cystic artery were divided with electrocautery.  Once a single duct was   seen entering the gallbladder, the cystic duct was clipped once proximally,   twice distally and divided.  An Endoloop was placed over the distal duct.  The    gallbladder was removed from the gallbladder fossa and placed in an EndoCatch   bag and removed through the umbilicus.  A 19-Serbian Félix drain was placed and   left in the right upper quadrant and brought out through the most lateral   incision.  The insufflation was then expelled from the abdomen.  All ports were   removed.  The umbilical fascia was reapproximated using a single 0 Vicryl   figure-of-eight suture.  The skin over all ports was closed using interrupted   4-0 Monocryl deep dermal sutures.  Dermabond was applied.  The patient was   aroused and transferred to Recovery Room in good condition.  All instruments and   sponge counts were correct at the end of the case.      NEENA  dd: 05/08/2017 08:57:08 (CDT)  td: 05/08/2017 11:38:17 (CDT)  Doc ID   #[id number]  Job ID #[id number]    CC:

## 2017-05-08 NOTE — NURSING
Dr Cruz discontinued right sided BONG drain per patient's request.  Patient tolerated well. Applied gauze and tape .

## 2017-05-08 NOTE — PROGRESS NOTES
N says [facility] approved for pt..Hilda Ojeda RN, BSN, STN [facility]  5/8/2017  '

## 2017-05-08 NOTE — PT/OT/SLP EVAL
"Occupational Therapy  Evaluation/Treatment    Christal Goodson   MRN: [id number]   Admitting Diagnosis: Acute biliary pancreatitis    OT Date of Treatment: 05/08/17   OT Start Time: 1147  OT Stop Time: 1220  OT Total Time (min): 33 min    Billable Minutes:  Evaluation 20  Self Care/Home Management 13  Total Time 33    Diagnosis: Acute biliary pancreatitis   S/p lap betzaida 5/5/17    Past Medical History:   Diagnosis Date    Arthritis     Blood transfusion     CHF (congestive heart failure)     Coronary artery disease     Diabetes mellitus     General anesthetics causing adverse effect in therapeutic use     "woke up as they were putting me on the table"    Hyperlipidemia     Hypertension     Renal disorder     Thyroid disease       Past Surgical History:   Procedure Laterality Date    CATARACT EXTRACTION EXTRACAPSULAR W/ INTRAOCULAR LENS IMPLANTATION  Sept 2012    right eye    CORONARY ANGIOPLASTY WITH STENT PLACEMENT  2011    JOINT REPLACEMENT      Left total hip replacement in 1986    TONSILLECTOMY         Referring physician: Bienvenido  Date referred to OT: 5/6/17    General Precautions: Standard, fall  Orthopedic Precautions: N/A  Braces: N/A    Do you have any cultural, spiritual, Hindu conflicts, given your current situation?: none     Patient History:  Living Environment  Lives With: child(rodo), adult (daughter)  Living Arrangements: house  Home Accessibility:  (elevator available)  Equipment Currently Used at Home: rollator, bedside commode, shower chair (scooter)    Prior level of function:   Bed Mobility/Transfers: needs device  Grooming: independent  Bathing: needs assist  Upper Body Dressing: independent  Lower Body Dressing: independent  Toileting: needs assist  Home Management Skills: unable to perform  Driving License: No  IADL Comments: Patient states she has not walked since Nov. 2016. She received assist to sit on the EOB from her dtr where she sat for  2-3 hours. Patient reports that her " "left hip is "worn out".     Dominant hand: right    Subjective:  Communicated with patient prior to session.    Chief Complaint: has pain when moving  Patient/Family stated goals: wants to get a Cedric Lift for home    Pain Ratin/10 (yes-did not rate)        Location: abdomen (and left leg)  Pain Addressed: Distraction, Cessation of Activity, Reposition       Objective:  Patient found with: central line, SCD, BONG drain    Cognitive Exam:  Oriented to: Person, Place, Time and Situation  Follows Commands/attention: Follows two-step commands  Communication: clear/fluent  Memory:  No Deficits noted  Safety awareness/insight to disability: intact and fearful of falling  Coping skills/emotional control: Appropriate to situation    Visual/perceptual:  Intact    Physical Exam:  Postural examination/scapula alignment: No postural abnormalities identified  Skin integrity: drain to right side, incision covered by dressing  Edema: None noted     Sensation:   Intact    Upper Extremity Range of Motion:  Right Upper Extremity: WFL except shoulder  Left Upper Extremity: WFL except shoulder    Upper Extremity Strength:  Right Upper Extremity: WFL  Left Upper Extremity: WFL   Strength: WFL    Fine motor coordination:   Intact    Gross motor coordination: WFL    Functional Mobility:  Bed Mobility:  Rolling/Turning to Left: Maximum assistance  Scooting/Bridging: Total Assistance, With assist of 2 (drawsheet lift to top of the bed)  Supine to Sit: With assist of 2, Dependent  Sit to Supine: With assist of 2, Dependent    Transfers:  Sit <> Stand Assistance:  (unable to stand)    Activities of Daily Living:  UE Dressing Level of Assistance: Moderate assistance  LE Dressing Level of Assistance: Total assistance  Grooming Position: EOB  Grooming Level of Assistance: Stand by assistance (Patient was to open packages and perform denture care while seated EOB.)       Balance:   Static Sit: FAIR+: Able to take MINIMAL challenges from all " "directions  Dynamic Sit: FAIR+: Maintains balance through MINIMAL excursions of active trunk motion    Therapeutic Activities and Exercises:  The patient tolerated sitting EOB ~20 for grooming tasks. The patient and daughter were educated re: Ot role and recommendations. The daughter would like a Cedric Lift available to be able to take the patient to the doctor appointments.    AM-PAC 6 CLICK ADL  How much help from another person does this patient currently need?  1 = Unable, Total/Dependent Assistance  2 = A lot, Maximum/Moderate Assistance  3 = A little, Minimum/Contact Guard/Supervision  4 = None, Modified Dare/Independent    Putting on and taking off regular lower body clothing? : 1  Bathing (including washing, rinsing, drying)?: 2  Toileting, which includes using toilet, bedpan, or urinal? : 2  Putting on and taking off regular upper body clothing?: 2  Taking care of personal grooming such as brushing teeth?: 4  Eating meals?: 4  Total Score: 15    AM-PAC Raw Score CMS "G-Code Modifier Level of Impairment Assistance   6 % Total / Unable   7 - 9 CM 80 - 100% Maximal Assist   10 - 14 CL 60 - 80% Moderate Assist   15 - 19 CK 40 - 60% Moderate Assist   20 - 22 CJ 20 - 40% Minimal Assist   23 CI 1-20% SBA / CGA   24 CH 0% Independent/ Mod I       Patient left supine with all lines intact, call button in reach, bed alarm on and daughter present    Assessment:  Christal Goodson is a 85 y.o. female with a medical diagnosis of Acute biliary pancreatitis and presents with self care and functional mobility deficits. Patient is unable to to stand or amb  And will need a Cedric lift for safe transfers. The patient will benefit from  services for family education at home.    Rehab identified problem list/impairments: Rehab identified problem list/impairments: weakness, impaired self care skills, impaired functional mobilty, impaired balance, decreased upper extremity function, decreased lower extremity " function, decreased safety awareness, pain, impaired skin, decreased ROM    Rehab potential is fair.    Activity tolerance: Good    Discharge recommendations: Discharge Facility/Level Of Care Needs: home health OT, home health PT (with family assist)     Barriers to discharge: Barriers to Discharge: Other (Comment) (decreased mobility)    Equipment recommendations: lift device (Cedric lift)     GOALS:   Occupational Therapy Goals        Problem: Occupational Therapy Goal    Goal Priority Disciplines Outcome Interventions   Occupational Therapy Goal     OT, PT/OT Ongoing (interventions implemented as appropriate)    Description:  Goals to be met by: 5/22/17     Patient will increase functional independence with ADLs by performing:    UE Dressing with Contact Guard Assistance.  Sitting at edge of bed x60 minutes with Modified [place].  Supine to sit with Moderate Assistance.  Upper extremity exercise program x10 reps per handout, with assistance as needed.                PLAN:  Patient to be seen 3 x/week to address the above listed problems via    Plan of Care expires: 05/22/17  Plan of Care reviewed with: patient, daughter         Nelly Briones, OT  05/08/2017

## 2017-05-08 NOTE — PROGRESS NOTES
TN requested w/c order for home use...Hilda Ojeda RN, BSN, STN [facility]  5/8/2017

## 2017-05-08 NOTE — PLAN OF CARE
Problem: Occupational Therapy Goal  Goal: Occupational Therapy Goal  Goals to be met by: 5/22/17     Patient will increase functional independence with ADLs by performing:    UE Dressing with Contact Guard Assistance.  Sitting at edge of bed x60 minutes with Modified [place].  Supine to sit with Moderate Assistance.  Upper extremity exercise program x10 reps per handout, with assistance as needed.  Outcome: Ongoing (interventions implemented as appropriate)  The patient will require 24* care, Cedric lift and a W/C for home. The patient and daughter are agreeable to OT via home health. Patient will benefit from OT to address functional deficits.

## 2017-05-08 NOTE — DISCHARGE SUMMARY
[facility] Hospital Medicine  Discharge Summary      Patient Name: Christal Goodson  MRN: [id number]  Admission Date: 4/28/2017  Hospital Length of Stay: 10 days  Discharge Date and Time:  05/08/2017 10:39 AM  Attending Physician: Max Faria MD   Discharging Provider: Max Faria MD  Primary Care Provider: Juan Alberto Prieto MD      HPI:   Christal Goodson is a 85 y.o. female that (in part)  has a past medical history of Arthritis; Blood transfusion; CHF (congestive heart failure); Coronary artery disease; Diabetes mellitus; General anesthetics causing adverse effect in therapeutic use; Hyperlipidemia; Hypertension; Renal disorder; and Thyroid disease. Presents to [facility] Emergency Department complaining of abdominal pain as described below in detail.   Description of symptoms    Location:  Periumbilical  Onset:  Acute onset last night  Character:  Severe deep aching cramping pain  Frequency:  First episode  Duration:  Constant  Associated Symptoms:  Headache, nausea, vomiting, constipation, fever, cough  Radiation:  Radiates to epigastrium  Exacerbating factors:  Palpation, vomiting  Relieving factors:  Antiemetics given in the ED along with the medicine     In the emergency department routine laboratory studies were obtained followed by CT of the abdomen which demonstrated that the pancreas is mildly enlarged with peripancreatic inflammatory stranding in addition to gallstones I would prefer to see some of the later asked.  Lipase levels were elevated.  Right upper quadrant ultrasound obtained.  Concern for gallstone pancreatitis.       Procedure(s) (LRB):  CHOLECYSTECTOMY-LAPAROSCOPIC (N/A)      Indwelling Lines/Drains at time of discharge:   Lines/Drains/Airways     Drain                 Closed/Suction Drain 05/05/17 1431 Right Abdomen Bulb 19 Fr. 2 days              Hospital Course:   Ms. Goodson was admitted with biliary pancreatitis and sepsis with  bacteremia with Klebsiella.    Acute biliary pancreatitis  Pt with biliary pancreatitis, clinically improved with bowel rest and lipase trended down. Continued supportive care and pain control. GI and Surgery consulted.  Anemia of chronic disease and patient transfused a total of 3 units of blood during hospital stay.  Cardiology recommended to proceed with surgical intervention after Hgb>10.  Continued Zosyn/Cipro given sepsis with bacteremia.  T Bili remained high and Surgery recommended ERCP prior to surgery.  ERCP on 5/3 with stone extraction.  Lap betzaida on 5/5.  Patient tolerated procedures well without any complications.  Currently pain is well controlled and she is tolerating diet.      Chronic kidney disease, stage III (moderate)  Patient had slight ARF secondary to sepsis.  She was hydrated with improvement of renal function.  Creat is now back close to baseline.      Sepsis  Bacteremia due to Klebsiella pneumoniae  Pt met criteria for sepsis with fever, and hypotension. Pt with likely biliary source of bacteremia with Klebsiella pneumoniae. Continued Zosyn/Cipro.  Repeat blood cultures done NGTD.  Patient will need 7 more days of PO Cipro to complete 14 day course since last negative blood cultures.    Patient will be discharged home with  and [facility].       Consults:   Consults         Status Ordering Provider     Inpatient consult to Cardiology  Once     Provider:  Ibrahima Linda MD    Completed JALEN JACOBO     Inpatient consult to Cardiology  Once     Provider:  Ibrahima Linda MD    Acknowledged HARSHA VARGHESE     Inpatient consult to Gastroenterology  Once     Provider:  Jalen Jacobo MD    Completed YINKA KIRKPATRICK     Inpatient consult to Gastroenterology  Once     Provider:  Barrie Hui MD    Completed HARSHA VARGHESE     Inpatient consult to General surgery  Once     Provider:  Jalen Jacobo MD    Completed YINKA KIRKPATRICK     IP consult to dietary  Once     Provider:  (Not  yet assigned)    Completed AURE MOSELEY          Pending Diagnostic Studies:     Procedure Component Value Units Date/Time    Hepatitis panel, acute [[id number]] Collected:  05/06/17 0359    Order Status:  Sent Lab Status:  In process Updated:  05/06/17 1438    Specimen:  Blood from Blood         Final Active Diagnoses:    Diagnosis Date Noted POA    Weakness [R53.1] 05/04/2017 Yes    Anemia of chronic disease [D63.8] 04/30/2017 Yes    Diabetes mellitus type 2 in nonobese [E11.9] 04/28/2017 Yes    Pulmonary hypertension [I27.2] 03/24/2017 Yes    Chronic kidney disease, stage III (moderate) [N18.3] 09/12/2015 Yes    CAD (coronary artery disease) [I25.10] 02/25/2013 Yes     Chronic    Essential hypertension, benign [I10] 02/25/2013 Yes     Chronic    Hypercholesteremia [E78.00] 11/11/2012 Yes     Chronic    Hypothyroidism [E03.9] 11/09/2012 Yes     Chronic      Problems Resolved During this Admission:    Diagnosis Date Noted Date Resolved POA    PRINCIPAL PROBLEM:  Acute biliary pancreatitis [K85.10] 04/28/2017 05/08/2017 Yes    Urinary tract infection without hematuria [N39.0] 05/01/2017 05/08/2017 Yes    Sepsis [A41.9] 04/29/2017 05/08/2017 Yes      No new Assessment & Plan notes have been filed under this hospital service since the last note was generated.  Service: Hospital Medicine      Discharged Condition: stable    Disposition: Home-Health Care [facility]    Follow Up:  Follow-up Information     Follow up with Juan Alberto Prieto MD On 5/12/2017.    Specialty:  Internal Medicine    Why:  out patient services:  9:20 a.m,. follow up from the hospital    Contact information:    [address]  [facility] [address]  [phone]          Follow up with Jalen Jacobo MD In 2 weeks.    Specialties:  General Surgery, Bariatrics    Why:  out patient services    Contact information:    [address]  [address]  [address]  [place] [address]  [phone]          Patient Instructions:     PATIENT  "(JOHNATHAN) LIFT FOR HOME USE   Order Specific Question Answer Comments   Height: 5' 2" (1.575 m)    Weight: 59.2 kg (130 lb 8.6 oz)    Does patient have medical equipment at home? bedside commode    Does patient have medical equipment at home? rollator    Does patient have medical equipment at home? shower chair    Length of need (1-99 months): 99      Diet general     Activity as tolerated     Call MD for:  temperature >100.4     Call MD for:  persistent nausea and vomiting or diarrhea     Call MD for:  severe uncontrolled pain     Call MD for:  redness, tenderness, or signs of infection (pain, swelling, redness, odor or green/yellow discharge around incision site)     Call MD for:  difficulty breathing or increased cough     Call MD for:  persistent dizziness, light-headedness, or visual disturbances     Call MD for:  increased confusion or weakness       Medications:  Reconciled Home Medications:   Current Discharge Medication List      START taking these medications    Details   acetaminophen (TYLENOL) 325 MG tablet Take 2 tablets (650 mg total) by mouth every 6 (six) hours as needed (Mild pain/Temp>100.4).  Refills: 0      ciprofloxacin HCl (CIPRO) 500 MG tablet Take 1 tablet (500 mg total) by mouth 2 (two) times daily.  Qty: 14 tablet, Refills: 0      polyethylene glycol (GLYCOLAX) 17 gram PwPk Take 17 g by mouth 2 (two) times daily as needed (Constipation).  Qty: 24 each, Refills: 0      tramadol (ULTRAM) 50 mg tablet Take 1 tablet (50 mg total) by mouth every 6 (six) hours as needed (Moderate to severe pain).  Qty: 20 tablet, Refills: 0         CONTINUE these medications which have CHANGED    Details   furosemide (LASIX) 40 MG tablet Take 0.5 tablets (20 mg total) by mouth 2 (two) times daily.  Qty: 15 tablet, Refills: 3         CONTINUE these medications which have NOT CHANGED    Details   aspirin (ECOTRIN) 81 MG EC tablet Take 81 mg by mouth once daily.      flaxseed oil Oil by Misc.(Non-Drug; Combo Route) " route Daily.      hydrALAZINE (APRESOLINE) 50 MG tablet TAKE 1 TABLET 3 TIMES DAILY.  Refills: 3      vitamin D 1000 units Tab Take 1,000 Units by mouth once daily.      vitamin E 400 UNIT capsule Take 400 Units by mouth once daily.      allopurinol (ZYLOPRIM) 100 MG tablet Take 1 tablet (100 mg total) by mouth once daily.  Qty: 30 tablet, Refills: 2    Associated Diagnoses: Chronic gout without tophus, unspecified cause, unspecified site      ferrous sulfate 325 mg (65 mg iron) Tab Take 1 tablet (325 mg total) by mouth daily with breakfast.  Qty: 30 tablet, Refills: 3      labetalol (NORMODYNE) 300 MG tablet TAKE 1 TABLET EVERY 12 HOURS DAILY.  Refills: 3      levothyroxine (SYNTHROID) 50 MCG tablet Take 1 tablet (50 mcg total) by mouth once daily.  Qty: 90 tablet, Refills: 0      magnesium oxide (MAG-OX) 250 mg Tab Take 1 tablet (250 mg total) by mouth once daily.  Qty: 30 tablet, Refills: 2      nystatin (MYCOSTATIN) cream Apply topically 2 (two) times daily.  Qty: 30 g, Refills: 2      triamcinolone acetonide 0.1% (KENALOG) 0.1 % cream Apply topically 2 (two) times daily.  Qty: 80 g, Refills: 0    Associated Diagnoses: Dermatitis         STOP taking these medications       hydrocodone-acetaminophen 5-325mg (NORCO) 5-325 mg per tablet Comments:   Reason for Stopping:         valsartan (DIOVAN) 160 MG tablet Comments:   Reason for Stopping:             Time spent on the discharge of patient: >30 minutes    Max Faria MD  Department of Hospital Medicine  [facility]

## 2017-05-08 NOTE — PROGRESS NOTES
Sabrina  At [facility] [phone] says will transport patient home/ [address]; [place] [address] between  2-3 hours/ 6:30 p.m. And 7:30 p.m. Sabrina says.  TN informed JUAN De La Cruz Supervisor for CM of the above...Hilda Ojeda RN, BSN, STN [facility]  5/8/2017

## 2017-05-08 NOTE — PLAN OF CARE
05/08/17 1048   Medicare Message   Important Message from Medicare regarding Discharge Appeal Rights Given to patient/caregiver;Explained to patient/caregiver;Signed/date by patient/caregiver;Other (comments)  (DTR, Liana)   Date IMM was signed 05/08/17   Time IMM was signed 1040

## 2017-05-08 NOTE — PROGRESS NOTES
WRITTEN DISCHARGE INFORMATION:   Things that YOU are responsible for to Manage Your Care At Home:  1. Getting your prescriptions filled.  2. Taking you medications as directed. DO NOT MISS ANY DOSES!  3. Going to your follow-up doctor appointments. This is important because it allows the doctor to monitor your progress and to determine if any changes need to be made to your treatment plan.  Follow-up Information     Follow up with Juan Alberto Prieto MD On 5/12/2017.    Specialty:  Internal Medicine    Why:  out patient services:  9:20 a.m,. follow up from the hospital    Contact information:    [address]  [facility] [address]  [phone]          Follow up with Jalen Jacobo MD On 5/18/2017.    Specialties:  General Surgery, Bariatrics    Why:  out patient services:  10:00 a.m. follow up from the hospital    Contact information:    [address]  [address]  [address]  [place] [address]  [phone]          Follow up with [facility] On 5/9/2017.    Specialties:  DME Provider, Home Health Services    Why:  Home Health:      Contact information:    [address] [facility] [address]  [facility] [address]  [phone]          Follow up with [facility].    Specialty:  DME Provider    Why:  DME:  rustam lift and wheel chair    Contact information:    [address] [facility] [address]  [facility] [address]  [phone]                                                                Help at Home  After discharge for assistance [facility] On Call Nurse Care Line 24/7 assistance  [phone]     Thank you for choosing [facility] for your care.  Please answer any calls you may receive from [facility] we want to continue to support you as you manage your healthcare needs.  Sincerely, Your [facility] Healthcare Manager is,  Hilda Ojeda RN [facility] [phone]

## 2017-05-08 NOTE — PROGRESS NOTES
TN called to physical therapy for OT to see pt prior to d/c to send to N for C referral..Hilda Ojeda RN, BSN, [facility]  5/8/2017

## 2017-05-08 NOTE — PROGRESS NOTES
Dtr [place] and pt aware rustam lift and w/c to be delivered to patient's home.   OC [place] , med surg [facility] given SPD phone #[phone], to bring w/c and  Belt for transport.  TN called to [facility] [phone], Beatriz PEREZ reviewing for... Says went to [facility], [phone], Tiffany khalil TN says awaiting authorization from PHN...Hilda Ojeda, RN, BSN, [facility]  5/8/2017

## 2017-05-08 NOTE — PROGRESS NOTES
Jose E at [facility] -[phone] says rustam lift and w/c will be delivered to patient's home this p.m....Hilda Ojeda RN, BSN, [facility]  5/8/2017

## 2017-05-08 NOTE — PROGRESS NOTES
TN informed med surg nurse Rhina and JOJO Gaines that patient is ready for d/c from [facility]..Hilda Ojeda RN, BSN, STN [facility]  5/8/2017

## 2017-05-09 NOTE — NURSING
Pt safely moved via wheelchair and lift from hospital, escorted down to transport by staff, all discharge teaching and information completed.

## 2017-05-09 NOTE — NURSING
Spoke to Lizbeth at [facility] and she stated that a  is en route at this time to pick pt up.   The  should arrive in 15 to 20 minutes.  Charge Nurse, Promise advised of ETA

## 2017-05-11 ENCOUNTER — TELEPHONE (OUTPATIENT)
Dept: ORTHOPEDICS | Facility: CLINIC | Age: 82
End: 2017-05-11

## 2017-05-11 NOTE — TELEPHONE ENCOUNTER
----- Message from Bipin Gastelum sent at 5/11/2017 12:39 PM CDT -----  Contact: daughter  Pt's daughter called to inform that she wont her appointment today and will call back at a later time to reschedule.

## 2017-05-12 ENCOUNTER — TELEPHONE (OUTPATIENT)
Dept: FAMILY MEDICINE | Facility: CLINIC | Age: 82
End: 2017-05-12

## 2017-05-12 NOTE — TELEPHONE ENCOUNTER
Spoke with Marline with [facility]. She states that patient has a small amount of dark brown drainage around her umbilicus tube. She has tube to drain her gallbladder.     Blood pressure elevated. Patient due to take blood pressure medication. No chest pain and no shortness of breath

## 2017-05-12 NOTE — TELEPHONE ENCOUNTER
----- Message from Jacqueline Alvarado sent at 5/12/2017  3:18 PM CDT -----  [facility] states patient was released from hospital earlier this week. She is having a small amount of bleeding from her belly button. Her blood pressure was 164/68 and had taken all medications. Nurse can be reached at [phone] Thank you!

## 2017-05-15 NOTE — TELEPHONE ENCOUNTER
Spoke with Marline, nurse with [facility] and advised below    Patient has appointment 5-18-17 with Dr. Jacobo. General surgery

## 2017-05-18 DIAGNOSIS — E11.9 TYPE 2 DIABETES MELLITUS WITHOUT COMPLICATION: ICD-10-CM

## 2017-05-26 DIAGNOSIS — E03.9 HYPOTHYROIDISM, UNSPECIFIED TYPE: Primary | ICD-10-CM

## 2017-05-26 RX ORDER — LEVOTHYROXINE SODIUM 50 UG/1
50 TABLET ORAL DAILY
Qty: 90 TABLET | Refills: 0 | Status: SHIPPED | OUTPATIENT
Start: 2017-05-26 | End: 2017-08-29 | Stop reason: SDUPTHER

## 2017-05-26 NOTE — TELEPHONE ENCOUNTER
----- Message from Maximiliano Anaya sent at 5/26/2017 11:40 AM CDT -----  Contact: Liana/Reggie/[phone]  Refill:  levothyroxine (SYNTHROID) 50 MCG tablet    Patient is completely out of this medication. Thank you.

## 2017-05-26 NOTE — LETTER
May 28, 2017    Christal Goodson  [address] [place] [address]             [facility]  [address] [address]  Phone: [phone] Dear Ms. Goodson:    This letter is a reminder that your Urine for Microalbumin, Cholesterol and TSH will be due in June 2017. Please call our office to schedule an appointment.    If you've already scheduled these tests, please disregard this notice.    Sincerely,        Priya Crowe LPN

## 2017-06-05 ENCOUNTER — TELEPHONE (OUTPATIENT)
Dept: FAMILY MEDICINE | Facility: CLINIC | Age: 82
End: 2017-06-05

## 2017-06-05 NOTE — UM SECONDARY REVIEW
Physician Advisor Internal    Other CONTINUED STAY INPT  84 Y/O F DIAGNOSED WITH ACUTE BILIARY PANCREATITIS ON ARRIVAL.  NOT ABLE TO CERTIFY FOR CONTINUED STAY, PT WITH ERCP IN LAST 24HRS FOR RETAINED STONE, BUT DID NOT HAVE FEVER OR ELEVATED WBCs.  NOTED PT WITH GLUCOSE LESS THAN 70 TODAY WITH D50 IV REQUIRED x2, NO INSULIN ADJUSTEMNTS MADE (PER CRITERIA POINT), HOWEVER PATIENT IS NOT ON INSULIN.  PT SCHEDULED FOR LAP ZOEY ON 5/5.       Approved Inpatient  LATE REVIEW - approved

## 2017-06-05 NOTE — TELEPHONE ENCOUNTER
----- Message from Lety Garcia sent at 6/2/2017 11:51 AM CDT -----  Contact: LISA Montalvo from [facility] calling to discuss pt PT. Please call [phone].

## 2017-06-14 ENCOUNTER — TELEPHONE (OUTPATIENT)
Dept: FAMILY MEDICINE | Facility: CLINIC | Age: 82
End: 2017-06-14

## 2017-06-14 DIAGNOSIS — E11.9 DIABETES MELLITUS WITHOUT COMPLICATION: Primary | ICD-10-CM

## 2017-06-14 RX ORDER — DEXTROSE 4 G
TABLET,CHEWABLE ORAL
Qty: 1 EACH | Refills: 0 | Status: SHIPPED | OUTPATIENT
Start: 2017-06-14

## 2017-06-14 RX ORDER — LANCETS 28 GAUGE
1 EACH MISCELLANEOUS DAILY
Qty: 100 EACH | Refills: 3 | Status: SHIPPED | OUTPATIENT
Start: 2017-06-14

## 2017-06-14 NOTE — TELEPHONE ENCOUNTER
----- Message from Echo Haynes sent at 6/14/2017  9:00 AM CDT -----  Contact: [employer] [facility] is requesting an order for diabetic supplies. [employer] form & clinicals for artie metrics testing meter, lancets & strips. Please advise.    [phone] ph  [phone] fx

## 2017-06-15 DIAGNOSIS — E11.9 DIABETES MELLITUS WITHOUT COMPLICATION: ICD-10-CM

## 2017-06-28 ENCOUNTER — HOSPITAL ENCOUNTER (INPATIENT)
Facility: HOSPITAL | Age: 82
LOS: 3 days | Discharge: HOME | DRG: 605 | End: 2017-07-01
Attending: EMERGENCY MEDICINE | Admitting: HOSPITALIST
Payer: MEDICARE

## 2017-06-28 DIAGNOSIS — R60.0 BILATERAL LEG EDEMA: ICD-10-CM

## 2017-06-28 DIAGNOSIS — S80.12XA TRAUMATIC HEMATOMA OF LEFT LOWER LEG, INITIAL ENCOUNTER: Primary | ICD-10-CM

## 2017-06-28 DIAGNOSIS — S80.12XA TRAUMATIC HEMATOMA OF LEFT LOWER LEG: ICD-10-CM

## 2017-06-28 DIAGNOSIS — N18.30 CHRONIC KIDNEY DISEASE, STAGE III (MODERATE): ICD-10-CM

## 2017-06-28 DIAGNOSIS — D62 ACUTE BLOOD LOSS ANEMIA: ICD-10-CM

## 2017-06-28 DIAGNOSIS — R53.1 WEAKNESS: ICD-10-CM

## 2017-06-28 DIAGNOSIS — E11.9 DIABETES MELLITUS TYPE 2 IN NONOBESE: ICD-10-CM

## 2017-06-28 DIAGNOSIS — I25.10 CORONARY ARTERY DISEASE INVOLVING NATIVE CORONARY ARTERY OF NATIVE HEART WITHOUT ANGINA PECTORIS: Chronic | ICD-10-CM

## 2017-06-28 DIAGNOSIS — I25.10 CORONARY ATHEROSCLEROSIS OF UNSPECIFIED TYPE OF VESSEL, NATIVE OR GRAFT: ICD-10-CM

## 2017-06-28 DIAGNOSIS — E78.00 HYPERCHOLESTEREMIA: Chronic | ICD-10-CM

## 2017-06-28 DIAGNOSIS — M79.605 LEG PAIN, DIFFUSE, LEFT: ICD-10-CM

## 2017-06-28 DIAGNOSIS — E03.4 HYPOTHYROIDISM DUE TO ACQUIRED ATROPHY OF THYROID: ICD-10-CM

## 2017-06-28 DIAGNOSIS — E55.9 MILD VITAMIN D DEFICIENCY: ICD-10-CM

## 2017-06-28 DIAGNOSIS — I27.20 PULMONARY HYPERTENSION: ICD-10-CM

## 2017-06-28 DIAGNOSIS — I12.9 BENIGN HYPERTENSIVE KIDNEY DISEASE WITH CHRONIC KIDNEY DISEASE STAGE I THROUGH STAGE IV, OR UNSPECIFIED(403.10): ICD-10-CM

## 2017-06-28 DIAGNOSIS — D63.8 ANEMIA OF CHRONIC DISEASE: ICD-10-CM

## 2017-06-28 DIAGNOSIS — I10 ESSENTIAL HYPERTENSION, BENIGN: Chronic | ICD-10-CM

## 2017-06-28 DIAGNOSIS — D62 ACUTE POSTHEMORRHAGIC ANEMIA: ICD-10-CM

## 2017-06-28 DIAGNOSIS — E03.9 HYPOTHYROIDISM, UNSPECIFIED TYPE: Chronic | ICD-10-CM

## 2017-06-28 LAB
ABO + RH BLD: NORMAL
ALBUMIN SERPL BCP-MCNC: 2.8 G/DL
ALP SERPL-CCNC: 53 U/L
ALT SERPL W/O P-5'-P-CCNC: 10 U/L
ANION GAP SERPL CALC-SCNC: 6 MMOL/L
APTT BLDCRRT: 27.3 SEC
AST SERPL-CCNC: 20 U/L
BASOPHILS # BLD AUTO: 0.01 K/UL
BASOPHILS NFR BLD: 0.1 %
BILIRUB SERPL-MCNC: 1 MG/DL
BLD GP AB SCN CELLS X3 SERPL QL: NORMAL
BLD PROD TYP BPU: NORMAL
BLD PROD TYP BPU: NORMAL
BLOOD UNIT EXPIRATION DATE: NORMAL
BLOOD UNIT EXPIRATION DATE: NORMAL
BLOOD UNIT TYPE CODE: 5100
BLOOD UNIT TYPE CODE: 5100
BLOOD UNIT TYPE: NORMAL
BLOOD UNIT TYPE: NORMAL
BUN SERPL-MCNC: 23 MG/DL
CALCIUM SERPL-MCNC: 8.5 MG/DL
CHLORIDE SERPL-SCNC: 109 MMOL/L
CO2 SERPL-SCNC: 25 MMOL/L
CODING SYSTEM: NORMAL
CODING SYSTEM: NORMAL
CREAT SERPL-MCNC: 1.6 MG/DL
DIFFERENTIAL METHOD: ABNORMAL
DISPENSE STATUS: NORMAL
DISPENSE STATUS: NORMAL
EOSINOPHIL # BLD AUTO: 0.1 K/UL
EOSINOPHIL NFR BLD: 1.8 %
ERYTHROCYTE [DISTWIDTH] IN BLOOD BY AUTOMATED COUNT: 16.6 %
EST. GFR  (AFRICAN AMERICAN): 34 ML/MIN/1.73 M^2
EST. GFR  (NON AFRICAN AMERICAN): 29 ML/MIN/1.73 M^2
GLUCOSE SERPL-MCNC: 90 MG/DL
HCT VFR BLD AUTO: 19.7 %
HGB BLD-MCNC: 6.8 G/DL
INR PPP: 1.2
LYMPHOCYTES # BLD AUTO: 0.6 K/UL
LYMPHOCYTES NFR BLD: 8.3 %
MAGNESIUM SERPL-MCNC: 1.9 MG/DL
MCH RBC QN AUTO: 30.6 PG
MCHC RBC AUTO-ENTMCNC: 34.5 %
MCV RBC AUTO: 89 FL
MONOCYTES # BLD AUTO: 0.5 K/UL
MONOCYTES NFR BLD: 6.7 %
NEUTROPHILS # BLD AUTO: 5.9 K/UL
NEUTROPHILS NFR BLD: 82.8 %
PLATELET # BLD AUTO: 113 K/UL
PMV BLD AUTO: 10.9 FL
POTASSIUM SERPL-SCNC: 5 MMOL/L
PROT SERPL-MCNC: 5.9 G/DL
PROTHROMBIN TIME: 12.4 SEC
RBC # BLD AUTO: 2.22 M/UL
SODIUM SERPL-SCNC: 140 MMOL/L
TRANS ERYTHROCYTES VOL PATIENT: NORMAL ML
TRANS ERYTHROCYTES VOL PATIENT: NORMAL ML
WBC # BLD AUTO: 7.13 K/UL

## 2017-06-28 PROCEDURE — 86900 BLOOD TYPING SEROLOGIC ABO: CPT

## 2017-06-28 PROCEDURE — 85025 COMPLETE CBC W/AUTO DIFF WBC: CPT

## 2017-06-28 PROCEDURE — 11000001 HC ACUTE MED/SURG PRIVATE ROOM

## 2017-06-28 PROCEDURE — 86920 COMPATIBILITY TEST SPIN: CPT

## 2017-06-28 PROCEDURE — 99284 EMERGENCY DEPT VISIT MOD MDM: CPT | Mod: 25

## 2017-06-28 PROCEDURE — 96375 TX/PRO/DX INJ NEW DRUG ADDON: CPT

## 2017-06-28 PROCEDURE — 83735 ASSAY OF MAGNESIUM: CPT

## 2017-06-28 PROCEDURE — 96374 THER/PROPH/DIAG INJ IV PUSH: CPT

## 2017-06-28 PROCEDURE — 86901 BLOOD TYPING SEROLOGIC RH(D): CPT

## 2017-06-28 PROCEDURE — 63600175 PHARM REV CODE 636 W HCPCS: Performed by: EMERGENCY MEDICINE

## 2017-06-28 PROCEDURE — 80053 COMPREHEN METABOLIC PANEL: CPT

## 2017-06-28 PROCEDURE — 85610 PROTHROMBIN TIME: CPT

## 2017-06-28 PROCEDURE — 85730 THROMBOPLASTIN TIME PARTIAL: CPT

## 2017-06-28 RX ORDER — FERROUS SULFATE 325(65) MG
325 TABLET, DELAYED RELEASE (ENTERIC COATED) ORAL DAILY
Status: DISCONTINUED | OUTPATIENT
Start: 2017-06-29 | End: 2017-07-01 | Stop reason: HOSPADM

## 2017-06-28 RX ORDER — TALC
250 POWDER (GRAM) TOPICAL DAILY
Status: DISCONTINUED | OUTPATIENT
Start: 2017-06-29 | End: 2017-07-01 | Stop reason: HOSPADM

## 2017-06-28 RX ORDER — HYDROMORPHONE HYDROCHLORIDE 2 MG/ML
1 INJECTION, SOLUTION INTRAMUSCULAR; INTRAVENOUS; SUBCUTANEOUS EVERY 4 HOURS PRN
Status: DISCONTINUED | OUTPATIENT
Start: 2017-06-28 | End: 2017-07-01 | Stop reason: HOSPADM

## 2017-06-28 RX ORDER — IBUPROFEN 200 MG
24 TABLET ORAL
Status: DISCONTINUED | OUTPATIENT
Start: 2017-06-28 | End: 2017-07-01 | Stop reason: HOSPADM

## 2017-06-28 RX ORDER — GLUCAGON 1 MG
1 KIT INJECTION
Status: DISCONTINUED | OUTPATIENT
Start: 2017-06-28 | End: 2017-07-01 | Stop reason: HOSPADM

## 2017-06-28 RX ORDER — HYDROCODONE BITARTRATE AND ACETAMINOPHEN 500; 5 MG/1; MG/1
TABLET ORAL
Status: DISCONTINUED | OUTPATIENT
Start: 2017-06-28 | End: 2017-07-01 | Stop reason: HOSPADM

## 2017-06-28 RX ORDER — IBUPROFEN 200 MG
16 TABLET ORAL
Status: DISCONTINUED | OUTPATIENT
Start: 2017-06-28 | End: 2017-07-01 | Stop reason: HOSPADM

## 2017-06-28 RX ORDER — FUROSEMIDE 20 MG/1
20 TABLET ORAL 2 TIMES DAILY
Status: DISCONTINUED | OUTPATIENT
Start: 2017-06-28 | End: 2017-07-01 | Stop reason: HOSPADM

## 2017-06-28 RX ORDER — HYDRALAZINE HYDROCHLORIDE 25 MG/1
50 TABLET, FILM COATED ORAL 3 TIMES DAILY
Status: DISCONTINUED | OUTPATIENT
Start: 2017-06-28 | End: 2017-07-01 | Stop reason: HOSPADM

## 2017-06-28 RX ORDER — HYDROMORPHONE HYDROCHLORIDE 2 MG/ML
0.5 INJECTION, SOLUTION INTRAMUSCULAR; INTRAVENOUS; SUBCUTANEOUS EVERY 4 HOURS PRN
Status: DISCONTINUED | OUTPATIENT
Start: 2017-06-28 | End: 2017-07-01 | Stop reason: HOSPADM

## 2017-06-28 RX ORDER — ONDANSETRON 2 MG/ML
4 INJECTION INTRAMUSCULAR; INTRAVENOUS EVERY 8 HOURS PRN
Status: DISCONTINUED | OUTPATIENT
Start: 2017-06-28 | End: 2017-07-01 | Stop reason: HOSPADM

## 2017-06-28 RX ORDER — POLYETHYLENE GLYCOL 3350 17 G/17G
17 POWDER, FOR SOLUTION ORAL 2 TIMES DAILY PRN
Status: DISCONTINUED | OUTPATIENT
Start: 2017-06-28 | End: 2017-07-01 | Stop reason: HOSPADM

## 2017-06-28 RX ORDER — INSULIN ASPART 100 [IU]/ML
1-10 INJECTION, SOLUTION INTRAVENOUS; SUBCUTANEOUS
Status: DISCONTINUED | OUTPATIENT
Start: 2017-06-28 | End: 2017-07-01 | Stop reason: HOSPADM

## 2017-06-28 RX ORDER — ONDANSETRON 2 MG/ML
4 INJECTION INTRAMUSCULAR; INTRAVENOUS
Status: COMPLETED | OUTPATIENT
Start: 2017-06-28 | End: 2017-06-28

## 2017-06-28 RX ORDER — LABETALOL 100 MG/1
300 TABLET, FILM COATED ORAL EVERY 12 HOURS
Status: DISCONTINUED | OUTPATIENT
Start: 2017-06-28 | End: 2017-07-01 | Stop reason: HOSPADM

## 2017-06-28 RX ORDER — LEVOTHYROXINE SODIUM 50 UG/1
50 TABLET ORAL DAILY
Status: DISCONTINUED | OUTPATIENT
Start: 2017-06-29 | End: 2017-07-01 | Stop reason: HOSPADM

## 2017-06-28 RX ORDER — ALLOPURINOL 100 MG/1
100 TABLET ORAL DAILY
Status: DISCONTINUED | OUTPATIENT
Start: 2017-06-29 | End: 2017-07-01 | Stop reason: HOSPADM

## 2017-06-28 RX ORDER — HYDROMORPHONE HYDROCHLORIDE 2 MG/ML
0.5 INJECTION, SOLUTION INTRAMUSCULAR; INTRAVENOUS; SUBCUTANEOUS
Status: DISCONTINUED | OUTPATIENT
Start: 2017-06-28 | End: 2017-06-29 | Stop reason: ALTCHOICE

## 2017-06-28 RX ORDER — HYDROMORPHONE HYDROCHLORIDE 2 MG/ML
0.5 INJECTION, SOLUTION INTRAMUSCULAR; INTRAVENOUS; SUBCUTANEOUS
Status: COMPLETED | OUTPATIENT
Start: 2017-06-28 | End: 2017-06-28

## 2017-06-28 RX ORDER — POLYETHYLENE GLYCOL 3350 17 G/17G
17 POWDER, FOR SOLUTION ORAL DAILY
Status: DISCONTINUED | OUTPATIENT
Start: 2017-06-29 | End: 2017-07-01 | Stop reason: HOSPADM

## 2017-06-28 RX ADMIN — ONDANSETRON 4 MG: 2 INJECTION INTRAMUSCULAR; INTRAVENOUS at 06:06

## 2017-06-28 RX ADMIN — HYDROMORPHONE HYDROCHLORIDE 0.5 MG: 2 INJECTION INTRAMUSCULAR; INTRAVENOUS; SUBCUTANEOUS at 06:06

## 2017-06-28 NOTE — ED PROVIDER NOTES
Encounter Date: 6/28/2017    SCRIBE #1 NOTE: I, Eric Franklin, am scribing for, and in the presence of,  Joe Maradiaga MD. I have scribed the following portions of the note - Other sections scribed: HPI and ROS.       History     Chief Complaint   Patient presents with    Leg Pain     Pt reports that they were using a slide board to move her today to wheelchair and caused a lumjp on right cvalf. Now the discoloration and swelling has moved up her left lower leg and she is screaming in pain. Pt also reports she has a left dislocated hip that is causing her pain. She reports they replaced it in 1982, but then last year it became dislocated and she can't have the surgery due to her health.     CC: Leg Pain    HPI: This 85 y.o. 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 Pt reports that her daughter tried to get pt out of bed when pt's L lower leg hit the bed frame. Pt reports associated mild SOB. Pt reports lingering pain and swelling to small area in R lower leg that developed after she hit her leg x3 weeks ago. Pt denies nausea, vomiting, cough, and CP. Pt denies hx of smoking and drinking. Pt denies being on any blood-thinners. Pt reports taking ASA daily. Pt has been told by her surgeons that they cannot repair her chronically dislocated L hip due to comorbidities.         The history is provided by the patient and a relative. No  was used.     Review of patient's allergies indicates:   Allergen Reactions    Bystolic [nebivolol]     Losartan     Diovan [valsartan] Rash     Past Medical History:   Diagnosis Date    Arthritis     Blood transfusion     CHF (congestive heart failure)     Coronary artery disease     Diabetes mellitus     General  "anesthetics causing adverse effect in therapeutic use     "woke up as they were putting me on the table"    Hyperlipidemia     Hypertension     Renal disorder     Thyroid disease      Past Surgical History:   Procedure Laterality Date    CATARACT EXTRACTION EXTRACAPSULAR W/ INTRAOCULAR LENS IMPLANTATION  Sept 2012    right eye    CHOLECYSTECTOMY      CORONARY ANGIOPLASTY WITH STENT PLACEMENT  2011    JOINT REPLACEMENT      Left total hip replacement in 1986    TONSILLECTOMY       Family History   Problem Relation Age of Onset    Diabetes Daughter     Hypertension Daughter     Pacemaker/defibrilator Daughter     Alzheimer's disease Sister     Dementia Sister     Alcohol abuse Brother     Diabetes Daughter     Hypertension Daughter     Cancer Sister      liver    Cancer Sister      lung    Kidney disease Sister      dialysis     Social History   Substance Use Topics    Smoking status: Never Smoker    Smokeless tobacco: Never Used    Alcohol use No     Review of Systems   Constitutional: Negative for chills and fever.   HENT: Negative for ear pain and sore throat.    Eyes: Negative for pain.   Respiratory: Positive for shortness of breath (mild). Negative for cough.    Cardiovascular: Negative for chest pain.   Gastrointestinal: Negative for abdominal pain, diarrhea, nausea and vomiting.   Genitourinary: Negative for dysuria.   Musculoskeletal: Negative for back pain.        (+) severe pain, swelling, and bruising to lower L leg   Skin: Negative for rash.   Neurological: Negative for headaches.       Physical Exam     Initial Vitals [06/28/17 1735]   BP Pulse Resp Temp SpO2   (!) 187/87 83 20 98.7 °F (37.1 °C) 99 %      MAP       120.33         Physical Exam  The patient was examined specifically for the following:   General:No significant distress, Good color, Warm and dry. Head and neck:Scalp atraumatic, Neck supple. Neurological:Appropriate conversation, Gross motor deficits. Eyes:Conjugate " gaze, Clear corneas. ENT: No epistaxis. Cardiac: Regular rate and rhythm, Grossly normal heart tones. Pulmonary: Wheezing, Rales. Gastrointestinal: Abdominal tenderness, Abdominal distention. Musculoskeletal: Extremity deformity, Apparent pain with range of motion of the joints. Skin: Rash.   The findings on examination were normal except for the following: The patient has pale range of motion left hip.  The left leg is ecchymotic and tensely edematous.  I can feel no pulses in the left foot.  Any movement causes pain.  ED Course   Critical Care  Date/Time: 7/15/2017 1:27 AM  Performed by: YINKA KIRKPATRICK  Authorized by: HARSHA VARGHESE   Direct patient critical care time: 22 minutes  Additional history critical care time: 11 minutes  Ordering / reviewing critical care time: 11 minutes  Documentation critical care time: 17 minutes  Consulting other physicians critical care time: 5 minutes  Consult with family critical care time: 5 minutes  Total critical care time (exclusive of procedural time) : 71 minutes  Critical care time was exclusive of separately billable procedures and treating other patients and teaching time.  Critical care was necessary to treat or prevent imminent or life-threatening deterioration of the following conditions: circulatory failure and metabolic crisis.  Critical care was time spent personally by me on the following activities: development of treatment plan with patient or surrogate, discussions with primary provider, evaluation of patient's response to treatment, examination of patient, obtaining history from patient or surrogate, ordering and performing treatments and interventions, ordering and review of laboratory studies, ordering and review of radiographic studies, pulse oximetry, re-evaluation of patient's condition and review of old charts.        Labs Reviewed   COMPREHENSIVE METABOLIC PANEL - Abnormal; Notable for the following:        Result Value    Creatinine 1.6 (*)      Calcium 8.5 (*)     Total Protein 5.9 (*)     Albumin 2.8 (*)     Alkaline Phosphatase 53 (*)     Anion Gap 6 (*)     eGFR if  34 (*)     eGFR if non  29 (*)     All other components within normal limits   CBC W/ AUTO DIFFERENTIAL - Abnormal; Notable for the following:     RBC 2.22 (*)     Hemoglobin 6.8 (*)     Hematocrit 19.7 (*)     RDW 16.6 (*)     Platelets 113 (*)     Lymph # 0.6 (*)     Gran% 82.8 (*)     Lymph% 8.3 (*)     All other components within normal limits    Narrative:       HEMATOCRIT critical result(s) called and verbal readback obtained   from Dr. Maradiaga 19:35 on 06/22/2017, 06/28/2017 19:36   APTT   PROTIME-INR   MAGNESIUM         Medical decision making: Given the above, this patient presents with tense edema of the left leg after minor trauma.  There is no evidence of fracture on x-ray.  The patient has a platelet count of 113,000.  She is on aspirin.  Her INR is 1.2.  We believe the heavy bleeding is from him.  Platelets from the aspirin therapy.  No fractures are identified.  We carefully consider compartment syndrome.  The patient was seen and evaluated by orthopedics, , in the emergency room who recommends admission and transfusion ice elevation and neurovascular checks every 3 hours.  He will be consult did.  I will write orders on behalf of hospital medicine.  The patient will be admitted to Dr. Faria.  We were able to Doppler dorsalis pedis pulse in his foot.                    Scribe Attestation:   Scribe #1: I performed the above scribed service and the documentation accurately describes the services I performed. I attest to the accuracy of the note.    Attending Attestation:           Physician Attestation for Scribe:  Physician Attestation Statement for Scribe #1: I, Joe Maradiaga MD, reviewed documentation, as scribed by Eric Franklin in my presence, and it is both accurate and complete.                 ED Course     Clinical Impression:    The primary encounter diagnosis was Traumatic hematoma of left lower leg, initial encounter. Diagnoses of Leg pain, diffuse, left, Acute posthemorrhagic anemia, Traumatic hematoma of left lower leg, Hypothyroidism, unspecified type, Coronary artery disease involving native coronary artery of native heart without angina pectoris, Essential hypertension, benign, Chronic kidney disease, stage III (moderate), Diabetes mellitus type 2 in nonobese, Acute blood loss anemia, Vitamin D deficiency, Hypercholesteremia, Benign hypertensive kidney disease with chronic kidney disease stage I through stage IV, or unspecified, Hypothyroidism due to acquired atrophy of thyroid, Coronary atherosclerosis of unspecified type of vessel, native or graft, Bilateral leg edema, Pulmonary hypertension, Anemia of chronic disease, and Weakness were also pertinent to this visit.                           Joe Maradiaga MD  06/28/17 1954       Joe Maradiaga MD  07/15/17 0128

## 2017-06-28 NOTE — ED TRIAGE NOTES
Pt. Arrived via personal transport. Daughter states her leg began to swell immediately after hitting her leg on the bed rails approximately 2 hours ago. Rates pain in leg as 10 out of 10 on scale. Denies any other symptoms at this time.

## 2017-06-29 PROBLEM — D62 ACUTE BLOOD LOSS ANEMIA: Status: ACTIVE | Noted: 2017-06-29

## 2017-06-29 LAB
BASOPHILS # BLD AUTO: 0.01 K/UL
BASOPHILS NFR BLD: 0.1 %
BLD PROD TYP BPU: NORMAL
BLD PROD TYP BPU: NORMAL
BLOOD UNIT EXPIRATION DATE: NORMAL
BLOOD UNIT EXPIRATION DATE: NORMAL
BLOOD UNIT TYPE CODE: 5100
BLOOD UNIT TYPE CODE: 5100
BLOOD UNIT TYPE: NORMAL
BLOOD UNIT TYPE: NORMAL
CODING SYSTEM: NORMAL
CODING SYSTEM: NORMAL
DIFFERENTIAL METHOD: ABNORMAL
DISPENSE STATUS: NORMAL
DISPENSE STATUS: NORMAL
EOSINOPHIL # BLD AUTO: 0.1 K/UL
EOSINOPHIL NFR BLD: 0.6 %
ERYTHROCYTE [DISTWIDTH] IN BLOOD BY AUTOMATED COUNT: 15 %
ESTIMATED AVG GLUCOSE: 82 MG/DL
HBA1C MFR BLD HPLC: 4.5 %
HCT VFR BLD AUTO: 20.8 %
HCT VFR BLD AUTO: 23.5 %
HGB BLD-MCNC: 7.3 G/DL
HGB BLD-MCNC: 7.9 G/DL
LYMPHOCYTES # BLD AUTO: 1.1 K/UL
LYMPHOCYTES NFR BLD: 9.6 %
MCH RBC QN AUTO: 29.5 PG
MCHC RBC AUTO-ENTMCNC: 33.6 %
MCV RBC AUTO: 88 FL
MONOCYTES # BLD AUTO: 0.6 K/UL
MONOCYTES NFR BLD: 5 %
NEUTROPHILS # BLD AUTO: 9.4 K/UL
NEUTROPHILS NFR BLD: 84.7 %
PLATELET # BLD AUTO: 89 K/UL
PMV BLD AUTO: 9.6 FL
POCT GLUCOSE: 100 MG/DL (ref 70–110)
POCT GLUCOSE: 114 MG/DL (ref 70–110)
POCT GLUCOSE: 116 MG/DL (ref 70–110)
POCT GLUCOSE: 120 MG/DL (ref 70–110)
POCT GLUCOSE: 129 MG/DL (ref 70–110)
RBC # BLD AUTO: 2.68 M/UL
TRANS ERYTHROCYTES VOL PATIENT: NORMAL ML
TRANS ERYTHROCYTES VOL PATIENT: NORMAL ML
WBC # BLD AUTO: 11.11 K/UL

## 2017-06-29 PROCEDURE — P9021 RED BLOOD CELLS UNIT: HCPCS

## 2017-06-29 PROCEDURE — 25000003 PHARM REV CODE 250: Performed by: EMERGENCY MEDICINE

## 2017-06-29 PROCEDURE — 11000001 HC ACUTE MED/SURG PRIVATE ROOM

## 2017-06-29 PROCEDURE — 63600175 PHARM REV CODE 636 W HCPCS: Performed by: EMERGENCY MEDICINE

## 2017-06-29 PROCEDURE — 83036 HEMOGLOBIN GLYCOSYLATED A1C: CPT

## 2017-06-29 PROCEDURE — 85018 HEMOGLOBIN: CPT

## 2017-06-29 PROCEDURE — 94761 N-INVAS EAR/PLS OXIMETRY MLT: CPT

## 2017-06-29 PROCEDURE — 36415 COLL VENOUS BLD VENIPUNCTURE: CPT

## 2017-06-29 PROCEDURE — 85025 COMPLETE CBC W/AUTO DIFF WBC: CPT

## 2017-06-29 PROCEDURE — 85014 HEMATOCRIT: CPT

## 2017-06-29 PROCEDURE — 25000003 PHARM REV CODE 250: Performed by: HOSPITALIST

## 2017-06-29 RX ADMIN — LABETALOL HYDROCHLORIDE 300 MG: 100 TABLET, FILM COATED ORAL at 09:06

## 2017-06-29 RX ADMIN — LEVOTHYROXINE SODIUM 50 MCG: 50 TABLET ORAL at 06:06

## 2017-06-29 RX ADMIN — FUROSEMIDE 20 MG: 20 TABLET ORAL at 09:06

## 2017-06-29 RX ADMIN — FUROSEMIDE 20 MG: 20 TABLET ORAL at 12:06

## 2017-06-29 RX ADMIN — FUROSEMIDE 20 MG: 20 TABLET ORAL at 05:06

## 2017-06-29 RX ADMIN — HYDROMORPHONE HYDROCHLORIDE 0.5 MG: 2 INJECTION INTRAMUSCULAR; INTRAVENOUS; SUBCUTANEOUS at 12:06

## 2017-06-29 RX ADMIN — HYDRALAZINE HYDROCHLORIDE 50 MG: 25 TABLET ORAL at 06:06

## 2017-06-29 RX ADMIN — HYDRALAZINE HYDROCHLORIDE 50 MG: 25 TABLET ORAL at 02:06

## 2017-06-29 RX ADMIN — HYDROMORPHONE HYDROCHLORIDE 0.5 MG: 2 INJECTION INTRAMUSCULAR; INTRAVENOUS; SUBCUTANEOUS at 09:06

## 2017-06-29 RX ADMIN — Medication 250 MG: at 09:06

## 2017-06-29 RX ADMIN — ALLOPURINOL 100 MG: 100 TABLET ORAL at 09:06

## 2017-06-29 RX ADMIN — FERROUS SULFATE TAB EC 325 MG (65 MG FE EQUIVALENT) 325 MG: 325 (65 FE) TABLET DELAYED RESPONSE at 09:06

## 2017-06-29 RX ADMIN — HYDRALAZINE HYDROCHLORIDE 50 MG: 25 TABLET ORAL at 09:06

## 2017-06-29 RX ADMIN — HYDROMORPHONE HYDROCHLORIDE 1 MG: 2 INJECTION INTRAMUSCULAR; INTRAVENOUS; SUBCUTANEOUS at 10:06

## 2017-06-29 NOTE — PHYSICIAN QUERY
"PT Name: Christal Goodson  MR #: [id number]    Physician Query Form - Heart  Condition Clarification     CDS/: Deena Faria RN-CDI    Contact information: [phone]  This form is a permanent document in the medical record.     Query Date: June 29, 2017    By submitting this query, we are merely seeking further clarification of documentation. Please utilize your independent clinical judgment when addressing the question(s) below.    The medical record contains the following   Indicators     Supporting Clinical Findings Location in Medical Record    BNP      EF      Radiology findings      Echo Results      "Ascites" documented      "SOB" or "HERMAN" documented      "Hypoxia" documented     X Heart Failure documented CHF (congestive heart failure)  H&P   X "Edema" documented There is diffuse edema of the lower.  H&P   X Diuretics/Meds Furosemide 20 mg po BID  Hydralazine 50 mg po TID  Labetalol 300 mg po BID  MAR    Treatment:      Other:          Provider, please specify diagnosis or diagnoses associated with above clinical findings.                                 [  ] Chronic Systolic Heart Failure (EF < 40)*  [  ] Chronic Diastolic Heart Failure (EF > 40)*  [ X ] Chronic Combined Systolic and Diastolic Heart Failure  [  ] Other Type of Heart Failure (please specify type): _______________  [  ] Heart Failure Ruled Out  [  ] Other (please specify): _____________________  [  ] Clinically Undetermined            *American [employer] Association                                                                                                          Please document in your progress notes daily for the duration of treatment until resolved and include in your discharge summary.    "

## 2017-06-29 NOTE — ED NOTES
Spoke with Blood bank. There is an issue with releasing the blood,. It saying that a screening has not been completed but it has. Blood bank states that it will take a while before the blood can be administered. I spoke with Justina POLANCO on floor to notify her.

## 2017-06-29 NOTE — PLAN OF CARE
Problem: Patient Care Overview  Goal: Plan of Care Review  Outcome: Ongoing (interventions implemented as appropriate)  Plan of care established. Second of two units PRBC infusing. LLE elevated/ice applied. Blood sugar WNL. Remains free from falls.

## 2017-06-29 NOTE — HPI
Christal Goodson is a 85 y.o. 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  Acute onset severe of pain, swelling, and bruising to LLE beginning at 1600 at occurring when the patient was trying to get out of bed and in the process striking her lower leg on the bed frame.  The patient  reports associated mild SOB and constant pain and swelling to area.   radiation of pain throughout lower leg.  Exacerbated with palpation.  Minimal relief given with pain medication.  She takes aspirin but denies being on any  other oral blood-thinners.  previously she has been told by her surgeons that they cannot repair her chronically dislocated left  hip due to  complex comorbidities.     In the emergency department she was evaluated by orthopedic surgery for concern of compartment syndrome.  After the exam there is no determination of compartment syndrome was present but recommended admission for ice, elevation, pain control, and frequent neurovascular checks.  She was also found to be quite anemic thought to be secondary to the hematoma and blood was ordered to be transfused.  Hospital medicine has been asked to admit for further evaluation and treatment.

## 2017-06-29 NOTE — CARE UPDATE
Pt seen and examined, and I agree with Dr. Barrientos's assessment and plan. Will continue current care outlined in the H&P with the following additions:    No signs of compartment syndrome as of yet. Appreciate Ortho input and patient s/p transfusion of PRBC. Will continue to monitor closely with exams and H/H.    JEFF Simpson M.D.   Hospitalist   [facility]   Department of Hospital Medicine   Pager: [phone]

## 2017-06-29 NOTE — ED NOTES
Dr. Barrientos contacted to sign blood consent. He states that he will sign once pt is on the floor.

## 2017-06-29 NOTE — H&P
[facility] Medicine  History & Physical    Patient Name: Christal Goodson  MRN: [id number]  Admission Date: 06/29/2017  Attending Physician: Jalen Barrientos MD, MPH      PCP:     Juan Alberto Prieto MD    CC:     Chief Complaint   Patient presents with    Leg Pain     Pt reports that they were using a slide board to move her today to wheelchair and caused a lumjp on right cvalf. Now the discoloration and swelling has moved up her left lower leg and she is screaming in pain. Pt also reports she has a left dislocated hip that is causing her pain. She reports they replaced it in 1982, but then last year it became dislocated and she can't have the surgery due to her health.       HISTORY OF PRESENT ILLNESS:     Christal Goodson is a 85 y.o. 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  Acute onset severe of pain, swelling, and bruising to LLE beginning at 1600 at occurring when the patient was trying to get out of bed and in the process striking her lower leg on the bed frame.  The patient  reports associated mild SOB and constant pain and swelling to area.   radiation of pain throughout lower leg.  Exacerbated with palpation.  Minimal relief given with pain medication.  She takes aspirin but denies being on any  other oral blood-thinners.  previously she has been told by her surgeons that they cannot repair her chronically dislocated left  hip due to  complex comorbidities.     In the emergency department she was evaluated by orthopedic surgery for concern of compartment syndrome.  After the exam there is no determination of compartment syndrome was present but recommended admission for ice, elevation, pain control, and frequent  neurovascular checks.  She was also found to be quite anemic thought to be secondary to the hematoma and blood was ordered to be transfused.  Hospital medicine has been asked to admit for further evaluation and treatment.       REVIEW OF SYSTEMS:     -- Constitutional:  GENERALIZED WEAKNESS AND FATIGUE. No fever or chills.  -- Eyes: No visual changes, diplopia, pain, tearing, blind spots, or discharge.   -- Ears, nose, mouth, throat, and face: No congestion, sore throat, epistaxis, d/c, bleeding gums, neck stiffness masses, or dental issues.  -- Respiratory: No cough, shortness of breath at rest, hemoptysis, stridor, wheezing, or night sweats.  -- Cardiovascular:  dyspnea with exertion.  No chest pain, syncope, PND, edema, cyanosis, or palpitations.   -- Gastrointestinal: No vomiting, abdominal pain, hematemesis, melena, dyspepsia, or change in bowel habits.  -- Genitourinary: No hematuria, dysuria, frequency, urgency, nocturia, polyuria, stones, or incontinence.  -- Integument/breast: No rash, pruritis, pigmentation changes, dryness, or changes in hair  -- Hematologic/lymphatic:  positive for easy bruising.   Denies hematemesis, hemoptysis, melena, or hematochezia   -- Musculoskeletal: injury to left lower extremity as noted above.  Chronic left hip pain.    -- Neurological:  ataxia secondary to chronic hip dislocation.  No seizures, headaches, incoordination, paraesthesias, vertigo, or tremors.  -- Behavioral/Psych: No auditory or visual hallucinations, depression, or suicidal/homicidal ideations.  -- Endocrine: No heat or cold intolerance, polydipsia, or unintentional weight gain / loss.  -- Allergy/Immunologic: No recurrent infections or adverse reaction to food, insects, or difficulty breathing.      PAST MEDICAL / SURGICAL HISTORY:     Past Medical History:   Diagnosis Date    Arthritis     Blood transfusion     CHF (congestive heart failure)     Coronary artery disease     Diabetes mellitus     General  "anesthetics causing adverse effect in therapeutic use     "woke up as they were putting me on the table"    Hyperlipidemia     Hypertension     Renal disorder     Thyroid disease      Past Surgical History:   Procedure Laterality Date    CATARACT EXTRACTION EXTRACAPSULAR W/ INTRAOCULAR LENS IMPLANTATION  Sept 2012    right eye    CHOLECYSTECTOMY      CORONARY ANGIOPLASTY WITH STENT PLACEMENT  2011    JOINT REPLACEMENT      Left total hip replacement in 1986    TONSILLECTOMY           FAMILY HISTORY:     Family History   Problem Relation Age of Onset    Diabetes Daughter     Hypertension Daughter     Pacemaker/defibrilator Daughter     Alzheimer's disease Sister     Dementia Sister     Alcohol abuse Brother     Diabetes Daughter     Hypertension Daughter     Cancer Sister      liver    Cancer Sister      lung    Kidney disease Sister      dialysis         SOCIAL HISTORY:     Social History   Substance Use Topics    Smoking status: Never Smoker    Smokeless tobacco: Never Used    Alcohol use No     Social History     Social History    Marital status: Single     Spouse name: N/A    Number of children: N/A    Years of education: N/A     Social History Main Topics    Smoking status: Never Smoker    Smokeless tobacco: Never Used    Alcohol use No    Drug use: No    Sexual activity: Not Currently     Other Topics Concern    None     Social History Narrative    None         ALLERGIES:       Review of patient's allergies indicates:   Allergen Reactions    Bystolic [nebivolol] Other (See Comments)     "it made me feel like I was going to pass out"    Losartan     Diovan [valsartan] Rash     "I've taken that since then and nothing happened to me."         HEALTH SCREENING:     Prevnar 13 pneumonia vaccine = no evidence of previous vaccination found in the medical record      HOME MEDICATIONS:     Prior to Admission medications    Medication Sig Start Date End Date Taking? Authorizing " Provider   aspirin (ECOTRIN) 81 MG EC tablet Take 81 mg by mouth once daily.   Yes Historical Provider, MD   blood sugar diagnostic (TRUE METRIX GLUCOSE TEST STRIP) Strp To test once daily 6/14/17  Yes Juan Alberto Prieto MD   blood-glucose meter (TRUE METRIX AIR GLUCOSE METER) Misc To test once daily 6/14/17  Yes Juan Alberto Prieto MD   ferrous sulfate 325 mg (65 mg iron) Tab Take 1 tablet (325 mg total) by mouth daily with breakfast. 2/25/13  Yes Rosa Sawyer MD   flaxseed oil Oil by Misc.(Non-Drug; Combo Route) route Daily.   Yes Historical Provider, MD   furosemide (LASIX) 40 MG tablet Take 0.5 tablets (20 mg total) by mouth 2 (two) times daily. 5/8/17  Yes Max Faria MD   hydrALAZINE (APRESOLINE) 50 MG tablet TAKE 1 TABLET 3 TIMES DAILY. 1/4/17  Yes Historical Provider, MD   labetalol (NORMODYNE) 300 MG tablet TAKE 1 TABLET EVERY 12 HOURS DAILY. 1/4/17  Yes Historical Provider, MD   lancets 28 gauge Misc 1 lancet by Misc.(Non-Drug; Combo Route) route once daily at 6am. True Metrix lancets 6/14/17  Yes Juan Alberto Prieto MD   levothyroxine (SYNTHROID) 50 MCG tablet Take 1 tablet (50 mcg total) by mouth once daily. 5/26/17  Yes Vesta Fernandez NP-SIXTO   magnesium oxide (MAG-OX) 250 mg Tab Take 1 tablet (250 mg total) by mouth once daily. 9/16/15  Yes Miguel A Calderno MD   polyethylene glycol (GLYCOLAX) 17 gram PwPk Take 17 g by mouth 2 (two) times daily as needed (Constipation). 5/8/17  Yes Max Faria MD   vitamin D 1000 units Tab Take 1,000 Units by mouth once daily.   Yes Historical Provider, MD   vitamin E 400 UNIT capsule Take 400 Units by mouth once daily.   Yes Historical Provider, MD   acetaminophen (TYLENOL) 325 MG tablet Take 2 tablets (650 mg total) by mouth every 6 (six) hours as needed (Mild pain/Temp>100.4). 5/8/17   Max Faria MD   allopurinol (ZYLOPRIM) 100 MG tablet Take 1 tablet (100 mg total) by mouth once daily. 11/8/16   Juan Alberto Prieto MD   nystatin (MYCOSTATIN) cream  Apply topically 2 (two) times daily. 3/24/17   Dianna Hillman MD   triamcinolone acetonide 0.1% (KENALOG) 0.1 % cream Apply topically 2 (two) times daily. 3/24/17 4/3/17  Dianna Hillman MD          [facility] MEDICATIONS:     Scheduled Meds:    allopurinol  100 mg Oral Daily    ferrous sulfate  325 mg Oral Daily    flaxseed oil   Misc.(Non-Drug; Combo Route) Daily    furosemide  20 mg Oral BID    hydrALAZINE  50 mg Oral TID    labetalol  300 mg Oral Q12H    levothyroxine  50 mcg Oral Daily    magnesium oxide  250 mg Oral Daily    polyethylene glycol  17 g Oral Daily     Continuous Infusions:    PRN Meds: sodium chloride, dextrose 50%, dextrose 50%, glucagon (human recombinant), glucose, glucose, HYDROmorphone, HYDROmorphone, HYDROmorphone, insulin aspart, ondansetron, polyethylene glycol      PHYSICAL EXAM:     Wt Readings from Last 1 Encounters:   06/28/17 1735 59 kg (130 lb)     Body mass index is 23.78 kg/m².  Vitals:    06/28/17 2301 06/29/17 0053 06/29/17 0108 06/29/17 0119   BP: (!) 103/51 (!) 143/63 (!) 119/56 (!) 134/59   BP Location: Left arm      Patient Position: Lying      BP Method: Automatic      Pulse: 66 74  72   Resp:  16  16   Temp: 98.2 °F (36.8 °C) 98.1 °F (36.7 °C)  98.3 °F (36.8 °C)   TempSrc: Oral   Oral   SpO2: 100% 98%  97%   Weight:       Height:              -- General appearance: Chronically ill-appearing elderly female who is lying in bed.  well developed. appears stated age .  Daughter at the bedside.  -- Head: normocephalic, atraumatic   -- Eyes: conjunctivae clear. Extraocular muscles intact  -- Nose: Nares normal. Septum midline.   -- Mouth/Throat: lips, mucosa, and tongue normal. no throat erythema.   -- Neck: supple, symmetrical, trachea midline, no JVD and thyroid not grossly enlarged, appears symmetric  -- Lungs: clear to auscultation bilaterally. normal respiratory effort. No use of accessory muscles.   -- Chest wall: no tenderness. equal bilateral chest  rise   -- Heart: regular rate and rhythm. S1, S2 normal.  no click, rub or gallop   -- Abdomen: soft, non-tender, non-distended, non-tympanic; bowel sounds normal; no masses  -- Extremities:  left lower extremity with large hematoma in the calf with tense skin and discoloration and edema.   palpable pedal pulse.    -- Pulses: 1+ and symmetric bilateral pedal pulses.     -- Skin:  ecchymoses and left lower extremity and other various ecchymoses of various stages of healing scattered throughout extremities  -- Neurologic:  globally decreased muscleth and tone.  is is not able to ambulate.  No focal numbness or weakness. CNII-XII intact. López coma scale: eyes open spontaneously-4, oriented & converses-5, obeys commands-6.      LABORATORY STUDIES:     Recent Results (from the past 36 hour(s))   Comprehensive metabolic panel    Collection Time: 06/28/17  6:35 PM   Result Value Ref Range    Sodium 140 136 - 145 mmol/L    Potassium 5.0 3.5 - 5.1 mmol/L    Chloride 109 95 - 110 mmol/L    CO2 25 23 - 29 mmol/L    Glucose 90 70 - 110 mg/dL    BUN, Bld 23 8 - 23 mg/dL    Creatinine 1.6 (H) 0.5 - 1.4 mg/dL    Calcium 8.5 (L) 8.7 - 10.5 mg/dL    Total Protein 5.9 (L) 6.0 - 8.4 g/dL    Albumin 2.8 (L) 3.5 - 5.2 g/dL    Total Bilirubin 1.0 0.1 - 1.0 mg/dL    Alkaline Phosphatase 53 (L) 55 - 135 U/L    AST 20 10 - 40 U/L    ALT 10 10 - 44 U/L    Anion Gap 6 (L) 8 - 16 mmol/L    eGFR if African American 34 (A) >60 mL/min/1.73 m^2    eGFR if non African American 29 (A) >60 mL/min/1.73 m^2   APTT    Collection Time: 06/28/17  6:35 PM   Result Value Ref Range    aPTT 27.3 21.0 - 32.0 sec   Protime-INR    Collection Time: 06/28/17  6:35 PM   Result Value Ref Range    Prothrombin Time 12.4 9.0 - 12.5 sec    INR 1.2 0.8 - 1.2   CBC auto differential    Collection Time: 06/28/17  6:35 PM   Result Value Ref Range    WBC 7.13 3.90 - 12.70 K/uL    RBC 2.22 (L) 4.00 - 5.40 M/uL    Hemoglobin 6.8 (L) 12.0 - 16.0 g/dL    Hematocrit 19.7 (LL)  37.0 - 48.5 %    MCV 89 82 - 98 fL    MCH 30.6 27.0 - 31.0 pg    MCHC 34.5 32.0 - 36.0 %    RDW 16.6 (H) 11.5 - 14.5 %    Platelets 113 (L) 150 - 350 K/uL    MPV 10.9 9.2 - 12.9 fL    Gran # 5.9 1.8 - 7.7 K/uL    Lymph # 0.6 (L) 1.0 - 4.8 K/uL    Mono # 0.5 0.3 - 1.0 K/uL    Eos # 0.1 0.0 - 0.5 K/uL    Baso # 0.01 0.00 - 0.20 K/uL    Gran% 82.8 (H) 38.0 - 73.0 %    Lymph% 8.3 (L) 18.0 - 48.0 %    Mono% 6.7 4.0 - 15.0 %    Eosinophil% 1.8 0.0 - 8.0 %    Basophil% 0.1 0.0 - 1.9 %    Differential Method Automated    Magnesium    Collection Time: 06/28/17  6:35 PM   Result Value Ref Range    Magnesium 1.9 1.6 - 2.6 mg/dL   Prepare RBC 2 Units; Severe anemia    Collection Time: 06/28/17  7:38 PM   Result Value Ref Range    UNIT NUMBER [id number]     PRODUCT CODE E1930B13     DISPENSE STATUS RETURNED     CODING SYSTEM [id number]     Unit Blood Type Code 5100     Unit Blood Type O POS     Unit Expiration [id number]     UNIT NUMBER [id number]     PRODUCT CODE E4960Y56     DISPENSE STATUS RETURNED     CODING SYSTEM HFHN299     Unit Blood Type Code 5100     Unit Blood Type O POS     Unit Expiration [id number]    Type & Screen    Collection Time: 06/28/17  7:54 PM   Result Value Ref Range    Group & Rh O POS     Indirect Kelly NEG    Prepare RBC    Collection Time: 06/28/17  7:54 PM   Result Value Ref Range    UNIT NUMBER [id number]     PRODUCT CODE U6077W71     DISPENSE STATUS ISSUED     CODING SYSTEM KVIN434     Unit Blood Type Code 5100     Unit Blood Type O POS     Unit Expiration [id number]     UNIT NUMBER [id number]     PRODUCT CODE Z5777F62     DISPENSE STATUS CROSSMATCHED     CODING SYSTEM OJAM325     Unit Blood Type Code 5100     Unit Blood Type O POS     Unit Expiration [id number]        Lab Results   Component Value Date    INR 1.2 06/28/2017    INR 1.3 (H) 04/28/2017    INR 1.0 05/13/2016     Lab Results   Component Value Date    HGBA1C <4.0 (L) 04/28/2017         IMAGING:     Imaging Results           X-Ray Tibia Fibula 2 View Left (Final result)  Result time 06/28/17 19:13:16    Final result by Cristiano Benavides MD (06/28/17 19:13:16)                 Impression:      1.  No acute displaced fracture or dislocation of the tibia or fibula noted diffuse edema about the leg, and dense fluid likely material layering along the lateral aspect on the cross table view, clinical correlation advised, could reflect cellulitis or infection.      Electronically signed by: CRISTIANO BENAVIDES MD  Date:     06/28/17  Time:    19:13              Narrative:    Tibia fibula 2 views    Clinical history: Pain in left leg    Comparison: None    Findings:  4 views.    There is diffuse edema of the lower SMV.  There is vascular calcification.  There is osteopenia.  No acute displaced fracture or dislocation of the tibia or fibula.  No radiopaque foreign body.  The ankle mortise is intact, noting degenerative changes.  Marked degenerative changes are noted in the period there is diffuse skin thickening, and dense possible fluid layering along the lateral aspect of the lower leg, clinical correlation with physical exam is recommended, cellulitis is not excluded.                                CONSULTS:     IP CONSULT TO ORTHOPEDIC SURGERY       ASSESSMENT & PLAN:     Primary Diagnosis:  Traumatic hematoma of left lower leg    Active Hospital Problems    Diagnosis  POA    *Traumatic hematoma of left lower leg [S80.12XA]  Yes     Priority: 1 - High    Acute blood loss anemia [D62]  Yes     Priority: 2     Diabetes mellitus type 2 in nonobese [E11.9]  Yes     Priority: 2     Chronic kidney disease, stage III (moderate) [N18.3]  Yes     Priority: 3      Followed by Dr. Hartley      CAD (coronary artery disease) [I25.10]  Yes     Chronic    Essential hypertension, benign [I10]  Yes     Chronic    Hypothyroidism [E03.9]  Yes     Chronic     Followed by Dr. Gandhi        Resolved Hospital Problems    Diagnosis Date Resolved POA   No resolved  problems to display.         Traumatic hematoma of left lower leg  · Due to trauma while on antiplatelet medication  · Evaluated by orthopedic surgery for possible compartment syndrome; felt this was not likely compartment syndrome but the patient should be admitted for observation including elevation, ice, and frequent neurovascular checks  · Monitor H&H and platelet levels closely  · INR is 1.2.      Diabetes mellitus type 2 in nonobese  · BG in acceptable range at this time  · Maintain w/ subcutaneous insulin management order set  · Hold oral diabetic meds  · ADA 1800 kcal diet  · BG goal while in patient is <180mg/dL  · HgA1c = Pending      Chronic kidney disease, stage III (moderate)    · Renal dose medications  · Avoid nephrotoxic agents  · Maintain euvolemic state    Hypothyroidism    · Clinically, patient is euthyroid   · Chemically, undetermined  · Obtain TSH, free T3, and free T4  · Continue current regimen    CAD (coronary artery disease)  · No evidence of acute coronary syndrome at this time  · Maintain adequate blood pressure control  · Heart healthy diet  · Aspirin on hold due to hematoma as noted above  · Statin      Essential hypertension, benign  · Goal while inpatient is a systolic blood pressure less than 160mmHg  · BP in acceptable range at this time  · Continue current home regimen with hold parameters  · PRN antihypertensives available        Acute blood loss anemia   Due to hematoma formation as noted above  · Hemoglobin decreased from 10.4 to 6.8  · Given the patient's multiple chronic problems including a CAD and CKD, the patient will be transfused at least a hemoglobin of 8.  · Monitor CBC closely.        VTE Risk Mitigation         Ordered     Low Risk of VTE  Once      06/28/17 7208            Adult PRN medications available   DVT prophylaxis given       DISPOSITION:     Will admit to the Hospital Medicine service for further evaluation and treatment.    Chart reviewed and updated where  applicable.    High Risk Conditions:  Patient has a condition that poses threat to life and bodily function: Severe anemia secondary to hematoma secondary to traumatic leg injury      ===============================================================    Jalen Barrientos MD, MPH  Department of Hospital Medicine   [facility]  [phone] pg  (7pm - 6am)          This note is dictated using Dragon Medical 360 voice recognition software.  There are word recognition mistakes that are occasionally missed on review.

## 2017-06-29 NOTE — PROGRESS NOTES
84 y/o female non-ambulator c/o acute onset left leg pain, swelling , discoloration today starting at about 400pm when her caregivers inadvertently bumped her lower leg against a bed rail while trying to move her. She has had knee flexion contractures for some time and that is unchanged. All pain is below the knee and is severe. No numbness/tingling or other left leg complaints. She does relate a similar response to minor trauma to her right leg 3 weeks ago with similar symptoms. Only anticoagulant is ASA    PE Left leg- +ecchymosis with blisters forming at the lower leg; + TTP at the area of ecchymosis and calf muscles. Skin is tight at the zone of ecchymosis, but calf muscle and anterior compartments are not tight. Pt is able to actively plantar and dorsi flex the ankle, flex/ext the toes with minimal discomfort. DP is dopplerable. Sensation intact throughout  Knee is held flexed at 90 degrees and no motion observed there. No signof trauma at the knee or proximally    Hct 19.7 (29.1 on 5/8/17)  Xray- no fx, +soft tissue edema, +knee djd    A/P Lt lower leg contusion, anemia  1. Pt admitted for observation- 2 U PRBC ordered  2. NV checks q 2 hours  3. Elevate left leg on pillow(s) as tolerated and cover with ice packs over a sheet  4. Will follow- compartment syndrome is not currently an issue, but concern still exists for development

## 2017-06-29 NOTE — CONSULTS
84 yo female with a sig pmhx of DM, HTN, and CAD and a sig pshx of left BAN presented to the ED yesterday with a chief complaint of left lower leg pain and swelling after hitting her left on her bed while she was trying to get out of bed. She stated she had associated SOB. Patient was evaluated by Dr. Henderson yesterday afternoon and he was not concerned for compartment syndrome. She was admitted to hospital medicine. She takes 81mg aspirin at home.    VSSAF    Left lower extremity: TTP. Swelling about entire left tibia. Some blisters on lateral tibia. Patient able to flex and extent at the ankle and can wiggle all toes. She confirms sensation in foot and toes. No obvious evidence of compartment syndrome.  XRays left tibia: No fracture or dislocation    A/P: Traumatic hematoma left lower extremity  1) cont med mgmt  2) continue ice, elevation, and neurovascular checks of left lower extremity   3) ortho will cont to follow

## 2017-06-29 NOTE — PLAN OF CARE
06/29/17 1102   Discharge Assessment   Assessment Type Discharge Planning Assessment   Confirmed/corrected address and phone number on facesheet? Yes   Assessment information obtained from? Patient   Type of Healthcare Directive Received Living will   Prior to hospitilization cognitive status: Alert/Oriented   Prior to hospitalization functional status: Completely Dependent   Current cognitive status: Alert/Oriented   Current Functional Status: Completely Dependent   Arrived From home or self-care   Lives With child(rodo), adult   Able to Return to Prior Arrangements yes   Is patient able to care for self after discharge? Yes   How many people do you have in your home that can help with your care after discharge? 1   Who are your caregiver(s) and their phone number(s)? Liana pt daughter [phone]   Patient's perception of discharge disposition admitted as an inpatient   Readmission Within The Last 30 Days no previous admission in last 30 days   Patient currently being followed by outpatient case management? No   Patient currently receives home health services? No   Does the patient currently use HME? Yes   Patient currently receives private duty nursing? No   Patient currently receives any other outside agency services? No   Equipment Currently Used at Home lift device;rollator;bath bench;commode  (Electric scootor )   Do you have any problems affording any of your prescribed medications? Yes   Is the patient taking medications as prescribed? yes   Do you have any financial concerns preventing you from receiving the healthcare you need? No   Does the patient have transportation to healthcare appointments? (Pt has been unable to get to medical appointments because no one is able to pick her up. )   On Dialysis? No   Does the patient receive services at the [facility]? No   Are there any open cases? No   Discharge Plan A Home with family;Home Health   Discharge Plan B Home with family;Home Health  "  Patient/Family In Agreement With Plan yes     SW to patient's room to discuss Helping the patient manage care at home. JUAN roll explained to pt.  Teach back method used.  SW's name and contact info placed on white board.  Pt/family encouraged to call for any problems/concerns with DC. "Discharge planning begins on Admission" pamphlet discussed and placed in "PaperShare Packet" and placed at bedside.     CHF Heart Failure Zones discussed with patient and pt daughter Pat at pt bedside.       Pt / family memeber presented with education information on CHF Heart Failure Zones. JUAN explained that Green is the clear zone noted by no swelling, SOB, wheezing, chest tightness, and no decrease in your ability to maintain your activity level. Yellow is the caution zone with the following signs and symptoms: swelling, coughing, SOB, chest tightness, and chest tightness (This is when patient or family member will call PCP's office) and Red zone: Emergent signs and symptoms: Patient is struggling to breathe, is confused and has chest pain. Time to call 911. Teach back method used. JUAN asked pt to provided two signs and symptoms that may warrant him to call 911 or PCP. Pt stated " fluid due to swelling and pain".     SW was informed by pt she has missed a lot of her doctors appointments because her daughters are unable to transport her in and out of the bed. JUAN explained to pt going to follow-up appointments is one of the most effective ways to manage your care at home and we need to determine new ways to getting her to doctors appointments. JUAN informed pt JUAN will contact [facility] to determine if anything can be done such as transportation. JUAN contacted Dinaa with [facility] to determine if [facility] would be able to offer outpatient CM and a . According to Diana, a referral was sent to CM and the  to follow-up on pt. Pt discussed problems she is having getting back and forth to doctors " appointments and including other issues. According to Diana, in June case management arranged for [facility] and pt was approved. The information and contact information for [facility] was provided to pt. Diana provided SW with [facility] information to give to pt. Per Diana the contact information for [facility] is [phone] and the pt is to press 1 for reservation. Diana explained there is a three dollar co-pay both ways for transportation.     SW provided pt and pt daughter Pat with transportation information and provided other options to help pt manage her care at home. SW explained to pt if her family is unable to get her in and out of bed, there is always NH placement to consider. While in the room pt nurse Brittnee was also in th room providing valuable input and alternatives for pt to manage care at home. Pt informed SW and nurse she has a rustam lift at home but they able unable to use equipment because it does not fit in her room. Brittnee suggested pt move to the living room of the home and adjust the furniture. Pt daughter pt agreed that would be a great idea and also pt would not feel so lonely. Pt and family are requesting transportation at discharge via wheelchair services as pt has a ramp for transporter to push the chair over the ramp. JUAN will meet with supervisor to determine if [facility] will be an option.

## 2017-06-29 NOTE — PLAN OF CARE
Patient's plan of care includes bedrest. Tolerating diabetic diet. PRN pain medication given x 1 today. Pain well managed. Ice pack continued to LLE. Pt voids per bedpan. All needs met. Will continue to monitor.

## 2017-06-30 LAB
BASOPHILS # BLD AUTO: 0.01 K/UL
BASOPHILS # BLD AUTO: 0.01 K/UL
BASOPHILS NFR BLD: 0.1 %
BASOPHILS NFR BLD: 0.1 %
BLD PROD TYP BPU: NORMAL
BLD PROD TYP BPU: NORMAL
BLOOD UNIT EXPIRATION DATE: NORMAL
BLOOD UNIT EXPIRATION DATE: NORMAL
BLOOD UNIT TYPE CODE: 5100
BLOOD UNIT TYPE CODE: 5100
BLOOD UNIT TYPE: NORMAL
BLOOD UNIT TYPE: NORMAL
CODING SYSTEM: NORMAL
CODING SYSTEM: NORMAL
DIFFERENTIAL METHOD: ABNORMAL
DIFFERENTIAL METHOD: ABNORMAL
DISPENSE STATUS: NORMAL
DISPENSE STATUS: NORMAL
EOSINOPHIL # BLD AUTO: 0.1 K/UL
EOSINOPHIL # BLD AUTO: 0.1 K/UL
EOSINOPHIL NFR BLD: 0.6 %
EOSINOPHIL NFR BLD: 1.2 %
ERYTHROCYTE [DISTWIDTH] IN BLOOD BY AUTOMATED COUNT: 15.8 %
ERYTHROCYTE [DISTWIDTH] IN BLOOD BY AUTOMATED COUNT: 16.3 %
HCT VFR BLD AUTO: 19.3 %
HCT VFR BLD AUTO: 19.8 %
HCT VFR BLD AUTO: 26.4 %
HGB BLD-MCNC: 6.7 G/DL
HGB BLD-MCNC: 6.8 G/DL
HGB BLD-MCNC: 9 G/DL
LYMPHOCYTES # BLD AUTO: 0.9 K/UL
LYMPHOCYTES # BLD AUTO: 0.9 K/UL
LYMPHOCYTES NFR BLD: 10.8 %
LYMPHOCYTES NFR BLD: 8.4 %
MCH RBC QN AUTO: 29 PG
MCH RBC QN AUTO: 30.7 PG
MCHC RBC AUTO-ENTMCNC: 34.1 %
MCHC RBC AUTO-ENTMCNC: 34.7 %
MCV RBC AUTO: 85 FL
MCV RBC AUTO: 89 FL
MONOCYTES # BLD AUTO: 0.8 K/UL
MONOCYTES # BLD AUTO: 0.9 K/UL
MONOCYTES NFR BLD: 10 %
MONOCYTES NFR BLD: 8.1 %
NEUTROPHILS # BLD AUTO: 6.3 K/UL
NEUTROPHILS # BLD AUTO: 9.2 K/UL
NEUTROPHILS NFR BLD: 78.5 %
NEUTROPHILS NFR BLD: 82.2 %
PLATELET # BLD AUTO: 86 K/UL
PLATELET # BLD AUTO: 86 K/UL
PMV BLD AUTO: 9.8 FL
PMV BLD AUTO: 9.9 FL
POCT GLUCOSE: 109 MG/DL (ref 70–110)
POCT GLUCOSE: 111 MG/DL (ref 70–110)
POCT GLUCOSE: 112 MG/DL (ref 70–110)
POCT GLUCOSE: 97 MG/DL (ref 70–110)
RBC # BLD AUTO: 2.18 M/UL
RBC # BLD AUTO: 3.1 M/UL
TRANS ERYTHROCYTES VOL PATIENT: NORMAL ML
TRANS ERYTHROCYTES VOL PATIENT: NORMAL ML
WBC # BLD AUTO: 11.16 K/UL
WBC # BLD AUTO: 8.06 K/UL

## 2017-06-30 PROCEDURE — 36415 COLL VENOUS BLD VENIPUNCTURE: CPT

## 2017-06-30 PROCEDURE — 25000003 PHARM REV CODE 250: Performed by: HOSPITALIST

## 2017-06-30 PROCEDURE — 36430 TRANSFUSION BLD/BLD COMPNT: CPT

## 2017-06-30 PROCEDURE — 94761 N-INVAS EAR/PLS OXIMETRY MLT: CPT

## 2017-06-30 PROCEDURE — 63600175 PHARM REV CODE 636 W HCPCS: Performed by: HOSPITALIST

## 2017-06-30 PROCEDURE — 85018 HEMOGLOBIN: CPT

## 2017-06-30 PROCEDURE — 85014 HEMATOCRIT: CPT

## 2017-06-30 PROCEDURE — 85025 COMPLETE CBC W/AUTO DIFF WBC: CPT | Mod: 91

## 2017-06-30 PROCEDURE — 11000001 HC ACUTE MED/SURG PRIVATE ROOM

## 2017-06-30 PROCEDURE — P9021 RED BLOOD CELLS UNIT: HCPCS

## 2017-06-30 PROCEDURE — 25000003 PHARM REV CODE 250: Performed by: EMERGENCY MEDICINE

## 2017-06-30 PROCEDURE — 63600175 PHARM REV CODE 636 W HCPCS: Performed by: EMERGENCY MEDICINE

## 2017-06-30 RX ORDER — FUROSEMIDE 10 MG/ML
20 INJECTION INTRAMUSCULAR; INTRAVENOUS ONCE
Status: COMPLETED | OUTPATIENT
Start: 2017-06-30 | End: 2017-06-30

## 2017-06-30 RX ADMIN — FUROSEMIDE 20 MG: 20 TABLET ORAL at 09:06

## 2017-06-30 RX ADMIN — FERROUS SULFATE TAB EC 325 MG (65 MG FE EQUIVALENT) 325 MG: 325 (65 FE) TABLET DELAYED RESPONSE at 09:06

## 2017-06-30 RX ADMIN — LEVOTHYROXINE SODIUM 50 MCG: 50 TABLET ORAL at 06:06

## 2017-06-30 RX ADMIN — POLYETHYLENE GLYCOL 3350 17 G: 17 POWDER, FOR SOLUTION ORAL at 09:06

## 2017-06-30 RX ADMIN — HYDROMORPHONE HYDROCHLORIDE 1 MG: 2 INJECTION INTRAMUSCULAR; INTRAVENOUS; SUBCUTANEOUS at 12:06

## 2017-06-30 RX ADMIN — FUROSEMIDE 20 MG: 20 TABLET ORAL at 05:06

## 2017-06-30 RX ADMIN — HYDRALAZINE HYDROCHLORIDE 50 MG: 25 TABLET ORAL at 02:06

## 2017-06-30 RX ADMIN — Medication 250 MG: at 09:06

## 2017-06-30 RX ADMIN — HYDRALAZINE HYDROCHLORIDE 50 MG: 25 TABLET ORAL at 09:06

## 2017-06-30 RX ADMIN — FUROSEMIDE 20 MG: 10 INJECTION, SOLUTION INTRAVENOUS at 09:06

## 2017-06-30 RX ADMIN — HYDRALAZINE HYDROCHLORIDE 50 MG: 25 TABLET ORAL at 06:06

## 2017-06-30 RX ADMIN — LABETALOL HYDROCHLORIDE 300 MG: 100 TABLET, FILM COATED ORAL at 09:06

## 2017-06-30 RX ADMIN — ALLOPURINOL 100 MG: 100 TABLET ORAL at 09:06

## 2017-06-30 NOTE — PROGRESS NOTES
Patient awake and alert. She stated her pain is well controlled and improved from yesterday.   VSSAF    Left LE: Dec TTP. + Swelling. Additional blisters on lateral leg. Patient able to flex and extend at the ankle and can wiggle all toes. Continues to confirm sensation in all toes.   Hct: 19.8    A/P: Left LE traumatic hematoma/anemia  1) primary team managing anemia  2) continue to ice and elevate left lower leg  3) no current concerns for compartment syndrom- cleared from ortho standpoint

## 2017-06-30 NOTE — NURSING
Patient's H/H is 6.8 and 19.8.  aware and 2 units of PRBC's were ordered. Will initiate orders and continue to monitor.

## 2017-06-30 NOTE — PLAN OF CARE
Problem: Patient Care Overview  Goal: Plan of Care Review  Outcome: Ongoing (interventions implemented as appropriate)  VSSAF. Blood sugar WNL. Turned q2h to prevent skin breakdown. LLE elevated and ice applied. Voids per bedpan. Remains free from falls.

## 2017-07-01 ENCOUNTER — NURSE TRIAGE (OUTPATIENT)
Dept: ADMINISTRATIVE | Facility: CLINIC | Age: 82
End: 2017-07-01

## 2017-07-01 VITALS
OXYGEN SATURATION: 97 % | RESPIRATION RATE: 19 BRPM | TEMPERATURE: 99 F | SYSTOLIC BLOOD PRESSURE: 188 MMHG | DIASTOLIC BLOOD PRESSURE: 74 MMHG | BODY MASS INDEX: 23.92 KG/M2 | HEIGHT: 62 IN | WEIGHT: 130 LBS | HEART RATE: 69 BPM

## 2017-07-01 LAB
BASOPHILS # BLD AUTO: 0.01 K/UL
BASOPHILS NFR BLD: 0.2 %
DIFFERENTIAL METHOD: ABNORMAL
EOSINOPHIL # BLD AUTO: 0.2 K/UL
EOSINOPHIL NFR BLD: 2.8 %
ERYTHROCYTE [DISTWIDTH] IN BLOOD BY AUTOMATED COUNT: 17 %
HCT VFR BLD AUTO: 26.5 %
HGB BLD-MCNC: 9 G/DL
LYMPHOCYTES # BLD AUTO: 0.8 K/UL
LYMPHOCYTES NFR BLD: 12.1 %
MCH RBC QN AUTO: 28.9 PG
MCHC RBC AUTO-ENTMCNC: 34 %
MCV RBC AUTO: 85 FL
MONOCYTES # BLD AUTO: 0.8 K/UL
MONOCYTES NFR BLD: 12.2 %
NEUTROPHILS # BLD AUTO: 4.8 K/UL
NEUTROPHILS NFR BLD: 72.7 %
PLATELET # BLD AUTO: 81 K/UL
PMV BLD AUTO: 9.7 FL
POCT GLUCOSE: 85 MG/DL (ref 70–110)
RBC # BLD AUTO: 3.11 M/UL
WBC # BLD AUTO: 6.54 K/UL

## 2017-07-01 PROCEDURE — 36415 COLL VENOUS BLD VENIPUNCTURE: CPT

## 2017-07-01 PROCEDURE — 63600175 PHARM REV CODE 636 W HCPCS: Performed by: EMERGENCY MEDICINE

## 2017-07-01 PROCEDURE — 25000003 PHARM REV CODE 250: Performed by: HOSPITALIST

## 2017-07-01 PROCEDURE — 85025 COMPLETE CBC W/AUTO DIFF WBC: CPT

## 2017-07-01 PROCEDURE — 25000003 PHARM REV CODE 250: Performed by: EMERGENCY MEDICINE

## 2017-07-01 RX ORDER — ASPIRIN 81 MG/1
TABLET ORAL
Refills: 0 | COMMUNITY
Start: 2017-07-01

## 2017-07-01 RX ADMIN — Medication 250 MG: at 09:07

## 2017-07-01 RX ADMIN — HYDROMORPHONE HYDROCHLORIDE 1 MG: 2 INJECTION INTRAMUSCULAR; INTRAVENOUS; SUBCUTANEOUS at 07:07

## 2017-07-01 RX ADMIN — FERROUS SULFATE TAB EC 325 MG (65 MG FE EQUIVALENT) 325 MG: 325 (65 FE) TABLET DELAYED RESPONSE at 09:07

## 2017-07-01 RX ADMIN — LEVOTHYROXINE SODIUM 50 MCG: 50 TABLET ORAL at 05:07

## 2017-07-01 RX ADMIN — ALLOPURINOL 100 MG: 100 TABLET ORAL at 09:07

## 2017-07-01 RX ADMIN — HYDRALAZINE HYDROCHLORIDE 50 MG: 25 TABLET ORAL at 05:07

## 2017-07-01 RX ADMIN — LABETALOL HYDROCHLORIDE 300 MG: 100 TABLET, FILM COATED ORAL at 09:07

## 2017-07-01 RX ADMIN — FUROSEMIDE 20 MG: 20 TABLET ORAL at 09:07

## 2017-07-01 NOTE — PLAN OF CARE
Problem: Patient Care Overview  Goal: Plan of Care Review  Pt AAO x 4, NAD noted.  Meds tolerated.  Denies pain at present.  Safety maintained, will continue to monitor.

## 2017-07-01 NOTE — PROGRESS NOTES
[facility] Hospital Medicine  Progress Note    Patient Name: Christal Goodson  MRN: [id number]  Patient Class: IP- Inpatient   Admission Date: 6/28/2017  Length of Stay: 2 days  Attending Physician: Tita Simpson MD  Primary Care Provider: Juan Alberto Prieto MD        Subjective:     Principal Problem:Traumatic hematoma of left lower leg    HPI:  Christal Goodson is a 85 y.o. 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  Acute onset severe of pain, swelling, and bruising to LLE beginning at 1600 at occurring when the patient was trying to get out of bed and in the process striking her lower leg on the bed frame.  The patient  reports associated mild SOB and constant pain and swelling to area.   radiation of pain throughout lower leg.  Exacerbated with palpation.  Minimal relief given with pain medication.  She takes aspirin but denies being on any  other oral blood-thinners.  previously she has been told by her surgeons that they cannot repair her chronically dislocated left  hip due to  complex comorbidities.     In the emergency department she was evaluated by orthopedic surgery for concern of compartment syndrome.  After the exam there is no determination of compartment syndrome was present but recommended admission for ice, elevation, pain control, and frequent neurovascular checks.  She was also found to be quite anemic thought to be secondary to the hematoma and blood was ordered to be transfused.  Hospital medicine has been asked to admit for further evaluation and treatment.     Hospital Course:  Ms. Goodson was admitted with traumatic hematoma of the left leg with resultant acute blood loss anemia. Pt was seen by Ortho and was ruled  out for compartment syndrome and she was transfused PRBC.    Interval History: Pt report left leg pain improved and swelling down today.    Review of Systems   Constitutional: Negative for chills and fever.   Respiratory: Negative for shortness of breath.    Cardiovascular: Negative for chest pain.     Objective:     Vital Signs (Most Recent):  Temp: 98.5 °F (36.9 °C) (06/30/17 1845)  Pulse: 63 (06/30/17 1845)  Resp: 19 (06/30/17 1845)  BP: 135/65 (06/30/17 1845)  SpO2: 98 % (06/30/17 1845) Vital Signs (24h Range):  Temp:  [98.2 °F (36.8 °C)-99.2 °F (37.3 °C)] 98.5 °F (36.9 °C)  Pulse:  [54-73] 63  Resp:  [17-19] 19  SpO2:  [95 %-98 %] 98 %  BP: ()/(48-75) 135/65     Weight: 59 kg (130 lb)  Body mass index is 23.78 kg/m².    Intake/Output Summary (Last 24 hours) at 06/30/17 1914  Last data filed at 06/30/17 1845   Gross per 24 hour   Intake             1363 ml   Output              400 ml   Net              963 ml      Physical Exam   Constitutional: She appears well-developed.   HENT:   Head: Normocephalic and atraumatic.   Eyes: EOM are normal. Pupils are equal, round, and reactive to light.   Neck: Normal range of motion. Neck supple.   Cardiovascular: Normal rate and regular rhythm.    Pulmonary/Chest: Effort normal and breath sounds normal.   Abdominal: Soft. Bowel sounds are normal.   Musculoskeletal:   Left leg with noted edema with some bullae noted, but improved from yesterday. Sensation intact, palpable pulse and pain level decreased.       Significant Labs: All pertinent labs within the past 24 hours have been reviewed.    Significant Imaging: I have reviewed and interpreted all pertinent imaging results/findings within the past 24 hours.    Assessment/Plan:      * Traumatic hematoma of left lower leg    Due to trauma while on antiplatelet medication, no signs of compartment syndrome. Appreciate Ortho input. Monitor.        Acute blood loss anemia    Due to hematoma, drop in H/H noted today likely  reflecting previous acute loss, will transfuse 2 units of PRBC and monitor. No other sources of loss.        CAD (coronary artery disease)    Aspirin on hold due to hematoma as noted above, continue statin.        Essential hypertension, benign    Continue current care.        Chronic kidney disease, stage III (moderate)    Creatinine remains stable, monitor.        Diabetes mellitus type 2 in nonobese    BG goal while in patient is <180mg/dL. Closely monitor blood glucose and make adjustments to insulin regimen as necessary. HgbA1C 4.5.        Hypothyroidism    Continue home regimen.          VTE Risk Mitigation         Ordered     Low Risk of VTE  Once      06/28/17 8632          Tita Simpson MD  Department of Hospital Medicine   [facility]

## 2017-07-01 NOTE — TELEPHONE ENCOUNTER
"    Reason for Disposition   [1] Request for URGENT new prescription or refill of "essential" medication (i.e., likelihood of harm to patient if not taken) AND [2] triager unable to fill per unit policy    Protocols used: ST MEDICATION QUESTION CALL-A-AH    Patient and her daughter called because she was just discharged from the hospital and her pain medications were not refilled. Call transferred to the MED Surg unit at [facility].   "

## 2017-07-01 NOTE — PLAN OF CARE
07/01/17 0933   Final Note   Assessment Type Final Discharge Note   Discharge Disposition Home   Discharge planning education complete? Yes   What phone number can be called within the next 1-3 days to see how you are doing after discharge? [phone]   Hospital Follow Up  Appt(s) scheduled? Yes   Discharge plans and expectations educations in teach back method with documentation complete? Yes   Right Care Referral Info   Post Acute Recommendation No Care       JUAN called [facility] to set up WC van. WC van will be here within the hour. JUAN spoke with pt and pt's daughter Marilee stating that pt will be discharging and [facility] will be here within the hour. Marilee stated that she would be here within a few minutes. JUAN voiced understanding. JUAN informed SHERRI Becerra that pt will be discharging by WC van and Marilee her daughter is on her way.

## 2017-07-01 NOTE — PLAN OF CARE
06/30/17 1112   Medicare Message   Important Message from Medicare regarding Discharge Appeal Rights Given to patient/caregiver;Explained to patient/caregiver;Signed/date by patient/caregiver   Date IMM was signed 06/30/17   Time IMM was signed [id number]

## 2017-07-01 NOTE — SUBJECTIVE & OBJECTIVE
Interval History: Pt report left leg pain improved and swelling down today.    Review of Systems   Constitutional: Negative for chills and fever.   Respiratory: Negative for shortness of breath.    Cardiovascular: Negative for chest pain.     Objective:     Vital Signs (Most Recent):  Temp: 98.5 °F (36.9 °C) (06/30/17 1845)  Pulse: 63 (06/30/17 1845)  Resp: 19 (06/30/17 1845)  BP: 135/65 (06/30/17 1845)  SpO2: 98 % (06/30/17 1845) Vital Signs (24h Range):  Temp:  [98.2 °F (36.8 °C)-99.2 °F (37.3 °C)] 98.5 °F (36.9 °C)  Pulse:  [54-73] 63  Resp:  [17-19] 19  SpO2:  [95 %-98 %] 98 %  BP: ()/(48-75) 135/65     Weight: 59 kg (130 lb)  Body mass index is 23.78 kg/m².    Intake/Output Summary (Last 24 hours) at 06/30/17 1914  Last data filed at 06/30/17 1845   Gross per 24 hour   Intake             1363 ml   Output              400 ml   Net              963 ml      Physical Exam   Constitutional: She appears well-developed.   HENT:   Head: Normocephalic and atraumatic.   Eyes: EOM are normal. Pupils are equal, round, and reactive to light.   Neck: Normal range of motion. Neck supple.   Cardiovascular: Normal rate and regular rhythm.    Pulmonary/Chest: Effort normal and breath sounds normal.   Abdominal: Soft. Bowel sounds are normal.   Musculoskeletal:   Left leg with noted edema with some bullae noted, but improved from yesterday. Sensation intact, palpable pulse and pain level decreased.       Significant Labs: All pertinent labs within the past 24 hours have been reviewed.    Significant Imaging: I have reviewed and interpreted all pertinent imaging results/findings within the past 24 hours.

## 2017-07-01 NOTE — PROGRESS NOTES
Pain continues to improve  VSS    Left leg- large bullae to anterior and medial lower leg are no longer tense, swelling slightly decreased; skin ok; ecchymosis unchanged; wiggles toes and ankle with minimal pain    Hct 26.4    A/P Left leg hematoma  1. No signs of compartment syndrome  2. Stable from Ortho standpoint- defer to primary team for timing of transfer or D/C  3. ACE and ABD placed; continue ICE and compression; wound care if bullae rupture  4. F/U in 2 weeks as outpt

## 2017-07-01 NOTE — PROGRESS NOTES
Discharge instructions provided and explained, pt and family verbalized understanding.  Instructed on changes in medications.  Awaiting transportation.

## 2017-07-01 NOTE — PROGRESS NOTES
Pt escorted from unit via wheelchair van by [facility] transportation services.  Family at bedside to meet pt at home.

## 2017-07-01 NOTE — HOSPITAL COURSE
Ms. Goodson was admitted with traumatic hematoma of the left leg with resultant acute blood loss anemia. Pt was seen by Ortho and was ruled out for compartment syndrome and she was transfused a total for 4 units of PRBC during hospital stay. Her H/H was stable on discharge at 9.0/26.5. Pt and family was educated to keep leg with iced daily and had compression dressing as per Ortho recommendation.

## 2017-07-02 NOTE — DISCHARGE SUMMARY
[facility] Hospital Medicine  Discharge Summary      Patient Name: Christal Goodson  MRN: [id number]  Admission Date: 6/28/2017  Hospital Length of Stay: 3 days  Discharge Date and Time: 7/1/2017  1:21 PM  Attending Physician: Tita Simpson MD  Discharging Provider: Tita Simpson MD  Primary Care Provider: Juan Alberto Prieto MD      HPI:   Christal Goodson is a 85 y.o. 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  Acute onset severe of pain, swelling, and bruising to LLE beginning at 1600 at occurring when the patient was trying to get out of bed and in the process striking her lower leg on the bed frame.  The patient  reports associated mild SOB and constant pain and swelling to area.   radiation of pain throughout lower leg.  Exacerbated with palpation.  Minimal relief given with pain medication.  She takes aspirin but denies being on any  other oral blood-thinners.  previously she has been told by her surgeons that they cannot repair her chronically dislocated left  hip due to  complex comorbidities.     In the emergency department she was evaluated by orthopedic surgery for concern of compartment syndrome.  After the exam there is no determination of compartment syndrome was present but recommended admission for ice, elevation, pain control, and frequent neurovascular checks.  She was also found to be quite anemic thought to be secondary to the hematoma and blood was ordered to be transfused.  Hospital medicine has been asked to admit for further evaluation and treatment.     * No surgery found *      Indwelling Lines/Drains at time of discharge:   Lines/Drains/Airways          No matching active lines, drains, or airways        Hospital  Course:   Ms. Goodson was admitted with traumatic hematoma of the left leg with resultant acute blood loss anemia. Pt was seen by Ortho and was ruled out for compartment syndrome and she was transfused a total for 4 units of PRBC during hospital stay. Her H/H was stable on discharge at 9.0/26.5. Pt and family were educated to keep the leg iced daily and had compression dressing as per Ortho recommendation. ASA was held and pt to hold this medication for a week prior to restarting. Pt arranged for follow up with Ortho and had tramadol for pain control.    Consults:   Consults         Status Ordering Provider     Inpatient consult to Orthopedic Surgery  Once     Provider:  Santiago Henderson MD    Completed YINKA KIRKPATRICK          Significant Diagnostic Studies:   X-ray of leg (6/28/17):  1.  No acute displaced fracture or dislocation of the tibia or fibula noted diffuse edema about the leg, and dense fluid likely material layering along the lateral aspect on the cross table view, clinical correlation advised, could reflect cellulitis or infection.      Pending Diagnostic Studies:     None        Final Active Diagnoses:    Diagnosis Date Noted POA    PRINCIPAL PROBLEM:  Traumatic hematoma of left lower leg [S80.12XA] 06/28/2017 Yes    Acute blood loss anemia [D62] 06/29/2017 Yes    CAD (coronary artery disease) [I25.10] 02/25/2013 Yes     Chronic    Essential hypertension, benign [I10] 02/25/2013 Yes     Chronic    Chronic kidney disease, stage III (moderate) [N18.3] 09/12/2015 Yes    Diabetes mellitus type 2 in nonobese [E11.9] 04/28/2017 Yes    Hypothyroidism [E03.9] 11/09/2012 Yes     Chronic      Problems Resolved During this Admission:    Diagnosis Date Noted Date Resolved POA        Discharged Condition: good    Disposition: Home or Self Care    Follow Up:  Follow-up Information     Juan Alberto Prieto MD On 7/10/2017.    Specialty:  Internal Medicine  Why:  Please arrive at 1040 for your appointment with  your PCP.   Contact information:  [address]  [place]  [phone]             Santiago Henderson MD In 2 weeks.    Specialty:  Orthopedic Surgery  Why:  CALL to schedule appointment with Surgery.  Contact information:  [address]  [address]  [place] [address]  [phone]                 Patient Instructions:     Diet general     Activity as tolerated     Ice to affected area   Scheduling Instructions: Compression dressing and ice to leg daily for 1 week.       Medications:  Reconciled Home Medications:   Discharge Medication List as of 7/1/2017 12:14 PM      CONTINUE these medications which have CHANGED    Details   aspirin (ECOTRIN) 81 MG EC tablet Hold for 1 week, and resume taking 81 mg daily on July 7, 2017., OTC         CONTINUE these medications which have NOT CHANGED    Details   blood sugar diagnostic (TRUE METRIX GLUCOSE TEST STRIP) Strp To test once daily, Print      blood-glucose meter (TRUE METRIX AIR GLUCOSE METER) Misc To test once daily, Print      ferrous sulfate 325 mg (65 mg iron) Tab Take 1 tablet (325 mg total) by mouth daily with breakfast., Starting 2/25/2013, Until Discontinued, Normal      flaxseed oil Oil by Misc.(Non-Drug; Combo Route) route Daily., Until Discontinued, Historical Med      furosemide (LASIX) 40 MG tablet Take 0.5 tablets (20 mg total) by mouth 2 (two) times daily., Starting 5/8/2017, Until Discontinued, Print      hydrALAZINE (APRESOLINE) 50 MG tablet TAKE 1 TABLET 3 TIMES DAILY., Historical Med      labetalol (NORMODYNE) 300 MG tablet TAKE 1 TABLET EVERY 12 HOURS DAILY., Historical Med      lancets 28 gauge Misc 1 lancet by Misc.(Non-Drug; Combo Route) route once daily at 6am. True Metrix lancets, Starting Wed 6/14/2017, Print      levothyroxine (SYNTHROID) 50 MCG tablet Take 1 tablet (50 mcg total) by mouth once daily., Starting Fri 5/26/2017, Normal      magnesium oxide (MAG-OX) 250 mg Tab Take 1 tablet (250 mg total) by mouth once daily., Starting  9/16/2015, Until Discontinued, Print      polyethylene glycol (GLYCOLAX) 17 gram PwPk Take 17 g by mouth 2 (two) times daily as needed (Constipation)., Starting 5/8/2017, Until Discontinued, Print      vitamin D 1000 units Tab Take 1,000 Units by mouth once daily., Until Discontinued, Historical Med      vitamin E 400 UNIT capsule Take 400 Units by mouth once daily., Until Discontinued, Historical Med      acetaminophen (TYLENOL) 325 MG tablet Take 2 tablets (650 mg total) by mouth every 6 (six) hours as needed (Mild pain/Temp>100.4)., Starting 5/8/2017, Until Discontinued, OTC      allopurinol (ZYLOPRIM) 100 MG tablet Take 1 tablet (100 mg total) by mouth once daily., Starting 11/8/2016, Until Discontinued, Normal      nystatin (MYCOSTATIN) cream Apply topically 2 (two) times daily., Starting 3/24/2017, Until Discontinued, Normal      triamcinolone acetonide 0.1% (KENALOG) 0.1 % cream Apply topically 2 (two) times daily., Starting 3/24/2017, Until Mon 4/3/17, Normal           Time spent on the discharge of patient: 40 minutes    Tita Simpson MD  Department of Hospital Medicine  [facility]

## 2017-07-03 ENCOUNTER — TELEPHONE (OUTPATIENT)
Dept: FAMILY MEDICINE | Facility: CLINIC | Age: 82
End: 2017-07-03

## 2017-07-05 NOTE — TELEPHONE ENCOUNTER
Contacted pt. She will be coming in on Monday 07/10/17 to have a hospital follow up with Dr. Prieto.

## 2017-07-07 ENCOUNTER — HOSPITAL ENCOUNTER (INPATIENT)
Facility: HOSPITAL | Age: 82
LOS: 12 days | Discharge: HOME HEALTH | DRG: 576 | End: 2017-07-20
Attending: EMERGENCY MEDICINE | Admitting: HOSPITALIST
Payer: MEDICARE

## 2017-07-07 ENCOUNTER — TELEPHONE (OUTPATIENT)
Dept: FAMILY MEDICINE | Facility: CLINIC | Age: 82
End: 2017-07-07

## 2017-07-07 DIAGNOSIS — S80.12XA TRAUMATIC HEMATOMA OF LEFT LOWER LEG: ICD-10-CM

## 2017-07-07 DIAGNOSIS — S80.12XA TRAUMATIC HEMATOMA OF LEFT LOWER LEG, INITIAL ENCOUNTER: Primary | ICD-10-CM

## 2017-07-07 LAB
ALBUMIN SERPL BCP-MCNC: 2.9 G/DL
ALP SERPL-CCNC: 64 U/L
ALT SERPL W/O P-5'-P-CCNC: 11 U/L
ANION GAP SERPL CALC-SCNC: 8 MMOL/L
APTT BLDCRRT: 31.3 SEC
AST SERPL-CCNC: 19 U/L
BASOPHILS # BLD AUTO: 0.01 K/UL
BASOPHILS NFR BLD: 0.1 %
BILIRUB SERPL-MCNC: 1 MG/DL
BUN SERPL-MCNC: 32 MG/DL
CALCIUM SERPL-MCNC: 9 MG/DL
CHLORIDE SERPL-SCNC: 105 MMOL/L
CO2 SERPL-SCNC: 25 MMOL/L
CREAT SERPL-MCNC: 1.8 MG/DL
DIFFERENTIAL METHOD: ABNORMAL
EOSINOPHIL # BLD AUTO: 0.1 K/UL
EOSINOPHIL NFR BLD: 1.2 %
ERYTHROCYTE [DISTWIDTH] IN BLOOD BY AUTOMATED COUNT: 15.7 %
EST. GFR  (AFRICAN AMERICAN): 29 ML/MIN/1.73 M^2
EST. GFR  (NON AFRICAN AMERICAN): 25 ML/MIN/1.73 M^2
GLUCOSE SERPL-MCNC: 79 MG/DL
HCT VFR BLD AUTO: 29.2 %
HGB BLD-MCNC: 9.8 G/DL
INR PPP: 1
LYMPHOCYTES # BLD AUTO: 0.6 K/UL
LYMPHOCYTES NFR BLD: 9 %
MCH RBC QN AUTO: 29.5 PG
MCHC RBC AUTO-ENTMCNC: 33.6 %
MCV RBC AUTO: 88 FL
MONOCYTES # BLD AUTO: 0.7 K/UL
MONOCYTES NFR BLD: 10 %
NEUTROPHILS # BLD AUTO: 5.4 K/UL
NEUTROPHILS NFR BLD: 79.7 %
PLATELET # BLD AUTO: 207 K/UL
PMV BLD AUTO: 9.2 FL
POCT GLUCOSE: 80 MG/DL (ref 70–110)
POTASSIUM SERPL-SCNC: 4.6 MMOL/L
PROT SERPL-MCNC: 6.7 G/DL
PROTHROMBIN TIME: 10.8 SEC
RBC # BLD AUTO: 3.32 M/UL
SODIUM SERPL-SCNC: 138 MMOL/L
WBC # BLD AUTO: 6.77 K/UL

## 2017-07-07 PROCEDURE — 99285 EMERGENCY DEPT VISIT HI MDM: CPT

## 2017-07-07 PROCEDURE — 87070 CULTURE OTHR SPECIMN AEROBIC: CPT

## 2017-07-07 PROCEDURE — 63600175 PHARM REV CODE 636 W HCPCS: Performed by: PHYSICIAN ASSISTANT

## 2017-07-07 PROCEDURE — 25000003 PHARM REV CODE 250: Performed by: HOSPITALIST

## 2017-07-07 PROCEDURE — 80053 COMPREHEN METABOLIC PANEL: CPT

## 2017-07-07 PROCEDURE — 25000003 PHARM REV CODE 250: Performed by: EMERGENCY MEDICINE

## 2017-07-07 PROCEDURE — 85730 THROMBOPLASTIN TIME PARTIAL: CPT

## 2017-07-07 PROCEDURE — 85610 PROTHROMBIN TIME: CPT

## 2017-07-07 PROCEDURE — 85025 COMPLETE CBC W/AUTO DIFF WBC: CPT

## 2017-07-07 PROCEDURE — 63600175 PHARM REV CODE 636 W HCPCS: Performed by: HOSPITALIST

## 2017-07-07 PROCEDURE — G0378 HOSPITAL OBSERVATION PER HR: HCPCS

## 2017-07-07 RX ORDER — OXYCODONE HYDROCHLORIDE 5 MG/1
5 TABLET ORAL EVERY 4 HOURS PRN
Status: DISCONTINUED | OUTPATIENT
Start: 2017-07-07 | End: 2017-07-20 | Stop reason: HOSPADM

## 2017-07-07 RX ORDER — INSULIN ASPART 100 [IU]/ML
0-5 INJECTION, SOLUTION INTRAVENOUS; SUBCUTANEOUS
Status: DISCONTINUED | OUTPATIENT
Start: 2017-07-07 | End: 2017-07-20 | Stop reason: HOSPADM

## 2017-07-07 RX ORDER — IBUPROFEN 200 MG
16 TABLET ORAL
Status: DISCONTINUED | OUTPATIENT
Start: 2017-07-07 | End: 2017-07-20 | Stop reason: HOSPADM

## 2017-07-07 RX ORDER — IBUPROFEN 200 MG
24 TABLET ORAL
Status: DISCONTINUED | OUTPATIENT
Start: 2017-07-07 | End: 2017-07-20 | Stop reason: HOSPADM

## 2017-07-07 RX ORDER — MORPHINE SULFATE 10 MG/ML
4 INJECTION INTRAMUSCULAR; INTRAVENOUS; SUBCUTANEOUS EVERY 6 HOURS PRN
Status: DISCONTINUED | OUTPATIENT
Start: 2017-07-07 | End: 2017-07-20 | Stop reason: HOSPADM

## 2017-07-07 RX ORDER — FUROSEMIDE 20 MG/1
20 TABLET ORAL 2 TIMES DAILY
Status: DISCONTINUED | OUTPATIENT
Start: 2017-07-07 | End: 2017-07-20 | Stop reason: HOSPADM

## 2017-07-07 RX ORDER — ACETAMINOPHEN 325 MG/1
650 TABLET ORAL EVERY 6 HOURS PRN
Status: DISCONTINUED | OUTPATIENT
Start: 2017-07-07 | End: 2017-07-20 | Stop reason: HOSPADM

## 2017-07-07 RX ORDER — ALLOPURINOL 100 MG/1
100 TABLET ORAL DAILY
Status: DISCONTINUED | OUTPATIENT
Start: 2017-07-08 | End: 2017-07-20 | Stop reason: HOSPADM

## 2017-07-07 RX ORDER — CEFEPIME HYDROCHLORIDE 1 G/50ML
1 INJECTION, SOLUTION INTRAVENOUS EVERY 24 HOURS
Status: DISCONTINUED | OUTPATIENT
Start: 2017-07-08 | End: 2017-07-09

## 2017-07-07 RX ORDER — HYDRALAZINE HYDROCHLORIDE 20 MG/ML
10 INJECTION INTRAMUSCULAR; INTRAVENOUS EVERY 6 HOURS PRN
Status: DISCONTINUED | OUTPATIENT
Start: 2017-07-07 | End: 2017-07-11

## 2017-07-07 RX ORDER — ONDANSETRON 2 MG/ML
4 INJECTION INTRAMUSCULAR; INTRAVENOUS EVERY 12 HOURS PRN
Status: DISCONTINUED | OUTPATIENT
Start: 2017-07-07 | End: 2017-07-20 | Stop reason: HOSPADM

## 2017-07-07 RX ORDER — POLYETHYLENE GLYCOL 3350 17 G/17G
17 POWDER, FOR SOLUTION ORAL DAILY
Status: DISCONTINUED | OUTPATIENT
Start: 2017-07-08 | End: 2017-07-12

## 2017-07-07 RX ORDER — LABETALOL 100 MG/1
300 TABLET, FILM COATED ORAL EVERY 12 HOURS
Status: DISCONTINUED | OUTPATIENT
Start: 2017-07-07 | End: 2017-07-20 | Stop reason: HOSPADM

## 2017-07-07 RX ORDER — VITAMIN E 268 MG
400 CAPSULE ORAL DAILY
Status: DISCONTINUED | OUTPATIENT
Start: 2017-07-08 | End: 2017-07-20 | Stop reason: HOSPADM

## 2017-07-07 RX ORDER — HYDRALAZINE HYDROCHLORIDE 25 MG/1
50 TABLET, FILM COATED ORAL 3 TIMES DAILY
Status: DISCONTINUED | OUTPATIENT
Start: 2017-07-07 | End: 2017-07-11

## 2017-07-07 RX ORDER — POLYETHYLENE GLYCOL 3350 17 G/17G
17 POWDER, FOR SOLUTION ORAL 2 TIMES DAILY PRN
Status: DISCONTINUED | OUTPATIENT
Start: 2017-07-07 | End: 2017-07-12

## 2017-07-07 RX ORDER — TALC
250 POWDER (GRAM) TOPICAL DAILY
Status: DISCONTINUED | OUTPATIENT
Start: 2017-07-08 | End: 2017-07-20 | Stop reason: HOSPADM

## 2017-07-07 RX ORDER — GLUCAGON 1 MG
1 KIT INJECTION
Status: DISCONTINUED | OUTPATIENT
Start: 2017-07-07 | End: 2017-07-20 | Stop reason: HOSPADM

## 2017-07-07 RX ORDER — FERROUS SULFATE 325(65) MG
325 TABLET, DELAYED RELEASE (ENTERIC COATED) ORAL DAILY
Status: DISCONTINUED | OUTPATIENT
Start: 2017-07-08 | End: 2017-07-20 | Stop reason: HOSPADM

## 2017-07-07 RX ORDER — LEVOTHYROXINE SODIUM 50 UG/1
50 TABLET ORAL
Status: DISCONTINUED | OUTPATIENT
Start: 2017-07-08 | End: 2017-07-20 | Stop reason: HOSPADM

## 2017-07-07 RX ORDER — ASPIRIN 81 MG/1
81 TABLET ORAL ONCE
Status: COMPLETED | OUTPATIENT
Start: 2017-07-07 | End: 2017-07-07

## 2017-07-07 RX ADMIN — MORPHINE SULFATE 4 MG: 10 INJECTION INTRAVENOUS at 11:07

## 2017-07-07 RX ADMIN — OXYCODONE HYDROCHLORIDE 5 MG: 5 TABLET ORAL at 08:07

## 2017-07-07 RX ADMIN — BACITRACIN ZINC, NEOMYCIN SULFATE, POLYMYXIN B SULFATE 1 EACH: 3.5; 5000; 4 OINTMENT TOPICAL at 09:07

## 2017-07-07 RX ADMIN — VANCOMYCIN HYDROCHLORIDE 1000 MG: 1 INJECTION, POWDER, LYOPHILIZED, FOR SOLUTION INTRAVENOUS at 08:07

## 2017-07-07 RX ADMIN — ASPIRIN 81 MG: 81 TABLET, COATED ORAL at 08:07

## 2017-07-07 RX ADMIN — HYDRALAZINE HYDROCHLORIDE 50 MG: 25 TABLET ORAL at 09:07

## 2017-07-07 RX ADMIN — LABETALOL HYDROCHLORIDE 300 MG: 100 TABLET, FILM COATED ORAL at 08:07

## 2017-07-07 RX ADMIN — FUROSEMIDE 20 MG: 20 TABLET ORAL at 08:07

## 2017-07-07 NOTE — TELEPHONE ENCOUNTER
----- Message from Echo Haynes sent at 7/7/2017 11:14 AM CDT -----  Contact: jose eduardo-daughter  Jose Eduardo called concerning fluid in the pt's leg. Please advise. [phone]

## 2017-07-07 NOTE — ED NOTES
"Pt presented with open wound to left leg that family states "busted open" bandage was removed 2 days ago, reports treating with ice packs.    "

## 2017-07-07 NOTE — ASSESSMENT & PLAN NOTE
Patient with traumatic hematoma for which she was hospitalized a little over 1 week ago as above in HPI. Today wound spontaneously opened per patient. Dr. Campo consulted from ED with recommendations for IV abx and wound care.   -follow H/H  -IV abx with vanc and cefepime  -analgesics  -Orthopedics and wound care consults

## 2017-07-07 NOTE — ASSESSMENT & PLAN NOTE
Creatinine of 1.8 which is near patient baseline. Will monitor. Avoid nephrotoxic agents and renally dose medications.

## 2017-07-07 NOTE — ED PROVIDER NOTES
"Encounter Date: 7/7/2017       History     Chief Complaint   Patient presents with    Wound Check     Per EMS report pt has hematoma to left calf that is draining pinkish malodorous blood/drainage; pt denies any other symptoms at this time     HPI   This 85-year-old female presents to the emergency room after having a hematoma in her left leg on June 28.  The patient sustained minor trauma to her leg and then had massive edema swelling tenderness and pain to the left leg.  The patient has a history of a chronic dislocation of the left hip.  The patient is not deemed to be a surgical candidate for repair for dislocation.  Today the patient had her legs hanging over the bed and suddenly the patient complains that her leg popped open.  There was bleeding at the scene.  There is no history of new trauma.  The patient complains of some drainage from the wound with some odor to the drainage.  The patient is essentially otherwise well without other injuries or problems.  She has moderate constant pain in the leg.  Review of patient's allergies indicates:   Allergen Reactions    Bystolic [nebivolol] Other (See Comments)     "it made me feel like I was going to pass out"    Losartan     Diovan [valsartan] Rash     "I've taken that since then and nothing happened to me."     Past Medical History:   Diagnosis Date    Arthritis     Blood transfusion     CHF (congestive heart failure)     Coronary artery disease     Diabetes mellitus     General anesthetics causing adverse effect in therapeutic use     "woke up as they were putting me on the table"    Hyperlipidemia     Hypertension     Renal disorder     Thyroid disease      Past Surgical History:   Procedure Laterality Date    CATARACT EXTRACTION EXTRACAPSULAR W/ INTRAOCULAR LENS IMPLANTATION  Sept 2012    right eye    CHOLECYSTECTOMY      CORONARY ANGIOPLASTY WITH STENT PLACEMENT  2011    JOINT REPLACEMENT      Left total hip replacement in 1986    " TONSILLECTOMY       Family History   Problem Relation Age of Onset    Diabetes Daughter     Hypertension Daughter     Pacemaker/defibrilator Daughter     Alzheimer's disease Sister     Dementia Sister     Alcohol abuse Brother     Diabetes Daughter     Hypertension Daughter     Cancer Sister      liver    Cancer Sister      lung    Kidney disease Sister      dialysis     Social History   Substance Use Topics    Smoking status: Never Smoker    Smokeless tobacco: Never Used    Alcohol use No     Review of Systems  The patient was questioned specifically with regard to the following.  General: Fever, chills, sweats. Neuro: Headache. Eyes: eye problems. ENT: Ear pain, sore throat. Cardiovascular: Chest pain. Respiratory: Cough, shortness of breath. Gastrointestinal: Abdominal pain, vomiting, diarrhea. Genitourinary: Painful urination.  Musculoskeletal: Arm and leg problems. Skin: Rash.  The review of systems was negative except for the following: Left leg pain  Physical Exam     Initial Vitals [07/07/17 1320]   BP Pulse Resp Temp SpO2   (!) 189/78 64 18 98.5 °F (36.9 °C) 98 %      MAP       115         Physical Exam  The patient was examined specifically for the following:   General:No significant distress, Good color, Warm and dry. Head and neck:Scalp atraumatic, Neck supple. Neurological:Appropriate conversation, Gross motor deficits. Eyes:Conjugate gaze, Clear corneas. ENT: No epistaxis. Cardiac: Regular rate and rhythm, Grossly normal heart tones. Pulmonary: Wheezing, Rales. Gastrointestinal: Abdominal tenderness, Abdominal distention. Musculoskeletal: Extremity deformity, Apparent pain with range of motion of the joints. Skin: Rash.   The findings on examination were normal except for the following: The patient has blistered skin that involves the entire lateral aspect of the left leg.  This was easily debrided during the physical examination removed.  There was some minimal odor.  The patient has a  16 cm skin defect that appears to be deep and full thickness with maroon clot at its base.  This skin defect is over the anterior shin on the left side.  It is essentially centered at the distal third of the leg.  There is no tense edema of the left lower extremity.  The patient still has pain with range of motion of the left hip.  Vital signs are stable.  The patient is in no distress.  She is afebrile.  She does not look ill or toxic.  ED Course   Procedures  Labs Reviewed   CBC W/ AUTO DIFFERENTIAL - Abnormal; Notable for the following:        Result Value    RBC 3.32 (*)     Hemoglobin 9.8 (*)     Hematocrit 29.2 (*)     RDW 15.7 (*)     Lymph # 0.6 (*)     Gran% 79.7 (*)     Lymph% 9.0 (*)     All other components within normal limits   COMPREHENSIVE METABOLIC PANEL - Abnormal; Notable for the following:     BUN, Bld 32 (*)     Creatinine 1.8 (*)     Albumin 2.9 (*)     eGFR if  29 (*)     eGFR if non  25 (*)     All other components within normal limits   APTT   PROTIME-INR   POCT GLUCOSE MONITORING CONTINUOUS         Medical decision making: I shared a picture of the wound on the leg with , orthopedics, via cell phone text.  This was done with the family's permission.  He recommended admission to the hospital consultation with wound care and IV antibiotics.  We await the results of the patient's comments of metabolic profile.  The patient does not seem to be ill toxic or septic. The patient's hemoglobin and hematocrit are climbing.  There is no elevated white blood count or fever.  The patient's hemoglobin and hematocrit are improving.  The CMP is pending at this time.  I discussed this case with the nurse practitioner.  The patient will be placed in observation.  Orthopedics requests IV antibiotics.                         ED Course     Clinical Impression:   The primary encounter diagnosis was Traumatic hematoma of left lower leg, initial encounter. A diagnosis  of Traumatic hematoma of left lower leg was also pertinent to this visit.                           Joe Maradiaga MD  07/08/17 0176

## 2017-07-07 NOTE — ASSESSMENT & PLAN NOTE
Last A1c 4.5 June 2017. Well controlled on no medications at present. Will monitor with POCT checks and cover with SSI PRN.

## 2017-07-07 NOTE — TELEPHONE ENCOUNTER
Pt's daughter states mom had fluid in her legs with pain and she took her to [facility] to be evaluated.

## 2017-07-07 NOTE — HPI
"Ms. Platt is a 86 yo woman with HTN, HLD, CAD, CHF (EF 40%+ DD), CKD, and DMII who presents with complaint of leg wound. Patient was recently hospitalized at [facility] from 6/28-7/1/17 for L lower extremity traumatic hematoma resulting in acute blood loss anemia. Was transfused 4 units PRBC and discharged in stable condition with planned outpatient follow up. Per patient today while hanging legs over side of bed the affected leg "popped open" with some blood and drainage at the scene. She denies new trauma to the area. She complains of leg pain but otherwise denies fever/chills, SOB, CP, abdominal pain, N/V, dysuria. On presentation patient with elevated blood pressure but otherwise grossly normal labs. Afebrile in without leukocytosis. Non toxic appearing. H/H stable with hemoglobin of 9.8 (up from 9.0 at recent discharge). Call placed to Dr. Campo with photo of affected area sent with family permission per ED note. He is with recommendation for IV antibiotics and wound care for now. He will see the patient in am. Placed in observation for further monitoring.   "

## 2017-07-07 NOTE — TELEPHONE ENCOUNTER
----- Message from Jacqueline Alvarado sent at 7/6/2017  9:49 AM CDT -----  Patient's daughter is calling to see if paperwork if ready for pickup. Please call at [phone] Thank you!

## 2017-07-08 ENCOUNTER — ANESTHESIA EVENT (OUTPATIENT)
Dept: SURGERY | Facility: HOSPITAL | Age: 82
DRG: 576 | End: 2017-07-08
Payer: MEDICARE

## 2017-07-08 ENCOUNTER — SURGERY (OUTPATIENT)
Age: 82
End: 2017-07-08

## 2017-07-08 ENCOUNTER — ANESTHESIA (OUTPATIENT)
Dept: SURGERY | Facility: HOSPITAL | Age: 82
DRG: 576 | End: 2017-07-08
Payer: MEDICARE

## 2017-07-08 LAB
ANION GAP SERPL CALC-SCNC: 8 MMOL/L
ANION GAP SERPL CALC-SCNC: 9 MMOL/L
BASOPHILS # BLD AUTO: 0 K/UL
BASOPHILS NFR BLD: 0 %
BUN SERPL-MCNC: 31 MG/DL
BUN SERPL-MCNC: 32 MG/DL
CALCIUM SERPL-MCNC: 8.7 MG/DL
CALCIUM SERPL-MCNC: 8.8 MG/DL
CHLORIDE SERPL-SCNC: 105 MMOL/L
CHLORIDE SERPL-SCNC: 105 MMOL/L
CO2 SERPL-SCNC: 24 MMOL/L
CO2 SERPL-SCNC: 24 MMOL/L
CREAT SERPL-MCNC: 1.7 MG/DL
CREAT SERPL-MCNC: 1.7 MG/DL
DIFFERENTIAL METHOD: ABNORMAL
EOSINOPHIL # BLD AUTO: 0.1 K/UL
EOSINOPHIL NFR BLD: 2.1 %
ERYTHROCYTE [DISTWIDTH] IN BLOOD BY AUTOMATED COUNT: 15.8 %
EST. GFR  (AFRICAN AMERICAN): 31 ML/MIN/1.73 M^2
EST. GFR  (AFRICAN AMERICAN): 31 ML/MIN/1.73 M^2
EST. GFR  (NON AFRICAN AMERICAN): 27 ML/MIN/1.73 M^2
EST. GFR  (NON AFRICAN AMERICAN): 27 ML/MIN/1.73 M^2
GLUCOSE SERPL-MCNC: 59 MG/DL
GLUCOSE SERPL-MCNC: 73 MG/DL
HCT VFR BLD AUTO: 27.4 %
HCT VFR BLD AUTO: 27.7 %
HGB BLD-MCNC: 8.9 G/DL
HGB BLD-MCNC: 9.1 G/DL
LYMPHOCYTES # BLD AUTO: 0.8 K/UL
LYMPHOCYTES NFR BLD: 14.7 %
MCH RBC QN AUTO: 28.9 PG
MCHC RBC AUTO-ENTMCNC: 32.5 %
MCV RBC AUTO: 89 FL
MONOCYTES # BLD AUTO: 0.6 K/UL
MONOCYTES NFR BLD: 12 %
NEUTROPHILS # BLD AUTO: 3.7 K/UL
NEUTROPHILS NFR BLD: 71.2 %
PLATELET # BLD AUTO: 220 K/UL
PMV BLD AUTO: 9.7 FL
POCT GLUCOSE: 101 MG/DL (ref 70–110)
POCT GLUCOSE: 85 MG/DL (ref 70–110)
POCT GLUCOSE: 87 MG/DL (ref 70–110)
POCT GLUCOSE: 94 MG/DL (ref 70–110)
POCT GLUCOSE: 95 MG/DL (ref 70–110)
POTASSIUM SERPL-SCNC: 4.6 MMOL/L
POTASSIUM SERPL-SCNC: 4.9 MMOL/L
RBC # BLD AUTO: 3.08 M/UL
SODIUM SERPL-SCNC: 137 MMOL/L
SODIUM SERPL-SCNC: 138 MMOL/L
VANCOMYCIN SERPL-MCNC: 13.2 UG/ML
WBC # BLD AUTO: 5.18 K/UL

## 2017-07-08 PROCEDURE — 36000705 HC OR TIME LEV I EA ADD 15 MIN: Performed by: ORTHOPAEDIC SURGERY

## 2017-07-08 PROCEDURE — 63600175 PHARM REV CODE 636 W HCPCS: Performed by: EMERGENCY MEDICINE

## 2017-07-08 PROCEDURE — 85018 HEMOGLOBIN: CPT

## 2017-07-08 PROCEDURE — 80048 BASIC METABOLIC PNL TOTAL CA: CPT | Mod: 91

## 2017-07-08 PROCEDURE — 25000003 PHARM REV CODE 250: Performed by: NURSE ANESTHETIST, CERTIFIED REGISTERED

## 2017-07-08 PROCEDURE — D9220A PRA ANESTHESIA: Mod: CRNA,,, | Performed by: NURSE ANESTHETIST, CERTIFIED REGISTERED

## 2017-07-08 PROCEDURE — 25000003 PHARM REV CODE 250: Performed by: ORTHOPAEDIC SURGERY

## 2017-07-08 PROCEDURE — 63600175 PHARM REV CODE 636 W HCPCS: Performed by: ANESTHESIOLOGY

## 2017-07-08 PROCEDURE — 27201423 OPTIME MED/SURG SUP & DEVICES STERILE SUPPLY: Performed by: ORTHOPAEDIC SURGERY

## 2017-07-08 PROCEDURE — 36415 COLL VENOUS BLD VENIPUNCTURE: CPT

## 2017-07-08 PROCEDURE — 25000003 PHARM REV CODE 250: Performed by: EMERGENCY MEDICINE

## 2017-07-08 PROCEDURE — 63600175 PHARM REV CODE 636 W HCPCS: Performed by: NURSE ANESTHETIST, CERTIFIED REGISTERED

## 2017-07-08 PROCEDURE — 71000033 HC RECOVERY, INTIAL HOUR: Performed by: ORTHOPAEDIC SURGERY

## 2017-07-08 PROCEDURE — 0Y9J0ZZ DRAINAGE OF LEFT LOWER LEG, OPEN APPROACH: ICD-10-PCS | Performed by: ORTHOPAEDIC SURGERY

## 2017-07-08 PROCEDURE — 0HBLXZZ EXCISION OF LEFT LOWER LEG SKIN, EXTERNAL APPROACH: ICD-10-PCS | Performed by: ORTHOPAEDIC SURGERY

## 2017-07-08 PROCEDURE — 82962 GLUCOSE BLOOD TEST: CPT

## 2017-07-08 PROCEDURE — 80202 ASSAY OF VANCOMYCIN: CPT

## 2017-07-08 PROCEDURE — 37000009 HC ANESTHESIA EA ADD 15 MINS: Performed by: ORTHOPAEDIC SURGERY

## 2017-07-08 PROCEDURE — D9220A PRA ANESTHESIA: Mod: ANES,,, | Performed by: ANESTHESIOLOGY

## 2017-07-08 PROCEDURE — 71000039 HC RECOVERY, EACH ADD'L HOUR: Performed by: ORTHOPAEDIC SURGERY

## 2017-07-08 PROCEDURE — 36000704 HC OR TIME LEV I 1ST 15 MIN: Performed by: ORTHOPAEDIC SURGERY

## 2017-07-08 PROCEDURE — 37000008 HC ANESTHESIA 1ST 15 MINUTES: Performed by: ORTHOPAEDIC SURGERY

## 2017-07-08 PROCEDURE — 27200651 HC AIRWAY, LMA: Performed by: NURSE ANESTHETIST, CERTIFIED REGISTERED

## 2017-07-08 PROCEDURE — 12000002 HC ACUTE/MED SURGE SEMI-PRIVATE ROOM

## 2017-07-08 PROCEDURE — 63600175 PHARM REV CODE 636 W HCPCS: Performed by: PHYSICIAN ASSISTANT

## 2017-07-08 PROCEDURE — 85014 HEMATOCRIT: CPT

## 2017-07-08 PROCEDURE — 85025 COMPLETE CBC W/AUTO DIFF WBC: CPT

## 2017-07-08 PROCEDURE — 80048 BASIC METABOLIC PNL TOTAL CA: CPT

## 2017-07-08 RX ORDER — OXYCODONE HYDROCHLORIDE 5 MG/1
5 TABLET ORAL
Status: DISCONTINUED | OUTPATIENT
Start: 2017-07-08 | End: 2017-07-20 | Stop reason: HOSPADM

## 2017-07-08 RX ORDER — SODIUM CHLORIDE 0.9 % (FLUSH) 0.9 %
3 SYRINGE (ML) INJECTION EVERY 8 HOURS
Status: DISCONTINUED | OUTPATIENT
Start: 2017-07-08 | End: 2017-07-08 | Stop reason: HOSPADM

## 2017-07-08 RX ORDER — HYDROMORPHONE HYDROCHLORIDE 2 MG/ML
0.2 INJECTION, SOLUTION INTRAMUSCULAR; INTRAVENOUS; SUBCUTANEOUS EVERY 5 MIN PRN
Status: DISCONTINUED | OUTPATIENT
Start: 2017-07-08 | End: 2017-07-08 | Stop reason: HOSPADM

## 2017-07-08 RX ORDER — PROPOFOL 10 MG/ML
VIAL (ML) INTRAVENOUS
Status: DISCONTINUED | OUTPATIENT
Start: 2017-07-08 | End: 2017-07-08

## 2017-07-08 RX ORDER — BACITRACIN 50000 [IU]/1
INJECTION, POWDER, FOR SOLUTION INTRAMUSCULAR
Status: DISCONTINUED | OUTPATIENT
Start: 2017-07-08 | End: 2017-07-08 | Stop reason: HOSPADM

## 2017-07-08 RX ORDER — LIDOCAINE HCL/PF 100 MG/5ML
SYRINGE (ML) INTRAVENOUS
Status: DISCONTINUED | OUTPATIENT
Start: 2017-07-08 | End: 2017-07-08

## 2017-07-08 RX ORDER — METOCLOPRAMIDE HYDROCHLORIDE 5 MG/ML
10 INJECTION INTRAMUSCULAR; INTRAVENOUS EVERY 10 MIN PRN
Status: DISCONTINUED | OUTPATIENT
Start: 2017-07-08 | End: 2017-07-08 | Stop reason: HOSPADM

## 2017-07-08 RX ORDER — SODIUM CHLORIDE 0.9 % (FLUSH) 0.9 %
3 SYRINGE (ML) INJECTION
Status: DISCONTINUED | OUTPATIENT
Start: 2017-07-08 | End: 2017-07-20 | Stop reason: HOSPADM

## 2017-07-08 RX ORDER — ENOXAPARIN SODIUM 100 MG/ML
30 INJECTION SUBCUTANEOUS EVERY 24 HOURS
Status: DISCONTINUED | OUTPATIENT
Start: 2017-07-09 | End: 2017-07-20 | Stop reason: HOSPADM

## 2017-07-08 RX ORDER — ONDANSETRON 2 MG/ML
4 INJECTION INTRAMUSCULAR; INTRAVENOUS EVERY 8 HOURS PRN
Status: DISCONTINUED | OUTPATIENT
Start: 2017-07-08 | End: 2017-07-08 | Stop reason: HOSPADM

## 2017-07-08 RX ORDER — SODIUM CHLORIDE, SODIUM LACTATE, POTASSIUM CHLORIDE, CALCIUM CHLORIDE 600; 310; 30; 20 MG/100ML; MG/100ML; MG/100ML; MG/100ML
INJECTION, SOLUTION INTRAVENOUS CONTINUOUS PRN
Status: DISCONTINUED | OUTPATIENT
Start: 2017-07-08 | End: 2017-07-08

## 2017-07-08 RX ORDER — OXYCODONE AND ACETAMINOPHEN 5; 325 MG/1; MG/1
1 TABLET ORAL
Status: DISCONTINUED | OUTPATIENT
Start: 2017-07-08 | End: 2017-07-08 | Stop reason: HOSPADM

## 2017-07-08 RX ORDER — FENTANYL CITRATE 50 UG/ML
INJECTION, SOLUTION INTRAMUSCULAR; INTRAVENOUS
Status: DISCONTINUED | OUTPATIENT
Start: 2017-07-08 | End: 2017-07-08

## 2017-07-08 RX ORDER — SODIUM CHLORIDE 9 MG/ML
INJECTION, SOLUTION INTRAVENOUS CONTINUOUS
Status: DISCONTINUED | OUTPATIENT
Start: 2017-07-08 | End: 2017-07-09

## 2017-07-08 RX ORDER — MEPERIDINE HYDROCHLORIDE 50 MG/ML
12.5 INJECTION INTRAMUSCULAR; INTRAVENOUS; SUBCUTANEOUS ONCE AS NEEDED
Status: DISCONTINUED | OUTPATIENT
Start: 2017-07-08 | End: 2017-07-08 | Stop reason: HOSPADM

## 2017-07-08 RX ORDER — GLYCOPYRROLATE 0.2 MG/ML
INJECTION INTRAMUSCULAR; INTRAVENOUS
Status: DISCONTINUED | OUTPATIENT
Start: 2017-07-08 | End: 2017-07-08

## 2017-07-08 RX ORDER — ETOMIDATE 2 MG/ML
INJECTION INTRAVENOUS
Status: DISCONTINUED | OUTPATIENT
Start: 2017-07-08 | End: 2017-07-08

## 2017-07-08 RX ADMIN — CEFEPIME HYDROCHLORIDE 1 G: 1 INJECTION, SOLUTION INTRAVENOUS at 08:07

## 2017-07-08 RX ADMIN — HYDRALAZINE HYDROCHLORIDE 50 MG: 25 TABLET ORAL at 09:07

## 2017-07-08 RX ADMIN — LIDOCAINE HYDROCHLORIDE 100 MG: 20 INJECTION, SOLUTION INTRAVENOUS at 08:07

## 2017-07-08 RX ADMIN — FUROSEMIDE 20 MG: 20 TABLET ORAL at 11:07

## 2017-07-08 RX ADMIN — OXYCODONE HYDROCHLORIDE 5 MG: 5 TABLET ORAL at 04:07

## 2017-07-08 RX ADMIN — PROPOFOL 40 MG: 10 INJECTION, EMULSION INTRAVENOUS at 08:07

## 2017-07-08 RX ADMIN — FERROUS SULFATE TAB EC 325 MG (65 MG FE EQUIVALENT) 325 MG: 325 (65 FE) TABLET DELAYED RESPONSE at 11:07

## 2017-07-08 RX ADMIN — HYDROMORPHONE HYDROCHLORIDE 0.2 MG: 2 INJECTION INTRAMUSCULAR; INTRAVENOUS; SUBCUTANEOUS at 09:07

## 2017-07-08 RX ADMIN — SODIUM CHLORIDE: 0.9 INJECTION, SOLUTION INTRAVENOUS at 11:07

## 2017-07-08 RX ADMIN — ETOMIDATE 10 MG: 2 INJECTION, SOLUTION INTRAVENOUS at 08:07

## 2017-07-08 RX ADMIN — HYDRALAZINE HYDROCHLORIDE 50 MG: 25 TABLET ORAL at 03:07

## 2017-07-08 RX ADMIN — FENTANYL CITRATE 50 MCG: 50 INJECTION INTRAMUSCULAR; INTRAVENOUS at 08:07

## 2017-07-08 RX ADMIN — LABETALOL HYDROCHLORIDE 300 MG: 100 TABLET, FILM COATED ORAL at 11:07

## 2017-07-08 RX ADMIN — Medication 250 MG: at 11:07

## 2017-07-08 RX ADMIN — Medication 400 UNITS: at 11:07

## 2017-07-08 RX ADMIN — BACITRACIN 150000 UNITS: 50000 INJECTION, POWDER, FOR SOLUTION INTRAMUSCULAR at 08:07

## 2017-07-08 RX ADMIN — ONDANSETRON 4 MG: 2 INJECTION, SOLUTION INTRAMUSCULAR; INTRAVENOUS at 08:07

## 2017-07-08 RX ADMIN — FUROSEMIDE 20 MG: 20 TABLET ORAL at 09:07

## 2017-07-08 RX ADMIN — VANCOMYCIN HYDROCHLORIDE 1000 MG: 1 INJECTION, POWDER, LYOPHILIZED, FOR SOLUTION INTRAVENOUS at 11:07

## 2017-07-08 RX ADMIN — MORPHINE SULFATE 4 MG: 10 INJECTION INTRAVENOUS at 07:07

## 2017-07-08 RX ADMIN — HYDRALAZINE HYDROCHLORIDE 50 MG: 25 TABLET ORAL at 05:07

## 2017-07-08 RX ADMIN — ALLOPURINOL 100 MG: 100 TABLET ORAL at 11:07

## 2017-07-08 RX ADMIN — SODIUM CHLORIDE, SODIUM LACTATE, POTASSIUM CHLORIDE, AND CALCIUM CHLORIDE: .6; .31; .03; .02 INJECTION, SOLUTION INTRAVENOUS at 08:07

## 2017-07-08 RX ADMIN — OXYCODONE HYDROCHLORIDE 5 MG: 5 TABLET ORAL at 11:07

## 2017-07-08 RX ADMIN — LABETALOL HYDROCHLORIDE 300 MG: 100 TABLET, FILM COATED ORAL at 09:07

## 2017-07-08 RX ADMIN — GLYCOPYRROLATE 0.1 MG: 0.2 INJECTION, SOLUTION INTRAMUSCULAR; INTRAVENOUS at 08:07

## 2017-07-08 RX ADMIN — LEVOTHYROXINE SODIUM 50 MCG: 50 TABLET ORAL at 05:07

## 2017-07-08 NOTE — ASSESSMENT & PLAN NOTE
Creatinine of 1.8 which is near patient baseline. Monitor with CAD, heart failure, and broad spectrum ABX use - Avoid nephrotoxic agents and renally dose medications when possible

## 2017-07-08 NOTE — PLAN OF CARE
07/08/17 1619   Discharge Assessment   Assessment Type Discharge Planning Assessment   Confirmed/corrected address and phone number on facesheet? Yes   Assessment information obtained from? Patient   Communicated expected length of stay with patient/caregiver no   Type of Healthcare Directive Received (N/A)   Prior to hospitilization cognitive status: Alert/Oriented   Prior to hospitalization functional status: Needs Assistance   Current cognitive status: Alert/Oriented   Current Functional Status: Assistive Equipment   Arrived From home or self-care   Lives With child(rodo), adult  (Marilee)   Able to Return to Prior Arrangements yes   Is patient able to care for self after discharge? No   How many people do you have in your home that can help with your care after discharge? 1   Who are your caregiver(s) and their phone number(s)? Daughter Marilee   Patient's perception of discharge disposition home or selfcare   Readmission Within The Last 30 Days previous discharge plan unsuccessful  (Patient says she feels like she was discharged too soon. )   Patient currently being followed by outpatient case management? No   Patient currently receives home health services? No   Patient previously received home health services and would like to resume services if necessary? Yes  (Used [employer] in the past, but would like to use another company. )   Does the patient currently use HME? No   Patient currently receives private duty nursing? No   Patient currently receives any other outside agency services? Yes   Equipment Currently Used at Home lift device;walker, standard;bedside commode;rollator   Do you have any problems affording any of your prescribed medications? No   Is the patient taking medications as prescribed? yes   Do you have any financial concerns preventing you from receiving the healthcare you need? No   Does the patient have transportation to healthcare appointments? Yes   Transportation Available family or friend will  provide;Medicaid transportation  (Trying to get RTA)   On Dialysis? No   Does the patient receive services at the [facility]? No   Are there any open cases? No   Discharge Plan A Home with family   Discharge Plan B Home with family;Home Health   Patient/Family In Agreement With Plan yes       Patient's family is requesting HH for wound care.

## 2017-07-08 NOTE — TRANSFER OF CARE
"Anesthesia Transfer of Care Note    Patient: Christal Greenvall    Procedure(s) Performed: Procedure(s) (LRB):  INCISION-DRAINAGE-HEMATOMA (Left)  APPLICATION-WOUND DRESSING (Left)    Patient location: PACU    Anesthesia Type: general    Transport from OR: Transported from OR on room air with adequate spontaneous ventilation    Post pain: adequate analgesia    Post assessment: no apparent anesthetic complications    Post vital signs: stable    Level of consciousness: sedated and responds to stimulation    Nausea/Vomiting: no nausea/vomiting    Complications: none    Transfer of care protocol was followed      Last vitals:   Visit Vitals  BP (!) 193/77 (BP Location: Right arm, Patient Position: Lying, BP Method: Automatic)   Pulse 65   Temp 37.1 °C (98.8 °F) (Oral)   Resp 15   Ht 5' 2" (1.575 m)   Wt 60.7 kg (133 lb 12.8 oz)   SpO2 100%   Breastfeeding? No   BMI 24.47 kg/m²     "

## 2017-07-08 NOTE — PROGRESS NOTES
"[facility] Hospital Medicine  Progress Note    Patient Name: Christal Goodson  MRN: [id number]  Patient Class: IP- Inpatient   Admission Date: 7/7/2017  Length of Stay: 0 days  Attending Physician: Tita Simpson MD  Primary Care Provider: Juan lAberto Prieto MD        Subjective:     Principal Problem:Traumatic hematoma of left lower leg    HPI:  Patient is 84 yo female with HTN, HLD, CAD, CHF (EF 40%+ DD), CKD, and DMII who presents with complaint of leg wound. Patient was recently hospitalized at [facility] from 6/28-7/1/17 for L lower extremity traumatic hematoma resulting in acute blood loss anemia. Was transfused 4 units PRBC and discharged in stable condition with planned outpatient follow up. Per patient today while hanging legs over side of bed the affected leg "popped open" with some blood and drainage at the scene. She denies new trauma to the area. She complains of leg pain but otherwise denies fever/chills, SOB, CP, abdominal pain, N/V, dysuria. On presentation patient with elevated blood pressure but otherwise grossly normal labs. Afebrile in without leukocytosis. Non toxic appearing. H/H stable with hemoglobin of 9.8 (up from 9.0 at recent discharge). Call placed to Dr. Campo with photo of affected area sent with family permission per ED note. He is with recommendation for IV antibiotics and wound care for now. He will see the patient in am. Placed in observation for further monitoring.     Hospital Course:  85 y.o.female with left leg open wound and full thickness skin necrosis - with severe pain, moans with movement, doesn't like pain medications - see by otho/surgery and taken for wound debridement, I&D and wound vac today - Patient may require skin grafting for future.    Interval History: orth/surgery for wound debridment; ongoing painful extremity    Review of Systems   Constitutional: Negative for chills, diaphoresis and fever.   Respiratory: Positive for shortness of breath. " Negative for cough, chest tightness and wheezing.    Cardiovascular: Negative for chest pain, palpitations and leg swelling.   Gastrointestinal: Negative for abdominal distention, abdominal pain, constipation, diarrhea, nausea and vomiting.   Genitourinary: Negative for dysuria and hematuria.   Musculoskeletal: Negative for joint swelling and neck stiffness.     Objective:     Vital Signs (Most Recent):  Temp: 98 °F (36.7 °C) (07/08/17 0715)  Pulse: 65 (07/08/17 0715)  Resp: 18 (07/08/17 0715)  BP: (!) 177/75 (07/08/17 0715)  SpO2: 100 % (07/08/17 0715) Vital Signs (24h Range):  Temp:  [98 °F (36.7 °C)-99.4 °F (37.4 °C)] 98 °F (36.7 °C)  Pulse:  [60-67] 65  Resp:  [18] 18  SpO2:  [96 %-100 %] 100 %  BP: (123-204)/(58-88) 177/75     Weight: 60.7 kg (133 lb 12.8 oz)  Body mass index is 24.47 kg/m².    Intake/Output Summary (Last 24 hours) at 07/08/17 0839  Last data filed at 07/08/17 0715   Gross per 24 hour   Intake              480 ml   Output                0 ml   Net              480 ml      Physical Exam   Constitutional: She is oriented to person, place, and time. She appears well-developed and well-nourished. No distress.   HENT:   Head: Normocephalic and atraumatic.   Eyes: EOM are normal. Pupils are equal, round, and reactive to light.   Neck: Normal range of motion. Neck supple.   Cardiovascular: Normal rate and regular rhythm.    Pulmonary/Chest: Effort normal. No respiratory distress. She has no wheezes. She has no rales.   Abdominal: Soft. Bowel sounds are normal. She exhibits no distension. There is no tenderness.   Genitourinary: Rectal exam shows guaiac negative stool.   Musculoskeletal: Normal range of motion. She exhibits edema and tenderness. She exhibits no deformity.   Leg wound   Neurological: She is alert and oriented to person, place, and time.   Skin: Skin is warm and dry. She is not diaphoretic. There is erythema.   See HPI and ortho notes for wound details; present on admit       Significant  Labs:   BMP:   Recent Labs  Lab 07/08/17  0453   GLU 59*      K 4.6      CO2 24   BUN 32*   CREATININE 1.7*   CALCIUM 8.8     CBC:   Recent Labs  Lab 07/07/17  1549 07/08/17  0453   WBC 6.77 5.18   HGB 9.8* 8.9*   HCT 29.2* 27.4*    220     Cardiac Markers: No results for input(s): CKMB, MYOGLOBIN, BNP, TROPISTAT in the last 48 hours.  Coagulation:   Recent Labs  Lab 07/07/17  1549   INR 1.0   APTT 31.3     Lactic Acid: No results for input(s): LACTATE in the last 48 hours.  Lipase: No results for input(s): LIPASE in the last 48 hours.  Lipid Panel: No results for input(s): CHOL, HDL, LDLCALC, TRIG, CHOLHDL in the last 48 hours.  Prealbumin: No results for input(s): PREALBUMIN in the last 48 hours.  Respiratory Culture: No results for input(s): GSRESP, RESPIRATORYC in the last 48 hours.  Troponin: No results for input(s): TROPONINI in the last 48 hours.  TSH:   Recent Labs  Lab 04/28/17  1656   TSH 4.414*     Urine Culture: No results for input(s): LABURIN in the last 48 hours.    Significant Imaging:   Imaging Results    None           Assessment/Plan:      * Traumatic hematoma of left lower leg    Traumatic hematoma with wound opening - hospitalized a little over 1 week ago; Dr. Campo saw patient - anticipate surgery.   -follow H/H  -IV abx with vanc and cefepime for now (defer to surgery)  -analgesics  -Orthopedics and wound care consults  -consider ID consult for abx care - defer to surgery  -Defer Lovenox 2/2 debridement - will defer to surgery for restarting tomorrow        Type 2 diabetes mellitus without complication    Last A1c 4.5 June 2017. Well controlled on no medications at present. Will monitor with POCT checks and cover with SSI PRN.           CAD (coronary artery disease)    Last 2D echo showed 40% LVED with DD and mild-->moderate AVS - Stable disease at this time but may be complicated by excess volume of IV ABX  Monitor fluid volume closely  Continue ASA and statin  Daily  Weight - strict I&O's  Low Na diet  1.5L fluid restriction          Hypercholesteremia    No recent LDL. Will defer repeat to PCP. Continue statin.           Hypothyroidism    TSH 4.414 April 2017. Continue synthroid.           Chronic kidney disease, stage III (moderate)    Creatinine of 1.8 which is near patient baseline. Monitor with CAD, heart failure, and broad spectrum ABX use - Avoid nephrotoxic agents and renally dose medications when possible          Essential hypertension, benign    BP elevated on presentation.   Will resume home anti hypertensive regimen and monitor.           Vitamin D deficiency    Multivitamin daily            VTE Risk Mitigation         Ordered     Medium Risk of VTE  Once      07/07/17 1907          ESETBAN Guerra, FNP-C  PA# [id number]  NPI# [id number]  Hospitalist - Department of Hospital Medicine  [address] [place] [address]  Office [phone]; Pager [phone]

## 2017-07-08 NOTE — PROGRESS NOTES
Returned from PACU post procedure. Wound Vac noted to left wound @ 125. Random Vanc level <20 dose ordered.

## 2017-07-08 NOTE — SUBJECTIVE & OBJECTIVE
"Past Medical History:   Diagnosis Date    Arthritis     Blood transfusion     CHF (congestive heart failure)     Coronary artery disease     Diabetes mellitus     General anesthetics causing adverse effect in therapeutic use     "woke up as they were putting me on the table"    Hyperlipidemia     Hypertension     Renal disorder     Thyroid disease        Past Surgical History:   Procedure Laterality Date    CATARACT EXTRACTION EXTRACAPSULAR W/ INTRAOCULAR LENS IMPLANTATION  Sept 2012    right eye    CHOLECYSTECTOMY      CORONARY ANGIOPLASTY WITH STENT PLACEMENT  2011    JOINT REPLACEMENT      Left total hip replacement in 1986    TONSILLECTOMY         Review of patient's allergies indicates:   Allergen Reactions    Bystolic [nebivolol] Other (See Comments)     "it made me feel like I was going to pass out"    Losartan     Diovan [valsartan] Rash     "I've taken that since then and nothing happened to me."       No current facility-administered medications on file prior to encounter.      Current Outpatient Prescriptions on File Prior to Encounter   Medication Sig    blood sugar diagnostic (TRUE METRIX GLUCOSE TEST STRIP) Strp To test once daily    hydrALAZINE (APRESOLINE) 50 MG tablet TAKE 1 TABLET 3 TIMES DAILY.    labetalol (NORMODYNE) 300 MG tablet TAKE 1 TABLET EVERY 12 HOURS DAILY.    acetaminophen (TYLENOL) 325 MG tablet Take 2 tablets (650 mg total) by mouth every 6 (six) hours as needed (Mild pain/Temp>100.4).    allopurinol (ZYLOPRIM) 100 MG tablet Take 1 tablet (100 mg total) by mouth once daily.    aspirin (ECOTRIN) 81 MG EC tablet Hold for 1 week, and resume taking 81 mg daily on July 7, 2017.    blood-glucose meter (TRUE METRIX AIR GLUCOSE METER) Misc To test once daily    ferrous sulfate 325 mg (65 mg iron) Tab Take 1 tablet (325 mg total) by mouth daily with breakfast.    flaxseed oil Oil by Misc.(Non-Drug; Combo Route) route Daily.    furosemide (LASIX) 40 MG tablet Take " 0.5 tablets (20 mg total) by mouth 2 (two) times daily.    lancets 28 gauge Misc 1 lancet by Misc.(Non-Drug; Combo Route) route once daily at 6am. True Metrix lancets    levothyroxine (SYNTHROID) 50 MCG tablet Take 1 tablet (50 mcg total) by mouth once daily.    magnesium oxide (MAG-OX) 250 mg Tab Take 1 tablet (250 mg total) by mouth once daily.    nystatin (MYCOSTATIN) cream Apply topically 2 (two) times daily.    polyethylene glycol (GLYCOLAX) 17 gram PwPk Take 17 g by mouth 2 (two) times daily as needed (Constipation).    triamcinolone acetonide 0.1% (KENALOG) 0.1 % cream Apply topically 2 (two) times daily.    vitamin D 1000 units Tab Take 1,000 Units by mouth once daily.    vitamin E 400 UNIT capsule Take 400 Units by mouth once daily.     Family History     Problem Relation (Age of Onset)    Alcohol abuse Brother    Alzheimer's disease Sister    Cancer Sister, Sister    Dementia Sister    Diabetes Daughter, Daughter    Hypertension Daughter, Daughter    Kidney disease Sister    Pacemaker/defibrilator Daughter        Social History Main Topics    Smoking status: Never Smoker    Smokeless tobacco: Never Used    Alcohol use No    Drug use: No    Sexual activity: Not Currently     Review of Systems   Constitutional: Negative for chills and fever.   HENT: Negative for congestion and sore throat.    Respiratory: Negative for cough and shortness of breath.    Cardiovascular: Negative for chest pain and palpitations.   Gastrointestinal: Positive for constipation. Negative for abdominal pain and nausea.   Endocrine: Negative for cold intolerance and heat intolerance.   Genitourinary: Negative for dysuria and frequency.   Musculoskeletal: Negative for arthralgias and myalgias.        L lower leg pain    Skin: Negative for color change and rash.   Neurological: Negative for dizziness and headaches.   Psychiatric/Behavioral: Negative for behavioral problems and confusion. The patient is nervous/anxious.       Objective:     Vital Signs (Most Recent):  Temp: 99.4 °F (37.4 °C) (07/07/17 1827)  Pulse: 62 (07/07/17 1827)  Resp: 18 (07/07/17 1827)  BP: (!) 194/75 (07/07/17 1828)  SpO2: 96 % (07/07/17 1827) Vital Signs (24h Range):  Temp:  [98.5 °F (36.9 °C)-99.4 °F (37.4 °C)] 99.4 °F (37.4 °C)  Pulse:  [62-64] 62  Resp:  [18] 18  SpO2:  [96 %-98 %] 96 %  BP: (189-194)/(75-78) 194/75     Weight: 63.5 kg (140 lb)  Body mass index is 25.61 kg/m².    Physical Exam   Constitutional: She is oriented to person, place, and time. She appears well-developed and well-nourished. No distress.   HENT:   Head: Normocephalic and atraumatic.   Eyes: EOM are normal. Pupils are equal, round, and reactive to light.   Neck: Normal range of motion. Neck supple.   Cardiovascular: Normal rate and regular rhythm.    No murmur heard.  Pulmonary/Chest: Effort normal and breath sounds normal.   Abdominal: Soft. Bowel sounds are normal. There is no tenderness.   Musculoskeletal: Normal range of motion.   Neurological: She is alert and oriented to person, place, and time.   Skin: Skin is warm and dry. No rash noted.   Open full thickness wound (~15 cm) to L anterior shin, some clots noted. No pus or odor.    Psychiatric: She has a normal mood and affect. Her behavior is normal.        Significant Labs:   CBC:   Recent Labs  Lab 07/07/17  1549   WBC 6.77   HGB 9.8*   HCT 29.2*        CMP:   Recent Labs  Lab 07/07/17  1549      K 4.6      CO2 25   GLU 79   BUN 32*   CREATININE 1.8*   CALCIUM 9.0   PROT 6.7   ALBUMIN 2.9*   BILITOT 1.0   ALKPHOS 64   AST 19   ALT 11   ANIONGAP 8   EGFRNONAA 25*

## 2017-07-08 NOTE — CONSULTS
"C.C. Left leg wound with necrosis     HPI: Christal Alvares85 y.o. complaining of left leg wound. She is non-ambulatory due to a chronically dislocated left hip. She was recently admitted approx one week ago for a LLE hematoma and required blood transfusion. She Presented back to the ED yesterday with spontaneous rupture of the skin overlying the hematoma. (Epic pictures will be entered into the system.) She now has a large area of necrosis and skin death at the location of the hematoma with an open wound anteriorly.     ROS:   Pertinent positives: Left leg wound                        Negatives: F/C, N/V, CP, SOB,                         All other 14 point ROS negative     PMH:        Past Medical History:   Diagnosis Date    Arthritis      Blood transfusion      CHF (congestive heart failure)      Coronary artery disease      Diabetes mellitus      General anesthetics causing adverse effect in therapeutic use       "woke up as they were putting me on the table"    Hyperlipidemia      Hypertension      Renal disorder      Thyroid disease           PSH:         Past Surgical History:   Procedure Laterality Date    CATARACT EXTRACTION EXTRACAPSULAR W/ INTRAOCULAR LENS IMPLANTATION   Sept 2012     right eye    CHOLECYSTECTOMY        CORONARY ANGIOPLASTY WITH STENT PLACEMENT   2011    JOINT REPLACEMENT         Left total hip replacement in 1986    TONSILLECTOMY             Social Hx:   Social History          Occupational History    Not on file.           Social History Main Topics    Smoking status: Never Smoker    Smokeless tobacco: Never Used    Alcohol use No    Drug use: No    Sexual activity: Not Currently         Medications:    No current facility-administered medications on file prior to encounter.              Current Outpatient Prescriptions on File Prior to Encounter   Medication Sig Dispense Refill    blood sugar diagnostic (TRUE METRIX GLUCOSE TEST STRIP) Strp To test once daily " "100 each 3    furosemide (LASIX) 40 MG tablet Take 0.5 tablets (20 mg total) by mouth 2 (two) times daily. 15 tablet 3    hydrALAZINE (APRESOLINE) 50 MG tablet TAKE 1 TABLET 3 TIMES DAILY.   3    labetalol (NORMODYNE) 300 MG tablet TAKE 1 TABLET EVERY 12 HOURS DAILY.   3    levothyroxine (SYNTHROID) 50 MCG tablet Take 1 tablet (50 mcg total) by mouth once daily. 90 tablet 0    vitamin D 1000 units Tab Take 1,000 Units by mouth once daily.        acetaminophen (TYLENOL) 325 MG tablet Take 2 tablets (650 mg total) by mouth every 6 (six) hours as needed (Mild pain/Temp>100.4).   0    allopurinol (ZYLOPRIM) 100 MG tablet Take 1 tablet (100 mg total) by mouth once daily. 30 tablet 2    aspirin (ECOTRIN) 81 MG EC tablet Hold for 1 week, and resume taking 81 mg daily on July 7, 2017.   0    blood-glucose meter (TRUE METRIX AIR GLUCOSE METER) Misc To test once daily 1 each 0    ferrous sulfate 325 mg (65 mg iron) Tab Take 1 tablet (325 mg total) by mouth daily with breakfast. 30 tablet 3    flaxseed oil Oil by Misc.(Non-Drug; Combo Route) route Daily.        lancets 28 gauge Misc 1 lancet by Misc.(Non-Drug; Combo Route) route once daily at 6am. True Metrix lancets 100 each 3    magnesium oxide (MAG-OX) 250 mg Tab Take 1 tablet (250 mg total) by mouth once daily. 30 tablet 2    nystatin (MYCOSTATIN) cream Apply topically 2 (two) times daily. 30 g 2    polyethylene glycol (GLYCOLAX) 17 gram PwPk Take 17 g by mouth 2 (two) times daily as needed (Constipation). 24 each 0    triamcinolone acetonide 0.1% (KENALOG) 0.1 % cream Apply topically 2 (two) times daily. 80 g 0    vitamin E 400 UNIT capsule Take 400 Units by mouth once daily.                PE:              Vitals:     07/08/17 0527   BP: 136/60   Pulse:     Resp:     Temp:           Estimated body mass index is 24.47 kg/m² as calculated from the following:    Height as of this encounter: 5' 2" (1.575 m).    Weight as of this encounter: 60.7 kg (133 lb " 12.8 oz).      General WDWN, NAD                           Extremity:   Left leg wound with hematoma clot present anteriorly.  Full thickness skin death and necrosis to the entire lateral aspect of the LE extending from the knee to the ankle  Pulses intact distally  SILT  Compartments soft           Labs:           Lab Results   Component Value Date     WBC 5.18 07/08/2017     HGB 8.9 (L) 07/08/2017     HCT 27.4 (L) 07/08/2017     MCV 89 07/08/2017      07/08/2017         BMP        Lab Results   Component Value Date      07/07/2017     K 4.6 07/07/2017      07/07/2017     CO2 25 07/07/2017     BUN 32 (H) 07/07/2017     CREATININE 1.8 (H) 07/07/2017     CALCIUM 9.0 07/07/2017     ANIONGAP 8 07/07/2017     ESTGFRAFRICA 29 (A) 07/07/2017     EGFRNONAA 25 (A) 07/07/2017               Lab Results   Component Value Date     INR 1.0 07/07/2017     INR 1.2 06/28/2017     INR 1.3 (H) 04/28/2017         No results found for: SEDRATE     No results found for: CRP        A/P  85 y.o.female with left leg open wound and full thickness skin necrosis     Plan for OR today for wound debridement and I and D  Possible wound vac placement  Pt may need skin grafting in the future.        Risks and complications were discussed including but not limited to the risks of anesthetic complications, infection, wound healing complications, bleeding, injury to nerve, vessels, anr or soft tissues. Need to repeat or perform future operations,neurologic dysfunction including numbness, DVT, pulmonary embolism, myocardial infarction, stroke and death among others were discussed, and the patient elects to proceed.     She is aware of the possibility of future amputation and likely future procedures        Parveen Campo MD

## 2017-07-08 NOTE — PLAN OF CARE
Problem: Patient Care Overview  Goal: Plan of Care Review   07/08/17 0336   Coping/Psychosocial   Plan Of Care Reviewed With patient;daughter   Patient and daughter updated on plan of care and verbalized understanding.     Problem: Fall Risk (Adult)  Goal: Absence of Falls  Patient will demonstrate the desired outcomes by discharge/transition of care.    07/08/17 0336   Fall Risk (Adult)   Absence of Falls making progress toward outcome   Patient remains free from falls.      Problem: Pressure Ulcer Risk (Zeeshan Scale) (Adult,Obstetrics,Pediatric)  Intervention: Turn/Reposition Often     07/08/17 0336   Skin Interventions   Pressure Reduction Techniques frequent weight shift encouraged;heels elevated off bed;positioned off wounds    Patient repositioned every 2 hours off of wounds.       Problem: Wound, Traumatic, Nonburn (Adult)  Intervention: Prevent/Manage Infection   07/08/17 0336   Prevent/Manage Colorectal Surgical Infection   Fever Reduction/Comfort Measures medication administered   Safety Interventions   Infection Prevention barrier precautions utilized;environmental surveillance;equipment surfaces disinfected;gylcemic control management;rest/sleep promoted   Infection Management aseptic technique maintained;cultures obtained and sent to lab     Intervention: Promote Effective Wound Healing  IV antibiotics infused per orders. Wound care provided to leg. Prn pain medication administered per orders with moderate relief obtained. Wound Care has been consulted. Patient is NPO pending orthopedic Surgery recommendations    Goal: Signs and Symptoms of Listed Potential Problems Will be Absent, Minimized or Managed (Wound, Traumatic, Nonburn)  Signs and symptoms of listed potential problems will be absent, minimized or managed by discharge/transition of care (reference Wound, Traumatic, Nonburn (Adult) CPG).    07/08/17 0336   Wound, Traumatic, Nonburn   Problems Assessed (Wound) all   Problems Present (Wound)  pain;situational response;skin breakdown;wound complications

## 2017-07-08 NOTE — ASSESSMENT & PLAN NOTE
Last 2D echo showed 40% LVED with DD and mild-->moderate AVS - Stable disease at this time but may be complicated by excess volume of IV ABX  Monitor fluid volume closely  Continue ASA and statin  Daily Weight - strict I&O's  Low Na diet  1.5L fluid restriction

## 2017-07-08 NOTE — H&P
"[facility] Medicine  History & Physical    Patient Name: Christal Goodson  MRN: [id number]  Admission Date: 7/7/2017  Attending Physician: Jason Santiago MD   Primary Care Provider: Juan Alberto Prieto MD         Patient information was obtained from patient and ER records.     Subjective:     Principal Problem:Traumatic hematoma of left lower leg    Chief Complaint:   Chief Complaint   Patient presents with    Wound Check     Per EMS report pt has hematoma to left calf that is draining pinkish malodorous blood/drainage; pt denies any other symptoms at this time        HPI: Patient is 86 yo female with HTN, HLD, CAD, CHF (EF 40%+ DD), CKD, and DMII who presents with complaint of leg wound. Patient was recently hospitalized at [facility] from 6/28-7/1/17 for L lower extremity traumatic hematoma resulting in acute blood loss anemia. Was transfused 4 units PRBC and discharged in stable condition with planned outpatient follow up. Per patient today while hanging legs over side of bed the affected leg "popped open" with some blood and drainage at the scene. She denies new trauma to the area. She complains of leg pain but otherwise denies fever/chills, SOB, CP, abdominal pain, N/V, dysuria. On presentation patient with elevated blood pressure but otherwise grossly normal labs. Afebrile in without leukocytosis. Non toxic appearing. H/H stable with hemoglobin of 9.8 (up from 9.0 at recent discharge). Call placed to Dr. Campo with photo of affected area sent with family permission per ED note. He is with recommendation for IV antibiotics and wound care for now. He will see the patient in am. Placed in observation for further monitoring.     Past Medical History:   Diagnosis Date    Arthritis     Blood transfusion     CHF (congestive heart failure)     Coronary artery disease     Diabetes mellitus     General anesthetics causing adverse effect in therapeutic use     "woke up as they " "were putting me on the table"    Hyperlipidemia     Hypertension     Renal disorder     Thyroid disease        Past Surgical History:   Procedure Laterality Date    CATARACT EXTRACTION EXTRACAPSULAR W/ INTRAOCULAR LENS IMPLANTATION  Sept 2012    right eye    CHOLECYSTECTOMY      CORONARY ANGIOPLASTY WITH STENT PLACEMENT  2011    JOINT REPLACEMENT      Left total hip replacement in 1986    TONSILLECTOMY         Review of patient's allergies indicates:   Allergen Reactions    Bystolic [nebivolol] Other (See Comments)     "it made me feel like I was going to pass out"    Losartan     Diovan [valsartan] Rash     "I've taken that since then and nothing happened to me."       No current facility-administered medications on file prior to encounter.      Current Outpatient Prescriptions on File Prior to Encounter   Medication Sig    blood sugar diagnostic (TRUE METRIX GLUCOSE TEST STRIP) Strp To test once daily    hydrALAZINE (APRESOLINE) 50 MG tablet TAKE 1 TABLET 3 TIMES DAILY.    labetalol (NORMODYNE) 300 MG tablet TAKE 1 TABLET EVERY 12 HOURS DAILY.    acetaminophen (TYLENOL) 325 MG tablet Take 2 tablets (650 mg total) by mouth every 6 (six) hours as needed (Mild pain/Temp>100.4).    allopurinol (ZYLOPRIM) 100 MG tablet Take 1 tablet (100 mg total) by mouth once daily.    aspirin (ECOTRIN) 81 MG EC tablet Hold for 1 week, and resume taking 81 mg daily on July 7, 2017.    blood-glucose meter (TRUE METRIX AIR GLUCOSE METER) Misc To test once daily    ferrous sulfate 325 mg (65 mg iron) Tab Take 1 tablet (325 mg total) by mouth daily with breakfast.    flaxseed oil Oil by Misc.(Non-Drug; Combo Route) route Daily.    furosemide (LASIX) 40 MG tablet Take 0.5 tablets (20 mg total) by mouth 2 (two) times daily.    lancets 28 gauge Misc 1 lancet by Misc.(Non-Drug; Combo Route) route once daily at 6am. True Metrix lancets    levothyroxine (SYNTHROID) 50 MCG tablet Take 1 tablet (50 mcg total) by mouth " once daily.    magnesium oxide (MAG-OX) 250 mg Tab Take 1 tablet (250 mg total) by mouth once daily.    nystatin (MYCOSTATIN) cream Apply topically 2 (two) times daily.    polyethylene glycol (GLYCOLAX) 17 gram PwPk Take 17 g by mouth 2 (two) times daily as needed (Constipation).    triamcinolone acetonide 0.1% (KENALOG) 0.1 % cream Apply topically 2 (two) times daily.    vitamin D 1000 units Tab Take 1,000 Units by mouth once daily.    vitamin E 400 UNIT capsule Take 400 Units by mouth once daily.     Family History     Problem Relation (Age of Onset)    Alcohol abuse Brother    Alzheimer's disease Sister    Cancer Sister, Sister    Dementia Sister    Diabetes Daughter, Daughter    Hypertension Daughter, Daughter    Kidney disease Sister    Pacemaker/defibrilator Daughter        Social History Main Topics    Smoking status: Never Smoker    Smokeless tobacco: Never Used    Alcohol use No    Drug use: No    Sexual activity: Not Currently     Review of Systems   Constitutional: Negative for chills and fever.   HENT: Negative for congestion and sore throat.    Respiratory: Negative for cough and shortness of breath.    Cardiovascular: Negative for chest pain and palpitations.   Gastrointestinal: Positive for constipation. Negative for abdominal pain and nausea.   Endocrine: Negative for cold intolerance and heat intolerance.   Genitourinary: Negative for dysuria and frequency.   Musculoskeletal: Negative for arthralgias and myalgias.        L lower leg pain    Skin: Negative for color change and rash.   Neurological: Negative for dizziness and headaches.   Psychiatric/Behavioral: Negative for behavioral problems and confusion. The patient is nervous/anxious.      Objective:     Vital Signs (Most Recent):  Temp: 99.4 °F (37.4 °C) (07/07/17 1827)  Pulse: 62 (07/07/17 1827)  Resp: 18 (07/07/17 1827)  BP: (!) 194/75 (07/07/17 1828)  SpO2: 96 % (07/07/17 1827) Vital Signs (24h Range):  Temp:  [98.5 °F (36.9  °C)-99.4 °F (37.4 °C)] 99.4 °F (37.4 °C)  Pulse:  [62-64] 62  Resp:  [18] 18  SpO2:  [96 %-98 %] 96 %  BP: (189-194)/(75-78) 194/75     Weight: 63.5 kg (140 lb)  Body mass index is 25.61 kg/m².    Physical Exam   Constitutional: She is oriented to person, place, and time. She appears well-developed and well-nourished. No distress.   HENT:   Head: Normocephalic and atraumatic.   Eyes: EOM are normal. Pupils are equal, round, and reactive to light.   Neck: Normal range of motion. Neck supple.   Cardiovascular: Normal rate and regular rhythm.    No murmur heard.  Pulmonary/Chest: Effort normal and breath sounds normal.   Abdominal: Soft. Bowel sounds are normal. There is no tenderness.   Musculoskeletal: Normal range of motion.   Neurological: She is alert and oriented to person, place, and time.   Skin: Skin is warm and dry. No rash noted.   Open full thickness wound (~15 cm) to L anterior shin, some clots noted. No pus or odor.    Psychiatric: She has a normal mood and affect. Her behavior is normal.        Significant Labs:   CBC:   Recent Labs  Lab 07/07/17  1549   WBC 6.77   HGB 9.8*   HCT 29.2*        CMP:   Recent Labs  Lab 07/07/17  1549      K 4.6      CO2 25   GLU 79   BUN 32*   CREATININE 1.8*   CALCIUM 9.0   PROT 6.7   ALBUMIN 2.9*   BILITOT 1.0   ALKPHOS 64   AST 19   ALT 11   ANIONGAP 8   EGFRNONAA 25*           Assessment/Plan:     * Traumatic hematoma of left lower leg    Patient with traumatic hematoma for which she was hospitalized a little over 1 week ago as above in HPI. Today wound spontaneously opened per patient. Dr. Campo consulted from ED with recommendations for IV abx and wound care.   -follow H/H  -IV abx with vanc and cefepime  -analgesics  -Orthopedics and wound care consults          Type 2 diabetes mellitus without complication    Last A1c 4.5 June 2017. Well controlled on no medications at present. Will monitor with POCT checks and cover with SSI PRN.            Chronic kidney disease, stage III (moderate)    Creatinine of 1.8 which is near patient baseline. Will monitor. Avoid nephrotoxic agents and renally dose medications.           Essential hypertension, benign    BP elevated on presentation. Will resume home anti hypertensive regimen and monitor.           CAD (coronary artery disease)    Stable, no acute issues. Continue ASA and statin.           Hypercholesteremia    No recent LDL. Will defer repeat to PCP. Continue statin.           Hypothyroidism    TSH 4.414 April 2017. Continue synthroid.             VTE Risk Mitigation         Ordered     Medium Risk of VTE  Once      07/07/17 1907        Yenny Chris PA-C  Hospitalist-Department of Hospital Medicine  [address] [place] [address]  Office [phone] Pager [phone]

## 2017-07-08 NOTE — SUBJECTIVE & OBJECTIVE
Interval History: orth/surgery for wound debridment; ongoing painful extremity    Review of Systems   Constitutional: Negative for chills, diaphoresis and fever.   Respiratory: Positive for shortness of breath. Negative for cough, chest tightness and wheezing.    Cardiovascular: Negative for chest pain, palpitations and leg swelling.   Gastrointestinal: Negative for abdominal distention, abdominal pain, constipation, diarrhea, nausea and vomiting.   Genitourinary: Negative for dysuria and hematuria.   Musculoskeletal: Negative for joint swelling and neck stiffness.     Objective:     Vital Signs (Most Recent):  Temp: 98 °F (36.7 °C) (07/08/17 0715)  Pulse: 65 (07/08/17 0715)  Resp: 18 (07/08/17 0715)  BP: (!) 177/75 (07/08/17 0715)  SpO2: 100 % (07/08/17 0715) Vital Signs (24h Range):  Temp:  [98 °F (36.7 °C)-99.4 °F (37.4 °C)] 98 °F (36.7 °C)  Pulse:  [60-67] 65  Resp:  [18] 18  SpO2:  [96 %-100 %] 100 %  BP: (123-204)/(58-88) 177/75     Weight: 60.7 kg (133 lb 12.8 oz)  Body mass index is 24.47 kg/m².    Intake/Output Summary (Last 24 hours) at 07/08/17 0839  Last data filed at 07/08/17 0715   Gross per 24 hour   Intake              480 ml   Output                0 ml   Net              480 ml      Physical Exam   Constitutional: She is oriented to person, place, and time. She appears well-developed and well-nourished. No distress.   HENT:   Head: Normocephalic and atraumatic.   Eyes: EOM are normal. Pupils are equal, round, and reactive to light.   Neck: Normal range of motion. Neck supple.   Cardiovascular: Normal rate and regular rhythm.    Pulmonary/Chest: Effort normal. No respiratory distress. She has no wheezes. She has no rales.   Abdominal: Soft. Bowel sounds are normal. She exhibits no distension. There is no tenderness.   Genitourinary: Rectal exam shows guaiac negative stool.   Musculoskeletal: Normal range of motion. She exhibits edema and tenderness. She exhibits no deformity.   Leg wound    Neurological: She is alert and oriented to person, place, and time.   Skin: Skin is warm and dry. She is not diaphoretic. There is erythema.   See HPI and ortho notes for wound details; present on admit       Significant Labs:   BMP:   Recent Labs  Lab 07/08/17  0453   GLU 59*      K 4.6      CO2 24   BUN 32*   CREATININE 1.7*   CALCIUM 8.8     CBC:   Recent Labs  Lab 07/07/17  1549 07/08/17  0453   WBC 6.77 5.18   HGB 9.8* 8.9*   HCT 29.2* 27.4*    220     Cardiac Markers: No results for input(s): CKMB, MYOGLOBIN, BNP, TROPISTAT in the last 48 hours.  Coagulation:   Recent Labs  Lab 07/07/17  1549   INR 1.0   APTT 31.3     Lactic Acid: No results for input(s): LACTATE in the last 48 hours.  Lipase: No results for input(s): LIPASE in the last 48 hours.  Lipid Panel: No results for input(s): CHOL, HDL, LDLCALC, TRIG, CHOLHDL in the last 48 hours.  Prealbumin: No results for input(s): PREALBUMIN in the last 48 hours.  Respiratory Culture: No results for input(s): GSRESP, RESPIRATORYC in the last 48 hours.  Troponin: No results for input(s): TROPONINI in the last 48 hours.  TSH:   Recent Labs  Lab 04/28/17  1656   TSH 4.414*     Urine Culture: No results for input(s): LABURIN in the last 48 hours.    Significant Imaging:   Imaging Results    None

## 2017-07-08 NOTE — PROGRESS NOTES
To room 408B via bed. No signs of distress. IVF and medicated prior to patient leaving for c/o pain

## 2017-07-08 NOTE — PLAN OF CARE
07/08/17 0921   Discharge Assessment   Assessment Type Discharge Planning Assessment  (SW attempted D/C Planning Assessment.  Patient currently off unit. SW will re-attempt at a later time.  )

## 2017-07-08 NOTE — PROGRESS NOTES
1915 Patient arrived to unit via stretcher accompanied by transporter and daughter. Patient transferred to bed with 3 person assist. She is oriented x 4 and appears non distressed but she complains of pain to left hip and leg. Open wound noted to left calf with bloody drainage present. Patient also has wound present to sacral area. Shift assessment initiated. Oriented patient and daughter to room, bed and call light is within reach. Dr. Barrientos and LEEANNA LEGER are at the bedside.     1950. Blood pressure is elevated at 204/87. P.A. Aware. New orders received. New wound care orders received.     2118 Blood pressure is down to 123/67 after receiving labetol per orders    2145 Wound cleaned and wrapped per orders. Patent tolerated well. Culture collected and sent to lab

## 2017-07-08 NOTE — OP NOTE
DATE OF PROCEDURE: 07/08/2017       SURGEON: Parveen Campo M.D.    PREOPERATIVE DIAGNOSES:  1.left lower extremity open wound with skin necrosis  2. Left lower extremity hematoma       POSTOPERATIVE DIAGNOSES:  Same       PROCEDURE PERFORMED:   1) left lower wound debridement 30 X 13 CM  2) Incision and drainage Left lower extremity  3) Hematoma evacuation     HISTORY OF PRESENT ILLNESS: The patient is a 86 y/o female with hx of left leg hematoma approximately 1 week ago that required admission and blood transfusion. She presented back to the ED with apparently spontaneous opening of the skin anteriorly and skin necrosis neer the full length of the left leg from the knee to the ankle laterally      BLOOD LOSS: 100 mL. Mostly all old hematoma clot    FLUIDS: 500 cc     URINE OUTPUT: No Darden placed.     COMPLICATIONS: None.     IMPLANTS: None.     PROCEDURE IN DETAIL:  After the appropriate consents were signed, which included discussion of possible risks and complications, including but not limited to wound healing complications such as skin breakdown and infections, as well as    injury to nerves, vessels, soft tissue in the area, need to repeat or perform future operations, anesthetic related complications such as DVT, pulmonary embolism,    myocardial infarction, stroke and death. After consent was  signed, the patient was brought in the Operating Room, kept in the supine    position, transferred to the operative table. All bony prominences and neurovascular    structures well padded. IV antibiotics have been infused. The left lower operative    limb was then prepped and draped in normal sterile standard fashion. At this    Time, a time out was called including the the scrub tech, the anesthesia staff, circulating   nurse as well as surgical staff.    A large amount of hematoma clot was milked through the open wound anteriorly. The overlying skin was nonviable and was removed through sharp debridement with  both scissors and a knife blade. There were a few small areas of active bleeding but nothing profuse. We cauterized the active bleeding. We used a soft  pulsavac to irrigate the wound which was 30X13 CM in size.     We then placed a wound vac tot he area 30 X 13 CM    The patient was awoken from anesthesia after the sterile dressing was applied.  All the counts were correct    POST OP EXAM  Breathing without labor in PACU  NV intact distally  Pain controlled    POS OP PLAN  We will likely get plastics involved for a skin graft to the area.   We will continue wound care for now.   She will likely need future surgery and the patient and family understand this.

## 2017-07-08 NOTE — PROGRESS NOTES
"C.C. Left leg wound with necrosis    HPI: Christal Alvares85 y.o. complaining of left leg wound. She is non-ambulatory due to a chronically dislocated left hip. She was recently admitted approx one week ago for a LLE hematoma and required blood transfusion. She Presented back to the ED yesterday with spontaneous rupture of the skin overlying the hematoma. (Epic pictures will be entered into the system.) She now has a large area of necrosis and skin death at the location of the hematoma with an open wound anteriorly.    ROS:   Pertinent positives: Left leg wound   Negatives: F/C, N/V, CP, SOB,    All other 14 point ROS negative    PMH:   Past Medical History:   Diagnosis Date    Arthritis     Blood transfusion     CHF (congestive heart failure)     Coronary artery disease     Diabetes mellitus     General anesthetics causing adverse effect in therapeutic use     "woke up as they were putting me on the table"    Hyperlipidemia     Hypertension     Renal disorder     Thyroid disease        PSH:   Past Surgical History:   Procedure Laterality Date    CATARACT EXTRACTION EXTRACAPSULAR W/ INTRAOCULAR LENS IMPLANTATION  Sept 2012    right eye    CHOLECYSTECTOMY      CORONARY ANGIOPLASTY WITH STENT PLACEMENT  2011    JOINT REPLACEMENT      Left total hip replacement in 1986    TONSILLECTOMY         Social Hx:   Social History     Occupational History    Not on file.     Social History Main Topics    Smoking status: Never Smoker    Smokeless tobacco: Never Used    Alcohol use No    Drug use: No    Sexual activity: Not Currently       Medications:    No current facility-administered medications on file prior to encounter.      Current Outpatient Prescriptions on File Prior to Encounter   Medication Sig Dispense Refill    blood sugar diagnostic (TRUE METRIX GLUCOSE TEST STRIP) Strp To test once daily 100 each 3    furosemide (LASIX) 40 MG tablet Take 0.5 tablets (20 mg total) by mouth 2 (two) times " "daily. 15 tablet 3    hydrALAZINE (APRESOLINE) 50 MG tablet TAKE 1 TABLET 3 TIMES DAILY.  3    labetalol (NORMODYNE) 300 MG tablet TAKE 1 TABLET EVERY 12 HOURS DAILY.  3    levothyroxine (SYNTHROID) 50 MCG tablet Take 1 tablet (50 mcg total) by mouth once daily. 90 tablet 0    vitamin D 1000 units Tab Take 1,000 Units by mouth once daily.      acetaminophen (TYLENOL) 325 MG tablet Take 2 tablets (650 mg total) by mouth every 6 (six) hours as needed (Mild pain/Temp>100.4).  0    allopurinol (ZYLOPRIM) 100 MG tablet Take 1 tablet (100 mg total) by mouth once daily. 30 tablet 2    aspirin (ECOTRIN) 81 MG EC tablet Hold for 1 week, and resume taking 81 mg daily on July 7, 2017.  0    blood-glucose meter (TRUE METRIX AIR GLUCOSE METER) Misc To test once daily 1 each 0    ferrous sulfate 325 mg (65 mg iron) Tab Take 1 tablet (325 mg total) by mouth daily with breakfast. 30 tablet 3    flaxseed oil Oil by Misc.(Non-Drug; Combo Route) route Daily.      lancets 28 gauge Misc 1 lancet by Misc.(Non-Drug; Combo Route) route once daily at 6am. True Metrix lancets 100 each 3    magnesium oxide (MAG-OX) 250 mg Tab Take 1 tablet (250 mg total) by mouth once daily. 30 tablet 2    nystatin (MYCOSTATIN) cream Apply topically 2 (two) times daily. 30 g 2    polyethylene glycol (GLYCOLAX) 17 gram PwPk Take 17 g by mouth 2 (two) times daily as needed (Constipation). 24 each 0    triamcinolone acetonide 0.1% (KENALOG) 0.1 % cream Apply topically 2 (two) times daily. 80 g 0    vitamin E 400 UNIT capsule Take 400 Units by mouth once daily.           PE:         Vitals:    07/08/17 0527   BP: 136/60   Pulse:    Resp:    Temp:        Estimated body mass index is 24.47 kg/m² as calculated from the following:    Height as of this encounter: 5' 2" (1.575 m).    Weight as of this encounter: 60.7 kg (133 lb 12.8 oz).     General WDWN, NAD     Extremity:   Left leg wound with hematoma clot present anteriorly.  Full thickness skin " death and necrosis to the entire lateral aspect of the LE extending from the knee to the ankle  Pulses intact distally  SILT  Compartments soft        Labs:    Lab Results   Component Value Date    WBC 5.18 07/08/2017    HGB 8.9 (L) 07/08/2017    HCT 27.4 (L) 07/08/2017    MCV 89 07/08/2017     07/08/2017       BMP  Lab Results   Component Value Date     07/07/2017    K 4.6 07/07/2017     07/07/2017    CO2 25 07/07/2017    BUN 32 (H) 07/07/2017    CREATININE 1.8 (H) 07/07/2017    CALCIUM 9.0 07/07/2017    ANIONGAP 8 07/07/2017    ESTGFRAFRICA 29 (A) 07/07/2017    EGFRNONAA 25 (A) 07/07/2017       Lab Results   Component Value Date    INR 1.0 07/07/2017    INR 1.2 06/28/2017    INR 1.3 (H) 04/28/2017       No results found for: SEDRATE    No results found for: CRP      A/P  85 y.o.female with left leg open wound and full thickness skin necrosis    Plan for OR today for wound debridement and I and D  Possible wound vac placement  Pt may need skin grafting in the future.      Risks and complications were discussed including but not limited to the risks of anesthetic complications, infection, wound healing complications, bleeding, injury to nerve, vessels, anr or soft tissues. Need to repeat or perform future operations,neurologic dysfunction including numbness, DVT, pulmonary embolism, myocardial infarction, stroke and death among others were discussed, and the patient elects to proceed.    She is aware of the possibility of future amputation and likely future procedures      Parveen Campo MD

## 2017-07-08 NOTE — ANESTHESIA PREPROCEDURE EVALUATION
"                                                                                                             07/08/2017  Christal Goodson is a 85 y.o., female.    Anesthesia Evaluation    I have reviewed the Patient Summary Reports.     I have reviewed the Medications.     Review of Systems  Anesthesia Hx:  Hx of Anesthetic complications    Social:  Non-Smoker, No Alcohol Use    Cardiovascular:   Hypertension CAD   CHF ECG has been reviewed. Echo 4/30/2017:  CONCLUSIONS     1 - Mildly to moderately depressed left ventricular systolic function (EF 40-45%).  Cannot exclude septal WMA vs "right ventricularization.".     2 - Concentric hypertrophy.     3 - Impaired LV relaxation, elevated LAP (grade 2 diastolic dysfunction).     4 - Right ventricular enlargement with hypertrophy, with low normal systolic function.     5 - Mild to moderate aortic stenosis, RAVI = 1.48 cm2, peak velocity = 2.32 m/s, mean gradient = 13.49 mmHg.     6 - Mild aortic regurgitation.     7 - Trivial to mild mitral regurgitation.     8 - Moderate to severe tricuspid regurgitation.  TV leaflet malcoaptation.     9 - Pulmonary hypertension. The estimated PA systolic pressure is 62 mmHg.     10 - Consider NAJMA +/- cath for further eval if clinically indicated.    Renal/:   Chronic Renal Disease, CRI    Endocrine:   Diabetes, type 2 Hypothyroidism  Diabetes, Type 2 Diabetes        Physical Exam  General:  Well nourished    Airway/Jaw/Neck:  Airway Findings: Mouth Opening: Normal Tongue: Normal  General Airway Assessment: Adult  Mallampati: III  TM Distance: Normal, at least 6 cm  Jaw/Neck Findings:  Neck ROM: Normal ROM      Dental:  Dental Findings: Edentulous   Chest/Lungs:  Chest/Lungs Findings: Clear to auscultation, Normal Respiratory Rate     Heart/Vascular:  Heart Findings: Rate: Normal        Mental Status:  Mental Status Findings:  Cooperative, Alert and Oriented         Anesthesia Plan  Type of Anesthesia, risks & benefits " discussed:  Anesthesia Type:  general, regional  Patient's Preference:   Intra-op Monitoring Plan: standard ASA monitors  Intra-op Monitoring Plan Comments:   Post Op Pain Control Plan: multimodal analgesia, IV/PO Opioids PRN, per primary service following discharge from PACU and peripheral nerve block  Post Op Pain Control Plan Comments:   Induction:   IV  Beta Blocker:  Patient is on a Beta-Blocker and has received one dose within the past 24 hours (No further documentation required).       Informed Consent: Patient understands risks and agrees with Anesthesia plan.  Questions answered. Anesthesia consent signed with patient.  ASA Score: 3     Day of Surgery Review of History & Physical:    H&P update referred to the surgeon.         Ready For Surgery From Anesthesia Perspective.

## 2017-07-08 NOTE — BRIEF OP NOTE
[facility]  Brief Operative Note    SUMMARY     Surgery Date: 7/8/2017     Surgeon(s) and Role:     * Parveen Campo MD - Primary    Assisting Surgeon: None    Pre-op Diagnosis:  Traumatic hematoma [T14.90]    Post-op Diagnosis:  Post-Op Diagnosis Codes:     * Traumatic hematoma [T14.90]    Procedure(s) (LRB):  INCISION-DRAINAGE-HEMATOMA (Left)  APPLICATION-WOUND DRESSING (Left)    Anesthesia: General    Description of Procedure: hematoma evacuation  Debridement of wound  I and D    Description of the findings of the procedure:  hematoma evacuation  Debridement of wound  I and D    Wound 30 X 13 CM      Estimated Blood Loss: 100 mL mostly old hematoma         Specimens:   Specimen (12h ago through future)    None

## 2017-07-08 NOTE — ASSESSMENT & PLAN NOTE
Traumatic hematoma with wound opening - hospitalized a little over 1 week ago; Dr. Campo saw patient - anticipate surgery.   -follow H/H  -IV abx with vanc and cefepime for now (defer to surgery)  -analgesics  -Orthopedics and wound care consults  -consider ID consult for abx care - defer to surgery  -Defer Lovenox 2/2 debridement - will defer to surgery for restarting tomorrow

## 2017-07-08 NOTE — HOSPITAL COURSE
85 y.o.female with left leg open wound and full thickness skin necrosis - with severe pain, moans with movement, doesn't like pain medications - seen by otho/surgery and taken for wound debridement, I&D and wound vac on 7/8. Plastic Surgery consulted for skin graft.  Skin graft on 7/11.  Wound vac removed 7/16.  Fever spike 7/16.  Watery diarrhea. C diff+. PO vanc started 7/16/17. Stable for DC to complete PO vanc - financial arrangements made by [facility] to provide this medicine. Home health arrangements made. Patient and daughter in agreement to go home with home health.

## 2017-07-09 LAB
ABO + RH BLD: NORMAL
ANION GAP SERPL CALC-SCNC: 6 MMOL/L
ANION GAP SERPL CALC-SCNC: 6 MMOL/L
BASOPHILS # BLD AUTO: 0.01 K/UL
BASOPHILS NFR BLD: 0.2 %
BLD GP AB SCN CELLS X3 SERPL QL: NORMAL
BLD PROD TYP BPU: NORMAL
BLD PROD TYP BPU: NORMAL
BLOOD UNIT EXPIRATION DATE: NORMAL
BLOOD UNIT EXPIRATION DATE: NORMAL
BLOOD UNIT TYPE CODE: 5100
BLOOD UNIT TYPE CODE: 5100
BLOOD UNIT TYPE: NORMAL
BLOOD UNIT TYPE: NORMAL
BUN SERPL-MCNC: 28 MG/DL
BUN SERPL-MCNC: 28 MG/DL
CALCIUM SERPL-MCNC: 8.4 MG/DL
CALCIUM SERPL-MCNC: 8.4 MG/DL
CHLORIDE SERPL-SCNC: 104 MMOL/L
CHLORIDE SERPL-SCNC: 104 MMOL/L
CO2 SERPL-SCNC: 25 MMOL/L
CO2 SERPL-SCNC: 25 MMOL/L
CODING SYSTEM: NORMAL
CODING SYSTEM: NORMAL
CREAT SERPL-MCNC: 1.9 MG/DL
CREAT SERPL-MCNC: 1.9 MG/DL
DIFFERENTIAL METHOD: ABNORMAL
DISPENSE STATUS: NORMAL
DISPENSE STATUS: NORMAL
EOSINOPHIL # BLD AUTO: 0.2 K/UL
EOSINOPHIL NFR BLD: 2.8 %
ERYTHROCYTE [DISTWIDTH] IN BLOOD BY AUTOMATED COUNT: 15.7 %
EST. GFR  (AFRICAN AMERICAN): 27 ML/MIN/1.73 M^2
EST. GFR  (AFRICAN AMERICAN): 27 ML/MIN/1.73 M^2
EST. GFR  (NON AFRICAN AMERICAN): 24 ML/MIN/1.73 M^2
EST. GFR  (NON AFRICAN AMERICAN): 24 ML/MIN/1.73 M^2
GLUCOSE SERPL-MCNC: 71 MG/DL
GLUCOSE SERPL-MCNC: 71 MG/DL
HCT VFR BLD AUTO: 23.9 %
HCT VFR BLD AUTO: 23.9 %
HGB BLD-MCNC: 7.7 G/DL
HGB BLD-MCNC: 7.7 G/DL
LYMPHOCYTES # BLD AUTO: 0.6 K/UL
LYMPHOCYTES NFR BLD: 10.8 %
MCH RBC QN AUTO: 28.8 PG
MCHC RBC AUTO-ENTMCNC: 32.2 %
MCV RBC AUTO: 90 FL
MONOCYTES # BLD AUTO: 0.7 K/UL
MONOCYTES NFR BLD: 12.8 %
NEUTROPHILS # BLD AUTO: 3.9 K/UL
NEUTROPHILS NFR BLD: 73.4 %
NUM UNITS TRANS PACKED RBC: NORMAL
PLATELET # BLD AUTO: 214 K/UL
PMV BLD AUTO: 9.1 FL
POCT GLUCOSE: 112 MG/DL (ref 70–110)
POCT GLUCOSE: 144 MG/DL (ref 70–110)
POCT GLUCOSE: 89 MG/DL (ref 70–110)
POCT GLUCOSE: 94 MG/DL (ref 70–110)
POTASSIUM SERPL-SCNC: 4.8 MMOL/L
POTASSIUM SERPL-SCNC: 4.8 MMOL/L
RBC # BLD AUTO: 2.67 M/UL
SODIUM SERPL-SCNC: 135 MMOL/L
SODIUM SERPL-SCNC: 135 MMOL/L
TRANS ERYTHROCYTES VOL PATIENT: NORMAL ML
VANCOMYCIN SERPL-MCNC: 19.7 UG/ML
WBC # BLD AUTO: 5.3 K/UL

## 2017-07-09 PROCEDURE — 25000003 PHARM REV CODE 250: Performed by: EMERGENCY MEDICINE

## 2017-07-09 PROCEDURE — 86900 BLOOD TYPING SEROLOGIC ABO: CPT

## 2017-07-09 PROCEDURE — P9016 RBC LEUKOCYTES REDUCED: HCPCS

## 2017-07-09 PROCEDURE — 86901 BLOOD TYPING SEROLOGIC RH(D): CPT

## 2017-07-09 PROCEDURE — 63600175 PHARM REV CODE 636 W HCPCS: Performed by: HOSPITALIST

## 2017-07-09 PROCEDURE — 80048 BASIC METABOLIC PNL TOTAL CA: CPT

## 2017-07-09 PROCEDURE — 85025 COMPLETE CBC W/AUTO DIFF WBC: CPT

## 2017-07-09 PROCEDURE — 63600175 PHARM REV CODE 636 W HCPCS: Performed by: PHYSICIAN ASSISTANT

## 2017-07-09 PROCEDURE — 25000003 PHARM REV CODE 250: Performed by: ORTHOPAEDIC SURGERY

## 2017-07-09 PROCEDURE — 80202 ASSAY OF VANCOMYCIN: CPT

## 2017-07-09 PROCEDURE — 36430 TRANSFUSION BLD/BLD COMPNT: CPT

## 2017-07-09 PROCEDURE — 36415 COLL VENOUS BLD VENIPUNCTURE: CPT

## 2017-07-09 PROCEDURE — 86920 COMPATIBILITY TEST SPIN: CPT

## 2017-07-09 PROCEDURE — 12000002 HC ACUTE/MED SURGE SEMI-PRIVATE ROOM

## 2017-07-09 PROCEDURE — P9021 RED BLOOD CELLS UNIT: HCPCS

## 2017-07-09 RX ORDER — CEFAZOLIN SODIUM 1 G/3ML
1 INJECTION, POWDER, FOR SOLUTION INTRAMUSCULAR; INTRAVENOUS
Status: DISCONTINUED | OUTPATIENT
Start: 2017-07-09 | End: 2017-07-09

## 2017-07-09 RX ORDER — CEFAZOLIN SODIUM 1 G/50ML
1 SOLUTION INTRAVENOUS
Status: DISCONTINUED | OUTPATIENT
Start: 2017-07-09 | End: 2017-07-13

## 2017-07-09 RX ORDER — HYDROCODONE BITARTRATE AND ACETAMINOPHEN 500; 5 MG/1; MG/1
TABLET ORAL
Status: DISCONTINUED | OUTPATIENT
Start: 2017-07-09 | End: 2017-07-18

## 2017-07-09 RX ADMIN — HYDRALAZINE HYDROCHLORIDE 50 MG: 25 TABLET ORAL at 05:07

## 2017-07-09 RX ADMIN — FUROSEMIDE 20 MG: 20 TABLET ORAL at 09:07

## 2017-07-09 RX ADMIN — HYDRALAZINE HYDROCHLORIDE 50 MG: 25 TABLET ORAL at 10:07

## 2017-07-09 RX ADMIN — OXYCODONE HYDROCHLORIDE 5 MG: 5 TABLET ORAL at 05:07

## 2017-07-09 RX ADMIN — SODIUM CHLORIDE: 0.9 INJECTION, SOLUTION INTRAVENOUS at 02:07

## 2017-07-09 RX ADMIN — Medication 400 UNITS: at 09:07

## 2017-07-09 RX ADMIN — Medication 250 MG: at 09:07

## 2017-07-09 RX ADMIN — CEFAZOLIN SODIUM 1 G: 1 INJECTION, SOLUTION INTRAVENOUS at 10:07

## 2017-07-09 RX ADMIN — LEVOTHYROXINE SODIUM 50 MCG: 50 TABLET ORAL at 05:07

## 2017-07-09 RX ADMIN — HYDRALAZINE HYDROCHLORIDE 50 MG: 25 TABLET ORAL at 03:07

## 2017-07-09 RX ADMIN — CEFAZOLIN SODIUM 1 G: 1 INJECTION, SOLUTION INTRAVENOUS at 09:07

## 2017-07-09 RX ADMIN — LABETALOL HYDROCHLORIDE 300 MG: 100 TABLET, FILM COATED ORAL at 10:07

## 2017-07-09 RX ADMIN — FERROUS SULFATE TAB EC 325 MG (65 MG FE EQUIVALENT) 325 MG: 325 (65 FE) TABLET DELAYED RESPONSE at 09:07

## 2017-07-09 RX ADMIN — FUROSEMIDE 20 MG: 20 TABLET ORAL at 10:07

## 2017-07-09 RX ADMIN — ALLOPURINOL 100 MG: 100 TABLET ORAL at 09:07

## 2017-07-09 RX ADMIN — POLYETHYLENE GLYCOL 3350 17 G: 17 POWDER, FOR SOLUTION ORAL at 09:07

## 2017-07-09 RX ADMIN — MORPHINE SULFATE 4 MG: 10 INJECTION INTRAVENOUS at 02:07

## 2017-07-09 RX ADMIN — OXYCODONE HYDROCHLORIDE 5 MG: 5 TABLET ORAL at 10:07

## 2017-07-09 RX ADMIN — LABETALOL HYDROCHLORIDE 300 MG: 100 TABLET, FILM COATED ORAL at 09:07

## 2017-07-09 NOTE — ASSESSMENT & PLAN NOTE
Last 2D echo showed 40% LVED with DD and mild-->moderate AVS - Stable disease at this time.  Monitor fluid volume closely  Continue ASA and statin  Daily Weight - strict I&O's  Low Na diet  1.5L fluid restriction

## 2017-07-09 NOTE — ANESTHESIA POSTPROCEDURE EVALUATION
"Anesthesia Post Evaluation    Patient: Christal Goodson    Procedure(s) Performed: Procedure(s) (LRB):  INCISION-DRAINAGE-HEMATOMA (Left)  APPLICATION-WOUND DRESSING (Left)    Final Anesthesia Type: general  Patient location during evaluation: PACU  Patient participation: Yes- Able to Participate  Level of consciousness: awake and alert and oriented  Post-procedure vital signs: reviewed and stable  Pain management: adequate  Airway patency: patent  PONV status at discharge: No PONV  Anesthetic complications: no      Cardiovascular status: blood pressure returned to baseline and hemodynamically stable  Respiratory status: unassisted, spontaneous ventilation and room air  Hydration status: euvolemic  Follow-up not needed.        Visit Vitals  BP (!) 146/67 (BP Location: Left arm, Patient Position: Lying, BP Method: Automatic)   Pulse (!) 58   Temp 36.9 °C (98.5 °F) (Oral)   Resp 18   Ht 5' 2" (1.575 m)   Wt 60.7 kg (133 lb 12.8 oz)   SpO2 (!) 94%   Breastfeeding? No   BMI 24.47 kg/m²       Pain/Tru Score: Pain Assessment Performed: Yes (7/9/2017  6:25 AM)  Presence of Pain: complains of pain/discomfort (7/8/2017  4:05 PM)  Pain Rating Prior to Med Admin: 8 (7/9/2017 10:02 AM)  Pain Rating Post Med Admin: 2 (7/8/2017  8:05 PM)  Tru Score: 9 (7/8/2017 10:13 AM)      "

## 2017-07-09 NOTE — PLAN OF CARE
Problem: Patient Care Overview  Goal: Plan of Care Review  Pt AOx4 pt free from falls and injury. Wound vac in place, no leaks. Comfort met to allow periods of sleep/ rest. Free from fever.  After education of break down and repositioning, pt Stays mainly on her right side due to pain of LLE.

## 2017-07-09 NOTE — ASSESSMENT & PLAN NOTE
Traumatic hematoma with wound opening - hospitalized a little over 1 week ago.  -follow H/H  -IV abx with Ancef  -analgesics  -appreciate Ortho input  S/P I&D on 7/8 with wound vac placement.  Plastic Surgery consulted.

## 2017-07-09 NOTE — SUBJECTIVE & OBJECTIVE
Interval History: Feeling well.  No new complaints.    Review of Systems   Constitutional: Negative for chills and fever.   HENT: Negative for ear discharge and ear pain.    Eyes: Negative for pain and itching.   Cardiovascular: Negative for chest pain and palpitations.     Objective:     Vital Signs (Most Recent):  Temp: 98.5 °F (36.9 °C) (07/09/17 0450)  Pulse: (!) 58 (07/09/17 0450)  Resp: 18 (07/09/17 0450)  BP: (!) 146/67 (07/09/17 0450)  SpO2: (!) 94 % (07/09/17 0450) Vital Signs (24h Range):  Temp:  [98.1 °F (36.7 °C)-99.2 °F (37.3 °C)] 98.5 °F (36.9 °C)  Pulse:  [58-67] 58  Resp:  [11-18] 18  SpO2:  [93 %-99 %] 94 %  BP: (126-179)/(60-76) 146/67     Weight: 60.7 kg (133 lb 12.8 oz)  Body mass index is 24.47 kg/m².    Intake/Output Summary (Last 24 hours) at 07/09/17 0949  Last data filed at 07/09/17 0632   Gross per 24 hour   Intake          1773.75 ml   Output              660 ml   Net          1113.75 ml      Physical Exam   Constitutional: She is oriented to person, place, and time. She appears well-developed and well-nourished. No distress.   HENT:   Head: Normocephalic and atraumatic.   Eyes: EOM are normal. Pupils are equal, round, and reactive to light.   Neck: Normal range of motion. Neck supple.   Cardiovascular: Normal rate and regular rhythm.    Pulmonary/Chest: Effort normal. No respiratory distress. She has no wheezes. She has no rales.   Abdominal: Soft. Bowel sounds are normal. She exhibits no distension. There is no tenderness.   Musculoskeletal: Normal range of motion. She exhibits no deformity.   Leg wound   Neurological: She is alert and oriented to person, place, and time.   Skin: Skin is warm and dry. She is not diaphoretic.   Wound vac to left leg       Significant Labs:   BMP:   Recent Labs  Lab 07/09/17  0347   GLU 71  71   *  135*   K 4.8  4.8     104   CO2 25  25   BUN 28*  28*   CREATININE 1.9*  1.9*   CALCIUM 8.4*  8.4*     CBC:   Recent Labs  Lab  07/07/17  1549 07/08/17  0453 07/08/17  1020 07/09/17  0347   WBC 6.77 5.18  --  5.30   HGB 9.8* 8.9* 9.1* 7.7*  7.7*   HCT 29.2* 27.4* 27.7* 23.9*  23.9*    220  --  214

## 2017-07-09 NOTE — PROGRESS NOTES
Ortho Daily Progress Note    Christal Goodson is a 85 y.o. female admitted on 7/7/2017      Chief Complaint/Reason for admission: Wound Check (Per EMS report pt has hematoma to left calf that is draining pinkish malodorous blood/drainage; pt denies any other symptoms at this time)       Hospital Day: 1  Post Op Day: 1 Day Post-Op     The patient was seen and examined this morning at the bedside. Patient reports no acute issues overnight.  Patient reports that pain is adequately controlled.    _______________    Vitals:    07/08/17 1551 07/08/17 1939 07/08/17 2330 07/09/17 0450   BP: (!) 153/66 (!) 149/66 126/60 (!) 146/67   Pulse: 67 65 60 (!) 58   Resp: 18 18 18 18   Temp: 98.3 °F (36.8 °C) 99.2 °F (37.3 °C) 98.5 °F (36.9 °C) 98.5 °F (36.9 °C)   TempSrc: Oral Oral Oral Oral   SpO2: 96% 98% 99% (!) 94%   Weight:       Height:           Vital Signs (Most Recent)  Temp: 98.5 °F (36.9 °C) (07/09/17 0450)  Pulse: (!) 58 (07/09/17 0450)  Resp: 18 (07/09/17 0450)  BP: (!) 146/67 (07/09/17 0450)  SpO2: (!) 94 % (07/09/17 0450)    Vital Signs Range (Last 24H):  Temp:  [98 °F (36.7 °C)-99.2 °F (37.3 °C)]   Pulse:  [58-67]   Resp:  [11-29]   BP: (126-197)/(60-82)   SpO2:  [93 %-100 %]       Physical:    AAOx3  Incision/ dressing clean/dry/intact  NVI Distally  Palpable distal pulses  CR<3sec      Recent Labs      07/07/17   1549  07/08/17   0453  07/08/17   1020  07/09/17   0347   K  4.6  4.6  4.9  4.8  4.8   CALCIUM  9.0  8.8  8.7  8.4*  8.4*   WBC  6.77  5.18   --   5.30   HGB  9.8*  8.9*  9.1*  7.7*  7.7*   HCT  29.2*  27.4*  27.7*  23.9*  23.9*   PLT  207  220   --   214   INR  1.0   --    --    --        I/O last 3 completed shifts:  In: 2403.8 [P.O.:1140; I.V.:1013.8; IV Piggyback:250]  Out: 760 [Other:660; Blood:100]          Assessment:  A/P POD 1 s/p left   Leg I and D and debridement            Plan:    Continue wound vac   Blood transfusion today from acute on chronic anemia from blood loss expected  Consult  put in to plastic surgery for wound coverage      Parveen Campo MD  [facility]

## 2017-07-09 NOTE — ASSESSMENT & PLAN NOTE
Acute blood loss anemia as expected from hematoma and surgery.  Transfuse 2 units of blood and monitor H/H.

## 2017-07-09 NOTE — ASSESSMENT & PLAN NOTE
Avoid nephrotoxic agents and renally dose medications when possible  Baseline Creat around 1.8  Stable.

## 2017-07-09 NOTE — PROGRESS NOTES
"[facility] [place] [facility]  Progress Note    Patient Name: Christal Goodson  MRN: [id number]  Patient Class: IP- Inpatient   Admission Date: 7/7/2017  Length of Stay: 1 days  Attending Physician: Max Faria MD  Primary Care Provider: Juan Alberto Prieto MD        Subjective:     Principal Problem:Traumatic hematoma of left lower leg    HPI:  Patient is 84 yo female with HTN, HLD, CAD, CHF (EF 40%+ DD), CKD, and DMII who presents with complaint of leg wound. Patient was recently hospitalized at [facility] from 6/28-7/1/17 for L lower extremity traumatic hematoma resulting in acute blood loss anemia. Was transfused 4 units PRBC and discharged in stable condition with planned outpatient follow up. Per patient today while hanging legs over side of bed the affected leg "popped open" with some blood and drainage at the scene. She denies new trauma to the area. She complains of leg pain but otherwise denies fever/chills, SOB, CP, abdominal pain, N/V, dysuria. On presentation patient with elevated blood pressure but otherwise grossly normal labs. Afebrile in without leukocytosis. Non toxic appearing. H/H stable with hemoglobin of 9.8 (up from 9.0 at recent discharge). Call placed to Dr. Campo with photo of affected area sent with family permission per ED note. He is with recommendation for IV antibiotics and wound care for now. He will see the patient in am. Placed in observation for further monitoring.     Hospital Course:  85 y.o.female with left leg open wound and full thickness skin necrosis - with severe pain, moans with movement, doesn't like pain medications - seen by otho/surgery and taken for wound debridement, I&D and wound vac on 7/8. - Patient may require skin grafting for future and Plastic Surgery consulted.    Interval History: Feeling well.  No new complaints.    Review of Systems   Constitutional: Negative for chills and fever.   HENT: Negative for ear discharge and ear pain.    Eyes: " Negative for pain and itching.   Cardiovascular: Negative for chest pain and palpitations.     Objective:     Vital Signs (Most Recent):  Temp: 98.5 °F (36.9 °C) (07/09/17 0450)  Pulse: (!) 58 (07/09/17 0450)  Resp: 18 (07/09/17 0450)  BP: (!) 146/67 (07/09/17 0450)  SpO2: (!) 94 % (07/09/17 0450) Vital Signs (24h Range):  Temp:  [98.1 °F (36.7 °C)-99.2 °F (37.3 °C)] 98.5 °F (36.9 °C)  Pulse:  [58-67] 58  Resp:  [11-18] 18  SpO2:  [93 %-99 %] 94 %  BP: (126-179)/(60-76) 146/67     Weight: 60.7 kg (133 lb 12.8 oz)  Body mass index is 24.47 kg/m².    Intake/Output Summary (Last 24 hours) at 07/09/17 0949  Last data filed at 07/09/17 0632   Gross per 24 hour   Intake          1773.75 ml   Output              660 ml   Net          1113.75 ml      Physical Exam   Constitutional: She is oriented to person, place, and time. She appears well-developed and well-nourished. No distress.   HENT:   Head: Normocephalic and atraumatic.   Eyes: EOM are normal. Pupils are equal, round, and reactive to light.   Neck: Normal range of motion. Neck supple.   Cardiovascular: Normal rate and regular rhythm.    Pulmonary/Chest: Effort normal. No respiratory distress. She has no wheezes. She has no rales.   Abdominal: Soft. Bowel sounds are normal. She exhibits no distension. There is no tenderness.   Musculoskeletal: Normal range of motion. She exhibits no deformity.   Leg wound   Neurological: She is alert and oriented to person, place, and time.   Skin: Skin is warm and dry. She is not diaphoretic.   Wound vac to left leg       Significant Labs:   BMP:   Recent Labs  Lab 07/09/17  0347   GLU 71  71   *  135*   K 4.8  4.8     104   CO2 25  25   BUN 28*  28*   CREATININE 1.9*  1.9*   CALCIUM 8.4*  8.4*     CBC:   Recent Labs  Lab 07/07/17  1549 07/08/17  0453 07/08/17  1020 07/09/17  0347   WBC 6.77 5.18  --  5.30   HGB 9.8* 8.9* 9.1* 7.7*  7.7*   HCT 29.2* 27.4* 27.7* 23.9*  23.9*    220  --  214      Assessment/Plan:      * Traumatic hematoma of left lower leg    Traumatic hematoma with wound opening - hospitalized a little over 1 week ago.  -follow H/H  -IV abx with Ancef  -analgesics  -appreciate Ortho input  S/P I&D on 7/8 with wound vac placement.  Plastic Surgery consulted.        Acute blood loss anemia    Acute blood loss anemia as expected from hematoma and surgery.  Transfuse 2 units of blood and monitor H/H.          Type 2 diabetes mellitus without complication    Last A1c 4.5 June 2017. Well controlled on no medications at present. Will monitor with POCT checks and cover with SSI PRN.           Chronic kidney disease, stage III (moderate)    Avoid nephrotoxic agents and renally dose medications when possible  Baseline Creat around 1.8  Stable.          Essential hypertension, benign    BP elevated on presentation.   Will resume home anti hypertensive regimen and monitor.           CAD (coronary artery disease)    Last 2D echo showed 40% LVED with DD and mild-->moderate AVS - Stable disease at this time.  Monitor fluid volume closely  Continue ASA and statin  Daily Weight - strict I&O's  Low Na diet  1.5L fluid restriction          Hypercholesteremia    Continue statin.           Vitamin D deficiency              Hypothyroidism    TSH 4.414 April 2017. Continue synthroid.             VTE Risk Mitigation         Ordered     enoxaparin injection 30 mg  Daily     Route:  Subcutaneous        07/08/17 0856     Medium Risk of VTE  Once      07/07/17 1907          Max Faria MD  Department of Hospital Medicine   [facility]

## 2017-07-10 ENCOUNTER — ANESTHESIA EVENT (OUTPATIENT)
Dept: SURGERY | Facility: HOSPITAL | Age: 82
DRG: 576 | End: 2017-07-10
Payer: MEDICARE

## 2017-07-10 LAB
ANION GAP SERPL CALC-SCNC: 8 MMOL/L
BACTERIA SPEC AEROBE CULT: NORMAL
BASOPHILS # BLD AUTO: 0.01 K/UL
BASOPHILS NFR BLD: 0.1 %
BUN SERPL-MCNC: 38 MG/DL
CALCIUM SERPL-MCNC: 8.8 MG/DL
CHLORIDE SERPL-SCNC: 107 MMOL/L
CO2 SERPL-SCNC: 22 MMOL/L
CREAT SERPL-MCNC: 2 MG/DL
DIFFERENTIAL METHOD: ABNORMAL
EOSINOPHIL # BLD AUTO: 0.2 K/UL
EOSINOPHIL NFR BLD: 2.8 %
ERYTHROCYTE [DISTWIDTH] IN BLOOD BY AUTOMATED COUNT: 15.3 %
EST. GFR  (AFRICAN AMERICAN): 26 ML/MIN/1.73 M^2
EST. GFR  (NON AFRICAN AMERICAN): 22 ML/MIN/1.73 M^2
GLUCOSE SERPL-MCNC: 80 MG/DL
HCT VFR BLD AUTO: 29.4 %
HGB BLD-MCNC: 9.7 G/DL
LYMPHOCYTES # BLD AUTO: 1 K/UL
LYMPHOCYTES NFR BLD: 14.2 %
MCH RBC QN AUTO: 29 PG
MCHC RBC AUTO-ENTMCNC: 33 %
MCV RBC AUTO: 88 FL
MONOCYTES # BLD AUTO: 0.8 K/UL
MONOCYTES NFR BLD: 10.5 %
NEUTROPHILS # BLD AUTO: 5.2 K/UL
NEUTROPHILS NFR BLD: 72.3 %
PLATELET # BLD AUTO: 217 K/UL
PMV BLD AUTO: 9.8 FL
POCT GLUCOSE: 101 MG/DL (ref 70–110)
POCT GLUCOSE: 123 MG/DL (ref 70–110)
POCT GLUCOSE: 145 MG/DL (ref 70–110)
POCT GLUCOSE: 79 MG/DL (ref 70–110)
POTASSIUM SERPL-SCNC: 5.6 MMOL/L
RBC # BLD AUTO: 3.35 M/UL
SODIUM SERPL-SCNC: 137 MMOL/L
WBC # BLD AUTO: 7.24 K/UL

## 2017-07-10 PROCEDURE — 12000002 HC ACUTE/MED SURGE SEMI-PRIVATE ROOM

## 2017-07-10 PROCEDURE — 94761 N-INVAS EAR/PLS OXIMETRY MLT: CPT

## 2017-07-10 PROCEDURE — 36415 COLL VENOUS BLD VENIPUNCTURE: CPT

## 2017-07-10 PROCEDURE — 63600175 PHARM REV CODE 636 W HCPCS: Performed by: HOSPITALIST

## 2017-07-10 PROCEDURE — 25000003 PHARM REV CODE 250: Performed by: EMERGENCY MEDICINE

## 2017-07-10 PROCEDURE — 80048 BASIC METABOLIC PNL TOTAL CA: CPT

## 2017-07-10 PROCEDURE — 63600175 PHARM REV CODE 636 W HCPCS: Performed by: PHYSICIAN ASSISTANT

## 2017-07-10 PROCEDURE — 63600175 PHARM REV CODE 636 W HCPCS: Performed by: NURSE PRACTITIONER

## 2017-07-10 PROCEDURE — 85025 COMPLETE CBC W/AUTO DIFF WBC: CPT

## 2017-07-10 PROCEDURE — 25000003 PHARM REV CODE 250: Performed by: HOSPITALIST

## 2017-07-10 RX ADMIN — HYDRALAZINE HYDROCHLORIDE 50 MG: 25 TABLET ORAL at 01:07

## 2017-07-10 RX ADMIN — HYDRALAZINE HYDROCHLORIDE 50 MG: 25 TABLET ORAL at 06:07

## 2017-07-10 RX ADMIN — POLYETHYLENE GLYCOL 3350 17 G: 17 POWDER, FOR SOLUTION ORAL at 08:07

## 2017-07-10 RX ADMIN — LABETALOL HYDROCHLORIDE 300 MG: 100 TABLET, FILM COATED ORAL at 08:07

## 2017-07-10 RX ADMIN — CEFAZOLIN SODIUM 1 G: 1 INJECTION, SOLUTION INTRAVENOUS at 10:07

## 2017-07-10 RX ADMIN — LABETALOL HYDROCHLORIDE 300 MG: 100 TABLET, FILM COATED ORAL at 09:07

## 2017-07-10 RX ADMIN — OXYCODONE HYDROCHLORIDE 5 MG: 5 TABLET ORAL at 09:07

## 2017-07-10 RX ADMIN — CEFAZOLIN SODIUM 1 G: 1 INJECTION, SOLUTION INTRAVENOUS at 09:07

## 2017-07-10 RX ADMIN — ENOXAPARIN SODIUM 30 MG: 100 INJECTION SUBCUTANEOUS at 04:07

## 2017-07-10 RX ADMIN — Medication 400 UNITS: at 08:07

## 2017-07-10 RX ADMIN — OXYCODONE HYDROCHLORIDE 5 MG: 5 TABLET ORAL at 12:07

## 2017-07-10 RX ADMIN — ALLOPURINOL 100 MG: 100 TABLET ORAL at 08:07

## 2017-07-10 RX ADMIN — HYDRALAZINE HYDROCHLORIDE 10 MG: 20 INJECTION INTRAMUSCULAR; INTRAVENOUS at 06:07

## 2017-07-10 RX ADMIN — CALCIUM GLUCONATE 1000 MG: 94 INJECTION, SOLUTION INTRAVENOUS at 09:07

## 2017-07-10 RX ADMIN — FUROSEMIDE 20 MG: 20 TABLET ORAL at 09:07

## 2017-07-10 RX ADMIN — OXYCODONE HYDROCHLORIDE 5 MG: 5 TABLET ORAL at 01:07

## 2017-07-10 RX ADMIN — Medication 250 MG: at 08:07

## 2017-07-10 RX ADMIN — FERROUS SULFATE TAB EC 325 MG (65 MG FE EQUIVALENT) 325 MG: 325 (65 FE) TABLET DELAYED RESPONSE at 08:07

## 2017-07-10 RX ADMIN — HYDRALAZINE HYDROCHLORIDE 50 MG: 25 TABLET ORAL at 09:07

## 2017-07-10 RX ADMIN — OXYCODONE HYDROCHLORIDE 5 MG: 5 TABLET ORAL at 07:07

## 2017-07-10 RX ADMIN — LEVOTHYROXINE SODIUM 50 MCG: 50 TABLET ORAL at 06:07

## 2017-07-10 RX ADMIN — FUROSEMIDE 20 MG: 20 TABLET ORAL at 08:07

## 2017-07-10 NOTE — PHYSICIAN QUERY
"PT Name: Christal Goodson  MR #: [id number]    Physician Query Form - Procedure Clarification     CDS/: Ania Alvarado RN             Contact information: [phone]  This form is a permanent document in the medical record.     Query Date: July 10, 2017  By submitting this query, we are merely seeking further clarification of documentation. Please utilize your independent clinical judgment when addressing the question(s) below.    The Medical record contains the following:     Indicators       Supporting Clinical Findings   Location in Medical Record   x Documentation of "Debridement"   Left lower leg wound debridement OP Note 7/8   x Documentation of "I & D" Incision and drainage of left lower extremity OP Note 7/8   x EBL = 100ml mostly all old hematoma clot OP Note   x Other: The overlying skin was nonviable and was removed through sharp debridement with both scissors and a knife blade OP Note 7/8     Excisional debridement is a surgical removal of  nonvitalized tissue, necrosis or slough. The use of a sharp instrument does not always indicate that an excisional debridement was performed.  Non excisional debridement is the scraping away or removal of loose tissue fragments.    Provider, please specify type of procedure(s) performed:    [ X ] Excisional Debridement (Specify site, type, depth of tissue removal ,instrument, size of wound & EBL)   * Site: (Specify)____Left leg lateral calf_________________________________   * Depth of tissue excised:    [ X ] Skin/Subcutaneous [ ] Soft tissue [ ] Fascia [ ] Muscle [ ] Bone   * Instrument used:    [X  ] Scalpel [X ] Scissors [ ] Curette [ ] Other (Specify) ____________________   * Size of wound after debridement: (Specify) ___30cm X 13cm__________________________   * EBL (Specify) ____100 cc______________________________    [  ] Non Excisional Debridement   * Depth of tissue debrided:    [  ] Skin/Subcutaneous [ ] Soft tissue [ ] Fascia [ ] Muscle [ ] Bone    [X  ] " Incision and Drainage    [  ] Other Procedure (Specify) ________________________________    [  ] Clinically Undetermined

## 2017-07-10 NOTE — PROGRESS NOTES
"[facility] Medicine  Progress Note    Patient Name: Christal Goodson  MRN: [id number]  Patient Class: IP- Inpatient   Admission Date: 7/7/2017  Length of Stay: 2 days  Attending Physician: Max Faria MD  Primary Care Provider: Juan Alberto Prieto MD        Subjective:     Principal Problem:Traumatic hematoma of left lower leg    HPI:  Patient is 84 yo female with HTN, HLD, CAD, CHF (EF 40%+ DD), CKD, and DMII who presents with complaint of leg wound. Patient was recently hospitalized at [facility] from 6/28-7/1/17 for L lower extremity traumatic hematoma resulting in acute blood loss anemia. Was transfused 4 units PRBC and discharged in stable condition with planned outpatient follow up. Per patient today while hanging legs over side of bed the affected leg "popped open" with some blood and drainage at the scene. She denies new trauma to the area. She complains of leg pain but otherwise denies fever/chills, SOB, CP, abdominal pain, N/V, dysuria. On presentation patient with elevated blood pressure but otherwise grossly normal labs. Afebrile in without leukocytosis. Non toxic appearing. H/H stable with hemoglobin of 9.8 (up from 9.0 at recent discharge). Call placed to Dr. Campo with photo of affected area sent with family permission per ED note. He is with recommendation for IV antibiotics and wound care for now. He will see the patient in am. Placed in observation for further monitoring.     Hospital Course:  85 y.o.female with left leg open wound and full thickness skin necrosis - with severe pain, moans with movement, doesn't like pain medications - seen by otho/surgery and taken for wound debridement, I&D and wound vac on 7/8. - Patient may require skin grafting for future and Plastic Surgery consulted.    Interval History: No new complaints.    Review of Systems   Constitutional: Negative for chills and fever.   HENT: Negative for ear discharge and ear pain.    Eyes: Negative for " pain and itching.   Cardiovascular: Negative for chest pain and palpitations.     Objective:     Vital Signs (Most Recent):  Temp: 98.9 °F (37.2 °C) (07/10/17 0812)  Pulse: 60 (07/10/17 0812)  Resp: 17 (07/10/17 0812)  BP: (!) 164/69 (07/10/17 0845)  SpO2: 100 % (07/10/17 0815) Vital Signs (24h Range):  Temp:  [98.1 °F (36.7 °C)-99.5 °F (37.5 °C)] 98.9 °F (37.2 °C)  Pulse:  [60-69] 60  Resp:  [17-18] 17  SpO2:  [97 %-100 %] 100 %  BP: (120-183)/(58-77) 164/69     Weight: 63.6 kg (140 lb 3.2 oz)  Body mass index is 25.64 kg/m².    Intake/Output Summary (Last 24 hours) at 07/10/17 1153  Last data filed at 07/10/17 0830   Gross per 24 hour   Intake             1050 ml   Output               90 ml   Net              960 ml      Physical Exam   Constitutional: She is oriented to person, place, and time. She appears well-developed and well-nourished. No distress.   HENT:   Head: Normocephalic and atraumatic.   Eyes: EOM are normal. Pupils are equal, round, and reactive to light.   Neck: Normal range of motion. Neck supple.   Cardiovascular: Normal rate and regular rhythm.    Pulmonary/Chest: Effort normal. No respiratory distress. She has no wheezes. She has no rales.   Abdominal: Soft. Bowel sounds are normal. She exhibits no distension. There is no tenderness.   Musculoskeletal: Normal range of motion. She exhibits no deformity.   Leg wound   Neurological: She is alert and oriented to person, place, and time.   Skin: Skin is warm and dry. She is not diaphoretic.   Wound vac to left leg       Significant Labs:   BMP:     Recent Labs  Lab 07/10/17  0345   GLU 80      K 5.6*      CO2 22*   BUN 38*   CREATININE 2.0*   CALCIUM 8.8     CBC:     Recent Labs  Lab 07/09/17  0347 07/10/17  0345   WBC 5.30 7.24   HGB 7.7*  7.7* 9.7*   HCT 23.9*  23.9* 29.4*    217     Assessment/Plan:      * Traumatic hematoma of left lower leg    Traumatic hematoma with wound opening - hospitalized a little over 1 week  ago.  -follow H/H  -IV abx with Ancef  -analgesics  -appreciate Ortho input  S/P I&D on 7/8 with wound vac placement.  Plastic Surgery consulted.        Acute blood loss anemia    Acute blood loss anemia as expected from hematoma and surgery.  Transfused 2 units of blood and monitor H/H.  Adequate correction of H/H post transfusion.          Type 2 diabetes mellitus without complication    Last A1c 4.5 June 2017. Well controlled on no medications at present. Will monitor with POCT checks and cover with SSI PRN.           Chronic kidney disease, stage III (moderate)    Avoid nephrotoxic agents and renally dose medications when possible  Baseline Creat around 1.8  Stable.          Essential hypertension, benign    BP elevated on presentation.   Will resume home anti hypertensive regimen and monitor.           CAD (coronary artery disease)    Last 2D echo showed 40% LVED with DD and mild-->moderate AVS - Stable disease at this time.  Monitor fluid volume closely  Continue ASA and statin  Daily Weight - strict I&O's  Low Na diet  1.5L fluid restriction          Hypercholesteremia    Continue statin.           Vitamin D deficiency              Hypothyroidism    TSH 4.414 April 2017. Continue synthroid.             VTE Risk Mitigation         Ordered     enoxaparin injection 30 mg  Daily     Route:  Subcutaneous        07/08/17 0856     Medium Risk of VTE  Once      07/07/17 1905          Max Faria MD  Department of Hospital Medicine   [facility] [place]

## 2017-07-10 NOTE — NURSING
Patient is scheduled for a skin graft tomorrow morning. Lovenox is scheduled for 1700 today.  Contacted Dr. Tomas for clarification on adminstering lovenox.  MD advised ok to adminster dose today. Mila Griffin RN 7/10/2017 3:30 PM

## 2017-07-10 NOTE — PLAN OF CARE
Problem: Pressure Ulcer Risk (Zeeshan Scale) (Adult,Obstetrics,Pediatric)  Goal: Identify Related Risk Factors and Signs and Symptoms  Related risk factors and signs and symptoms are identified upon initiation of Human Response Clinical Practice Guideline (CPG)   Turned patient every 2 hours and kept pt clean and dry.  Emphasized importance of turning every 2 hrs.  Patient is eating adequately and fluid intake is adequate within fluid restriction.

## 2017-07-10 NOTE — CONSULTS
An 85 year old female admitted 7/7/17 from home with traumatic hematoma left lower leg.   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 Hillman- Left leg lateral shin golf ball sized mass  6/28/17 Admitted with traumatic hematoma of left lower leg- treatment by Ortho  S/P Left lower wound debridement 30 x 13 cm; incision and drainage of left lower extremity; hematoma evacuation 7/8/17 per Dr. Campo. VAC placement in OR.   Noted plan for wound care and likely get Plastics involved for skin graft  7/10 WBC 7.24 Hgb 9.7 Hct 29.4    7/7 INR 1.0  Alb 2.9  On Isoflex mattress; bed mobility with assistance  Assessment:  LONG I VAC dressing in place to left lateral lower leg at 125 mm Hg continuous suction. Left leg with hip/knee flexion contracture. Slight hip/knee flexion contracture right lower leg. Moderate amount dark red drainage in VAC canister. No surrounding erythema.  Intervention:  Positioned patient so no pressure on left heel. Instructed patient and caregiver at bedside on avoiding pressure on heels. Voiced understanding.

## 2017-07-10 NOTE — ANESTHESIA PREPROCEDURE EVALUATION
"                                                                                                             07/10/2017  Christal Goodson is a 85 y.o., female.    Pre-op Assessment    I have reviewed the Patient Summary Reports.      I have reviewed the Medications.     Review of Systems  Anesthesia Hx:  Hx of Anesthetic complications    Social:  Non-Smoker, No Alcohol Use    Cardiovascular:   Hypertension CAD   CHF ECG has been reviewed. Echo 4/30/2017:  CONCLUSIONS     1 - Mildly to moderately depressed left ventricular systolic function (EF 40-45%).  Cannot exclude septal WMA vs "right ventricularization.".     2 - Concentric hypertrophy.     3 - Impaired LV relaxation, elevated LAP (grade 2 diastolic dysfunction).     4 - Right ventricular enlargement with hypertrophy, with low normal systolic function.     5 - Mild to moderate aortic stenosis, RAVI = 1.48 cm2, peak velocity = 2.32 m/s, mean gradient = 13.49 mmHg.     6 - Mild aortic regurgitation.     7 - Trivial to mild mitral regurgitation.     8 - Moderate to severe tricuspid regurgitation.  TV leaflet malcoaptation.     9 - Pulmonary hypertension. The estimated PA systolic pressure is 62 mmHg.     10 - Consider NAJMA +/- cath for further eval if clinically indicated.    Renal/:   Chronic Renal Disease, CRI    Endocrine:   Diabetes, type 2 Hypothyroidism  Diabetes, Type 2 Diabetes        Physical Exam  General:  Well nourished    Airway/Jaw/Neck:  Airway Findings: Mouth Opening: Normal Tongue: Normal  General Airway Assessment: Adult  Mallampati: III  TM Distance: Normal, at least 6 cm  Jaw/Neck Findings:  Neck ROM: Normal ROM      Dental:  Dental Findings: Edentulous   Chest/Lungs:  Chest/Lungs Findings: Clear to auscultation, Normal Respiratory Rate     Heart/Vascular:  Heart Findings: Rate: Normal        Mental Status:  Mental Status Findings:  Cooperative, Alert and Oriented         Anesthesia Plan  Type of Anesthesia, risks & benefits " discussed:  Anesthesia Type:  general, regional  Patient's Preference:   Intra-op Monitoring Plan: standard ASA monitors  Intra-op Monitoring Plan Comments:   Post Op Pain Control Plan: multimodal analgesia, IV/PO Opioids PRN, per primary service following discharge from PACU and peripheral nerve block  Post Op Pain Control Plan Comments:   Induction:   IV  Beta Blocker:  Patient is on a Beta-Blocker and has received one dose within the past 24 hours (No further documentation required).       Informed Consent: Patient understands risks and agrees with Anesthesia plan.  Questions answered. Anesthesia consent signed with patient.  ASA Score: 3     Day of Surgery Review of History & Physical:    H&P update referred to the surgeon.         Ready For Surgery From Anesthesia Perspective.

## 2017-07-10 NOTE — PLAN OF CARE
Problem: Patient Care Overview  Goal: Plan of Care Review  Outcome: Ongoing (interventions implemented as appropriate)  Pt currently AAOx4, no falls no injuries.  Afebrile.  Wound vac dressing to LLE is clean dry and intact, set to 125mmHg, small amount of clear dark red drainage.  LLE dorsalis pedis +1, cap refill <3s, pain 7/10 controlled by PRN medication.  No coverage needed for blood sugar.  No N/V/BM.  Will continue to monitor.

## 2017-07-10 NOTE — PROGRESS NOTES
Ortho Daily Progress Note    Christal Goodson is a 85 y.o. female admitted on 7/7/2017      Chief Complaint/Reason for admission: Wound Check (Per EMS report pt has hematoma to left calf that is draining pinkish malodorous blood/drainage; pt denies any other symptoms at this time)       Hospital Day: 2  Post Op Day: 2 Days Post-Op     The patient was seen and examined this morning at the bedside. Patient reports no acute issues overnight.  Patient reports that pain is adequately controlled.    _______________    Vitals:    07/09/17 2014 07/10/17 0012 07/10/17 0300 07/10/17 0600   BP: 130/70 132/62 133/69    Pulse: 69 68 64    Resp: 18 18 18    Temp: 99.2 °F (37.3 °C) 99.2 °F (37.3 °C) 98.2 °F (36.8 °C)    TempSrc: Oral Oral Oral    SpO2: 99% 99% 99%    Weight:    63.6 kg (140 lb 3.2 oz)   Height:           Vital Signs (Most Recent)  Temp: 98.2 °F (36.8 °C) (07/10/17 0300)  Pulse: 64 (07/10/17 0300)  Resp: 18 (07/10/17 0300)  BP: 133/69 (07/10/17 0300)  SpO2: 99 % (07/10/17 0300)    Vital Signs Range (Last 24H):  Temp:  [98.1 °F (36.7 °C)-99.5 °F (37.5 °C)]   Pulse:  [60-69]   Resp:  [17-18]   BP: (120-168)/(58-77)   SpO2:  [95 %-99 %]       Physical:    AAOx3  Wound vac clean/dry/intact  NVI Distally  Palpable distal pulses  CR<3sec      Recent Labs      07/07/17   1549  07/08/17   0453  07/08/17   1020  07/09/17   0347  07/10/17   0345   K  4.6  4.6  4.9  4.8  4.8  5.6*   CALCIUM  9.0  8.8  8.7  8.4*  8.4*  8.8   WBC  6.77  5.18   --   5.30  7.24   HGB  9.8*  8.9*  9.1*  7.7*  7.7*  9.7*   HCT  29.2*  27.4*  27.7*  23.9*  23.9*  29.4*   PLT  207  220   --   214  217   INR  1.0   --    --    --    --        I/O last 3 completed shifts:  In: 2631.8 [P.O.:960; I.V.:863.8; Blood:808]  Out: 550 [Other:550]          Assessment:  A/P POD 2 s/p left           Leg hematoma evacuation and skin debridement    Plan:    Await plastic surgery eval for skin grafting      Parveen Campo MD  [facility]

## 2017-07-10 NOTE — CONSULTS
"[facility] [place]  Plastic Surgery  Consult Note    Patient Name: Christal Goodson  MRN: [id number]  Code Status: Full Code  Admission Date: 7/7/2017  Hospital Length of Stay: 2 days  Attending Physician: Max Faria MD  Primary Care Provider: Juan Alberto Prieto MD    Patient information was obtained from patient and ER records.     Consults  Subjective: Consult for skin graft     Principal Problem: Traumatic hematoma of left lower leg    History of Present Illness: Patient is 86 yo female with HTN, HLD, CAD, CHF (EF 40%+ DD), CKD, and DMII who presents with complaint of leg wound. Patient was recently hospitalized at [facility] from 6/28-7/1/17 for L lower extremity traumatic hematoma resulting in acute blood loss anemia. Was transfused 4 units PRBC and discharged in stable condition with planned outpatient follow up. Per patient today while hanging legs over side of bed the affected leg "popped open" with some blood and drainage at the scene. She denies new trauma to the area. She complains of leg pain but otherwise denies fever/chills, SOB, CP, abdominal pain, N/V, dysuria. On presentation patient with elevated blood pressure but otherwise grossly normal labs. Afebrile in without leukocytosis. Non toxic appearing. H/H stable with hemoglobin of 9.8 (up from 9.0 at recent discharge). Call placed to Dr. Campo with photo of affected area sent with family permission per ED note. He is with recommendation for IV antibiotics and wound care for now. He will see the patient in am. Placed in observation for further monitoring.      I was consulted for skin graft. Wound has wound vac and has been debrided by Dr. Campo. The patient denies any recent trauma to the leg.     Subjective & objective note cannot be loaded without a specified hospital service.    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         Assessment/Plan: Left leg wound s.p hematoma   Plan: Will schedule for split thickness skin graft tomorrow or Wednesday.  -Discussed details with patient and daughter.         Assessment & plan notes cannot be loaded without a specified hospital service.    VTE Risk Mitigation         Ordered     enoxaparin injection 30 mg  Daily     Route:  Subcutaneous        07/08/17 0856     Medium Risk of VTE  Once      07/07/17 1903          Thank you for your consult. Will schedule for skin graft    Tonio Tomas MD  Plastic Surgery  [facility]

## 2017-07-10 NOTE — SUBJECTIVE & OBJECTIVE
Interval History: No new complaints.    Review of Systems   Constitutional: Negative for chills and fever.   HENT: Negative for ear discharge and ear pain.    Eyes: Negative for pain and itching.   Cardiovascular: Negative for chest pain and palpitations.     Objective:     Vital Signs (Most Recent):  Temp: 98.9 °F (37.2 °C) (07/10/17 0812)  Pulse: 60 (07/10/17 0812)  Resp: 17 (07/10/17 0812)  BP: (!) 164/69 (07/10/17 0845)  SpO2: 100 % (07/10/17 0815) Vital Signs (24h Range):  Temp:  [98.1 °F (36.7 °C)-99.5 °F (37.5 °C)] 98.9 °F (37.2 °C)  Pulse:  [60-69] 60  Resp:  [17-18] 17  SpO2:  [97 %-100 %] 100 %  BP: (120-183)/(58-77) 164/69     Weight: 63.6 kg (140 lb 3.2 oz)  Body mass index is 25.64 kg/m².    Intake/Output Summary (Last 24 hours) at 07/10/17 1153  Last data filed at 07/10/17 0830   Gross per 24 hour   Intake             1050 ml   Output               90 ml   Net              960 ml      Physical Exam   Constitutional: She is oriented to person, place, and time. She appears well-developed and well-nourished. No distress.   HENT:   Head: Normocephalic and atraumatic.   Eyes: EOM are normal. Pupils are equal, round, and reactive to light.   Neck: Normal range of motion. Neck supple.   Cardiovascular: Normal rate and regular rhythm.    Pulmonary/Chest: Effort normal. No respiratory distress. She has no wheezes. She has no rales.   Abdominal: Soft. Bowel sounds are normal. She exhibits no distension. There is no tenderness.   Musculoskeletal: Normal range of motion. She exhibits no deformity.   Leg wound   Neurological: She is alert and oriented to person, place, and time.   Skin: Skin is warm and dry. She is not diaphoretic.   Wound vac to left leg       Significant Labs:   BMP:     Recent Labs  Lab 07/10/17  0345   GLU 80      K 5.6*      CO2 22*   BUN 38*   CREATININE 2.0*   CALCIUM 8.8     CBC:     Recent Labs  Lab 07/09/17  0347 07/10/17  0345   WBC 5.30 7.24   HGB 7.7*  7.7* 9.7*   HCT  23.9*  23.9* 29.4*    217

## 2017-07-10 NOTE — ASSESSMENT & PLAN NOTE
Acute blood loss anemia as expected from hematoma and surgery.  Transfused 2 units of blood and monitor H/H.  Adequate correction of H/H post transfusion.

## 2017-07-11 ENCOUNTER — SURGERY (OUTPATIENT)
Age: 82
End: 2017-07-11

## 2017-07-11 ENCOUNTER — ANESTHESIA (OUTPATIENT)
Dept: SURGERY | Facility: HOSPITAL | Age: 82
DRG: 576 | End: 2017-07-11
Payer: MEDICARE

## 2017-07-11 LAB
ANION GAP SERPL CALC-SCNC: 8 MMOL/L
BASOPHILS # BLD AUTO: 0.02 K/UL
BASOPHILS NFR BLD: 0.3 %
BUN SERPL-MCNC: 41 MG/DL
CALCIUM SERPL-MCNC: 8.8 MG/DL
CHLORIDE SERPL-SCNC: 105 MMOL/L
CO2 SERPL-SCNC: 24 MMOL/L
CREAT SERPL-MCNC: 1.8 MG/DL
DIFFERENTIAL METHOD: ABNORMAL
EOSINOPHIL # BLD AUTO: 0.2 K/UL
EOSINOPHIL NFR BLD: 2.2 %
ERYTHROCYTE [DISTWIDTH] IN BLOOD BY AUTOMATED COUNT: 15.3 %
EST. GFR  (AFRICAN AMERICAN): 29 ML/MIN/1.73 M^2
EST. GFR  (NON AFRICAN AMERICAN): 25 ML/MIN/1.73 M^2
GLUCOSE SERPL-MCNC: 82 MG/DL
HCT VFR BLD AUTO: 31.1 %
HGB BLD-MCNC: 10.2 G/DL
LYMPHOCYTES # BLD AUTO: 0.9 K/UL
LYMPHOCYTES NFR BLD: 13.8 %
MCH RBC QN AUTO: 28.7 PG
MCHC RBC AUTO-ENTMCNC: 32.8 %
MCV RBC AUTO: 88 FL
MONOCYTES # BLD AUTO: 0.7 K/UL
MONOCYTES NFR BLD: 11 %
NEUTROPHILS # BLD AUTO: 4.9 K/UL
NEUTROPHILS NFR BLD: 72.6 %
PLATELET # BLD AUTO: 244 K/UL
PMV BLD AUTO: 9.8 FL
POCT GLUCOSE: 78 MG/DL (ref 70–110)
POCT GLUCOSE: 85 MG/DL (ref 70–110)
POCT GLUCOSE: 92 MG/DL (ref 70–110)
POTASSIUM SERPL-SCNC: 4.8 MMOL/L
RBC # BLD AUTO: 3.55 M/UL
SODIUM SERPL-SCNC: 137 MMOL/L
WBC # BLD AUTO: 6.74 K/UL

## 2017-07-11 PROCEDURE — 36000706: Performed by: PLASTIC SURGERY

## 2017-07-11 PROCEDURE — 27201423 OPTIME MED/SURG SUP & DEVICES STERILE SUPPLY: Performed by: PLASTIC SURGERY

## 2017-07-11 PROCEDURE — 63600175 PHARM REV CODE 636 W HCPCS: Performed by: ANESTHESIOLOGY

## 2017-07-11 PROCEDURE — 25000242 PHARM REV CODE 250 ALT 637 W/ HCPCS: Performed by: ANESTHESIOLOGY

## 2017-07-11 PROCEDURE — D9220A PRA ANESTHESIA: Mod: CRNA,,, | Performed by: NURSE ANESTHETIST, CERTIFIED REGISTERED

## 2017-07-11 PROCEDURE — 63600175 PHARM REV CODE 636 W HCPCS: Performed by: HOSPITALIST

## 2017-07-11 PROCEDURE — 37000008 HC ANESTHESIA 1ST 15 MINUTES: Performed by: PLASTIC SURGERY

## 2017-07-11 PROCEDURE — 25000003 PHARM REV CODE 250: Performed by: HOSPITALIST

## 2017-07-11 PROCEDURE — D9220A PRA ANESTHESIA: Mod: ANES,,, | Performed by: ANESTHESIOLOGY

## 2017-07-11 PROCEDURE — 12000002 HC ACUTE/MED SURGE SEMI-PRIVATE ROOM

## 2017-07-11 PROCEDURE — 63600175 PHARM REV CODE 636 W HCPCS: Performed by: NURSE ANESTHETIST, CERTIFIED REGISTERED

## 2017-07-11 PROCEDURE — 25000003 PHARM REV CODE 250: Performed by: ANESTHESIOLOGY

## 2017-07-11 PROCEDURE — 37000009 HC ANESTHESIA EA ADD 15 MINS: Performed by: PLASTIC SURGERY

## 2017-07-11 PROCEDURE — 25000003 PHARM REV CODE 250: Performed by: ORTHOPAEDIC SURGERY

## 2017-07-11 PROCEDURE — 36000707: Performed by: PLASTIC SURGERY

## 2017-07-11 PROCEDURE — 0HRLX73 REPLACEMENT OF LEFT LOWER LEG SKIN WITH AUTOLOGOUS TISSUE SUBSTITUTE, FULL THICKNESS, EXTERNAL APPROACH: ICD-10-PCS | Performed by: PLASTIC SURGERY

## 2017-07-11 PROCEDURE — 71000039 HC RECOVERY, EACH ADD'L HOUR: Performed by: PLASTIC SURGERY

## 2017-07-11 PROCEDURE — 71000033 HC RECOVERY, INTIAL HOUR: Performed by: PLASTIC SURGERY

## 2017-07-11 PROCEDURE — 36415 COLL VENOUS BLD VENIPUNCTURE: CPT

## 2017-07-11 PROCEDURE — 85025 COMPLETE CBC W/AUTO DIFF WBC: CPT

## 2017-07-11 PROCEDURE — 25000003 PHARM REV CODE 250: Performed by: EMERGENCY MEDICINE

## 2017-07-11 PROCEDURE — 63600175 PHARM REV CODE 636 W HCPCS: Performed by: NURSE PRACTITIONER

## 2017-07-11 PROCEDURE — 25000003 PHARM REV CODE 250: Performed by: NURSE ANESTHETIST, CERTIFIED REGISTERED

## 2017-07-11 PROCEDURE — 80048 BASIC METABOLIC PNL TOTAL CA: CPT

## 2017-07-11 PROCEDURE — 0HBJXZZ EXCISION OF LEFT UPPER LEG SKIN, EXTERNAL APPROACH: ICD-10-PCS | Performed by: PLASTIC SURGERY

## 2017-07-11 PROCEDURE — 25000003 PHARM REV CODE 250: Performed by: PLASTIC SURGERY

## 2017-07-11 RX ORDER — FENTANYL CITRATE 50 UG/ML
INJECTION, SOLUTION INTRAMUSCULAR; INTRAVENOUS
Status: DISCONTINUED | OUTPATIENT
Start: 2017-07-11 | End: 2017-07-11

## 2017-07-11 RX ORDER — CLONIDINE HYDROCHLORIDE 0.1 MG/1
0.1 TABLET ORAL EVERY 8 HOURS PRN
Status: DISCONTINUED | OUTPATIENT
Start: 2017-07-11 | End: 2017-07-20 | Stop reason: HOSPADM

## 2017-07-11 RX ORDER — MEPERIDINE HYDROCHLORIDE 50 MG/ML
12.5 INJECTION INTRAMUSCULAR; INTRAVENOUS; SUBCUTANEOUS ONCE AS NEEDED
Status: ACTIVE | OUTPATIENT
Start: 2017-07-11 | End: 2017-07-11

## 2017-07-11 RX ORDER — HYDRALAZINE HYDROCHLORIDE 20 MG/ML
10 INJECTION INTRAMUSCULAR; INTRAVENOUS
Status: COMPLETED | OUTPATIENT
Start: 2017-07-11 | End: 2017-07-11

## 2017-07-11 RX ORDER — IPRATROPIUM BROMIDE AND ALBUTEROL SULFATE 2.5; .5 MG/3ML; MG/3ML
3 SOLUTION RESPIRATORY (INHALATION) ONCE
Status: COMPLETED | OUTPATIENT
Start: 2017-07-11 | End: 2017-07-11

## 2017-07-11 RX ORDER — PROPOFOL 10 MG/ML
VIAL (ML) INTRAVENOUS
Status: DISCONTINUED | OUTPATIENT
Start: 2017-07-11 | End: 2017-07-11

## 2017-07-11 RX ORDER — METOCLOPRAMIDE HYDROCHLORIDE 5 MG/ML
10 INJECTION INTRAMUSCULAR; INTRAVENOUS EVERY 10 MIN PRN
Status: DISCONTINUED | OUTPATIENT
Start: 2017-07-11 | End: 2017-07-20 | Stop reason: HOSPADM

## 2017-07-11 RX ORDER — LIDOCAINE HCL/PF 100 MG/5ML
SYRINGE (ML) INTRAVENOUS
Status: DISCONTINUED | OUTPATIENT
Start: 2017-07-11 | End: 2017-07-11

## 2017-07-11 RX ORDER — HYDRALAZINE HYDROCHLORIDE 25 MG/1
75 TABLET, FILM COATED ORAL 3 TIMES DAILY
Status: DISCONTINUED | OUTPATIENT
Start: 2017-07-11 | End: 2017-07-14

## 2017-07-11 RX ORDER — HYDRALAZINE HYDROCHLORIDE 20 MG/ML
10 INJECTION INTRAMUSCULAR; INTRAVENOUS
Status: DISPENSED | OUTPATIENT
Start: 2017-07-11 | End: 2017-07-11

## 2017-07-11 RX ORDER — SODIUM CHLORIDE 0.9 % (FLUSH) 0.9 %
3 SYRINGE (ML) INJECTION
Status: DISCONTINUED | OUTPATIENT
Start: 2017-07-11 | End: 2017-07-20 | Stop reason: HOSPADM

## 2017-07-11 RX ORDER — LIDOCAINE HYDROCHLORIDE AND EPINEPHRINE 5; 5 MG/ML; UG/ML
INJECTION, SOLUTION INFILTRATION; PERINEURAL
Status: DISCONTINUED | OUTPATIENT
Start: 2017-07-11 | End: 2017-07-11 | Stop reason: HOSPADM

## 2017-07-11 RX ORDER — PHENYLEPHRINE HYDROCHLORIDE 10 MG/ML
INJECTION INTRAVENOUS
Status: DISCONTINUED | OUTPATIENT
Start: 2017-07-11 | End: 2017-07-11

## 2017-07-11 RX ORDER — OXYCODONE AND ACETAMINOPHEN 5; 325 MG/1; MG/1
1 TABLET ORAL
Status: COMPLETED | OUTPATIENT
Start: 2017-07-11 | End: 2017-07-17

## 2017-07-11 RX ORDER — HYDROMORPHONE HYDROCHLORIDE 2 MG/ML
0.2 INJECTION, SOLUTION INTRAMUSCULAR; INTRAVENOUS; SUBCUTANEOUS EVERY 5 MIN PRN
Status: DISCONTINUED | OUTPATIENT
Start: 2017-07-11 | End: 2017-07-20 | Stop reason: HOSPADM

## 2017-07-11 RX ORDER — GLYCOPYRROLATE 0.2 MG/ML
INJECTION INTRAMUSCULAR; INTRAVENOUS
Status: DISCONTINUED | OUTPATIENT
Start: 2017-07-11 | End: 2017-07-11

## 2017-07-11 RX ADMIN — HYDRALAZINE HYDROCHLORIDE 10 MG: 20 INJECTION INTRAMUSCULAR; INTRAVENOUS at 02:07

## 2017-07-11 RX ADMIN — EPHEDRINE SULFATE 5 MG: 50 INJECTION, SOLUTION INTRAMUSCULAR; INTRAVENOUS; SUBCUTANEOUS at 12:07

## 2017-07-11 RX ADMIN — HYDRALAZINE HYDROCHLORIDE 75 MG: 25 TABLET ORAL at 10:07

## 2017-07-11 RX ADMIN — FERROUS SULFATE TAB EC 325 MG (65 MG FE EQUIVALENT) 325 MG: 325 (65 FE) TABLET DELAYED RESPONSE at 09:07

## 2017-07-11 RX ADMIN — LIDOCAINE HYDROCHLORIDE 5 ML: 20 INJECTION, SOLUTION INTRAVENOUS at 11:07

## 2017-07-11 RX ADMIN — HYDROMORPHONE HYDROCHLORIDE 0.2 MG: 2 INJECTION INTRAMUSCULAR; INTRAVENOUS; SUBCUTANEOUS at 02:07

## 2017-07-11 RX ADMIN — PROPOFOL 50 MG: 10 INJECTION, EMULSION INTRAVENOUS at 12:07

## 2017-07-11 RX ADMIN — OXYCODONE AND ACETAMINOPHEN 1 TABLET: 5; 325 TABLET ORAL at 10:07

## 2017-07-11 RX ADMIN — CLONIDINE HYDROCHLORIDE 0.1 MG: 0.1 TABLET ORAL at 04:07

## 2017-07-11 RX ADMIN — HYDRALAZINE HYDROCHLORIDE 50 MG: 25 TABLET ORAL at 05:07

## 2017-07-11 RX ADMIN — HYDRALAZINE HYDROCHLORIDE 75 MG: 25 TABLET ORAL at 04:07

## 2017-07-11 RX ADMIN — FUROSEMIDE 20 MG: 20 TABLET ORAL at 10:07

## 2017-07-11 RX ADMIN — LABETALOL HYDROCHLORIDE 300 MG: 100 TABLET, FILM COATED ORAL at 10:07

## 2017-07-11 RX ADMIN — Medication 400 UNITS: at 09:07

## 2017-07-11 RX ADMIN — LEVOTHYROXINE SODIUM 50 MCG: 50 TABLET ORAL at 05:07

## 2017-07-11 RX ADMIN — EPHEDRINE SULFATE 10 MG: 50 INJECTION, SOLUTION INTRAMUSCULAR; INTRAVENOUS; SUBCUTANEOUS at 12:07

## 2017-07-11 RX ADMIN — POLYETHYLENE GLYCOL 3350 17 G: 17 POWDER, FOR SOLUTION ORAL at 09:07

## 2017-07-11 RX ADMIN — ALLOPURINOL 100 MG: 100 TABLET ORAL at 09:07

## 2017-07-11 RX ADMIN — OXYCODONE HYDROCHLORIDE 5 MG: 5 TABLET ORAL at 03:07

## 2017-07-11 RX ADMIN — FUROSEMIDE 20 MG: 20 TABLET ORAL at 09:07

## 2017-07-11 RX ADMIN — CEFAZOLIN SODIUM 1 G: 1 INJECTION, SOLUTION INTRAVENOUS at 09:07

## 2017-07-11 RX ADMIN — PROPOFOL 20 MG: 10 INJECTION, EMULSION INTRAVENOUS at 12:07

## 2017-07-11 RX ADMIN — ENOXAPARIN SODIUM 30 MG: 100 INJECTION SUBCUTANEOUS at 04:07

## 2017-07-11 RX ADMIN — OXYCODONE HYDROCHLORIDE 5 MG: 5 TABLET ORAL at 09:07

## 2017-07-11 RX ADMIN — LABETALOL HYDROCHLORIDE 300 MG: 100 TABLET, FILM COATED ORAL at 09:07

## 2017-07-11 RX ADMIN — Medication 250 MG: at 09:07

## 2017-07-11 RX ADMIN — LIDOCAINE HYDROCHLORIDE,EPINEPHRINE BITARTRATE 50 ML: 5; .005 INJECTION, SOLUTION INFILTRATION; PERINEURAL at 12:07

## 2017-07-11 RX ADMIN — PHENYLEPHRINE HYDROCHLORIDE 100 MCG: 10 INJECTION INTRAVENOUS at 01:07

## 2017-07-11 RX ADMIN — IPRATROPIUM BROMIDE AND ALBUTEROL SULFATE 3 ML: .5; 3 SOLUTION RESPIRATORY (INHALATION) at 01:07

## 2017-07-11 RX ADMIN — EPHEDRINE SULFATE 5 MG: 50 INJECTION, SOLUTION INTRAMUSCULAR; INTRAVENOUS; SUBCUTANEOUS at 01:07

## 2017-07-11 RX ADMIN — PHENYLEPHRINE HYDROCHLORIDE 100 MCG: 10 INJECTION INTRAVENOUS at 12:07

## 2017-07-11 RX ADMIN — FENTANYL CITRATE 50 MCG: 50 INJECTION INTRAMUSCULAR; INTRAVENOUS at 12:07

## 2017-07-11 RX ADMIN — PROPOFOL 50 MG: 10 INJECTION, EMULSION INTRAVENOUS at 11:07

## 2017-07-11 RX ADMIN — PHENYLEPHRINE HYDROCHLORIDE 200 MCG: 10 INJECTION INTRAVENOUS at 12:07

## 2017-07-11 RX ADMIN — GLYCOPYRROLATE 0.1 MG: 0.2 INJECTION, SOLUTION INTRAMUSCULAR; INTRAVENOUS at 12:07

## 2017-07-11 RX ADMIN — FENTANYL CITRATE 50 MCG: 50 INJECTION INTRAMUSCULAR; INTRAVENOUS at 11:07

## 2017-07-11 RX ADMIN — CEFAZOLIN SODIUM 1 G: 1 INJECTION, SOLUTION INTRAVENOUS at 10:07

## 2017-07-11 NOTE — NURSING
Contacted Dr. Tomas for clearance to give cardiac meds prior to procedure scheduled today.  MD advised ok to give cardiac meds.

## 2017-07-11 NOTE — OP NOTE
Surgery Date: 7/11/2017      Surgeon(s) and Role:     * Tonio Tomas MD - Primary     Assisting Surgeon: None     Pre-op Diagnosis:  Open wound of leg, left, initial encounter [S81.802A]     Post-op Diagnosis:  Post-Op Diagnosis Codes:     * Open wound of leg, left, initial encounter [S81.802A]     Procedure(s) (LRB):  SPLIT THICKNESS SKIN GRAFT LOWER EXTREMITY (Left)     Anesthesia: General     Description of Procedure: Split thickness skin graft [id number]     Description of the findings of the procedure: Open left leg wound left leg     Estimated Blood Loss: * No values recorded between 7/11/2017 12:23 PM and 7/11/2017  1:21 PM *         Specimens:       Specimen (12h ago through future)     None     Details of Procedure:  After informed consent, patient was taken to the OR, placed on the OR table in supine position. Administered anesthesia and intubated. The left leg wound vac was removed and the left leg was prepped and draped in usual sterile fashion. A time out was performed. The wound measured 26x3cm and was clean subcutaneous tissue. The wound was washed with normal saline.  A dermatome was then used to obtain a split thickness skin graft from the left thigh. Both a 3in and 2 in blade was used. The skin graft was meshed 1.5 to 1 and placed over the wound. It was secured with staples around the skin and chromic in the center.Adaptic was then placed over the skin graft followed by a wound vac, which was placed to suction without issues. The donor site was dressed with xeroform and tegaderm.   The patient was extubated and taken to recovery without issue. All counts were correct. I was present for the  Entire procedure.

## 2017-07-11 NOTE — PROGRESS NOTES
"[facility]  Progress Note    Patient Name: Christal Goodson  MRN: [id number]  Patient Class: IP- Inpatient   Admission Date: 7/7/2017  Length of Stay: 3 days  Attending Physician: Max Faria MD  Primary Care Provider: Juan Alberto Prieto MD        Subjective:     Principal Problem:Traumatic hematoma of left lower leg    HPI:  Patient is 84 yo female with HTN, HLD, CAD, CHF (EF 40%+ DD), CKD, and DMII who presents with complaint of leg wound. Patient was recently hospitalized at [facility] from 6/28-7/1/17 for L lower extremity traumatic hematoma resulting in acute blood loss anemia. Was transfused 4 units PRBC and discharged in stable condition with planned outpatient follow up. Per patient today while hanging legs over side of bed the affected leg "popped open" with some blood and drainage at the scene. She denies new trauma to the area. She complains of leg pain but otherwise denies fever/chills, SOB, CP, abdominal pain, N/V, dysuria. On presentation patient with elevated blood pressure but otherwise grossly normal labs. Afebrile in without leukocytosis. Non toxic appearing. H/H stable with hemoglobin of 9.8 (up from 9.0 at recent discharge). Call placed to Dr. Campo with photo of affected area sent with family permission per ED note. He is with recommendation for IV antibiotics and wound care for now. He will see the patient in am. Placed in observation for further monitoring.     Hospital Course:  85 y.o.female with left leg open wound and full thickness skin necrosis - with severe pain, moans with movement, doesn't like pain medications - seen by otho/surgery and taken for wound debridement, I&D and wound vac on 7/8. - Patient may require skin grafting for future and Plastic Surgery consulted.  Skin graft scheduled for today.    Interval History: No acute issues overnight.    Review of Systems   Constitutional: Negative for chills and fever.   HENT: Negative for ear " discharge and ear pain.    Eyes: Negative for pain and itching.   Cardiovascular: Negative for chest pain and palpitations.     Objective:     Vital Signs (Most Recent):  Temp: 98.3 °F (36.8 °C) (07/11/17 0755)  Pulse: 64 (07/11/17 0822)  Resp: 18 (07/11/17 0755)  BP: (!) 183/74 (07/11/17 0822)  SpO2: 96 % (07/11/17 0822) Vital Signs (24h Range):  Temp:  [98.3 °F (36.8 °C)-99.1 °F (37.3 °C)] 98.3 °F (36.8 °C)  Pulse:  [59-67] 64  Resp:  [17-18] 18  SpO2:  [96 %-100 %] 96 %  BP: (162-197)/(52-88) 183/74     Weight: 63.6 kg (140 lb 3.8 oz)  Body mass index is 25.65 kg/m².    Intake/Output Summary (Last 24 hours) at 07/11/17 1038  Last data filed at 07/11/17 0907   Gross per 24 hour   Intake              120 ml   Output              450 ml   Net             -330 ml      Physical Exam   Constitutional: She is oriented to person, place, and time. She appears well-developed and well-nourished. No distress.   HENT:   Head: Normocephalic and atraumatic.   Eyes: EOM are normal. Pupils are equal, round, and reactive to light.   Neck: Normal range of motion. Neck supple.   Cardiovascular: Normal rate and regular rhythm.    Pulmonary/Chest: Effort normal. No respiratory distress. She has no wheezes. She has no rales.   Abdominal: Soft. Bowel sounds are normal. She exhibits no distension. There is no tenderness.   Musculoskeletal: Normal range of motion. She exhibits no deformity.   Leg wound   Neurological: She is alert and oriented to person, place, and time.   Skin: Skin is warm and dry. She is not diaphoretic.   Wound vac to left leg       Significant Labs:   BMP:     Recent Labs  Lab 07/11/17 0415   GLU 82      K 4.8      CO2 24   BUN 41*   CREATININE 1.8*   CALCIUM 8.8     CBC:     Recent Labs  Lab 07/10/17  0345 07/11/17 0415   WBC 7.24 6.74   HGB 9.7* 10.2*   HCT 29.4* 31.1*    244     Assessment/Plan:      * Traumatic hematoma of left lower leg    Traumatic hematoma with wound opening - hospitalized  a little over 1 week ago.  -follow H/H  -IV abx with Ancef  -analgesics  -appreciate Ortho input  S/P I&D on 7/8 with wound vac placement.  Plastic Surgery and plan for skin grafting today.        Acute blood loss anemia    Acute blood loss anemia as expected from hematoma and surgery.  Transfused 2 units of blood and monitor H/H.  Adequate correction of H/H post transfusion.          Type 2 diabetes mellitus without complication    Last A1c 4.5 June 2017. Well controlled on no medications at present. Will monitor with POCT checks and cover with SSI PRN.           Chronic kidney disease, stage III (moderate)    Avoid nephrotoxic agents and renally dose medications when possible  Baseline Creat around 1.8  Stable.          Essential hypertension, benign    BP elevated on presentation.   Will resume home anti hypertensive regimen and monitor.   BP remains elevated.  Will increase Hydralazine.          CAD (coronary artery disease)    Last 2D echo showed 40% LVED with DD and mild-->moderate AVS - Stable disease at this time.  Monitor fluid volume closely  Continue ASA and statin  Daily Weight - strict I&O's  Low Na diet  1.5L fluid restriction          Hypercholesteremia    Continue statin.           Vitamin D deficiency              Hypothyroidism    TSH 4.414 April 2017. Continue synthroid.             VTE Risk Mitigation         Ordered     enoxaparin injection 30 mg  Daily     Route:  Subcutaneous        07/08/17 0856     Medium Risk of VTE  Once      07/07/17 1907          Max Faria MD  Department of Hospital Medicine   [facility]

## 2017-07-11 NOTE — PROGRESS NOTES
Ortho Daily Progress Note    Christal Goodson is a 85 y.o. female admitted on 7/7/2017      Chief Complaint/Reason for admission: Wound Check (Per EMS report pt has hematoma to left calf that is draining pinkish malodorous blood/drainage; pt denies any other symptoms at this time)       Hospital Day: 3  Post Op Day: 3 Days Post-Op     The patient was seen and examined this morning at the bedside. Patient reports no acute issues overnight.  Patient reports that pain is adequately controlled.    _______________    Vitals:    07/11/17 0423 07/11/17 0500 07/11/17 0755 07/11/17 0822   BP: (!) 182/88 (!) 164/52 (!) 183/74 (!) 183/74   Pulse: 64  63 64   Resp: 17 18    Temp: 98.6 °F (37 °C)  98.3 °F (36.8 °C)    TempSrc: Oral  Oral    SpO2: 100%  96% 96%   Weight:       Height:           Vital Signs (Most Recent)  Temp: 98.3 °F (36.8 °C) (07/11/17 0755)  Pulse: 64 (07/11/17 0822)  Resp: 18 (07/11/17 0755)  BP: (!) 183/74 (07/11/17 0822)  SpO2: 96 % (07/11/17 0822)    Vital Signs Range (Last 24H):  Temp:  [98.3 °F (36.8 °C)-99.1 °F (37.3 °C)]   Pulse:  [59-67]   Resp:  [17-18]   BP: (162-197)/(52-88)   SpO2:  [96 %-100 %]       Physical:    AAOx3    Wound vac in place working  NVI Distally  Palpable distal pulses  CR<3sec      Recent Labs      07/09/17   0347  07/10/17   0345  07/11/17   0415   K  4.8  4.8  5.6*  4.8   CALCIUM  8.4*  8.4*  8.8  8.8   WBC  5.30  7.24  6.74   HGB  7.7*  7.7*  9.7*  10.2*   HCT  23.9*  23.9*  29.4*  31.1*   PLT  214  217  244       I/O last 3 completed shifts:  In: 1320 [P.O.:720; I.V.:100; Blood:450; IV Piggyback:50]  Out: 580 [Urine:400; Other:180]          Assessment:  A/P POD 3 s/p left     Lateral LE Hematoma evacuation and debridement          Plan:      Appreciate plastic surgery involvement for wound coverage        Parveen Campo MD  [facility]

## 2017-07-11 NOTE — ASSESSMENT & PLAN NOTE
BP elevated on presentation.   Will resume home anti hypertensive regimen and monitor.   BP remains elevated.  Will increase Hydralazine.

## 2017-07-11 NOTE — PLAN OF CARE
Problem: Patient Care Overview  Goal: Plan of Care Review  Outcome: Ongoing (interventions implemented as appropriate)  Educated pt and family member on skin graft procedure, wound care/wound vac purpose and layered dressing for skin donor site as well.  Also educated pt and family member on anti-hypertensive scheduled and PRN.  Both were receptive and verbally acknowledged understanding.  We discussed pain management regime,  Schedule, and side effects as well.

## 2017-07-11 NOTE — ASSESSMENT & PLAN NOTE
Traumatic hematoma with wound opening - hospitalized a little over 1 week ago.  -follow H/H  -IV abx with Ancef  -analgesics  -appreciate Ortho input  S/P I&D on 7/8 with wound vac placement.  Plastic Surgery and plan for skin grafting today.

## 2017-07-11 NOTE — PLAN OF CARE
07/11/17 1110   Readmission Questionnaire   At the time of your discharge, did someone talk to you about what your health problems were? Yes   At the time of discharge, did someone talk to you about what to watch out for regarding worsening of your health problem? Yes   At the time of discharge, did someone talk to you about what to do if you experienced worsening of your health problem? Yes   At the time of discharge, did someone talk to you about which medication to take when you left the hospital and which ones to stop taking? Yes   At the time of discharge, did someone talk to you about when and where to follow up with a doctor after you left the hospital? Yes   How often do you need to have someone help you when you read instructions, pamphlets, or other written material from your doctor or pharmacy? Never   Do you have problems taking your medications as prescribed? No   Do you have any problems affording any of  your prescribed medications? No   Do you have problems obtaining/receiving your medications? Yes  (Last discharge Rx for gout was not called into the pharmacy.)   Does the patient have transportation to healthcare appointments? Yes  (Needs resources for additional transportation options since PHN only covers limited number of visits.)   Lives With child(rodo), adult   Living Arrangements house   Does the patient have family/friends to help with healtcare needs after discharge? yes   Who are your caregiver(s) and their phone number(s)? daughters.   Does your caregiver provide all the help you need? Yes   Are you currently feeling confused? No   Are you currently having problems thinking? No   Are you currently having memory problems? No   Have you felt down, depressed, or hopeless? 0   Have you felt little interest or pleasure in doing things? 0   In the last 7 days, my sleep quality was: poor   Patient's daughter reports that patient will need transportation home via wheelchair vanDarryl Rdz  SURINDER Burris, RADHA-JUAN, [facility]  07/11/2017

## 2017-07-11 NOTE — BRIEF OP NOTE
[facility]  Brief Operative Note    SUMMARY     Surgery Date: 7/11/2017     Surgeon(s) and Role:     * Tonio Tomas MD - Primary    Assisting Surgeon: None    Pre-op Diagnosis:  Open wound of leg, left, initial encounter [S81.802A]    Post-op Diagnosis:  Post-Op Diagnosis Codes:     * Open wound of leg, left, initial encounter [S81.802A]    Procedure(s) (LRB):  SPLIT THICKNESS SKIN GRAFT LOWER EXTREMITY (Left)    Anesthesia: General    Description of Procedure: Split thickness skin graft [id number]    Description of the findings of the procedure: Open left leg wound left leg    Estimated Blood Loss: * No values recorded between 7/11/2017 12:23 PM and 7/11/2017  1:21 PM *         Specimens:   Specimen (12h ago through future)    None      [place]

## 2017-07-11 NOTE — PLAN OF CARE
07/11/17 1113   Discharge Reassessment   Assessment Type Discharge Planning Reassessment   Can the patient answer the patient profile reliably? Yes, cognitively intact   How does the patient rate their overall health at the present time? Fair   Describe the patient's ability to walk at the present time. Does not walk or unable to take any steps at all  (Cedric Lift used for transfer and standard wheelchair used at home.)   How often would a person be available to care for the patient? Whenever needed   During the past month, has the patient often been bothered by feeling down, depressed or hopeless? No   During the past month, has the patient often been bothered by little interest or pleasure in doing things? No   Discharge plan remains the same: Yes  (Home with HH.  Patient currently has [facility].)   Provided patient/caregiver education on the expected discharge date and the discharge plan No  (TBD)   Discharge Plan A Home with family;Home Health   Discharge Plan B (TBD)   Change in patient condition or support system No   Patient choice form signed by patient/caregiver N/A   Explained to the the patient/caregiver why the discharge planned changed: No   Involved the patient/caregiver in establishing a new discharge plan: Yes  (Daughter Jose Eduardo at the bedside.  )   Kajal Burris LMSW, RADHA-Nissa, [facility]  07/11/2017

## 2017-07-11 NOTE — SUBJECTIVE & OBJECTIVE
Interval History: No acute issues overnight.    Review of Systems   Constitutional: Negative for chills and fever.   HENT: Negative for ear discharge and ear pain.    Eyes: Negative for pain and itching.   Cardiovascular: Negative for chest pain and palpitations.     Objective:     Vital Signs (Most Recent):  Temp: 98.3 °F (36.8 °C) (07/11/17 0755)  Pulse: 64 (07/11/17 0822)  Resp: 18 (07/11/17 0755)  BP: (!) 183/74 (07/11/17 0822)  SpO2: 96 % (07/11/17 0822) Vital Signs (24h Range):  Temp:  [98.3 °F (36.8 °C)-99.1 °F (37.3 °C)] 98.3 °F (36.8 °C)  Pulse:  [59-67] 64  Resp:  [17-18] 18  SpO2:  [96 %-100 %] 96 %  BP: (162-197)/(52-88) 183/74     Weight: 63.6 kg (140 lb 3.8 oz)  Body mass index is 25.65 kg/m².    Intake/Output Summary (Last 24 hours) at 07/11/17 1038  Last data filed at 07/11/17 0907   Gross per 24 hour   Intake              120 ml   Output              450 ml   Net             -330 ml      Physical Exam   Constitutional: She is oriented to person, place, and time. She appears well-developed and well-nourished. No distress.   HENT:   Head: Normocephalic and atraumatic.   Eyes: EOM are normal. Pupils are equal, round, and reactive to light.   Neck: Normal range of motion. Neck supple.   Cardiovascular: Normal rate and regular rhythm.    Pulmonary/Chest: Effort normal. No respiratory distress. She has no wheezes. She has no rales.   Abdominal: Soft. Bowel sounds are normal. She exhibits no distension. There is no tenderness.   Musculoskeletal: Normal range of motion. She exhibits no deformity.   Leg wound   Neurological: She is alert and oriented to person, place, and time.   Skin: Skin is warm and dry. She is not diaphoretic.   Wound vac to left leg       Significant Labs:   BMP:     Recent Labs  Lab 07/11/17  0415   GLU 82      K 4.8      CO2 24   BUN 41*   CREATININE 1.8*   CALCIUM 8.8     CBC:     Recent Labs  Lab 07/10/17  0345 07/11/17  0415   WBC 7.24 6.74   HGB 9.7* 10.2*   HCT 29.4*  31.1*    244

## 2017-07-11 NOTE — TRANSFER OF CARE
"Anesthesia Transfer of Care Note    Patient: Christal Greenvall    Procedure(s) Performed: Procedure(s) (LRB):  SPLIT THICKNESS SKIN GRAFT LOWER EXTREMITY (Left)    Patient location: PACU    Anesthesia Type: general    Transport from OR: Transported from OR on room air with adequate spontaneous ventilation    Post pain: adequate analgesia    Post assessment: no apparent anesthetic complications and tolerated procedure well    Post vital signs: stable    Level of consciousness: awake, alert and oriented    Nausea/Vomiting: no nausea/vomiting    Complications: none    Transfer of care protocol was followed      Last vitals:   Visit Vitals  BP (!) 140/68   Pulse 64   Temp 36.7 °C (98.1 °F) (Oral)   Resp 14   Ht 5' 2" (1.575 m)   Wt 63.6 kg (140 lb 3.8 oz)   SpO2 97%   Breastfeeding? No   BMI 25.65 kg/m²     "

## 2017-07-11 NOTE — NURSING
"I spoke with Dr. Tomas's office (on-call physician) about the heavy bleedy drainage on skin graft donor site.  MD gave several options including "puncture hole in tegaderm to drain and reinforce or change tegaderm dressing, can also used ABD + kerlix, and recommends leave xeroform in place b/c painful to pt to remove close to donor site surface."  "

## 2017-07-11 NOTE — NURSING
Pt transported off unit for skin graft procedure.  Pt received surgical bath this am and all am meds

## 2017-07-12 LAB
ANION GAP SERPL CALC-SCNC: 7 MMOL/L
BASOPHILS # BLD AUTO: 0.01 K/UL
BASOPHILS NFR BLD: 0.1 %
BUN SERPL-MCNC: 33 MG/DL
CALCIUM SERPL-MCNC: 8.8 MG/DL
CHLORIDE SERPL-SCNC: 104 MMOL/L
CO2 SERPL-SCNC: 25 MMOL/L
CREAT SERPL-MCNC: 1.6 MG/DL
DIFFERENTIAL METHOD: ABNORMAL
EOSINOPHIL # BLD AUTO: 0.2 K/UL
EOSINOPHIL NFR BLD: 2.6 %
ERYTHROCYTE [DISTWIDTH] IN BLOOD BY AUTOMATED COUNT: 15.1 %
EST. GFR  (AFRICAN AMERICAN): 34 ML/MIN/1.73 M^2
EST. GFR  (NON AFRICAN AMERICAN): 29 ML/MIN/1.73 M^2
GLUCOSE SERPL-MCNC: 94 MG/DL
HCT VFR BLD AUTO: 26.5 %
HGB BLD-MCNC: 8.8 G/DL
LYMPHOCYTES # BLD AUTO: 0.8 K/UL
LYMPHOCYTES NFR BLD: 9.3 %
MCH RBC QN AUTO: 29.6 PG
MCHC RBC AUTO-ENTMCNC: 33.2 %
MCV RBC AUTO: 89 FL
MONOCYTES # BLD AUTO: 0.6 K/UL
MONOCYTES NFR BLD: 7.5 %
NEUTROPHILS # BLD AUTO: 6.5 K/UL
NEUTROPHILS NFR BLD: 80.4 %
PLATELET # BLD AUTO: 236 K/UL
PMV BLD AUTO: 9.9 FL
POCT GLUCOSE: 113 MG/DL (ref 70–110)
POCT GLUCOSE: 155 MG/DL (ref 70–110)
POCT GLUCOSE: 90 MG/DL (ref 70–110)
POTASSIUM SERPL-SCNC: 4.8 MMOL/L
RBC # BLD AUTO: 2.97 M/UL
SODIUM SERPL-SCNC: 136 MMOL/L
WBC # BLD AUTO: 8.04 K/UL

## 2017-07-12 PROCEDURE — 25000003 PHARM REV CODE 250: Performed by: EMERGENCY MEDICINE

## 2017-07-12 PROCEDURE — 94761 N-INVAS EAR/PLS OXIMETRY MLT: CPT

## 2017-07-12 PROCEDURE — 25000003 PHARM REV CODE 250: Performed by: HOSPITALIST

## 2017-07-12 PROCEDURE — 12000002 HC ACUTE/MED SURGE SEMI-PRIVATE ROOM

## 2017-07-12 PROCEDURE — 25000003 PHARM REV CODE 250: Performed by: ORTHOPAEDIC SURGERY

## 2017-07-12 PROCEDURE — 80048 BASIC METABOLIC PNL TOTAL CA: CPT

## 2017-07-12 PROCEDURE — 85025 COMPLETE CBC W/AUTO DIFF WBC: CPT

## 2017-07-12 PROCEDURE — 63600175 PHARM REV CODE 636 W HCPCS: Performed by: NURSE PRACTITIONER

## 2017-07-12 PROCEDURE — 63600175 PHARM REV CODE 636 W HCPCS: Performed by: HOSPITALIST

## 2017-07-12 PROCEDURE — 36415 COLL VENOUS BLD VENIPUNCTURE: CPT

## 2017-07-12 RX ORDER — POLYETHYLENE GLYCOL 3350 17 G/17G
17 POWDER, FOR SOLUTION ORAL 2 TIMES DAILY PRN
Status: DISCONTINUED | OUTPATIENT
Start: 2017-07-12 | End: 2017-07-20 | Stop reason: HOSPADM

## 2017-07-12 RX ORDER — AMOXICILLIN 250 MG
2 CAPSULE ORAL 2 TIMES DAILY
Status: DISCONTINUED | OUTPATIENT
Start: 2017-07-12 | End: 2017-07-16

## 2017-07-12 RX ORDER — LACTULOSE 10 G/15ML
30 SOLUTION ORAL ONCE
Status: COMPLETED | OUTPATIENT
Start: 2017-07-12 | End: 2017-07-12

## 2017-07-12 RX ADMIN — ALLOPURINOL 100 MG: 100 TABLET ORAL at 09:07

## 2017-07-12 RX ADMIN — CEFAZOLIN SODIUM 1 G: 1 INJECTION, SOLUTION INTRAVENOUS at 09:07

## 2017-07-12 RX ADMIN — HYDRALAZINE HYDROCHLORIDE 75 MG: 25 TABLET ORAL at 02:07

## 2017-07-12 RX ADMIN — ENOXAPARIN SODIUM 30 MG: 100 INJECTION SUBCUTANEOUS at 05:07

## 2017-07-12 RX ADMIN — HYDRALAZINE HYDROCHLORIDE 75 MG: 25 TABLET ORAL at 06:07

## 2017-07-12 RX ADMIN — Medication 400 UNITS: at 10:07

## 2017-07-12 RX ADMIN — LACTULOSE 30 G: 10 SOLUTION ORAL at 12:07

## 2017-07-12 RX ADMIN — HYDRALAZINE HYDROCHLORIDE 75 MG: 25 TABLET ORAL at 09:07

## 2017-07-12 RX ADMIN — FUROSEMIDE 20 MG: 20 TABLET ORAL at 09:07

## 2017-07-12 RX ADMIN — POLYETHYLENE GLYCOL 3350 17 G: 17 POWDER, FOR SOLUTION ORAL at 09:07

## 2017-07-12 RX ADMIN — Medication 250 MG: at 10:07

## 2017-07-12 RX ADMIN — OXYCODONE HYDROCHLORIDE 5 MG: 5 TABLET ORAL at 12:07

## 2017-07-12 RX ADMIN — DOCUSATE SODIUM AND SENNOSIDES 2 TABLET: 8.6; 5 TABLET, FILM COATED ORAL at 09:07

## 2017-07-12 RX ADMIN — LEVOTHYROXINE SODIUM 50 MCG: 50 TABLET ORAL at 06:07

## 2017-07-12 RX ADMIN — LABETALOL HYDROCHLORIDE 300 MG: 100 TABLET, FILM COATED ORAL at 09:07

## 2017-07-12 RX ADMIN — OXYCODONE HYDROCHLORIDE 5 MG: 5 TABLET ORAL at 09:07

## 2017-07-12 RX ADMIN — OXYCODONE HYDROCHLORIDE 5 MG: 5 TABLET ORAL at 06:07

## 2017-07-12 RX ADMIN — FERROUS SULFATE TAB EC 325 MG (65 MG FE EQUIVALENT) 325 MG: 325 (65 FE) TABLET DELAYED RESPONSE at 09:07

## 2017-07-12 NOTE — PROGRESS NOTES
"[facility]  [place] Note    Patient Name: Christal Goodson  MRN: [id number]  Patient Class: IP- Inpatient   Admission Date: 7/7/2017  Length of Stay: 4 days  Attending Physician: Max Faria MD  Primary Care Provider: Juan lAberto Prieto MD        Subjective:     Principal Problem:Traumatic hematoma of left lower leg    HPI:  Patient is 86 yo female with HTN, HLD, CAD, CHF (EF 40%+ DD), CKD, and DMII who presents with complaint of leg wound. Patient was recently hospitalized at [facility] from 6/28-7/1/17 for L lower extremity traumatic hematoma resulting in acute blood loss anemia. Was transfused 4 units PRBC and discharged in stable condition with planned outpatient follow up. Per patient today while hanging legs over side of bed the affected leg "popped open" with some blood and drainage at the scene. She denies new trauma to the area. She complains of leg pain but otherwise denies fever/chills, SOB, CP, abdominal pain, N/V, dysuria. On presentation patient with elevated blood pressure but otherwise grossly normal labs. Afebrile in without leukocytosis. Non toxic appearing. H/H stable with hemoglobin of 9.8 (up from 9.0 at recent discharge). Call placed to Dr. Campo with photo of affected area sent with family permission per ED note. He is with recommendation for IV antibiotics and wound care for now. He will see the patient in am. Placed in observation for further monitoring.     Hospital Course:  85 y.o.female with left leg open wound and full thickness skin necrosis - with severe pain, moans with movement, doesn't like pain medications - seen by otho/surgery and taken for wound debridement, I&D and wound vac on 7/8. - Patient may require skin grafting for future and Plastic Surgery consulted.  Skin graft on 7/11.  Wound vac to remain in place.      Interval History: Constipated.  Pain well controlled.    Review of Systems   Constitutional: Negative for chills and fever. "   HENT: Negative for ear discharge and ear pain.    Eyes: Negative for pain and itching.   Cardiovascular: Negative for chest pain and palpitations.     Objective:     Vital Signs (Most Recent):  Temp: 98 °F (36.7 °C) (07/12/17 0737)  Pulse: 64 (07/12/17 0737)  Resp: 17 (07/12/17 0737)  BP: (!) 153/68 (07/12/17 0900)  SpO2: 99 % (07/12/17 0737) Vital Signs (24h Range):  Temp:  [97.7 °F (36.5 °C)-99 °F (37.2 °C)] 98 °F (36.7 °C)  Pulse:  [63-71] 64  Resp:  [14-39] 17  SpO2:  [97 %-100 %] 99 %  BP: (123-207)/(60-98) 153/68     Weight: 63.6 kg (140 lb 3.8 oz)  Body mass index is 25.65 kg/m².    Intake/Output Summary (Last 24 hours) at 07/12/17 1013  Last data filed at 07/12/17 0700   Gross per 24 hour   Intake             1620 ml   Output              410 ml   Net             1210 ml      Physical Exam   Constitutional: She is oriented to person, place, and time. She appears well-developed and well-nourished. No distress.   HENT:   Head: Normocephalic and atraumatic.   Eyes: EOM are normal. Pupils are equal, round, and reactive to light.   Neck: Normal range of motion. Neck supple.   Cardiovascular: Normal rate and regular rhythm.    Pulmonary/Chest: Effort normal. No respiratory distress. She has no wheezes. She has no rales.   Abdominal: Soft. Bowel sounds are normal. She exhibits no distension. There is no tenderness.   Musculoskeletal: Normal range of motion. She exhibits no deformity.   Leg wound   Neurological: She is alert and oriented to person, place, and time.   Skin: Skin is warm and dry. She is not diaphoretic.   Wound vac to left leg       Significant Labs:   BMP:     Recent Labs  Lab 07/12/17  0352   GLU 94      K 4.8      CO2 25   BUN 33*   CREATININE 1.6*   CALCIUM 8.8     CBC:     Recent Labs  Lab 07/11/17  0415 07/12/17  0352   WBC 6.74 8.04   HGB 10.2* 8.8*   HCT 31.1* 26.5*    236     Assessment/Plan:      * Traumatic hematoma of left lower leg    Traumatic hematoma with wound  opening  -follow H/H  -IV abx with Ancef  -analgesics  -appreciate Ortho input  S/P I&D on 7/8 with wound vac placement.  Skin grafting per Plastic Surgery on 7/11.  Continue with wound vac.        Acute blood loss anemia    Acute blood loss anemia as expected from hematoma and surgery.  Transfused 2 units of blood and monitor H/H.  Adequate correction of H/H post transfusion.  Acute drop in H/H as expected from surgery.  Repeat CBC in AM.          Type 2 diabetes mellitus without complication    Last A1c 4.5 June 2017. Well controlled on no medications at present. Will monitor with POCT checks and cover with SSI PRN.           Chronic kidney disease, stage III (moderate)    Avoid nephrotoxic agents and renally dose medications when possible  Baseline Creat around 1.8  Stable.          Essential hypertension, benign    BP elevated on presentation.   Will resume home anti hypertensive regimen and monitor.   BP remains elevated.  Will increase Hydralazine.          CAD (coronary artery disease)    Last 2D echo showed 40% LVED with DD and mild-->moderate AVS - Stable disease at this time.  Monitor fluid volume closely  Continue ASA and statin  Daily Weight - strict I&O's  Low Na diet  1.5L fluid restriction          Hypercholesteremia    Continue statin.           Vitamin D deficiency              Hypothyroidism    TSH 4.414 April 2017. Continue synthroid.             VTE Risk Mitigation         Ordered     enoxaparin injection 30 mg  Daily     Route:  Subcutaneous        07/08/17 0856     Medium Risk of VTE  Once      07/07/17 1907          Max Faria MD  Department of Hospital Medicine   [facility]

## 2017-07-12 NOTE — SUBJECTIVE & OBJECTIVE
Interval History: Constipated.  Pain well controlled.    Review of Systems   Constitutional: Negative for chills and fever.   HENT: Negative for ear discharge and ear pain.    Eyes: Negative for pain and itching.   Cardiovascular: Negative for chest pain and palpitations.     Objective:     Vital Signs (Most Recent):  Temp: 98 °F (36.7 °C) (07/12/17 0737)  Pulse: 64 (07/12/17 0737)  Resp: 17 (07/12/17 0737)  BP: (!) 153/68 (07/12/17 0900)  SpO2: 99 % (07/12/17 0737) Vital Signs (24h Range):  Temp:  [97.7 °F (36.5 °C)-99 °F (37.2 °C)] 98 °F (36.7 °C)  Pulse:  [63-71] 64  Resp:  [14-39] 17  SpO2:  [97 %-100 %] 99 %  BP: (123-207)/(60-98) 153/68     Weight: 63.6 kg (140 lb 3.8 oz)  Body mass index is 25.65 kg/m².    Intake/Output Summary (Last 24 hours) at 07/12/17 1013  Last data filed at 07/12/17 0700   Gross per 24 hour   Intake             1620 ml   Output              410 ml   Net             1210 ml      Physical Exam   Constitutional: She is oriented to person, place, and time. She appears well-developed and well-nourished. No distress.   HENT:   Head: Normocephalic and atraumatic.   Eyes: EOM are normal. Pupils are equal, round, and reactive to light.   Neck: Normal range of motion. Neck supple.   Cardiovascular: Normal rate and regular rhythm.    Pulmonary/Chest: Effort normal. No respiratory distress. She has no wheezes. She has no rales.   Abdominal: Soft. Bowel sounds are normal. She exhibits no distension. There is no tenderness.   Musculoskeletal: Normal range of motion. She exhibits no deformity.   Leg wound   Neurological: She is alert and oriented to person, place, and time.   Skin: Skin is warm and dry. She is not diaphoretic.   Wound vac to left leg       Significant Labs:   BMP:     Recent Labs  Lab 07/12/17  0352   GLU 94      K 4.8      CO2 25   BUN 33*   CREATININE 1.6*   CALCIUM 8.8     CBC:     Recent Labs  Lab 07/11/17  0415 07/12/17  0352   WBC 6.74 8.04   HGB 10.2* 8.8*   HCT  31.1* 26.5*    236

## 2017-07-12 NOTE — ASSESSMENT & PLAN NOTE
Acute blood loss anemia as expected from hematoma and surgery.  Transfused 2 units of blood and monitor H/H.  Adequate correction of H/H post transfusion.  Acute drop in H/H as expected from surgery.  Repeat CBC in AM.

## 2017-07-12 NOTE — NURSING
Dressing to graft site reinforced with abd pads and tape, small hole poked in tegaderm per note to allow drainage.  Large amount of drainage present before reinforcement.  Will continue to monitor.

## 2017-07-12 NOTE — PLAN OF CARE
Problem: Wound, Traumatic, Nonburn (Adult)  Goal: Signs and Symptoms of Listed Potential Problems Will be Absent, Minimized or Managed (Wound, Traumatic, Nonburn)  Signs and symptoms of listed potential problems will be absent, minimized or managed by discharge/transition of care (reference Wound, Traumatic, Nonburn (Adult) CPG).   Outcome: Ongoing (interventions implemented as appropriate)  I discussed with patient pain management goals and schedule for PRN medications.  Patient has needed pain meds q3h instead of q4h today.  Less drainage was present on skin donor site.  I discussed with patient and family dresssing changes for both wound sites.

## 2017-07-12 NOTE — PROGRESS NOTES
No issues over night, other than oozing from donor site requiring dressing reinforcement.     AF VSS    AO NAD  Wound vac in place, good suction. Minimal drainage  Donor site dressed with oozing and ABD pads     Plan: Patient will need wound vac on untouched for 5 days. I will remove Sunday or Monday.  -Patient can go home with wound vac, but has asked to stay inpatient for pain control. I will leave disposition to primary team. If she is here Sunday, I will remove vac then. If discharged, I can see her in clinic and remove on Monday.  -Patient donor site will ooze serosanguinous fluid. Nursing order placed to change and reinforce dressing as needed.   -Will follow while inpatient.

## 2017-07-12 NOTE — PROGRESS NOTES
S/P STSG left lower leg per Dr. Tomas (Plastics). KCI VAC dressing placed on skin graft with plans for removal of VAC on Sunday or Monday. Plastics reports patient can go home with VAC on STSG or stay inpatient for pain control. VAC dressing not to be removed/changed. Receiving wound care to donor site due to drainage.

## 2017-07-12 NOTE — ASSESSMENT & PLAN NOTE
Traumatic hematoma with wound opening  -follow H/H  -IV abx with Ancef  -analgesics  -appreciate Ortho input  S/P I&D on 7/8 with wound vac placement.  Skin grafting per Plastic Surgery on 7/11.  Continue with wound vac.

## 2017-07-12 NOTE — ANESTHESIA POSTPROCEDURE EVALUATION
"Anesthesia Post Evaluation    Patient: Christal Goodson    Procedure(s) Performed: Procedure(s) (LRB):  SPLIT THICKNESS SKIN GRAFT LOWER EXTREMITY (Left)    Final Anesthesia Type: general  Patient location during evaluation: PACU  Patient participation: Yes- Able to Participate  Level of consciousness: awake and alert, oriented and awake  Post-procedure vital signs: reviewed and stable  Airway patency: patent  PONV status at discharge: No PONV  Anesthetic complications: no      Cardiovascular status: blood pressure returned to baseline  Respiratory status: unassisted, spontaneous ventilation and room air  Hydration status: euvolemic  Follow-up not needed.        Visit Vitals  /61 (BP Location: Left arm, Patient Position: Lying, BP Method: Automatic)   Pulse 63   Temp 36.5 °C (97.7 °F) (Oral)   Resp 17   Ht 5' 2" (1.575 m)   Wt 63.6 kg (140 lb 3.8 oz)   SpO2 99%   Breastfeeding? No   BMI 25.65 kg/m²       Pain/Tru Score: Pain Assessment Performed: Yes (7/11/2017  8:00 PM)  Presence of Pain: denies (7/11/2017  8:00 PM)  Pain Rating Prior to Med Admin: 3 (7/11/2017 10:19 PM)  Pain Rating Post Med Admin: 0 (7/11/2017 11:19 PM)  Tru Score: 10 (7/11/2017  2:10 PM)      "

## 2017-07-13 LAB
ANION GAP SERPL CALC-SCNC: 8 MMOL/L
BASOPHILS # BLD AUTO: 0.01 K/UL
BASOPHILS NFR BLD: 0.1 %
BUN SERPL-MCNC: 34 MG/DL
CALCIUM SERPL-MCNC: 9.4 MG/DL
CHLORIDE SERPL-SCNC: 103 MMOL/L
CO2 SERPL-SCNC: 29 MMOL/L
CREAT SERPL-MCNC: 1.9 MG/DL
DIFFERENTIAL METHOD: ABNORMAL
EOSINOPHIL # BLD AUTO: 0.1 K/UL
EOSINOPHIL NFR BLD: 1.5 %
ERYTHROCYTE [DISTWIDTH] IN BLOOD BY AUTOMATED COUNT: 15.5 %
EST. GFR  (AFRICAN AMERICAN): 27 ML/MIN/1.73 M^2
EST. GFR  (NON AFRICAN AMERICAN): 24 ML/MIN/1.73 M^2
GLUCOSE SERPL-MCNC: 78 MG/DL
HCT VFR BLD AUTO: 26.4 %
HGB BLD-MCNC: 8.7 G/DL
LYMPHOCYTES # BLD AUTO: 0.8 K/UL
LYMPHOCYTES NFR BLD: 11.9 %
MCH RBC QN AUTO: 28.9 PG
MCHC RBC AUTO-ENTMCNC: 33 %
MCV RBC AUTO: 88 FL
MONOCYTES # BLD AUTO: 0.5 K/UL
MONOCYTES NFR BLD: 7.7 %
NEUTROPHILS # BLD AUTO: 5.4 K/UL
NEUTROPHILS NFR BLD: 78.8 %
PLATELET # BLD AUTO: 242 K/UL
PMV BLD AUTO: 9.6 FL
POCT GLUCOSE: 115 MG/DL (ref 70–110)
POCT GLUCOSE: 142 MG/DL (ref 70–110)
POCT GLUCOSE: 83 MG/DL (ref 70–110)
POCT GLUCOSE: 98 MG/DL (ref 70–110)
POTASSIUM SERPL-SCNC: 4.2 MMOL/L
RBC # BLD AUTO: 3.01 M/UL
SODIUM SERPL-SCNC: 140 MMOL/L
WBC # BLD AUTO: 6.87 K/UL

## 2017-07-13 PROCEDURE — 25000003 PHARM REV CODE 250: Performed by: HOSPITALIST

## 2017-07-13 PROCEDURE — 63600175 PHARM REV CODE 636 W HCPCS: Performed by: HOSPITALIST

## 2017-07-13 PROCEDURE — 80048 BASIC METABOLIC PNL TOTAL CA: CPT

## 2017-07-13 PROCEDURE — 97165 OT EVAL LOW COMPLEX 30 MIN: CPT

## 2017-07-13 PROCEDURE — G8987 SELF CARE CURRENT STATUS: HCPCS | Mod: CK

## 2017-07-13 PROCEDURE — 12000002 HC ACUTE/MED SURGE SEMI-PRIVATE ROOM

## 2017-07-13 PROCEDURE — G8989 SELF CARE D/C STATUS: HCPCS | Mod: CK

## 2017-07-13 PROCEDURE — 63600175 PHARM REV CODE 636 W HCPCS: Performed by: NURSE PRACTITIONER

## 2017-07-13 PROCEDURE — 36415 COLL VENOUS BLD VENIPUNCTURE: CPT

## 2017-07-13 PROCEDURE — G8988 SELF CARE GOAL STATUS: HCPCS | Mod: CK

## 2017-07-13 PROCEDURE — 25000003 PHARM REV CODE 250: Performed by: EMERGENCY MEDICINE

## 2017-07-13 PROCEDURE — G8982 BODY POS GOAL STATUS: HCPCS | Mod: CM

## 2017-07-13 PROCEDURE — 85025 COMPLETE CBC W/AUTO DIFF WBC: CPT

## 2017-07-13 PROCEDURE — G8981 BODY POS CURRENT STATUS: HCPCS | Mod: CM

## 2017-07-13 PROCEDURE — 97161 PT EVAL LOW COMPLEX 20 MIN: CPT

## 2017-07-13 PROCEDURE — G8983 BODY POS D/C STATUS: HCPCS | Mod: CM

## 2017-07-13 RX ADMIN — FUROSEMIDE 20 MG: 20 TABLET ORAL at 09:07

## 2017-07-13 RX ADMIN — LEVOTHYROXINE SODIUM 50 MCG: 50 TABLET ORAL at 05:07

## 2017-07-13 RX ADMIN — Medication 400 UNITS: at 09:07

## 2017-07-13 RX ADMIN — OXYCODONE HYDROCHLORIDE 5 MG: 5 TABLET ORAL at 03:07

## 2017-07-13 RX ADMIN — OXYCODONE HYDROCHLORIDE 5 MG: 5 TABLET ORAL at 09:07

## 2017-07-13 RX ADMIN — HYDRALAZINE HYDROCHLORIDE 75 MG: 25 TABLET ORAL at 09:07

## 2017-07-13 RX ADMIN — ENOXAPARIN SODIUM 30 MG: 100 INJECTION SUBCUTANEOUS at 05:07

## 2017-07-13 RX ADMIN — LABETALOL HYDROCHLORIDE 300 MG: 100 TABLET, FILM COATED ORAL at 09:07

## 2017-07-13 RX ADMIN — HYDRALAZINE HYDROCHLORIDE 75 MG: 25 TABLET ORAL at 05:07

## 2017-07-13 RX ADMIN — DOCUSATE SODIUM AND SENNOSIDES 2 TABLET: 8.6; 5 TABLET, FILM COATED ORAL at 09:07

## 2017-07-13 RX ADMIN — ALLOPURINOL 100 MG: 100 TABLET ORAL at 09:07

## 2017-07-13 RX ADMIN — CEFAZOLIN SODIUM 1 G: 1 INJECTION, SOLUTION INTRAVENOUS at 09:07

## 2017-07-13 RX ADMIN — HYDRALAZINE HYDROCHLORIDE 75 MG: 25 TABLET ORAL at 03:07

## 2017-07-13 RX ADMIN — Medication 250 MG: at 09:07

## 2017-07-13 RX ADMIN — FERROUS SULFATE TAB EC 325 MG (65 MG FE EQUIVALENT) 325 MG: 325 (65 FE) TABLET DELAYED RESPONSE at 09:07

## 2017-07-13 NOTE — SUBJECTIVE & OBJECTIVE
Interval History: No new complaints.    Review of Systems   Constitutional: Negative for chills and fever.   HENT: Negative for ear discharge and ear pain.    Eyes: Negative for pain and itching.   Cardiovascular: Negative for chest pain and palpitations.     Objective:     Vital Signs (Most Recent):  Temp: 99.2 °F (37.3 °C) (07/13/17 1647)  Pulse: 68 (07/13/17 1647)  Resp: 18 (07/13/17 1647)  BP: 137/62 (07/13/17 1647)  SpO2: 100 % (07/13/17 1647) Vital Signs (24h Range):  Temp:  [98.6 °F (37 °C)-99.2 °F (37.3 °C)] 99.2 °F (37.3 °C)  Pulse:  [64-74] 68  Resp:  [18] 18  SpO2:  [98 %-100 %] 100 %  BP: (129-152)/(58-68) 137/62     Weight: 63.6 kg (140 lb 3.8 oz)  Body mass index is 25.65 kg/m².    Intake/Output Summary (Last 24 hours) at 07/13/17 1708  Last data filed at 07/13/17 0500   Gross per 24 hour   Intake              270 ml   Output               50 ml   Net              220 ml      Physical Exam   Constitutional: She is oriented to person, place, and time. She appears well-developed and well-nourished. No distress.   HENT:   Head: Normocephalic and atraumatic.   Eyes: EOM are normal. Pupils are equal, round, and reactive to light.   Neck: Normal range of motion. Neck supple.   Cardiovascular: Normal rate and regular rhythm.    Pulmonary/Chest: Effort normal. No respiratory distress. She has no wheezes. She has no rales.   Abdominal: Soft. Bowel sounds are normal. She exhibits no distension. There is no tenderness.   Musculoskeletal: Normal range of motion. She exhibits no deformity.   Leg wound   Neurological: She is alert and oriented to person, place, and time.   Skin: Skin is warm and dry. She is not diaphoretic.   Wound vac to left leg       Significant Labs:   BMP:     Recent Labs  Lab 07/13/17  0446   GLU 78      K 4.2      CO2 29   BUN 34*   CREATININE 1.9*   CALCIUM 9.4     CBC:     Recent Labs  Lab 07/12/17  0352 07/13/17  0445   WBC 8.04 6.87   HGB 8.8* 8.7*   HCT 26.5* 26.4*     242

## 2017-07-13 NOTE — ASSESSMENT & PLAN NOTE
Traumatic hematoma with wound opening  -follow H/H  -IV abx with Ancef  Afebrile with normal WBC.  Will stop ABx's.  -analgesics  -appreciate Ortho input  S/P I&D on 7/8 with wound vac placement.  Skin grafting per Plastic Surgery on 7/11.  Continue with wound vac.  Patient very hesitant about going home with wound vac.  Will discuss with JUAN.

## 2017-07-13 NOTE — PLAN OF CARE
Problem: Diabetes, Type 2 (Adult)  Goal: Signs and Symptoms of Listed Potential Problems Will be Absent, Minimized or Managed (Diabetes, Type 2)  Signs and symptoms of listed potential problems will be absent, minimized or managed by discharge/transition of care (reference Diabetes, Type 2 (Adult) CPG).   Outcome: Ongoing (interventions implemented as appropriate)   07/13/17 0310   Diabetes, Type 2   Problems Assessed (Type 2 Diabetes) hyperglycemia   Blood glucose 113 mg/dl, no ss coverage required.    Problem: Patient Care Overview  Goal: Plan of Care Review  Outcome: Ongoing (interventions implemented as appropriate)  Resting quietly at intervals, using bedpan as needed for urine and bowel movement. Adjusting position as needed    Problem: Fall Risk (Adult)  Goal: Absence of Falls  Patient will demonstrate the desired outcomes by discharge/transition of care.   Outcome: Ongoing (interventions implemented as appropriate)   07/13/17 0310   Fall Risk (Adult)   Absence of Falls making progress toward outcome   Located close to nursing station with bed alarm on. Instructed patient to call for assistance when needed.    Problem: Pressure Ulcer Risk (Zeeshan Scale) (Adult,Obstetrics,Pediatric)  Goal: Skin Integrity  Patient will demonstrate the desired outcomes by discharge/transition of care.   Outcome: Ongoing (interventions implemented as appropriate)   07/13/17 0310   Pressure Ulcer Risk (Zeeshan Scale) (Adult,Obstetrics,Pediatric)   Skin Integrity making progress toward outcome   Lle wound vac draining small amount of ss fluid and has dressing to left hip with small amount of ss drainage.    Problem: Wound, Traumatic, Nonburn (Adult)  Goal: Signs and Symptoms of Listed Potential Problems Will be Absent, Minimized or Managed (Wound, Traumatic, Nonburn)  Signs and symptoms of listed potential problems will be absent, minimized or managed by discharge/transition of care (reference Wound, Traumatic, Nonburn (Adult) CPG).    Outcome: Ongoing (interventions implemented as appropriate)   07/13/17 0310   Wound, Traumatic, Nonburn   Problems Assessed (Wound) pain   Patient receiving iv abx as ordered.

## 2017-07-13 NOTE — PT/OT/SLP EVAL
"Occupational Therapy  Evaluation/Discharge    Christal Goodson   MRN: [id number]   Admitting Diagnosis: Traumatic hematoma of left lower leg    OT Date of Treatment: 07/13/17   OT Start Time: 1534  OT Stop Time: 1550  OT Total Time (min): 16 min    Billable Minutes:  Evaluation 16 (co-eval with PT)    Diagnosis: Traumatic hematoma of left lower leg       Past Medical History:   Diagnosis Date    Arthritis     Blood transfusion     CHF (congestive heart failure)     Coronary artery disease     Diabetes mellitus     General anesthetics causing adverse effect in therapeutic use     "woke up as they were putting me on the table"    Hyperlipidemia     Hypertension     Renal disorder     Thyroid disease       Past Surgical History:   Procedure Laterality Date    CATARACT EXTRACTION EXTRACAPSULAR W/ INTRAOCULAR LENS IMPLANTATION  Sept 2012    right eye    CHOLECYSTECTOMY      CORONARY ANGIOPLASTY WITH STENT PLACEMENT  2011    JOINT REPLACEMENT      Left total hip replacement in 1986    TONSILLECTOMY         Referring physician: Bienvenido  Date referred to OT: 7/12/17    General Precautions: Standard, fall, diabetic  Orthopedic Precautions: N/A  Braces: N/A    Do you have any cultural, spiritual, Bahai conflicts, given your current situation?: none     Patient History:  Living Environment  Lives With: child(rodo), adult (daughter)  Living Arrangements: house  Home Accessibility:  (elevator available)  Equipment Currently Used at Home: wheelchair, lift device (motorized bed)    Prior level of function:   Bed Mobility/Transfers: needs device and assist  Grooming: needs assist  Bathing: needs assist  Upper Body Dressing: needs assist  Lower Body Dressing: needs assist  Toileting: needs assist  Home Management Skills: unable to perform  Driving License: No  IADL Comments: The patient states her daughter assists her with a sponge bath in  the bed or she sometimes bathes herself while seated EOB. The patient was able " "to sit EOB with her dtr's assist until a few weeks ago when she injured her leg. The patient has not amb " for a long time". The patient has a [employer] lift at home to to be able to get to her W/C but does not use it because her house is small.     Dominant hand: right    Subjective:  Communicated with nurseAnkita prior to session.    Chief Complaint: afraid of hurting her leg again  Patient/Family stated goals: Patient states she wants to walk again.    Pain/Comfort  Pain Rating 1: 7/10  Location - Side 1: Left  Location - Orientation 1: lower  Location 1: leg  Pain Addressed 1: Pre-medicate for activity, Reposition, Cessation of Activity, Nurse notified    Objective:  Patient found with: wound vac    Cognitive Exam:  Oriented to: Person, Place and Situation  Follows Commands/attention: Follows one-step commands and Follows two-step commands  Communication: clear/fluent  Memory:  No Deficits noted  Safety awareness/insight to disability: intact  Coping skills/emotional control: Appropriate to situation    Visual/perceptual:  Intact    Physical Exam:  Postural examination/scapula alignment: unable to assess in bed  Skin integrity: wound vac to left lower leg and dressing to left upper leg (doner site)  Edema: None noted     Sensation:   Intact    Upper Extremity Range of Motion:  Right Upper Extremity: WFL  Left Upper Extremity: WFL    Upper Extremity Strength:  Right Upper Extremity: WFL  Left Upper Extremity: WFL   Strength: WFL  Fine motor coordination:   Intact    Gross motor coordination: WFL    Functional Mobility:  Bed Mobility:  Rolling/Turning to Left: Minimum assistance, With side rail, With leg lift  Rolling/Turning Right: Minimum assistance, With side rail  Scooting/Bridging: With assist of 2, Total Assistance  Supine to Sit:  (unable to sit EIB 2* fear of injuring her leg with a wound vac)    Transfers:  Sit <> Stand Assistance: Activity did not occur (unable to stand or amb)      Activities of " "Daily Living:  Feeding Level of Assistance: Independent  UE Dressing Level of Assistance: Minimum assistance  LE Dressing Level of Assistance: Total assistance  Grooming Level of Assistance: Total assistance  Toileting Where Assessed: Bed level      Balance: N/T    Therapeutic Activities and Exercises:  The patient was educated to move her arms and legs and assist with bed mobility as able.    AM-PAC 6 CLICK ADL  How much help from another person does this patient currently need?  1 = Unable, Total/Dependent Assistance  2 = A lot, Maximum/Moderate Assistance  3 = A little, Minimum/Contact Guard/Supervision  4 = None, Modified [place]/Independent    Putting on and taking off regular lower body clothing? : 1  Bathing (including washing, rinsing, drying)?: 2  Toileting, which includes using toilet, bedpan, or urinal? : 2  Putting on and taking off regular upper body clothing?: 2  Taking care of personal grooming such as brushing teeth?: 4  Eating meals?: 4  Total Score: 15    AM-PAC Raw Score CMS "G-Code Modifier Level of Impairment Assistance   6 % Total / Unable   7 - 9 CM 80 - 100% Maximal Assist   10-14 CL 60 - 80% Moderate Assist   15 - 19 CK 40 - 60% Moderate Assist   20 - 22 CJ 20 - 40% Minimal Assist   23 CI 1-20% SBA / CGA   24 CH 0% Independent/ Mod I       Patient left right sidelying with all lines intact and call button in reach    Assessment:  Christal Goodson is a 85 y.o. female with a medical diagnosis of Traumatic hematoma of left lower leg.OT eval is complete. The patient is at her functional baseline and no OT is recommended. Nursing should encourage the patient to participate in self care as able.    Rehab identified problem list/impairments: Rehab identified problem list/impairments: weakness, impaired self care skills, impaired balance, impaired functional mobilty, decreased lower extremity function, decreased ROM, edema, impaired skin, pain, decreased safety awareness    Rehab " potential is poor.    Activity tolerance: Poor    Discharge recommendations:     Home with 24* care vs NH placement    Barriers to discharge: Barriers to Discharge: Other (Comment) (requires total care)    Equipment recommendations: none     GOALS:    Occupational Therapy Goals     Not on file          Multidisciplinary Problems (Resolved)        Problem: Occupational Therapy Goal    Goal Priority Disciplines Outcome Interventions   Occupational Therapy Goal   (Resolved)     OT, PT/OT Outcome(s) achieved                    PLAN:    (no OT is recommended)  Plan of Care expires:   N/A  Plan of Care reviewed with: patient    OT G-codes  Functional Assessment Tool Used: AM PAC  Score: 15  Functional Limitation: Self care  Self Care Current Status (): NEHA  Self Care Goal Status (): NEHA  Self Care Discharge Status (): NEHA Briones OT  07/13/2017

## 2017-07-13 NOTE — PROGRESS NOTES
"[facility] Medicine  Progress Note    Patient Name: Christal Goodson  MRN: [id number]  Patient Class: IP- Inpatient   Admission Date: 7/7/2017  Length of Stay: 5 days  Attending Physician: Max Faria MD  Primary Care Provider: Juan Alberto Prieto MD        Subjective:     Principal Problem:Traumatic hematoma of left lower leg    HPI:  Patient is 84 yo female with HTN, HLD, CAD, CHF (EF 40%+ DD), CKD, and DMII who presents with complaint of leg wound. Patient was recently hospitalized at [facility] from 6/28-7/1/17 for L lower extremity traumatic hematoma resulting in acute blood loss anemia. Was transfused 4 units PRBC and discharged in stable condition with planned outpatient follow up. Per patient today while hanging legs over side of bed the affected leg "popped open" with some blood and drainage at the scene. She denies new trauma to the area. She complains of leg pain but otherwise denies fever/chills, SOB, CP, abdominal pain, N/V, dysuria. On presentation patient with elevated blood pressure but otherwise grossly normal labs. Afebrile in without leukocytosis. Non toxic appearing. H/H stable with hemoglobin of 9.8 (up from 9.0 at recent discharge). Call placed to Dr. Campo with photo of affected area sent with family permission per ED note. He is with recommendation for IV antibiotics and wound care for now. He will see the patient in am. Placed in observation for further monitoring.     Hospital Course:  85 y.o.female with left leg open wound and full thickness skin necrosis - with severe pain, moans with movement, doesn't like pain medications - seen by otho/surgery and taken for wound debridement, I&D and wound vac on 7/8. - Patient may require skin grafting for future and Plastic Surgery consulted.  Skin graft on 7/11.  Wound vac to remain in place.      Interval History: No new complaints.    Review of Systems   Constitutional: Negative for chills and fever.   HENT: Negative " for ear discharge and ear pain.    Eyes: Negative for pain and itching.   Cardiovascular: Negative for chest pain and palpitations.     Objective:     Vital Signs (Most Recent):  Temp: 99.2 °F (37.3 °C) (07/13/17 1647)  Pulse: 68 (07/13/17 1647)  Resp: 18 (07/13/17 1647)  BP: 137/62 (07/13/17 1647)  SpO2: 100 % (07/13/17 1647) Vital Signs (24h Range):  Temp:  [98.6 °F (37 °C)-99.2 °F (37.3 °C)] 99.2 °F (37.3 °C)  Pulse:  [64-74] 68  Resp:  [18] 18  SpO2:  [98 %-100 %] 100 %  BP: (129-152)/(58-68) 137/62     Weight: 63.6 kg (140 lb 3.8 oz)  Body mass index is 25.65 kg/m².    Intake/Output Summary (Last 24 hours) at 07/13/17 1708  Last data filed at 07/13/17 0500   Gross per 24 hour   Intake              270 ml   Output               50 ml   Net              220 ml      Physical Exam   Constitutional: She is oriented to person, place, and time. She appears well-developed and well-nourished. No distress.   HENT:   Head: Normocephalic and atraumatic.   Eyes: EOM are normal. Pupils are equal, round, and reactive to light.   Neck: Normal range of motion. Neck supple.   Cardiovascular: Normal rate and regular rhythm.    Pulmonary/Chest: Effort normal. No respiratory distress. She has no wheezes. She has no rales.   Abdominal: Soft. Bowel sounds are normal. She exhibits no distension. There is no tenderness.   Musculoskeletal: Normal range of motion. She exhibits no deformity.   Leg wound   Neurological: She is alert and oriented to person, place, and time.   Skin: Skin is warm and dry. She is not diaphoretic.   Wound vac to left leg       Significant Labs:   BMP:     Recent Labs  Lab 07/13/17  0446   GLU 78      K 4.2      CO2 29   BUN 34*   CREATININE 1.9*   CALCIUM 9.4     CBC:     Recent Labs  Lab 07/12/17  0352 07/13/17  0445   WBC 8.04 6.87   HGB 8.8* 8.7*   HCT 26.5* 26.4*    242     Assessment/Plan:      * Traumatic hematoma of left lower leg    Traumatic hematoma with wound opening  -follow  H/H  -IV abx with Ancef  Afebrile with normal WBC.  Will stop ABx's.  -analgesics  -appreciate Ortho input  S/P I&D on 7/8 with wound vac placement.  Skin grafting per Plastic Surgery on 7/11.  Continue with wound vac.  Patient very hesitant about going home with wound vac.  Will discuss with SW.        Acute blood loss anemia    Acute blood loss anemia as expected from hematoma and surgery.  Transfused 2 units of blood and monitor H/H.  Adequate correction of H/H post transfusion.  Acute drop in H/H as expected from surgery.  H/H stable from yesterday.          Type 2 diabetes mellitus without complication    Last A1c 4.5 June 2017. Well controlled on no medications at present. Will monitor with POCT checks and cover with SSI PRN.           Chronic kidney disease, stage III (moderate)    Avoid nephrotoxic agents and renally dose medications when possible  Baseline Creat around 1.8  Stable.          Essential hypertension, benign    BP elevated on presentation.   Will resume home anti hypertensive regimen and monitor.   BP remains elevated.  Will increase Hydralazine.          CAD (coronary artery disease)    Last 2D echo showed 40% LVED with DD and mild-->moderate AVS - Stable disease at this time.  Monitor fluid volume closely  Continue ASA and statin  Daily Weight - strict I&O's  Low Na diet  1.5L fluid restriction          Hypercholesteremia    Continue statin.           Vitamin D deficiency              Hypothyroidism    TSH 4.414 April 2017. Continue synthroid.             VTE Risk Mitigation         Ordered     enoxaparin injection 30 mg  Daily     Route:  Subcutaneous        07/08/17 0856     Medium Risk of VTE  Once      07/07/17 1907          Max Faria MD  Department of Hospital Medicine   [facility]

## 2017-07-13 NOTE — PLAN OF CARE
Problem: Occupational Therapy Goal  Goal: Occupational Therapy Goal  Outcome: Outcome(s) achieved Date Met: 07/13/17  OT eval is complete. The patient is at her functional baseline and no OT is recommended. Nursing should encourage the patient to participate in self care as able.

## 2017-07-13 NOTE — ASSESSMENT & PLAN NOTE
Acute blood loss anemia as expected from hematoma and surgery.  Transfused 2 units of blood and monitor H/H.  Adequate correction of H/H post transfusion.  Acute drop in H/H as expected from surgery.  H/H stable from yesterday.

## 2017-07-13 NOTE — PT/OT/SLP EVAL
"Physical Therapy  Evaluation/Discharge summary    Christal Goodson   MRN: [id number]   Admitting Diagnosis: Traumatic hematoma of left lower leg    PT Received On: 07/13/17  PT Start Time: 1534     PT Stop Time: 1549    PT Total Time (min): 15 min       Billable Minutes:  Evaluation 15 co-treat with OT    Diagnosis: Traumatic hematoma of left lower leg      Past Medical History:   Diagnosis Date    Arthritis     Blood transfusion     CHF (congestive heart failure)     Coronary artery disease     Diabetes mellitus     General anesthetics causing adverse effect in therapeutic use     "woke up as they were putting me on the table"    Hyperlipidemia     Hypertension     Renal disorder     Thyroid disease       Past Surgical History:   Procedure Laterality Date    CATARACT EXTRACTION EXTRACAPSULAR W/ INTRAOCULAR LENS IMPLANTATION  Sept 2012    right eye    CHOLECYSTECTOMY      CORONARY ANGIOPLASTY WITH STENT PLACEMENT  2011    JOINT REPLACEMENT      Left total hip replacement in 1986    TONSILLECTOMY           General Precautions: Standard, fall  Orthopedic Precautions: N/A   Braces: N/A       Do you have any cultural, spiritual, Episcopal conflicts, given your current situation?: none    Patient History:  Lives With: child(rodo), adult  Living Arrangements: house  Home Accessibility:  (elevator available)  Transportation Available: Medicaid transportation  Equipment Currently Used at Home:  (motorized bed, pt states that daughter has difficulty operating cedric kift therefor she has not been OOB to W/C in "A while")  DME owned (not currently used): Cedric lift, W/C    Previous Level of Function:  Ambulation Skills: unable to perform  Transfer Skills: unable to perform  ADL Skills: needs assist    Subjective:  Communicated with nsg prior to session.  Pt agreeable to limited eval  Declined sitting EOB 2/2 fear of re-injuring L LE.  Pt states that she has not vbeen OOB in "A while" 2/2 daughter has difficulty " using rustam lift.    Chief Complaint: pain  Patient goals: none stated    Pain/Comfort  Pain Rating 1: 7/10  Location 1:  (L LE)  Pain Addressed 1: Pre-medicate for activity, Reposition, Distraction, Cessation of Activity  Pain Rating Post-Intervention 1: 7/10      Objective:   Patient found with: wound vac     Cognitive Exam:  Oriented to: Person, Place, Time and Situation    Follows Commands/attention: Follows one-step commands  Communication: clear/fluent  Safety awareness/insight to disability: impaired    Physical Exam:  Postural examination/scapula alignment: N/A    Skin integrity: L LE with wound vac intact  Edema: None noted     Sensation:   Intact  light/touch B LE's    Upper Extremity Range of Motion:    Lower Extremity Range of Motion:  Right Lower Extremity: knee ext approx 30* PROM  Left Lower Extremity: knee ext approx 90* PROM    Lower Extremity Strength:  Right Lower Extremity: knee ext 2/5  Left Lower Extremity: knee ext 2/5     Fine motor coordination:  impaired    Gross motor coordination: impaired    Functional Mobility:  Bed Mobility:  Rolling/Turning to Left: Minimum assistance (with use of BR)  Rolling/Turning Right: Minimum assistance (with use of BR)  Supine to Sit:  (Pt declined sitting EOB 2/2 fear of injuring her L LE)    Transfers:  Sit <> Stand Assistance:  (activity did not occur)    Gait:   Gait Distance: activity  did not occur    Stairs:  N/T    Balance:   Static Sit: N/T  Dynamic Sit: N/T  Static Stand: N/T  Dynamic stand: N/T    Therapeutic Activities and Exercises:  Eval and positioning only    AM-PAC 6 CLICK MOBILITY  How much help from another person does this patient currently need?   1 = Unable, Total/Dependent Assistance  2 = A lot, Maximum/Moderate Assistance  3 = A little, Minimum/Contact Guard/Supervision  4 = None, Modified [place]/Independent    Turning over in bed (including adjusting bedclothes, sheets and blankets)?: 3  Sitting down on and standing up from a  chair with arms (e.g., wheelchair, bedside commode, etc.): 1  Moving from lying on back to sitting on the side of the bed?: 2  Moving to and from a bed to a chair (including a wheelchair)?: 1  Need to walk in hospital room?: 1  Climbing 3-5 steps with a railing?: 1  Total Score: 9     AM-PAC Raw Score CMS G-Code Modifier Level of Impairment Assistance   6 % Total / Unable   7 - 9 CM 80 - 100% Maximal Assist   10 - 14 CL 60 - 80% Moderate Assist   15 - 19 CK 40 - 60% Moderate Assist   20 - 22 CJ 20 - 40% Minimal Assist   23 CI 1-20% SBA / CGA   24 CH 0% Independent/ Mod I     Patient left right sidelying with call button in reach and nsg notified.    Assessment:   Christal Goodson is a 85 y.o. female with a medical diagnosis of Traumatic hematoma of left lower leg and presents at prior level of function.      Rehab identified problem list/impairments: Rehab identified problem list/impairments: weakness, impaired self care skills, impaired functional mobilty, decreased coordination, decreased lower extremity function, decreased safety awareness, pain, impaired skin, impaired fine motor, impaired coordination, decreased ROM, impaired joint extensibility, impaired muscle length    Rehab potential is poor.    Activity tolerance: Poor    Discharge recommendations: Discharge Facility/Level Of Care Needs: home with home health (and 24hr care)     Barriers to discharge: Barriers to Discharge: None    Equipment recommendations: Equipment Needed After Discharge:  (wound Vac)     GOALS:    Physical Therapy Goals     Not on file          Multidisciplinary Problems (Resolved)        Problem: Physical Therapy Goal    Goal Priority Disciplines Outcome Goal Variances Interventions   Physical Therapy Goal   (Resolved)     PT/OT, PT Outcome(s) achieved                     PLAN:  D/C PT with t/f of care to [facility] and 24hr assist from family  Patient to be seen  (N/A) to address the above listed problems via  (N/A)  Plan of Care  expires:  (N/A)  Plan of Care reviewed with: patient    Functional Assessment Tool Used: AM-PAC  Score: 9  Functional Limitation: Changing and maintaining body position  Changing and Maintaining Body Position Current Status (): CM  Changing and Maintaining Body Position Goal Status (): ANA  Changing and Maintaining Body Position Discharge Status (): ANA [place], PT  07/13/2017

## 2017-07-13 NOTE — PLAN OF CARE
Problem: Physical Therapy Goal  Goal: Physical Therapy Goal  Outcome: Outcome(s) achieved Date Met: 07/13/17  Pt OK for D/C home with HHC and 24hr care.  DME in place

## 2017-07-13 NOTE — PROGRESS NOTES
Plan to remove wound vac on Sunday. Post wound vac dressings will be daily bacitracin over skin graft, followed by non stick adaptic and kerlex wrap.   Donor site to continue management, will likely change tegaderm on Sunday as well. Continue prn ABD pads for drainage.

## 2017-07-13 NOTE — PROGRESS NOTES
Ortho Daily Progress Note    Christal Goodson is a 85 y.o. female admitted on 7/7/2017      Chief Complaint/Reason for admission: Wound Check (Per EMS report pt has hematoma to left calf that is draining pinkish malodorous blood/drainage; pt denies any other symptoms at this time)       Hospital Day: 5  Post Op Day: 2 Days Post-Op     The patient was seen and examined this morning at the bedside. Patient reports no acute issues overnight.  Patient reports that pain is adequately controlled.    _______________    Vitals:    07/12/17 1235 07/12/17 1458 07/12/17 2013 07/13/17 0000   BP: (!) 154/66 136/65 (!) 147/67 138/65   Pulse: 64  71 64   Resp: 18  18 18   Temp: 97.8 °F (36.6 °C)  99.2 °F (37.3 °C) 98.6 °F (37 °C)   TempSrc: Oral  Oral Oral   SpO2: 99%  98% 98%   Weight:       Height:           Vital Signs (Most Recent)  Temp: 98.6 °F (37 °C) (07/13/17 0000)  Pulse: 64 (07/13/17 0000)  Resp: 18 (07/13/17 0000)  BP: 138/65 (07/13/17 0000)  SpO2: 98 % (07/13/17 0000)    Vital Signs Range (Last 24H):  Temp:  [97.8 °F (36.6 °C)-99.2 °F (37.3 °C)]   Pulse:  [64-71]   Resp:  [17-18]   BP: (136-160)/(65-68)   SpO2:  [98 %-99 %]       Physical:    AAOx3  Incision/ dressing clean/dry/intact  NVI Distally  Palpable distal pulses  CR<3sec      Recent Labs      07/11/17   0415  07/12/17   0352  07/13/17   0445   K  4.8  4.8   --    CALCIUM  8.8  8.8   --    WBC  6.74  8.04  6.87   HGB  10.2*  8.8*  8.7*   HCT  31.1*  26.5*  26.4*   PLT  244  236  242       I/O last 3 completed shifts:  In: 990 [P.O.:940; IV Piggyback:50]  Out: 530 [Urine:450; Other:80]          Assessment:  A/P POD 1 s/p left  Leg skin grafting by plastic surgery             Plan:    Appreciate care by plastic surgery  Will forgo treatment plan to primary and plastic surgery at this point      Parveen Campo MD  [facility]

## 2017-07-13 NOTE — PROGRESS NOTES
POD # 2 STSG left lateral lower extremity  KCI VAC dressing in place to left lower leg connected to suction at 125 mmHg. Scant amount dark red drainage in VAC canister. Dry ABD dressings over left upper thigh donor site.   On Isoflex mattress with heels suspended off bed with pillow. Encouraged patient movement in bed. Nursing to continue Pressure Injury Prevention Interventions.

## 2017-07-14 LAB
ANION GAP SERPL CALC-SCNC: 7 MMOL/L
BASOPHILS # BLD AUTO: 0 K/UL
BASOPHILS NFR BLD: 0 %
BUN SERPL-MCNC: 32 MG/DL
CALCIUM SERPL-MCNC: 9.3 MG/DL
CHLORIDE SERPL-SCNC: 102 MMOL/L
CO2 SERPL-SCNC: 31 MMOL/L
CREAT SERPL-MCNC: 1.9 MG/DL
DIFFERENTIAL METHOD: ABNORMAL
EOSINOPHIL # BLD AUTO: 0.1 K/UL
EOSINOPHIL NFR BLD: 2.2 %
ERYTHROCYTE [DISTWIDTH] IN BLOOD BY AUTOMATED COUNT: 15.5 %
EST. GFR  (AFRICAN AMERICAN): 27 ML/MIN/1.73 M^2
EST. GFR  (NON AFRICAN AMERICAN): 24 ML/MIN/1.73 M^2
GLUCOSE SERPL-MCNC: 80 MG/DL
HCT VFR BLD AUTO: 25.7 %
HGB BLD-MCNC: 8.3 G/DL
LYMPHOCYTES # BLD AUTO: 0.9 K/UL
LYMPHOCYTES NFR BLD: 13.9 %
MCH RBC QN AUTO: 28.5 PG
MCHC RBC AUTO-ENTMCNC: 32.3 %
MCV RBC AUTO: 88 FL
MONOCYTES # BLD AUTO: 0.8 K/UL
MONOCYTES NFR BLD: 11.8 %
NEUTROPHILS # BLD AUTO: 4.6 K/UL
NEUTROPHILS NFR BLD: 72.1 %
PLATELET # BLD AUTO: 240 K/UL
PMV BLD AUTO: 9.2 FL
POCT GLUCOSE: 105 MG/DL (ref 70–110)
POCT GLUCOSE: 115 MG/DL (ref 70–110)
POCT GLUCOSE: 92 MG/DL (ref 70–110)
POCT GLUCOSE: 93 MG/DL (ref 70–110)
POTASSIUM SERPL-SCNC: 5.1 MMOL/L
RBC # BLD AUTO: 2.91 M/UL
SODIUM SERPL-SCNC: 140 MMOL/L
WBC # BLD AUTO: 6.38 K/UL

## 2017-07-14 PROCEDURE — 25000003 PHARM REV CODE 250: Performed by: HOSPITALIST

## 2017-07-14 PROCEDURE — 63600175 PHARM REV CODE 636 W HCPCS: Performed by: NURSE PRACTITIONER

## 2017-07-14 PROCEDURE — 94761 N-INVAS EAR/PLS OXIMETRY MLT: CPT

## 2017-07-14 PROCEDURE — 85025 COMPLETE CBC W/AUTO DIFF WBC: CPT

## 2017-07-14 PROCEDURE — 12000002 HC ACUTE/MED SURGE SEMI-PRIVATE ROOM

## 2017-07-14 PROCEDURE — 27000221 HC OXYGEN, UP TO 24 HOURS

## 2017-07-14 PROCEDURE — 80048 BASIC METABOLIC PNL TOTAL CA: CPT

## 2017-07-14 PROCEDURE — 25000003 PHARM REV CODE 250: Performed by: EMERGENCY MEDICINE

## 2017-07-14 PROCEDURE — 36415 COLL VENOUS BLD VENIPUNCTURE: CPT

## 2017-07-14 RX ORDER — HYDRALAZINE HYDROCHLORIDE 25 MG/1
100 TABLET, FILM COATED ORAL 3 TIMES DAILY
Status: DISCONTINUED | OUTPATIENT
Start: 2017-07-14 | End: 2017-07-20 | Stop reason: HOSPADM

## 2017-07-14 RX ADMIN — LEVOTHYROXINE SODIUM 50 MCG: 50 TABLET ORAL at 05:07

## 2017-07-14 RX ADMIN — LABETALOL HYDROCHLORIDE 300 MG: 100 TABLET, FILM COATED ORAL at 08:07

## 2017-07-14 RX ADMIN — HYDRALAZINE HYDROCHLORIDE 100 MG: 25 TABLET ORAL at 03:07

## 2017-07-14 RX ADMIN — Medication 250 MG: at 08:07

## 2017-07-14 RX ADMIN — FUROSEMIDE 20 MG: 20 TABLET ORAL at 08:07

## 2017-07-14 RX ADMIN — ENOXAPARIN SODIUM 30 MG: 100 INJECTION SUBCUTANEOUS at 05:07

## 2017-07-14 RX ADMIN — ALLOPURINOL 100 MG: 100 TABLET ORAL at 08:07

## 2017-07-14 RX ADMIN — OXYCODONE HYDROCHLORIDE 5 MG: 5 TABLET ORAL at 05:07

## 2017-07-14 RX ADMIN — CLONIDINE HYDROCHLORIDE 0.1 MG: 0.1 TABLET ORAL at 12:07

## 2017-07-14 RX ADMIN — Medication 400 UNITS: at 08:07

## 2017-07-14 RX ADMIN — FUROSEMIDE 20 MG: 20 TABLET ORAL at 09:07

## 2017-07-14 RX ADMIN — LABETALOL HYDROCHLORIDE 300 MG: 100 TABLET, FILM COATED ORAL at 09:07

## 2017-07-14 RX ADMIN — FERROUS SULFATE TAB EC 325 MG (65 MG FE EQUIVALENT) 325 MG: 325 (65 FE) TABLET DELAYED RESPONSE at 08:07

## 2017-07-14 RX ADMIN — DOCUSATE SODIUM AND SENNOSIDES 2 TABLET: 8.6; 5 TABLET, FILM COATED ORAL at 08:07

## 2017-07-14 RX ADMIN — OXYCODONE HYDROCHLORIDE 5 MG: 5 TABLET ORAL at 12:07

## 2017-07-14 RX ADMIN — DOCUSATE SODIUM AND SENNOSIDES 2 TABLET: 8.6; 5 TABLET, FILM COATED ORAL at 09:07

## 2017-07-14 RX ADMIN — OXYCODONE HYDROCHLORIDE 5 MG: 5 TABLET ORAL at 08:07

## 2017-07-14 NOTE — ASSESSMENT & PLAN NOTE
Traumatic hematoma with wound opening  -follow H/H  -analgesics  -appreciate Ortho input  S/P I&D on 7/8 with wound vac placement.  Skin grafting per Plastic Surgery on 7/11.  Continue with wound vac.  Patient very hesitant about going home with wound vac.  Plastic Surgery to remove wound vac on Sunday.

## 2017-07-14 NOTE — ASSESSMENT & PLAN NOTE
Acute blood loss anemia as expected from hematoma and surgery.  Transfused 2 units of blood and monitor H/H.  Adequate correction of H/H post transfusion.  Acute drop in H/H as expected from surgery.  H/H stable.

## 2017-07-14 NOTE — SUBJECTIVE & OBJECTIVE
Interval History: some pain to leg.    Review of Systems   Constitutional: Negative for chills and fever.   HENT: Negative for ear discharge and ear pain.    Eyes: Negative for pain and itching.   Cardiovascular: Negative for chest pain and palpitations.     Objective:     Vital Signs (Most Recent):  Temp: 98 °F (36.7 °C) (07/14/17 0829)  Pulse: 66 (07/14/17 0829)  Resp: 18 (07/14/17 0829)  BP: (!) 199/75 (07/14/17 0829)  SpO2: (!) 83 % (07/14/17 0829) Vital Signs (24h Range):  Temp:  [98 °F (36.7 °C)-99.2 °F (37.3 °C)] 98 °F (36.7 °C)  Pulse:  [64-70] 66  Resp:  [18-20] 18  SpO2:  [83 %-100 %] 83 %  BP: (116-199)/(54-75) 199/75     Weight: 63.6 kg (140 lb 3.8 oz)  Body mass index is 25.65 kg/m².    Intake/Output Summary (Last 24 hours) at 07/14/17 1212  Last data filed at 07/14/17 0600   Gross per 24 hour   Intake              960 ml   Output               50 ml   Net              910 ml      Physical Exam   Constitutional: She is oriented to person, place, and time. She appears well-developed and well-nourished. No distress.   HENT:   Head: Normocephalic and atraumatic.   Eyes: EOM are normal. Pupils are equal, round, and reactive to light.   Neck: Normal range of motion. Neck supple.   Cardiovascular: Normal rate and regular rhythm.    Pulmonary/Chest: Effort normal. No respiratory distress. She has no wheezes. She has no rales.   Abdominal: Soft. Bowel sounds are normal. She exhibits no distension. There is no tenderness.   Musculoskeletal: Normal range of motion. She exhibits no deformity.   Leg wound   Neurological: She is alert and oriented to person, place, and time.   Skin: Skin is warm and dry. She is not diaphoretic.   Wound vac to left leg       Significant Labs:   BMP:     Recent Labs  Lab 07/14/17  0641   GLU 80      K 5.1      CO2 31*   BUN 32*   CREATININE 1.9*   CALCIUM 9.3     CBC:     Recent Labs  Lab 07/13/17  0445 07/14/17  0641   WBC 6.87 6.38   HGB 8.7* 8.3*   HCT 26.4* 25.7*   PLT  081 536

## 2017-07-14 NOTE — PROGRESS NOTES
No new issues.     AF VSS    Wound vac in place, good suction  Donor site dressed.     Plan: Will take down wound vac Sunday if still inpatient. Otherwise, will take down Monday in clinic.  She is resistant to discharge home with vac.

## 2017-07-14 NOTE — PROGRESS NOTES
"[facility] Medicine  Progress Note    Patient Name: Christal Goodson  MRN: [id number]  Patient Class: IP- Inpatient   Admission Date: 7/7/2017  Length of Stay: 6 days  Attending Physician: Max Faria MD  Primary Care Provider: Juan Alberto Prieto MD        Subjective:     Principal Problem:Traumatic hematoma of left lower leg    HPI:  Patient is 86 yo female with HTN, HLD, CAD, CHF (EF 40%+ DD), CKD, and DMII who presents with complaint of leg wound. Patient was recently hospitalized at [facility] from 6/28-7/1/17 for L lower extremity traumatic hematoma resulting in acute blood loss anemia. Was transfused 4 units PRBC and discharged in stable condition with planned outpatient follow up. Per patient today while hanging legs over side of bed the affected leg "popped open" with some blood and drainage at the scene. She denies new trauma to the area. She complains of leg pain but otherwise denies fever/chills, SOB, CP, abdominal pain, N/V, dysuria. On presentation patient with elevated blood pressure but otherwise grossly normal labs. Afebrile in without leukocytosis. Non toxic appearing. H/H stable with hemoglobin of 9.8 (up from 9.0 at recent discharge). Call placed to Dr. Campo with photo of affected area sent with family permission per ED note. He is with recommendation for IV antibiotics and wound care for now. He will see the patient in am. Placed in observation for further monitoring.     Hospital Course:  85 y.o.female with left leg open wound and full thickness skin necrosis - with severe pain, moans with movement, doesn't like pain medications - seen by otho/surgery and taken for wound debridement, I&D and wound vac on 7/8. - Patient may require skin grafting for future and Plastic Surgery consulted.  Skin graft on 7/11.  Wound vac to remain in place.      Interval History: some pain to leg.    Review of Systems   Constitutional: Negative for chills and fever.   HENT: Negative " for ear discharge and ear pain.    Eyes: Negative for pain and itching.   Cardiovascular: Negative for chest pain and palpitations.     Objective:     Vital Signs (Most Recent):  Temp: 98 °F (36.7 °C) (07/14/17 0829)  Pulse: 66 (07/14/17 0829)  Resp: 18 (07/14/17 0829)  BP: (!) 199/75 (07/14/17 0829)  SpO2: (!) 83 % (07/14/17 0829) Vital Signs (24h Range):  Temp:  [98 °F (36.7 °C)-99.2 °F (37.3 °C)] 98 °F (36.7 °C)  Pulse:  [64-70] 66  Resp:  [18-20] 18  SpO2:  [83 %-100 %] 83 %  BP: (116-199)/(54-75) 199/75     Weight: 63.6 kg (140 lb 3.8 oz)  Body mass index is 25.65 kg/m².    Intake/Output Summary (Last 24 hours) at 07/14/17 1212  Last data filed at 07/14/17 0600   Gross per 24 hour   Intake              960 ml   Output               50 ml   Net              910 ml      Physical Exam   Constitutional: She is oriented to person, place, and time. She appears well-developed and well-nourished. No distress.   HENT:   Head: Normocephalic and atraumatic.   Eyes: EOM are normal. Pupils are equal, round, and reactive to light.   Neck: Normal range of motion. Neck supple.   Cardiovascular: Normal rate and regular rhythm.    Pulmonary/Chest: Effort normal. No respiratory distress. She has no wheezes. She has no rales.   Abdominal: Soft. Bowel sounds are normal. She exhibits no distension. There is no tenderness.   Musculoskeletal: Normal range of motion. She exhibits no deformity.   Leg wound   Neurological: She is alert and oriented to person, place, and time.   Skin: Skin is warm and dry. She is not diaphoretic.   Wound vac to left leg       Significant Labs:   BMP:     Recent Labs  Lab 07/14/17  0641   GLU 80      K 5.1      CO2 31*   BUN 32*   CREATININE 1.9*   CALCIUM 9.3     CBC:     Recent Labs  Lab 07/13/17  0445 07/14/17  0641   WBC 6.87 6.38   HGB 8.7* 8.3*   HCT 26.4* 25.7*    240     Assessment/Plan:      * Traumatic hematoma of left lower leg    Traumatic hematoma with wound opening  -follow  H/H  -analgesics  -appreciate Ortho input  S/P I&D on 7/8 with wound vac placement.  Skin grafting per Plastic Surgery on 7/11.  Continue with wound vac.  Patient very hesitant about going home with wound vac.  Plastic Surgery to remove wound vac on Sunday.          Acute blood loss anemia    Acute blood loss anemia as expected from hematoma and surgery.  Transfused 2 units of blood and monitor H/H.  Adequate correction of H/H post transfusion.  Acute drop in H/H as expected from surgery.  H/H stable.          Type 2 diabetes mellitus without complication    Last A1c 4.5 June 2017. Well controlled on no medications at present. Will monitor with POCT checks and cover with SSI PRN.           Chronic kidney disease, stage III (moderate)    Avoid nephrotoxic agents and renally dose medications when possible  Baseline Creat around 1.8  Stable.          Essential hypertension, benign    BP elevated on presentation.   Will resume home anti hypertensive regimen and monitor.   BP remains elevated.  Will increase Hydralazine.          CAD (coronary artery disease)    Last 2D echo showed 40% LVED with DD and mild-->moderate AVS - Stable disease at this time.  Monitor fluid volume closely  Continue ASA and statin  Daily Weight - strict I&O's  Low Na diet  1.5L fluid restriction          Hypercholesteremia    Continue statin.           Vitamin D deficiency              Hypothyroidism    TSH 4.414 April 2017. Continue synthroid.             VTE Risk Mitigation         Ordered     enoxaparin injection 30 mg  Daily     Route:  Subcutaneous        07/08/17 0856     Medium Risk of VTE  Once      07/07/17 1907          Max Faria MD  Department of Hospital Medicine   [facility]

## 2017-07-14 NOTE — PLAN OF CARE
Problem: Diabetes, Type 2 (Adult)  Goal: Signs and Symptoms of Listed Potential Problems Will be Absent, Minimized or Managed (Diabetes, Type 2)  Signs and symptoms of listed potential problems will be absent, minimized or managed by discharge/transition of care (reference Diabetes, Type 2 (Adult) CPG).   Outcome: Ongoing (interventions implemented as appropriate)   07/14/17 0338   Diabetes, Type 2   Problems Assessed (Type 2 Diabetes) hyperglycemia   No ss coverage required for blood glucose 142 mg/dl.    Problem: Patient Care Overview  Goal: Plan of Care Review  Outcome: Ongoing (interventions implemented as appropriate)   07/14/17 0338   Coping/Psychosocial   Plan Of Care Reviewed With patient   Resting quietly during night, no report of pain or discomfort. Voiding per bedpan, no bowel movements tonight.     Problem: Fall Risk (Adult)  Goal: Absence of Falls  Patient will demonstrate the desired outcomes by discharge/transition of care.   Outcome: Ongoing (interventions implemented as appropriate)   07/14/17 0338   Fall Risk (Adult)   Absence of Falls making progress toward outcome   Located close to nursing station, instructed to call for assistance when needed.     Problem: Wound, Traumatic, Nonburn (Adult)  Goal: Signs and Symptoms of Listed Potential Problems Will be Absent, Minimized or Managed (Wound, Traumatic, Nonburn)  Signs and symptoms of listed potential problems will be absent, minimized or managed by discharge/transition of care (reference Wound, Traumatic, Nonburn (Adult) CPG).   Outcome: Ongoing (interventions implemented as appropriate)   07/14/17 0338   Wound, Traumatic, Nonburn   Problems Assessed (Wound) skin breakdown   Wound to lle with wound vac with small amount of s drainage. Also dsg to left hip intact.

## 2017-07-14 NOTE — PLAN OF CARE
Problem: Patient Care Overview  Goal: Plan of Care Review  Outcome: Ongoing (interventions implemented as appropriate)   07/13/17 1941   Coping/Psychosocial   Plan Of Care Reviewed With patient     Patient remained free from falls, trauma, and injuries throughout shift.  No complaints of nausea or vomiting. Pain controlled with prn analgesics. IV antibiotics and medications administered as prescribed. Vital Signs Stable; afebrile throughout shift. Blood glucose controlled, no insulin needed throughout shift. Turned q2 to prevent pressure ulcers.  Wound vac to LLE intact and set at 125 mmHg; sanguineous fluid draining. Dressing to L lateral thigh dry and intact.   No acute distress noted.

## 2017-07-14 NOTE — PROGRESS NOTES
SW f/u with patient with daughter at the bedside.  Discussed discharge plan goals; home with HH.  Questionable as to whether patient will need a wound v.a.c. at d/c. Weekend discharge not anticipated.  Kajal Burris LMSW, [facility], [place]  07/14/2017

## 2017-07-15 LAB
ANION GAP SERPL CALC-SCNC: 6 MMOL/L
BASOPHILS # BLD AUTO: 0.01 K/UL
BASOPHILS NFR BLD: 0.2 %
BUN SERPL-MCNC: 31 MG/DL
CALCIUM SERPL-MCNC: 8.9 MG/DL
CHLORIDE SERPL-SCNC: 101 MMOL/L
CO2 SERPL-SCNC: 29 MMOL/L
CREAT SERPL-MCNC: 1.7 MG/DL
DIFFERENTIAL METHOD: ABNORMAL
EOSINOPHIL # BLD AUTO: 0.1 K/UL
EOSINOPHIL NFR BLD: 1.7 %
ERYTHROCYTE [DISTWIDTH] IN BLOOD BY AUTOMATED COUNT: 15.4 %
EST. GFR  (AFRICAN AMERICAN): 31 ML/MIN/1.73 M^2
EST. GFR  (NON AFRICAN AMERICAN): 27 ML/MIN/1.73 M^2
GLUCOSE SERPL-MCNC: 73 MG/DL
HCT VFR BLD AUTO: 25 %
HGB BLD-MCNC: 8.1 G/DL
LYMPHOCYTES # BLD AUTO: 1 K/UL
LYMPHOCYTES NFR BLD: 18 %
MCH RBC QN AUTO: 28.8 PG
MCHC RBC AUTO-ENTMCNC: 32.4 %
MCV RBC AUTO: 89 FL
MONOCYTES # BLD AUTO: 0.7 K/UL
MONOCYTES NFR BLD: 11.7 %
NEUTROPHILS # BLD AUTO: 3.9 K/UL
NEUTROPHILS NFR BLD: 68.4 %
PLATELET # BLD AUTO: 224 K/UL
PMV BLD AUTO: 9.4 FL
POCT GLUCOSE: 125 MG/DL (ref 70–110)
POCT GLUCOSE: 153 MG/DL (ref 70–110)
POCT GLUCOSE: 80 MG/DL (ref 70–110)
POCT GLUCOSE: 92 MG/DL (ref 70–110)
POTASSIUM SERPL-SCNC: 5 MMOL/L
RBC # BLD AUTO: 2.81 M/UL
SODIUM SERPL-SCNC: 136 MMOL/L
WBC # BLD AUTO: 5.73 K/UL

## 2017-07-15 PROCEDURE — 63600175 PHARM REV CODE 636 W HCPCS: Performed by: PHYSICIAN ASSISTANT

## 2017-07-15 PROCEDURE — 63600175 PHARM REV CODE 636 W HCPCS: Performed by: NURSE PRACTITIONER

## 2017-07-15 PROCEDURE — 80048 BASIC METABOLIC PNL TOTAL CA: CPT

## 2017-07-15 PROCEDURE — 85025 COMPLETE CBC W/AUTO DIFF WBC: CPT

## 2017-07-15 PROCEDURE — 25000003 PHARM REV CODE 250: Performed by: HOSPITALIST

## 2017-07-15 PROCEDURE — 25000003 PHARM REV CODE 250: Performed by: ORTHOPAEDIC SURGERY

## 2017-07-15 PROCEDURE — 25000003 PHARM REV CODE 250: Performed by: ANESTHESIOLOGY

## 2017-07-15 PROCEDURE — 12000002 HC ACUTE/MED SURGE SEMI-PRIVATE ROOM

## 2017-07-15 PROCEDURE — 36415 COLL VENOUS BLD VENIPUNCTURE: CPT

## 2017-07-15 PROCEDURE — 25000003 PHARM REV CODE 250: Performed by: EMERGENCY MEDICINE

## 2017-07-15 RX ADMIN — DOCUSATE SODIUM AND SENNOSIDES 2 TABLET: 8.6; 5 TABLET, FILM COATED ORAL at 09:07

## 2017-07-15 RX ADMIN — FUROSEMIDE 20 MG: 20 TABLET ORAL at 09:07

## 2017-07-15 RX ADMIN — MORPHINE SULFATE 4 MG: 10 INJECTION INTRAVENOUS at 03:07

## 2017-07-15 RX ADMIN — OXYCODONE HYDROCHLORIDE 5 MG: 5 TABLET ORAL at 01:07

## 2017-07-15 RX ADMIN — LABETALOL HYDROCHLORIDE 300 MG: 100 TABLET, FILM COATED ORAL at 09:07

## 2017-07-15 RX ADMIN — Medication 400 UNITS: at 09:07

## 2017-07-15 RX ADMIN — ALLOPURINOL 100 MG: 100 TABLET ORAL at 09:07

## 2017-07-15 RX ADMIN — Medication 250 MG: at 09:07

## 2017-07-15 RX ADMIN — FERROUS SULFATE TAB EC 325 MG (65 MG FE EQUIVALENT) 325 MG: 325 (65 FE) TABLET DELAYED RESPONSE at 09:07

## 2017-07-15 RX ADMIN — OXYCODONE AND ACETAMINOPHEN 1 TABLET: 5; 325 TABLET ORAL at 10:07

## 2017-07-15 RX ADMIN — OXYCODONE AND ACETAMINOPHEN 1 TABLET: 5; 325 TABLET ORAL at 05:07

## 2017-07-15 RX ADMIN — HYDRALAZINE HYDROCHLORIDE 100 MG: 25 TABLET ORAL at 06:07

## 2017-07-15 RX ADMIN — ENOXAPARIN SODIUM 30 MG: 100 INJECTION SUBCUTANEOUS at 05:07

## 2017-07-15 RX ADMIN — HYDRALAZINE HYDROCHLORIDE 100 MG: 25 TABLET ORAL at 09:07

## 2017-07-15 RX ADMIN — HYDRALAZINE HYDROCHLORIDE 100 MG: 25 TABLET ORAL at 03:07

## 2017-07-15 RX ADMIN — LEVOTHYROXINE SODIUM 50 MCG: 50 TABLET ORAL at 06:07

## 2017-07-15 NOTE — PLAN OF CARE
Problem: Patient Care Overview  Goal: Plan of Care Review  Outcome: Ongoing (interventions implemented as appropriate)  Pt free from falls injury or any further trauma throughout shift. Pt AAOx4. Continued medications as ordered. 1500cc 24 hr fluids restriction. Pt weight shifted q2 hrs. Complaints of pain to L leg, moderately relieved with medication. Will cont to monitor.    07/15/17 2137   Coping/Psychosocial   Plan Of Care Reviewed With patient

## 2017-07-15 NOTE — ASSESSMENT & PLAN NOTE
Traumatic hematoma with wound opening  -follow H/H  -analgesics  -appreciate Ortho input  S/P I&D on 7/8 with wound vac placement.  Skin grafting per Plastic Surgery on 7/11.  Continue with wound vac.  Patient very hesitant about going home with wound vac.  Plastic Surgery to remove wound vac tomorrow.  Discharge planning on Monday.

## 2017-07-15 NOTE — ASSESSMENT & PLAN NOTE
Acute blood loss anemia as expected from hematoma and surgery.  Transfused 2 units of blood and monitor H/H.  Adequate correction of H/H post transfusion.  Acute drop in H/H as expected from surgery.  H/H slowly coming down.  Continue to monitor.  No transfusion for now.

## 2017-07-15 NOTE — SUBJECTIVE & OBJECTIVE
Interval History: No acute events overnight.    Review of Systems   Constitutional: Negative for chills and fever.   HENT: Negative for ear discharge and ear pain.    Eyes: Negative for pain and itching.   Cardiovascular: Negative for chest pain and palpitations.     Objective:     Vital Signs (Most Recent):  Temp: 97.3 °F (36.3 °C) (07/15/17 0747)  Pulse: 60 (07/15/17 0747)  Resp: 18 (07/15/17 0747)  BP: 133/63 (07/15/17 0747)  SpO2: 100 % (07/15/17 0747) Vital Signs (24h Range):  Temp:  [97.3 °F (36.3 °C)-98.5 °F (36.9 °C)] 97.3 °F (36.3 °C)  Pulse:  [60-65] 60  Resp:  [18] 18  SpO2:  [98 %-100 %] 100 %  BP: (120-174)/(57-74) 133/63     Weight: 63.6 kg (140 lb 3.8 oz)  Body mass index is 25.65 kg/m².    Intake/Output Summary (Last 24 hours) at 07/15/17 0856  Last data filed at 07/14/17 1845   Gross per 24 hour   Intake              480 ml   Output                0 ml   Net              480 ml      Physical Exam   Constitutional: She is oriented to person, place, and time. She appears well-developed and well-nourished. No distress.   HENT:   Head: Normocephalic and atraumatic.   Eyes: EOM are normal. Pupils are equal, round, and reactive to light.   Neck: Normal range of motion. Neck supple.   Cardiovascular: Normal rate and regular rhythm.    Pulmonary/Chest: Effort normal. No respiratory distress. She has no wheezes. She has no rales.   Abdominal: Soft. Bowel sounds are normal. She exhibits no distension. There is no tenderness.   Musculoskeletal: Normal range of motion. She exhibits no deformity.   Leg wound   Neurological: She is alert and oriented to person, place, and time.   Skin: Skin is warm and dry. She is not diaphoretic.   Wound vac to left leg       Significant Labs:   BMP:     Recent Labs  Lab 07/15/17  0532   GLU 73      K 5.0      CO2 29   BUN 31*   CREATININE 1.7*   CALCIUM 8.9     CBC:     Recent Labs  Lab 07/14/17  0641 07/15/17  0532   WBC 6.38 5.73   HGB 8.3* 8.1*   HCT 25.7* 25.0*     220

## 2017-07-15 NOTE — PLAN OF CARE
Problem: Patient Care Overview  Goal: Plan of Care Review  Outcome: Ongoing (interventions implemented as appropriate)   07/15/17 4745   Coping/Psychosocial   Plan Of Care Reviewed With patient   A+Ox4. Pt free from falls, injury, and trauma. Pt dressing CDI. Pain controlled with PRN pain medication. Pt left leg contracted. Turned q2 hours. BM x1. No acute distress noted. Will continue to monitor.

## 2017-07-15 NOTE — PROGRESS NOTES
"[facility]  Progress Note    Patient Name: Christal Goodson  MRN: [id number]  Patient Class: IP- Inpatient   Admission Date: 7/7/2017  Length of Stay: 7 days  Attending Physician: Max Faria MD  Primary Care Provider: Juan Alberto Prieto MD        Subjective:     Principal Problem:Traumatic hematoma of left lower leg    HPI:  Patient is 84 yo female with HTN, HLD, CAD, CHF (EF 40%+ DD), CKD, and DMII who presents with complaint of leg wound. Patient was recently hospitalized at [facility] from 6/28-7/1/17 for L lower extremity traumatic hematoma resulting in acute blood loss anemia. Was transfused 4 units PRBC and discharged in stable condition with planned outpatient follow up. Per patient today while hanging legs over side of bed the affected leg "popped open" with some blood and drainage at the scene. She denies new trauma to the area. She complains of leg pain but otherwise denies fever/chills, SOB, CP, abdominal pain, N/V, dysuria. On presentation patient with elevated blood pressure but otherwise grossly normal labs. Afebrile in without leukocytosis. Non toxic appearing. H/H stable with hemoglobin of 9.8 (up from 9.0 at recent discharge). Call placed to Dr. Campo with photo of affected area sent with family permission per ED note. He is with recommendation for IV antibiotics and wound care for now. He will see the patient in am. Placed in observation for further monitoring.     Hospital Course:  85 y.o.female with left leg open wound and full thickness skin necrosis - with severe pain, moans with movement, doesn't like pain medications - seen by otho/surgery and taken for wound debridement, I&D and wound vac on 7/8. - Patient may require skin grafting for future and Plastic Surgery consulted.  Skin graft on 7/11.  Wound vac to remain in place.      Interval History: No acute events overnight.    Review of Systems   Constitutional: Negative for chills and fever.   HENT: " Negative for ear discharge and ear pain.    Eyes: Negative for pain and itching.   Cardiovascular: Negative for chest pain and palpitations.     Objective:     Vital Signs (Most Recent):  Temp: 97.3 °F (36.3 °C) (07/15/17 0747)  Pulse: 60 (07/15/17 0747)  Resp: 18 (07/15/17 0747)  BP: 133/63 (07/15/17 0747)  SpO2: 100 % (07/15/17 0747) Vital Signs (24h Range):  Temp:  [97.3 °F (36.3 °C)-98.5 °F (36.9 °C)] 97.3 °F (36.3 °C)  Pulse:  [60-65] 60  Resp:  [18] 18  SpO2:  [98 %-100 %] 100 %  BP: (120-174)/(57-74) 133/63     Weight: 63.6 kg (140 lb 3.8 oz)  Body mass index is 25.65 kg/m².    Intake/Output Summary (Last 24 hours) at 07/15/17 0856  Last data filed at 07/14/17 1845   Gross per 24 hour   Intake              480 ml   Output                0 ml   Net              480 ml      Physical Exam   Constitutional: She is oriented to person, place, and time. She appears well-developed and well-nourished. No distress.   HENT:   Head: Normocephalic and atraumatic.   Eyes: EOM are normal. Pupils are equal, round, and reactive to light.   Neck: Normal range of motion. Neck supple.   Cardiovascular: Normal rate and regular rhythm.    Pulmonary/Chest: Effort normal. No respiratory distress. She has no wheezes. She has no rales.   Abdominal: Soft. Bowel sounds are normal. She exhibits no distension. There is no tenderness.   Musculoskeletal: Normal range of motion. She exhibits no deformity.   Leg wound   Neurological: She is alert and oriented to person, place, and time.   Skin: Skin is warm and dry. She is not diaphoretic.   Wound vac to left leg       Significant Labs:   BMP:     Recent Labs  Lab 07/15/17  0532   GLU 73      K 5.0      CO2 29   BUN 31*   CREATININE 1.7*   CALCIUM 8.9     CBC:     Recent Labs  Lab 07/14/17  0641 07/15/17  0532   WBC 6.38 5.73   HGB 8.3* 8.1*   HCT 25.7* 25.0*    224     Assessment/Plan:      * Traumatic hematoma of left lower leg    Traumatic hematoma with wound  opening  -follow H/H  -analgesics  -appreciate Ortho input  S/P I&D on 7/8 with wound vac placement.  Skin grafting per Plastic Surgery on 7/11.  Continue with wound vac.  Patient very hesitant about going home with wound vac.  Plastic Surgery to remove wound vac tomorrow.  Discharge planning on Monday.          Acute blood loss anemia    Acute blood loss anemia as expected from hematoma and surgery.  Transfused 2 units of blood and monitor H/H.  Adequate correction of H/H post transfusion.  Acute drop in H/H as expected from surgery.  H/H slowly coming down.  Continue to monitor.  No transfusion for now.          Type 2 diabetes mellitus without complication    Last A1c 4.5 June 2017. Well controlled on no medications at present. Will monitor with POCT checks and cover with SSI PRN.           Chronic kidney disease, stage III (moderate)    Avoid nephrotoxic agents and renally dose medications when possible  Baseline Creat around 1.8  Stable.          Essential hypertension, benign    BP elevated on presentation.   Will resume home anti hypertensive regimen and monitor.   BP remains elevated.  Will increase Hydralazine.          CAD (coronary artery disease)    Last 2D echo showed 40% LVED with DD and mild-->moderate AVS - Stable disease at this time.  Monitor fluid volume closely  Continue ASA and statin  Daily Weight - strict I&O's  Low Na diet  1.5L fluid restriction          Hypercholesteremia    Continue statin.           Vitamin D deficiency              Hypothyroidism    TSH 4.414 April 2017. Continue synthroid.             VTE Risk Mitigation         Ordered     enoxaparin injection 30 mg  Daily     Route:  Subcutaneous        07/08/17 0856     Medium Risk of VTE  Once      07/07/17 1514          Max Faria MD  Department of Hospital Medicine   [facility]

## 2017-07-16 LAB
BASOPHILS # BLD AUTO: 0.01 K/UL
BASOPHILS NFR BLD: 0.1 %
DIFFERENTIAL METHOD: ABNORMAL
EOSINOPHIL # BLD AUTO: 0 K/UL
EOSINOPHIL NFR BLD: 0.1 %
ERYTHROCYTE [DISTWIDTH] IN BLOOD BY AUTOMATED COUNT: 15 %
HCT VFR BLD AUTO: 28.2 %
HGB BLD-MCNC: 9.2 G/DL
LYMPHOCYTES # BLD AUTO: 0.7 K/UL
LYMPHOCYTES NFR BLD: 3.8 %
MCH RBC QN AUTO: 29.2 PG
MCHC RBC AUTO-ENTMCNC: 32.6 %
MCV RBC AUTO: 90 FL
MONOCYTES # BLD AUTO: 1 K/UL
MONOCYTES NFR BLD: 5.4 %
NEUTROPHILS # BLD AUTO: 17.1 K/UL
NEUTROPHILS NFR BLD: 90.2 %
PLATELET # BLD AUTO: 265 K/UL
PMV BLD AUTO: 9.7 FL
POCT GLUCOSE: 120 MG/DL (ref 70–110)
POCT GLUCOSE: 132 MG/DL (ref 70–110)
POCT GLUCOSE: 89 MG/DL (ref 70–110)
RBC # BLD AUTO: 3.15 M/UL
WBC # BLD AUTO: 18.9 K/UL

## 2017-07-16 PROCEDURE — 36415 COLL VENOUS BLD VENIPUNCTURE: CPT

## 2017-07-16 PROCEDURE — 85025 COMPLETE CBC W/AUTO DIFF WBC: CPT

## 2017-07-16 PROCEDURE — 87040 BLOOD CULTURE FOR BACTERIA: CPT | Mod: 59

## 2017-07-16 PROCEDURE — 25000003 PHARM REV CODE 250: Performed by: PLASTIC SURGERY

## 2017-07-16 PROCEDURE — 25000003 PHARM REV CODE 250: Performed by: ANESTHESIOLOGY

## 2017-07-16 PROCEDURE — 25000003 PHARM REV CODE 250: Performed by: HOSPITALIST

## 2017-07-16 PROCEDURE — 12000002 HC ACUTE/MED SURGE SEMI-PRIVATE ROOM

## 2017-07-16 PROCEDURE — 63600175 PHARM REV CODE 636 W HCPCS: Performed by: HOSPITALIST

## 2017-07-16 PROCEDURE — 25000003 PHARM REV CODE 250: Performed by: PHYSICIAN ASSISTANT

## 2017-07-16 PROCEDURE — 63600175 PHARM REV CODE 636 W HCPCS: Performed by: NURSE PRACTITIONER

## 2017-07-16 PROCEDURE — 25000003 PHARM REV CODE 250: Performed by: ORTHOPAEDIC SURGERY

## 2017-07-16 PROCEDURE — 25000003 PHARM REV CODE 250: Performed by: EMERGENCY MEDICINE

## 2017-07-16 PROCEDURE — 63600175 PHARM REV CODE 636 W HCPCS: Performed by: PHYSICIAN ASSISTANT

## 2017-07-16 RX ORDER — COLCHICINE 0.6 MG/1
0.6 TABLET, FILM COATED ORAL ONCE
Status: COMPLETED | OUTPATIENT
Start: 2017-07-16 | End: 2017-07-16

## 2017-07-16 RX ORDER — BACITRACIN 500 [USP'U]/G
OINTMENT TOPICAL 3 TIMES DAILY
Status: DISCONTINUED | OUTPATIENT
Start: 2017-07-16 | End: 2017-07-20 | Stop reason: HOSPADM

## 2017-07-16 RX ORDER — LOPERAMIDE HYDROCHLORIDE 2 MG/1
2 CAPSULE ORAL 4 TIMES DAILY PRN
Status: DISCONTINUED | OUTPATIENT
Start: 2017-07-16 | End: 2017-07-17

## 2017-07-16 RX ADMIN — OXYCODONE AND ACETAMINOPHEN 1 TABLET: 5; 325 TABLET ORAL at 02:07

## 2017-07-16 RX ADMIN — Medication 400 UNITS: at 08:07

## 2017-07-16 RX ADMIN — Medication 250 MG: at 03:07

## 2017-07-16 RX ADMIN — LEVOTHYROXINE SODIUM 50 MCG: 50 TABLET ORAL at 05:07

## 2017-07-16 RX ADMIN — MORPHINE SULFATE 4 MG: 10 INJECTION INTRAVENOUS at 11:07

## 2017-07-16 RX ADMIN — ALLOPURINOL 100 MG: 100 TABLET ORAL at 08:07

## 2017-07-16 RX ADMIN — OXYCODONE AND ACETAMINOPHEN 1 TABLET: 5; 325 TABLET ORAL at 08:07

## 2017-07-16 RX ADMIN — FUROSEMIDE 20 MG: 20 TABLET ORAL at 08:07

## 2017-07-16 RX ADMIN — BACITRACIN: 500 OINTMENT TOPICAL at 09:07

## 2017-07-16 RX ADMIN — BACITRACIN: 500 OINTMENT TOPICAL at 03:07

## 2017-07-16 RX ADMIN — ENOXAPARIN SODIUM 30 MG: 100 INJECTION SUBCUTANEOUS at 06:07

## 2017-07-16 RX ADMIN — Medication 250 MG: at 08:07

## 2017-07-16 RX ADMIN — PIPERACILLIN SODIUM AND TAZOBACTAM SODIUM 4.5 G: 4; .5 INJECTION, POWDER, LYOPHILIZED, FOR SOLUTION INTRAVENOUS at 10:07

## 2017-07-16 RX ADMIN — HYDRALAZINE HYDROCHLORIDE 100 MG: 25 TABLET ORAL at 02:07

## 2017-07-16 RX ADMIN — FERROUS SULFATE TAB EC 325 MG (65 MG FE EQUIVALENT) 325 MG: 325 (65 FE) TABLET DELAYED RESPONSE at 08:07

## 2017-07-16 RX ADMIN — Medication 250 MG: at 11:07

## 2017-07-16 RX ADMIN — COLCHICINE 0.6 MG: 0.6 TABLET, FILM COATED ORAL at 12:07

## 2017-07-16 RX ADMIN — Medication 250 MG: at 06:07

## 2017-07-16 RX ADMIN — OXYCODONE HYDROCHLORIDE 5 MG: 5 TABLET ORAL at 07:07

## 2017-07-16 RX ADMIN — HYDRALAZINE HYDROCHLORIDE 100 MG: 25 TABLET ORAL at 09:07

## 2017-07-16 RX ADMIN — FUROSEMIDE 20 MG: 20 TABLET ORAL at 09:07

## 2017-07-16 RX ADMIN — ACETAMINOPHEN 650 MG: 325 TABLET, FILM COATED ORAL at 08:07

## 2017-07-16 RX ADMIN — LABETALOL HYDROCHLORIDE 300 MG: 100 TABLET, FILM COATED ORAL at 09:07

## 2017-07-16 NOTE — ASSESSMENT & PLAN NOTE
Traumatic hematoma with wound opening  -follow H/H  -analgesics  -appreciate Ortho input  S/P I&D on 7/8 with wound vac placement.  Skin grafting per Plastic Surgery on 7/11.  Continue with wound vac.  Patient very hesitant about going home with wound vac.  Wound vac removed.  Fever--only other symptom is watery diarrhea.  High suspicion of Cdiff infection with high fever and abrupt increase in WBC.  Sepsis (not POA) probably secondary to Cdiff infection.  Check stool for Cdiff.  Empirically start on PO Vanc.

## 2017-07-16 NOTE — NURSING
Patient's daughter request not to take vital signs at this time. States her mother is resting and don't want her bother at this time. Will continue to monitor.

## 2017-07-16 NOTE — PROGRESS NOTES
"[facility]  Progress Note    Patient Name: Christal Goodson  MRN: [id number]  Patient Class: IP- Inpatient   Admission Date: 7/7/2017  Length of Stay: 8 days  Attending Physician: Max Faria MD  Primary Care Provider: Juan Alberto Prieto MD        Subjective:     Principal Problem:Traumatic hematoma of left lower leg    HPI:  Patient is 84 yo female with HTN, HLD, CAD, CHF (EF 40%+ DD), CKD, and DMII who presents with complaint of leg wound. Patient was recently hospitalized at [facility] from 6/28-7/1/17 for L lower extremity traumatic hematoma resulting in acute blood loss anemia. Was transfused 4 units PRBC and discharged in stable condition with planned outpatient follow up. Per patient today while hanging legs over side of bed the affected leg "popped open" with some blood and drainage at the scene. She denies new trauma to the area. She complains of leg pain but otherwise denies fever/chills, SOB, CP, abdominal pain, N/V, dysuria. On presentation patient with elevated blood pressure but otherwise grossly normal labs. Afebrile in without leukocytosis. Non toxic appearing. H/H stable with hemoglobin of 9.8 (up from 9.0 at recent discharge). Call placed to Dr. Campo with photo of affected area sent with family permission per ED note. He is with recommendation for IV antibiotics and wound care for now. He will see the patient in am. Placed in observation for further monitoring.     Hospital Course:  85 y.o.female with left leg open wound and full thickness skin necrosis - with severe pain, moans with movement, doesn't like pain medications - seen by otho/surgery and taken for wound debridement, I&D and wound vac on 7/8. - Patient may require skin grafting for future and Plastic Surgery consulted.  Skin graft on 7/11.  Wound vac removed 7/16.  Fever spike 7/16.    Interval History: Complaining of watery diarrhea.    Review of Systems   HENT: Negative for ear discharge and ear " pain.    Eyes: Negative for pain and itching.   Respiratory: Negative for cough and shortness of breath.    Cardiovascular: Negative for chest pain and palpitations.     Objective:     Vital Signs (Most Recent):  Temp: (!) 102.5 °F (39.2 °C) (07/16/17 0814)  Pulse: 81 (07/16/17 0736)  Resp: 18 (07/16/17 0736)  BP: 116/64 (07/16/17 0736)  SpO2: 95 % (07/16/17 0736) Vital Signs (24h Range):  Temp:  [98.4 °F (36.9 °C)-102.5 °F (39.2 °C)] 102.5 °F (39.2 °C)  Pulse:  [60-81] 81  Resp:  [18] 18  SpO2:  [95 %-99 %] 95 %  BP: (113-168)/(53-70) 116/64     Weight: 63.6 kg (140 lb 3.8 oz)  Body mass index is 25.65 kg/m².    Intake/Output Summary (Last 24 hours) at 07/16/17 1148  Last data filed at 07/15/17 1700   Gross per 24 hour   Intake              120 ml   Output                0 ml   Net              120 ml      Physical Exam   Constitutional: She is oriented to person, place, and time. She appears well-developed and well-nourished. No distress.   HENT:   Head: Normocephalic and atraumatic.   Eyes: EOM are normal. Pupils are equal, round, and reactive to light.   Neck: Normal range of motion. Neck supple.   Cardiovascular: Normal rate and regular rhythm.    Pulmonary/Chest: Effort normal. No respiratory distress. She has no wheezes. She has no rales.   Abdominal: Soft. Bowel sounds are normal. She exhibits no distension. There is no tenderness.   Musculoskeletal: Normal range of motion. She exhibits no deformity.   Leg wound   Neurological: She is alert and oriented to person, place, and time.   Skin: Skin is warm and dry. She is not diaphoretic.       Significant Labs:   BMP:   Recent Labs  Lab 07/15/17  0532   GLU 73      K 5.0      CO2 29   BUN 31*   CREATININE 1.7*   CALCIUM 8.9     CBC:   Recent Labs  Lab 07/15/17  0532 07/16/17  0730   WBC 5.73 18.90*   HGB 8.1* 9.2*   HCT 25.0* 28.2*    265         Assessment/Plan:      * Traumatic hematoma of left lower leg    Traumatic hematoma with wound  opening  -follow H/H  -analgesics  -appreciate Ortho input  S/P I&D on 7/8 with wound vac placement.  Skin grafting per Plastic Surgery on 7/11.  Continue with wound vac.  Patient very hesitant about going home with wound vac.  Wound vac removed.  Fever--only other symptom is watery diarrhea.  High suspicion of Cdiff infection with high fever and abrupt increase in WBC.  Sepsis (not POA) probably secondary to Cdiff infection.  Check stool for Cdiff.  Empirically start on PO Vanc.        Acute blood loss anemia    Acute blood loss anemia as expected from hematoma and surgery.  Transfused 2 units of blood and monitor H/H.  Adequate correction of H/H post transfusion.  Acute drop in H/H as expected from surgery.  H/H slowly coming down.  Continue to monitor.  No transfusion for now.          Type 2 diabetes mellitus without complication    Last A1c 4.5 June 2017. Well controlled on no medications at present. Will monitor with POCT checks and cover with SSI PRN.           Chronic kidney disease, stage III (moderate)    Avoid nephrotoxic agents and renally dose medications when possible  Baseline Creat around 1.8  Stable.          Essential hypertension, benign    BP elevated on presentation.   Will resume home anti hypertensive regimen and monitor.   BP remains elevated.  Will increase Hydralazine.          CAD (coronary artery disease)    Last 2D echo showed 40% LVED with DD and mild-->moderate AVS - Stable disease at this time.  Monitor fluid volume closely  Continue ASA and statin  Daily Weight - strict I&O's  Low Na diet  1.5L fluid restriction          Hypercholesteremia    Continue statin.           Vitamin D deficiency              Hypothyroidism    TSH 4.414 April 2017. Continue synthroid.             VTE Risk Mitigation         Ordered     enoxaparin injection 30 mg  Daily     Route:  Subcutaneous        07/08/17 0856     Medium Risk of VTE  Once      07/07/17 1907          Max Faria MD  Department of  Hospital Medicine [facility]

## 2017-07-16 NOTE — PLAN OF CARE
Problem: Pressure Ulcer Risk (Zeeshan Scale) (Adult,Obstetrics,Pediatric)  Goal: Skin Integrity  Patient will demonstrate the desired outcomes by discharge/transition of care.   Outcome: Ongoing (interventions implemented as appropriate)  Wound vac discontinued on today. New orders for dressing change daily. Appetite no good.repositoned for comfort. Does complain of some pain to leg and hip. Will continue to monitor. Started on po abx.

## 2017-07-16 NOTE — SUBJECTIVE & OBJECTIVE
Interval History: Complaining of watery diarrhea.    Review of Systems   HENT: Negative for ear discharge and ear pain.    Eyes: Negative for pain and itching.   Respiratory: Negative for cough and shortness of breath.    Cardiovascular: Negative for chest pain and palpitations.     Objective:     Vital Signs (Most Recent):  Temp: (!) 102.5 °F (39.2 °C) (07/16/17 0814)  Pulse: 81 (07/16/17 0736)  Resp: 18 (07/16/17 0736)  BP: 116/64 (07/16/17 0736)  SpO2: 95 % (07/16/17 0736) Vital Signs (24h Range):  Temp:  [98.4 °F (36.9 °C)-102.5 °F (39.2 °C)] 102.5 °F (39.2 °C)  Pulse:  [60-81] 81  Resp:  [18] 18  SpO2:  [95 %-99 %] 95 %  BP: (113-168)/(53-70) 116/64     Weight: 63.6 kg (140 lb 3.8 oz)  Body mass index is 25.65 kg/m².    Intake/Output Summary (Last 24 hours) at 07/16/17 1148  Last data filed at 07/15/17 1700   Gross per 24 hour   Intake              120 ml   Output                0 ml   Net              120 ml      Physical Exam   Constitutional: She is oriented to person, place, and time. She appears well-developed and well-nourished. No distress.   HENT:   Head: Normocephalic and atraumatic.   Eyes: EOM are normal. Pupils are equal, round, and reactive to light.   Neck: Normal range of motion. Neck supple.   Cardiovascular: Normal rate and regular rhythm.    Pulmonary/Chest: Effort normal. No respiratory distress. She has no wheezes. She has no rales.   Abdominal: Soft. Bowel sounds are normal. She exhibits no distension. There is no tenderness.   Musculoskeletal: Normal range of motion. She exhibits no deformity.   Leg wound   Neurological: She is alert and oriented to person, place, and time.   Skin: Skin is warm and dry. She is not diaphoretic.       Significant Labs:   BMP:   Recent Labs  Lab 07/15/17  0532   GLU 73      K 5.0      CO2 29   BUN 31*   CREATININE 1.7*   CALCIUM 8.9     CBC:   Recent Labs  Lab 07/15/17  0532 07/16/17  0730   WBC 5.73 18.90*   HGB 8.1* 9.2*   HCT 25.0* 28.2*   PLT  016 [id number]

## 2017-07-17 LAB
BASOPHILS # BLD AUTO: 0.01 K/UL
BASOPHILS NFR BLD: 0.1 %
BILIRUB UR QL STRIP: NEGATIVE
C DIFF GDH STL QL: POSITIVE
C DIFF TOX A+B STL QL IA: POSITIVE
CLARITY UR: CLEAR
COLOR UR: YELLOW
DIFFERENTIAL METHOD: ABNORMAL
EOSINOPHIL # BLD AUTO: 0 K/UL
EOSINOPHIL NFR BLD: 0.2 %
ERYTHROCYTE [DISTWIDTH] IN BLOOD BY AUTOMATED COUNT: 15.6 %
GLUCOSE UR QL STRIP: NEGATIVE
HCT VFR BLD AUTO: 27.6 %
HGB BLD-MCNC: 8.9 G/DL
HGB UR QL STRIP: NEGATIVE
KETONES UR QL STRIP: NEGATIVE
LEUKOCYTE ESTERASE UR QL STRIP: NEGATIVE
LYMPHOCYTES # BLD AUTO: 1 K/UL
LYMPHOCYTES NFR BLD: 5.5 %
MCH RBC QN AUTO: 28.9 PG
MCHC RBC AUTO-ENTMCNC: 32.2 %
MCV RBC AUTO: 90 FL
MONOCYTES # BLD AUTO: 1.2 K/UL
MONOCYTES NFR BLD: 6.3 %
NEUTROPHILS # BLD AUTO: 16.2 K/UL
NEUTROPHILS NFR BLD: 87.9 %
NITRITE UR QL STRIP: NEGATIVE
PH UR STRIP: 5 [PH] (ref 5–8)
PLATELET # BLD AUTO: 249 K/UL
PMV BLD AUTO: 9.7 FL
POCT GLUCOSE: 86 MG/DL (ref 70–110)
POCT GLUCOSE: 87 MG/DL (ref 70–110)
POCT GLUCOSE: 91 MG/DL (ref 70–110)
POCT GLUCOSE: 93 MG/DL (ref 70–110)
PROT UR QL STRIP: ABNORMAL
RBC # BLD AUTO: 3.08 M/UL
SP GR UR STRIP: 1.01 (ref 1–1.03)
URN SPEC COLLECT METH UR: ABNORMAL
UROBILINOGEN UR STRIP-ACNC: NEGATIVE EU/DL
WBC # BLD AUTO: 18.4 K/UL

## 2017-07-17 PROCEDURE — 81003 URINALYSIS AUTO W/O SCOPE: CPT

## 2017-07-17 PROCEDURE — 25000003 PHARM REV CODE 250: Performed by: ANESTHESIOLOGY

## 2017-07-17 PROCEDURE — 12000002 HC ACUTE/MED SURGE SEMI-PRIVATE ROOM

## 2017-07-17 PROCEDURE — 63600175 PHARM REV CODE 636 W HCPCS: Performed by: NURSE PRACTITIONER

## 2017-07-17 PROCEDURE — 87449 NOS EACH ORGANISM AG IA: CPT

## 2017-07-17 PROCEDURE — 25000003 PHARM REV CODE 250: Performed by: ORTHOPAEDIC SURGERY

## 2017-07-17 PROCEDURE — 85025 COMPLETE CBC W/AUTO DIFF WBC: CPT

## 2017-07-17 PROCEDURE — 25000003 PHARM REV CODE 250: Performed by: HOSPITALIST

## 2017-07-17 PROCEDURE — 36415 COLL VENOUS BLD VENIPUNCTURE: CPT

## 2017-07-17 PROCEDURE — 25000003 PHARM REV CODE 250: Performed by: EMERGENCY MEDICINE

## 2017-07-17 RX ORDER — COLCHICINE 0.6 MG/1
0.6 TABLET, FILM COATED ORAL ONCE
Status: COMPLETED | OUTPATIENT
Start: 2017-07-17 | End: 2017-07-17

## 2017-07-17 RX ADMIN — OXYCODONE HYDROCHLORIDE 5 MG: 5 TABLET ORAL at 05:07

## 2017-07-17 RX ADMIN — HYDRALAZINE HYDROCHLORIDE 100 MG: 25 TABLET ORAL at 05:07

## 2017-07-17 RX ADMIN — ALLOPURINOL 100 MG: 100 TABLET ORAL at 09:07

## 2017-07-17 RX ADMIN — FERROUS SULFATE TAB EC 325 MG (65 MG FE EQUIVALENT) 325 MG: 325 (65 FE) TABLET DELAYED RESPONSE at 09:07

## 2017-07-17 RX ADMIN — Medication 250 MG: at 01:07

## 2017-07-17 RX ADMIN — Medication 400 UNITS: at 09:07

## 2017-07-17 RX ADMIN — Medication 250 MG: at 09:07

## 2017-07-17 RX ADMIN — OXYCODONE AND ACETAMINOPHEN 1 TABLET: 5; 325 TABLET ORAL at 09:07

## 2017-07-17 RX ADMIN — HYDRALAZINE HYDROCHLORIDE 100 MG: 25 TABLET ORAL at 09:07

## 2017-07-17 RX ADMIN — LABETALOL HYDROCHLORIDE 300 MG: 100 TABLET, FILM COATED ORAL at 09:07

## 2017-07-17 RX ADMIN — COLCHICINE 0.6 MG: 0.6 TABLET, FILM COATED ORAL at 01:07

## 2017-07-17 RX ADMIN — Medication 250 MG: at 05:07

## 2017-07-17 RX ADMIN — BACITRACIN: 500 OINTMENT TOPICAL at 01:07

## 2017-07-17 RX ADMIN — FUROSEMIDE 20 MG: 20 TABLET ORAL at 10:07

## 2017-07-17 RX ADMIN — ENOXAPARIN SODIUM 30 MG: 100 INJECTION SUBCUTANEOUS at 05:07

## 2017-07-17 RX ADMIN — BACITRACIN: 500 OINTMENT TOPICAL at 05:07

## 2017-07-17 RX ADMIN — LEVOTHYROXINE SODIUM 50 MCG: 50 TABLET ORAL at 05:07

## 2017-07-17 RX ADMIN — OXYCODONE HYDROCHLORIDE 5 MG: 5 TABLET ORAL at 09:07

## 2017-07-17 RX ADMIN — FUROSEMIDE 20 MG: 20 TABLET ORAL at 09:07

## 2017-07-17 NOTE — UM SECONDARY REVIEW
Medical Affairs    Level of Care Issue     85 y.o.female with left leg open wound and full thickness skin necrosis - with severe pain, moans with movement, doesn't like pain medications - seen by otho/surgery and taken for wound debridement, I&D and wound vac on 7/8. - Patient may require skin grafting for future and Plastic Surgery consulted.  Skin graft on 7/11.  Wound vac removed 7/16.  Fever spike 7/16.  Watery diarrhea.     7/17: c.diff antigen and toxin positive. blood cx drawn on 7/16: NGTD      vancomycin 250mg PO q 6hrs     labs:  C. diff Antigen              Positive (A)  C difficile Toxins A+B, EIA                    Positive (A)    Tmax 100.8 last 24hrs    Wbc : 18 k last 2 days, was normal     {[facility]  Review Outcome:[id number]}

## 2017-07-17 NOTE — SUBJECTIVE & OBJECTIVE
Interval History: Watery diarrhea and left foot pain.    Review of Systems   HENT: Negative for ear discharge and ear pain.    Eyes: Negative for pain and itching.   Respiratory: Negative for cough and shortness of breath.    Cardiovascular: Negative for chest pain and palpitations.     Objective:     Vital Signs (Most Recent):  Temp: 99 °F (37.2 °C) (07/17/17 0847)  Pulse: 79 (07/17/17 0847)  Resp: 18 (07/17/17 0847)  BP: (!) 140/62 (07/17/17 0847)  SpO2: 100 % (07/17/17 0847) Vital Signs (24h Range):  Temp:  [99 °F (37.2 °C)-100.8 °F (38.2 °C)] 99 °F (37.2 °C)  Pulse:  [70-89] 79  Resp:  [17-19] 18  SpO2:  [94 %-100 %] 100 %  BP: (109-140)/(53-62) 140/62     Weight: 63.6 kg (140 lb 3.2 oz)  Body mass index is 25.64 kg/m².  No intake or output data in the 24 hours ending 07/17/17 1148   Physical Exam   Constitutional: She is oriented to person, place, and time. She appears well-developed and well-nourished. No distress.   HENT:   Head: Normocephalic and atraumatic.   Eyes: EOM are normal. Pupils are equal, round, and reactive to light.   Neck: Normal range of motion. Neck supple.   Cardiovascular: Normal rate and regular rhythm.    Pulmonary/Chest: Effort normal. No respiratory distress. She has no wheezes. She has no rales.   Abdominal: Soft. Bowel sounds are normal. She exhibits no distension. There is no tenderness.   Musculoskeletal: Normal range of motion. She exhibits no deformity.   Leg wound   Neurological: She is alert and oriented to person, place, and time.   Skin: Skin is warm and dry. She is not diaphoretic.       Significant Labs:   BMP: No results for input(s): GLU, NA, K, CL, CO2, BUN, CREATININE, CALCIUM, MG in the last 48 hours.  CBC:     Recent Labs  Lab 07/16/17  0730 07/17/17  0643   WBC 18.90* 18.40*   HGB 9.2* 8.9*   HCT 28.2* 27.6*    249

## 2017-07-17 NOTE — PLAN OF CARE
07/17/17 1741   Discharge Reassessment   Assessment Type Discharge Planning Reassessment   Can the patient answer the patient profile reliably? Yes, cognitively intact   How does the patient rate their overall health at the present time? Fair   Describe the patient's ability to walk at the present time. Major restrictions/daily assistance from another person   How often would a person be available to care for the patient? Whenever needed   During the past month, has the patient often been bothered by feeling down, depressed or hopeless? No   During the past month, has the patient often been bothered by little interest or pleasure in doing things? No   Discharge plan remains the same: Yes   Provided patient/caregiver education on the expected discharge date and the discharge plan No   Discharge Plan A Home Health;Home with family   Discharge Plan B Home Health;Home with family   Change in patient condition or support system No   Patient choice form signed by patient/caregiver No   Explained to the the patient/caregiver why the discharge planned changed: No   Involved the patient/caregiver in establishing a new discharge plan: Yes   Kajal Burris LMSW ACM-SW, CCM

## 2017-07-17 NOTE — ASSESSMENT & PLAN NOTE
BP elevated on presentation.   Will resume home anti hypertensive regimen and monitor.   BP remains elevated. Increased Hydralazine with much better BP control.

## 2017-07-17 NOTE — ASSESSMENT & PLAN NOTE
Traumatic hematoma with wound opening  -follow H/H  -analgesics  -appreciate Ortho input  S/P I&D on 7/8 with wound vac placement.  Skin grafting per Plastic Surgery on 7/11.  Continue with wound vac.  Patient very hesitant about going home with wound vac.  Wound vac removed.  Fever--watery diarrhea.  High suspicion of Cdiff infection with high fever and abrupt increase in WBC.  Sepsis (not POA) probably secondary to Cdiff infection.  Check stool for Cdiff.  Empirically start on PO Vanc.  Also complaining of pain to left big toe.  Hx of gout.  If Cdiff negative, would then think that fever and leukocytosis secondary to acute gouty attack.  Will give Colchicine.

## 2017-07-17 NOTE — PROGRESS NOTES
"[facility]  Progress Note    Patient Name: Christal Goodson  MRN: [id number]  Patient Class: IP- Inpatient   Admission Date: 7/7/2017  Length of Stay: 9 days  Attending Physician: Max Faria MD  Primary Care Provider: Juan Alberto Prieto MD        Subjective:     Principal Problem:Traumatic hematoma of left lower leg    HPI:  Patient is 84 yo female with HTN, HLD, CAD, CHF (EF 40%+ DD), CKD, and DMII who presents with complaint of leg wound. Patient was recently hospitalized at [facility] from 6/28-7/1/17 for L lower extremity traumatic hematoma resulting in acute blood loss anemia. Was transfused 4 units PRBC and discharged in stable condition with planned outpatient follow up. Per patient today while hanging legs over side of bed the affected leg "popped open" with some blood and drainage at the scene. She denies new trauma to the area. She complains of leg pain but otherwise denies fever/chills, SOB, CP, abdominal pain, N/V, dysuria. On presentation patient with elevated blood pressure but otherwise grossly normal labs. Afebrile in without leukocytosis. Non toxic appearing. H/H stable with hemoglobin of 9.8 (up from 9.0 at recent discharge). Call placed to Dr. Campo with photo of affected area sent with family permission per ED note. He is with recommendation for IV antibiotics and wound care for now. He will see the patient in am. Placed in observation for further monitoring.     Hospital Course:  85 y.o.female with left leg open wound and full thickness skin necrosis - with severe pain, moans with movement, doesn't like pain medications - seen by otho/surgery and taken for wound debridement, I&D and wound vac on 7/8. - Patient may require skin grafting for future and Plastic Surgery consulted.  Skin graft on 7/11.  Wound vac removed 7/16.  Fever spike 7/16.  Watery diarrhea.    Interval History: Watery diarrhea and left foot pain.    Review of Systems   HENT: Negative for " ear discharge and ear pain.    Eyes: Negative for pain and itching.   Respiratory: Negative for cough and shortness of breath.    Cardiovascular: Negative for chest pain and palpitations.     Objective:     Vital Signs (Most Recent):  Temp: 99 °F (37.2 °C) (07/17/17 0847)  Pulse: 79 (07/17/17 0847)  Resp: 18 (07/17/17 0847)  BP: (!) 140/62 (07/17/17 0847)  SpO2: 100 % (07/17/17 0847) Vital Signs (24h Range):  Temp:  [99 °F (37.2 °C)-100.8 °F (38.2 °C)] 99 °F (37.2 °C)  Pulse:  [70-89] 79  Resp:  [17-19] 18  SpO2:  [94 %-100 %] 100 %  BP: (109-140)/(53-62) 140/62     Weight: 63.6 kg (140 lb 3.2 oz)  Body mass index is 25.64 kg/m².  No intake or output data in the 24 hours ending 07/17/17 1148   Physical Exam   Constitutional: She is oriented to person, place, and time. She appears well-developed and well-nourished. No distress.   HENT:   Head: Normocephalic and atraumatic.   Eyes: EOM are normal. Pupils are equal, round, and reactive to light.   Neck: Normal range of motion. Neck supple.   Cardiovascular: Normal rate and regular rhythm.    Pulmonary/Chest: Effort normal. No respiratory distress. She has no wheezes. She has no rales.   Abdominal: Soft. Bowel sounds are normal. She exhibits no distension. There is no tenderness.   Musculoskeletal: Normal range of motion. She exhibits no deformity.   Leg wound   Neurological: She is alert and oriented to person, place, and time.   Skin: Skin is warm and dry. She is not diaphoretic.       Significant Labs:   BMP: No results for input(s): GLU, NA, K, CL, CO2, BUN, CREATININE, CALCIUM, MG in the last 48 hours.  CBC:     Recent Labs  Lab 07/16/17  0730 07/17/17  0643   WBC 18.90* 18.40*   HGB 9.2* 8.9*   HCT 28.2* 27.6*    249         Assessment/Plan:      * Traumatic hematoma of left lower leg    Traumatic hematoma with wound opening  -follow H/H  -analgesics  -appreciate Ortho input  S/P I&D on 7/8 with wound vac placement.  Skin grafting per Plastic Surgery on  7/11.  Continue with wound vac.  Patient very hesitant about going home with wound vac.  Wound vac removed.  Fever--watery diarrhea.  High suspicion of Cdiff infection with high fever and abrupt increase in WBC.  Sepsis (not POA) probably secondary to Cdiff infection.  Check stool for Cdiff.  Empirically start on PO Vanc.  Also complaining of pain to left big toe.  Hx of gout.  If Cdiff negative, would then think that fever and leukocytosis secondary to acute gouty attack.  Will give Colchicine.        Acute blood loss anemia    Acute blood loss anemia as expected from hematoma and surgery.  Transfused 2 units of blood and monitor H/H.  Adequate correction of H/H post transfusion.  Acute drop in H/H as expected from surgery.  H/H stable.          Type 2 diabetes mellitus without complication    Last A1c 4.5 June 2017. Well controlled on no medications at present. Will monitor with POCT checks and cover with SSI PRN.           Chronic kidney disease, stage III (moderate)    Avoid nephrotoxic agents and renally dose medications when possible  Baseline Creat around 1.8  Stable.          Essential hypertension, benign    BP elevated on presentation.   Will resume home anti hypertensive regimen and monitor.   BP remains elevated. Increased Hydralazine with much better BP control.          CAD (coronary artery disease)    Last 2D echo showed 40% LVED with DD and mild-->moderate AVS - Stable disease at this time.  Monitor fluid volume closely  Continue ASA and statin  Daily Weight - strict I&O's  Low Na diet  1.5L fluid restriction          Hypercholesteremia    Continue statin.           Vitamin D deficiency              Hypothyroidism    TSH 4.414 April 2017. Continue synthroid.             VTE Risk Mitigation         Ordered     enoxaparin injection 30 mg  Daily     Route:  Subcutaneous        07/08/17 0856     Medium Risk of VTE  Once      07/07/17 1907          Max Faria MD  Department of Hospital Medicine    [facility] [place]

## 2017-07-18 PROBLEM — A49.8 CLOSTRIDIUM DIFFICILE INFECTION: Status: ACTIVE | Noted: 2017-07-18

## 2017-07-18 LAB
ANION GAP SERPL CALC-SCNC: 10 MMOL/L
BASOPHILS # BLD AUTO: 0.02 K/UL
BASOPHILS NFR BLD: 0.1 %
BUN SERPL-MCNC: 40 MG/DL
CALCIUM SERPL-MCNC: 8.8 MG/DL
CHLORIDE SERPL-SCNC: 98 MMOL/L
CO2 SERPL-SCNC: 25 MMOL/L
CREAT SERPL-MCNC: 2.4 MG/DL
DIFFERENTIAL METHOD: ABNORMAL
EOSINOPHIL # BLD AUTO: 0 K/UL
EOSINOPHIL NFR BLD: 0 %
ERYTHROCYTE [DISTWIDTH] IN BLOOD BY AUTOMATED COUNT: 15 %
EST. GFR  (AFRICAN AMERICAN): 21 ML/MIN/1.73 M^2
EST. GFR  (NON AFRICAN AMERICAN): 18 ML/MIN/1.73 M^2
GLUCOSE SERPL-MCNC: 86 MG/DL
HCT VFR BLD AUTO: 24.5 %
HGB BLD-MCNC: 8.4 G/DL
LYMPHOCYTES # BLD AUTO: 0.9 K/UL
LYMPHOCYTES NFR BLD: 5 %
MCH RBC QN AUTO: 30.5 PG
MCHC RBC AUTO-ENTMCNC: 34.3 %
MCV RBC AUTO: 89 FL
MONOCYTES # BLD AUTO: 1.5 K/UL
MONOCYTES NFR BLD: 8.5 %
NEUTROPHILS # BLD AUTO: 14.8 K/UL
NEUTROPHILS NFR BLD: 85.8 %
PLATELET # BLD AUTO: 365 K/UL
PMV BLD AUTO: 11 FL
POCT GLUCOSE: 106 MG/DL (ref 70–110)
POCT GLUCOSE: 108 MG/DL (ref 70–110)
POCT GLUCOSE: 117 MG/DL (ref 70–110)
POCT GLUCOSE: 76 MG/DL (ref 70–110)
POTASSIUM SERPL-SCNC: 4 MMOL/L
RBC # BLD AUTO: 2.75 M/UL
SODIUM SERPL-SCNC: 133 MMOL/L
WBC # BLD AUTO: 17.27 K/UL

## 2017-07-18 PROCEDURE — 63600175 PHARM REV CODE 636 W HCPCS: Performed by: PHYSICIAN ASSISTANT

## 2017-07-18 PROCEDURE — 63600175 PHARM REV CODE 636 W HCPCS: Performed by: NURSE PRACTITIONER

## 2017-07-18 PROCEDURE — 25000003 PHARM REV CODE 250: Performed by: HOSPITALIST

## 2017-07-18 PROCEDURE — 85025 COMPLETE CBC W/AUTO DIFF WBC: CPT

## 2017-07-18 PROCEDURE — 25000003 PHARM REV CODE 250: Performed by: EMERGENCY MEDICINE

## 2017-07-18 PROCEDURE — 94761 N-INVAS EAR/PLS OXIMETRY MLT: CPT

## 2017-07-18 PROCEDURE — 12000002 HC ACUTE/MED SURGE SEMI-PRIVATE ROOM

## 2017-07-18 PROCEDURE — 25000003 PHARM REV CODE 250: Performed by: INTERNAL MEDICINE

## 2017-07-18 PROCEDURE — 80048 BASIC METABOLIC PNL TOTAL CA: CPT

## 2017-07-18 PROCEDURE — 36415 COLL VENOUS BLD VENIPUNCTURE: CPT

## 2017-07-18 PROCEDURE — 25000003 PHARM REV CODE 250: Performed by: ORTHOPAEDIC SURGERY

## 2017-07-18 RX ADMIN — BACITRACIN: 500 OINTMENT TOPICAL at 12:07

## 2017-07-18 RX ADMIN — ENOXAPARIN SODIUM 30 MG: 100 INJECTION SUBCUTANEOUS at 05:07

## 2017-07-18 RX ADMIN — BACITRACIN: 500 OINTMENT TOPICAL at 05:07

## 2017-07-18 RX ADMIN — BACITRACIN: 500 OINTMENT TOPICAL at 09:07

## 2017-07-18 RX ADMIN — LABETALOL HYDROCHLORIDE 300 MG: 100 TABLET, FILM COATED ORAL at 09:07

## 2017-07-18 RX ADMIN — SODIUM CHLORIDE 500 ML: 0.9 INJECTION, SOLUTION INTRAVENOUS at 05:07

## 2017-07-18 RX ADMIN — Medication 250 MG: at 05:07

## 2017-07-18 RX ADMIN — LEVOTHYROXINE SODIUM 50 MCG: 50 TABLET ORAL at 05:07

## 2017-07-18 RX ADMIN — MORPHINE SULFATE 4 MG: 10 INJECTION INTRAVENOUS at 09:07

## 2017-07-18 RX ADMIN — Medication 250 MG: at 03:07

## 2017-07-18 RX ADMIN — OXYCODONE HYDROCHLORIDE 5 MG: 5 TABLET ORAL at 05:07

## 2017-07-18 RX ADMIN — Medication 250 MG: at 09:07

## 2017-07-18 RX ADMIN — Medication 250 MG: at 12:07

## 2017-07-18 RX ADMIN — HYDRALAZINE HYDROCHLORIDE 100 MG: 25 TABLET ORAL at 03:07

## 2017-07-18 RX ADMIN — ALLOPURINOL 100 MG: 100 TABLET ORAL at 09:07

## 2017-07-18 RX ADMIN — FERROUS SULFATE TAB EC 325 MG (65 MG FE EQUIVALENT) 325 MG: 325 (65 FE) TABLET DELAYED RESPONSE at 09:07

## 2017-07-18 RX ADMIN — Medication 400 UNITS: at 09:07

## 2017-07-18 RX ADMIN — BACITRACIN: 500 OINTMENT TOPICAL at 03:07

## 2017-07-18 RX ADMIN — FUROSEMIDE 20 MG: 20 TABLET ORAL at 09:07

## 2017-07-18 NOTE — PLAN OF CARE
Problem: Patient Care Overview  Goal: Plan of Care Review  Shonna Perry, RN Registered Nurse Signed Med/Surg  Nursing          []Hide copied text  []Hover for attribution information  Pt has been free from falls this shift. Pt and family refuse for pt to be turned. Pt is awake and oriented but her legs are contracted. Pt states she is having a much better day than yesterday. pts call light is within reach. Will continue to monitor.

## 2017-07-18 NOTE — SUBJECTIVE & OBJECTIVE
Review of Systems   HENT: Negative for ear discharge and ear pain.    Eyes: Negative for pain and itching.   Respiratory: Negative for cough and shortness of breath.    Cardiovascular: Negative for chest pain and palpitations.   Gastrointestinal: Positive for diarrhea.     Objective:     Vital Signs (Most Recent):  Temp: 98.3 °F (36.8 °C) (07/18/17 1619)  Pulse: 62 (07/18/17 1619)  Resp: 18 (07/18/17 1619)  BP: 131/60 (07/18/17 1619)  SpO2: 95 % (07/18/17 1619) Vital Signs (24h Range):  Temp:  [98.3 °F (36.8 °C)-99.2 °F (37.3 °C)] 98.3 °F (36.8 °C)  Pulse:  [62-71] 62  Resp:  [18] 18  SpO2:  [95 %-100 %] 95 %  BP: ()/(54-61) 131/60     Weight: 63.6 kg (140 lb 3.2 oz)  Body mass index is 25.64 kg/m².    Intake/Output Summary (Last 24 hours) at 07/18/17 1719  Last data filed at 07/18/17 1300   Gross per 24 hour   Intake              600 ml   Output                0 ml   Net              600 ml      Physical Exam   Constitutional: She is oriented to person, place, and time. She appears well-developed and well-nourished. No distress.   HENT:   Head: Normocephalic and atraumatic.   Eyes: EOM are normal. Pupils are equal, round, and reactive to light.   Neck: Normal range of motion. Neck supple.   Cardiovascular: Normal rate and regular rhythm.    Pulmonary/Chest: Effort normal. No respiratory distress. She has no wheezes. She has no rales.   Abdominal: Soft. Bowel sounds are normal. She exhibits no distension. There is no tenderness.   Musculoskeletal: Normal range of motion. She exhibits no deformity.   Leg wound   Neurological: She is alert and oriented to person, place, and time.   Skin: Skin is warm and dry. She is not diaphoretic.       Significant Labs:   BMP:     Recent Labs  Lab 07/18/17  0625   GLU 86   *   K 4.0   CL 98   CO2 25   BUN 40*   CREATININE 2.4*   CALCIUM 8.8     CBC:     Recent Labs  Lab 07/17/17  0643 07/18/17  0625   WBC 18.40* 17.27*   HGB 8.9* 8.4*   HCT 27.6* 24.5*    365*

## 2017-07-18 NOTE — ASSESSMENT & PLAN NOTE
+Cdiff. Sepsis (not POA) probably secondary to Cdiff infection. Empirically started on PO Vanc 7/16.

## 2017-07-18 NOTE — NURSING
Pt has been free from falls this shift. Pt and family refuse for pt to be turned. Pt is awake and oriented but her legs are contracted. Pt states she is having a much better day than yesterday. pts call light is within reach. Will continue to monitor.

## 2017-07-18 NOTE — ASSESSMENT & PLAN NOTE
Traumatic hematoma with wound opening.  Appreciate Ortho input  S/P I&D on 7/8 with wound vac placement.  Skin grafting per Plastic Surgery on 7/11.  Continue with wound vac.  Patient very hesitant about going home with wound vac.  Wound vac removed.

## 2017-07-18 NOTE — PROGRESS NOTES
"[facility]  Progress Note    Patient Name: Christal Goodson  MRN: [id number]  Patient Class: IP- Inpatient   Admission Date: 7/7/2017  Length of Stay: 10 days  Attending Physician: Rosi Link MD  Primary Care Provider: Juan Alberto Prieto MD        Subjective:     Principal Problem:Traumatic hematoma of left lower leg    HPI:  Ms. Platt is a 86 yo woman with HTN, HLD, CAD, CHF (EF 40%+ DD), CKD, and DMII who presents with complaint of leg wound. Patient was recently hospitalized at [facility] from 6/28-7/1/17 for L lower extremity traumatic hematoma resulting in acute blood loss anemia. Was transfused 4 units PRBC and discharged in stable condition with planned outpatient follow up. Per patient today while hanging legs over side of bed the affected leg "popped open" with some blood and drainage at the scene. She denies new trauma to the area. She complains of leg pain but otherwise denies fever/chills, SOB, CP, abdominal pain, N/V, dysuria. On presentation patient with elevated blood pressure but otherwise grossly normal labs. Afebrile in without leukocytosis. Non toxic appearing. H/H stable with hemoglobin of 9.8 (up from 9.0 at recent discharge). Call placed to Dr. Campo with photo of affected area sent with family permission per ED note. He is with recommendation for IV antibiotics and wound care for now. He will see the patient in am. Placed in observation for further monitoring.     Hospital Course:  85 y.o.female with left leg open wound and full thickness skin necrosis - with severe pain, moans with movement, doesn't like pain medications - seen by otho/surgery and taken for wound debridement, I&D and wound vac on 7/8. Plastic Surgery consulted for skin graft.  Skin graft on 7/11.  Wound vac removed 7/16.  Fever spike 7/16.  Watery diarrhea. C diff+. PO vanc started 7/16/17.      Review of Systems   HENT: Negative for ear discharge and ear pain.    Eyes: Negative for " pain and itching.   Respiratory: Negative for cough and shortness of breath.    Cardiovascular: Negative for chest pain and palpitations.   Gastrointestinal: Positive for diarrhea.     Objective:     Vital Signs (Most Recent):  Temp: 98.3 °F (36.8 °C) (07/18/17 1619)  Pulse: 62 (07/18/17 1619)  Resp: 18 (07/18/17 1619)  BP: 131/60 (07/18/17 1619)  SpO2: 95 % (07/18/17 1619) Vital Signs (24h Range):  Temp:  [98.3 °F (36.8 °C)-99.2 °F (37.3 °C)] 98.3 °F (36.8 °C)  Pulse:  [62-71] 62  Resp:  [18] 18  SpO2:  [95 %-100 %] 95 %  BP: ()/(54-61) 131/60     Weight: 63.6 kg (140 lb 3.2 oz)  Body mass index is 25.64 kg/m².    Intake/Output Summary (Last 24 hours) at 07/18/17 1719  Last data filed at 07/18/17 1300   Gross per 24 hour   Intake              600 ml   Output                0 ml   Net              600 ml      Physical Exam   Constitutional: She is oriented to person, place, and time. She appears well-developed and well-nourished. No distress.   HENT:   Head: Normocephalic and atraumatic.   Eyes: EOM are normal. Pupils are equal, round, and reactive to light.   Neck: Normal range of motion. Neck supple.   Cardiovascular: Normal rate and regular rhythm.    Pulmonary/Chest: Effort normal. No respiratory distress. She has no wheezes. She has no rales.   Abdominal: Soft. Bowel sounds are normal. She exhibits no distension. There is no tenderness.   Musculoskeletal: Normal range of motion. She exhibits no deformity.   Leg wound   Neurological: She is alert and oriented to person, place, and time.   Skin: Skin is warm and dry. She is not diaphoretic.       Significant Labs:   BMP:     Recent Labs  Lab 07/18/17  0625   GLU 86   *   K 4.0   CL 98   CO2 25   BUN 40*   CREATININE 2.4*   CALCIUM 8.8     CBC:     Recent Labs  Lab 07/17/17  0643 07/18/17  0625   WBC 18.40* 17.27*   HGB 8.9* 8.4*   HCT 27.6* 24.5*    365*         Assessment/Plan:      * Traumatic hematoma of left lower leg    Traumatic hematoma  with wound opening.  Appreciate Ortho input  S/P I&D on 7/8 with wound vac placement.  Skin grafting per Plastic Surgery on 7/11.  Continue with wound vac.  Patient very hesitant about going home with wound vac.  Wound vac removed.        Clostridium difficile infection    +Cdiff. Sepsis (not POA) probably secondary to Cdiff infection. Empirically started on PO Vanc 7/16.        Acute blood loss anemia    Acute blood loss anemia as expected from hematoma and surgery.  Transfused 2 units of blood and monitor H/H.  Adequate correction of H/H post transfusion.  H/H stable.        Type 2 diabetes mellitus without complication    Last A1c 4.5 June 2017. Well controlled on no medications at present. Will monitor with POCT checks and cover with SSI PRN.           Chronic kidney disease, stage III (moderate)    Avoid nephrotoxic agents and renally dose medications when possible  Baseline Creat around 1.8  Stable.        Essential hypertension, benign    BP elevated on presentation.   Will resume home anti hypertensive regimen and monitor.   BP remained elevated. Increased Hydralazine with much better BP control.        CAD (coronary artery disease)    Last 2D echo showed 40% LVED with DD and mild-->moderate AVS - Stable disease at this time.  Monitor fluid volume closely  Continue ASA and statin  Daily Weight - strict I&O's  Low Na diet  1.5L fluid restriction        Hypercholesteremia    Continue statin.         Vitamin D deficiency              Hypothyroidism    TSH 4.414 April 2017. Continue synthroid.             VTE Risk Mitigation         Ordered     enoxaparin injection 30 mg  Daily     Route:  Subcutaneous        07/08/17 0856     Medium Risk of VTE  Once      07/07/17 1907          Rosi Link MD  Department of Hospital Medicine   [facility]

## 2017-07-18 NOTE — ASSESSMENT & PLAN NOTE
Acute blood loss anemia as expected from hematoma and surgery.  Transfused 2 units of blood and monitor H/H.  Adequate correction of H/H post transfusion.  H/H stable.

## 2017-07-18 NOTE — ASSESSMENT & PLAN NOTE
BP elevated on presentation.   Will resume home anti hypertensive regimen and monitor.   BP remained elevated. Increased Hydralazine with much better BP control.

## 2017-07-19 LAB
ANION GAP SERPL CALC-SCNC: 9 MMOL/L
BASOPHILS # BLD AUTO: 0.01 K/UL
BASOPHILS NFR BLD: 0.1 %
BUN SERPL-MCNC: 39 MG/DL
CALCIUM SERPL-MCNC: 8.2 MG/DL
CHLORIDE SERPL-SCNC: 100 MMOL/L
CO2 SERPL-SCNC: 25 MMOL/L
CREAT SERPL-MCNC: 2.3 MG/DL
DIFFERENTIAL METHOD: ABNORMAL
EOSINOPHIL # BLD AUTO: 0.1 K/UL
EOSINOPHIL NFR BLD: 0.9 %
ERYTHROCYTE [DISTWIDTH] IN BLOOD BY AUTOMATED COUNT: 15 %
EST. GFR  (AFRICAN AMERICAN): 22 ML/MIN/1.73 M^2
EST. GFR  (NON AFRICAN AMERICAN): 19 ML/MIN/1.73 M^2
GLUCOSE SERPL-MCNC: 79 MG/DL
HCT VFR BLD AUTO: 23.1 %
HGB BLD-MCNC: 7.6 G/DL
LYMPHOCYTES # BLD AUTO: 0.9 K/UL
LYMPHOCYTES NFR BLD: 8.7 %
MCH RBC QN AUTO: 29.5 PG
MCHC RBC AUTO-ENTMCNC: 32.9 %
MCV RBC AUTO: 90 FL
MONOCYTES # BLD AUTO: 1.3 K/UL
MONOCYTES NFR BLD: 11.8 %
NEUTROPHILS # BLD AUTO: 8.3 K/UL
NEUTROPHILS NFR BLD: 78.3 %
PLATELET # BLD AUTO: 247 K/UL
PMV BLD AUTO: 10.5 FL
POCT GLUCOSE: 104 MG/DL (ref 70–110)
POCT GLUCOSE: 114 MG/DL (ref 70–110)
POCT GLUCOSE: 116 MG/DL (ref 70–110)
POCT GLUCOSE: 80 MG/DL (ref 70–110)
POTASSIUM SERPL-SCNC: 3.7 MMOL/L
RBC # BLD AUTO: 2.58 M/UL
SODIUM SERPL-SCNC: 134 MMOL/L
WBC # BLD AUTO: 10.55 K/UL

## 2017-07-19 PROCEDURE — 63600175 PHARM REV CODE 636 W HCPCS: Performed by: NURSE PRACTITIONER

## 2017-07-19 PROCEDURE — 85025 COMPLETE CBC W/AUTO DIFF WBC: CPT

## 2017-07-19 PROCEDURE — 25000003 PHARM REV CODE 250: Performed by: EMERGENCY MEDICINE

## 2017-07-19 PROCEDURE — 80048 BASIC METABOLIC PNL TOTAL CA: CPT

## 2017-07-19 PROCEDURE — 12000002 HC ACUTE/MED SURGE SEMI-PRIVATE ROOM

## 2017-07-19 PROCEDURE — 25000003 PHARM REV CODE 250: Performed by: HOSPITALIST

## 2017-07-19 PROCEDURE — 36415 COLL VENOUS BLD VENIPUNCTURE: CPT

## 2017-07-19 PROCEDURE — 63600175 PHARM REV CODE 636 W HCPCS: Performed by: PHYSICIAN ASSISTANT

## 2017-07-19 RX ORDER — OXYCODONE HYDROCHLORIDE 5 MG/1
5 TABLET ORAL EVERY 4 HOURS PRN
Qty: 30 TABLET | Refills: 0 | Status: SHIPPED | OUTPATIENT
Start: 2017-07-19 | End: 2017-07-25

## 2017-07-19 RX ADMIN — Medication 250 MG: at 11:07

## 2017-07-19 RX ADMIN — Medication 250 MG: at 08:07

## 2017-07-19 RX ADMIN — BACITRACIN: 500 OINTMENT TOPICAL at 03:07

## 2017-07-19 RX ADMIN — MORPHINE SULFATE 4 MG: 10 INJECTION INTRAVENOUS at 11:07

## 2017-07-19 RX ADMIN — FUROSEMIDE 20 MG: 20 TABLET ORAL at 08:07

## 2017-07-19 RX ADMIN — Medication 250 MG: at 06:07

## 2017-07-19 RX ADMIN — ENOXAPARIN SODIUM 30 MG: 100 INJECTION SUBCUTANEOUS at 07:07

## 2017-07-19 RX ADMIN — OXYCODONE HYDROCHLORIDE 5 MG: 5 TABLET ORAL at 03:07

## 2017-07-19 RX ADMIN — FERROUS SULFATE TAB EC 325 MG (65 MG FE EQUIVALENT) 325 MG: 325 (65 FE) TABLET DELAYED RESPONSE at 08:07

## 2017-07-19 RX ADMIN — Medication 400 UNITS: at 08:07

## 2017-07-19 RX ADMIN — LABETALOL HYDROCHLORIDE 300 MG: 100 TABLET, FILM COATED ORAL at 08:07

## 2017-07-19 RX ADMIN — Medication 250 MG: at 01:07

## 2017-07-19 RX ADMIN — BACITRACIN: 500 OINTMENT TOPICAL at 06:07

## 2017-07-19 RX ADMIN — LABETALOL HYDROCHLORIDE 300 MG: 100 TABLET, FILM COATED ORAL at 09:07

## 2017-07-19 RX ADMIN — ALLOPURINOL 100 MG: 100 TABLET ORAL at 08:07

## 2017-07-19 RX ADMIN — BACITRACIN: 500 OINTMENT TOPICAL at 09:07

## 2017-07-19 RX ADMIN — FUROSEMIDE 20 MG: 20 TABLET ORAL at 09:07

## 2017-07-19 RX ADMIN — LEVOTHYROXINE SODIUM 50 MCG: 50 TABLET ORAL at 06:07

## 2017-07-19 RX ADMIN — Medication 250 MG: at 12:07

## 2017-07-19 RX ADMIN — Medication 250 MG: at 07:07

## 2017-07-19 NOTE — SUBJECTIVE & OBJECTIVE
Review of Systems   HENT: Negative for ear discharge and ear pain.    Eyes: Negative for pain and itching.   Respiratory: Negative for cough and shortness of breath.    Cardiovascular: Negative for chest pain and palpitations.   Gastrointestinal: Positive for diarrhea.     Objective:     Vital Signs (Most Recent):  Temp: 98.2 °F (36.8 °C) (07/19/17 1124)  Pulse: 61 (07/19/17 1124)  Resp: 18 (07/19/17 1124)  BP: (!) 125/58 (07/19/17 1124)  SpO2: 97 % (07/19/17 1124) Vital Signs (24h Range):  Temp:  [98.2 °F (36.8 °C)-99.6 °F (37.6 °C)] 98.2 °F (36.8 °C)  Pulse:  [59-69] 61  Resp:  [18] 18  SpO2:  [95 %-100 %] 97 %  BP: ()/(55-70) 125/58     Weight: 63.6 kg (140 lb 3.4 oz)  Body mass index is 25.65 kg/m².    Intake/Output Summary (Last 24 hours) at 07/19/17 1449  Last data filed at 07/19/17 0900   Gross per 24 hour   Intake              840 ml   Output                0 ml   Net              840 ml      Physical Exam   Constitutional: She is oriented to person, place, and time. She appears well-developed and well-nourished. No distress.   HENT:   Head: Normocephalic and atraumatic.   Eyes: EOM are normal. Pupils are equal, round, and reactive to light.   Neck: Normal range of motion. Neck supple.   Cardiovascular: Normal rate and regular rhythm.    Pulmonary/Chest: Effort normal. No respiratory distress. She has no wheezes. She has no rales.   Abdominal: Soft. Bowel sounds are normal. She exhibits no distension. There is no tenderness.   Musculoskeletal: Normal range of motion. She exhibits no deformity.   Leg wound   Neurological: She is alert and oriented to person, place, and time.   Skin: Skin is warm and dry. She is not diaphoretic.       Significant Labs:   BMP:     Recent Labs  Lab 07/19/17  0348   GLU 79   *   K 3.7      CO2 25   BUN 39*   CREATININE 2.3*   CALCIUM 8.2*     CBC:     Recent Labs  Lab 07/18/17  0625 07/19/17  0348   WBC 17.27* 10.55   HGB 8.4* 7.6*   HCT 24.5* 23.1*   *  247

## 2017-07-19 NOTE — PROGRESS NOTES
"[facility]  Progress Note    Patient Name: Christal Goodson  MRN: [id number]  Patient Class: IP- Inpatient   Admission Date: 7/7/2017  Length of Stay: 11 days  Attending Physician: Rosi Link MD  Primary Care Provider: Juan Alberto Prieto MD        Subjective:     Principal Problem:Traumatic hematoma of left lower leg    HPI:  Ms. Platt is a 86 yo woman with HTN, HLD, CAD, CHF (EF 40%+ DD), CKD, and DMII who presents with complaint of leg wound. Patient was recently hospitalized at [facility] from 6/28-7/1/17 for L lower extremity traumatic hematoma resulting in acute blood loss anemia. Was transfused 4 units PRBC and discharged in stable condition with planned outpatient follow up. Per patient today while hanging legs over side of bed the affected leg "popped open" with some blood and drainage at the scene. She denies new trauma to the area. She complains of leg pain but otherwise denies fever/chills, SOB, CP, abdominal pain, N/V, dysuria. On presentation patient with elevated blood pressure but otherwise grossly normal labs. Afebrile in without leukocytosis. Non toxic appearing. H/H stable with hemoglobin of 9.8 (up from 9.0 at recent discharge). Call placed to Dr. Campo with photo of affected area sent with family permission per ED note. He is with recommendation for IV antibiotics and wound care for now. He will see the patient in am. Placed in observation for further monitoring.     Hospital Course:  85 y.o.female with left leg open wound and full thickness skin necrosis - with severe pain, moans with movement, doesn't like pain medications - seen by otho/surgery and taken for wound debridement, I&D and wound vac on 7/8. Plastic Surgery consulted for skin graft.  Skin graft on 7/11.  Wound vac removed 7/16.  Fever spike 7/16.  Watery diarrhea. C diff+. PO vanc started 7/16/17. Stable for DC to complete PO vanc. Home arrangements being made. Patient now reconsidering " SNF.      Review of Systems   HENT: Negative for ear discharge and ear pain.    Eyes: Negative for pain and itching.   Respiratory: Negative for cough and shortness of breath.    Cardiovascular: Negative for chest pain and palpitations.   Gastrointestinal: Positive for diarrhea.     Objective:     Vital Signs (Most Recent):  Temp: 98.2 °F (36.8 °C) (07/19/17 1124)  Pulse: 61 (07/19/17 1124)  Resp: 18 (07/19/17 1124)  BP: (!) 125/58 (07/19/17 1124)  SpO2: 97 % (07/19/17 1124) Vital Signs (24h Range):  Temp:  [98.2 °F (36.8 °C)-99.6 °F (37.6 °C)] 98.2 °F (36.8 °C)  Pulse:  [59-69] 61  Resp:  [18] 18  SpO2:  [95 %-100 %] 97 %  BP: ()/(55-70) 125/58     Weight: 63.6 kg (140 lb 3.4 oz)  Body mass index is 25.65 kg/m².    Intake/Output Summary (Last 24 hours) at 07/19/17 1449  Last data filed at 07/19/17 0900   Gross per 24 hour   Intake              840 ml   Output                0 ml   Net              840 ml      Physical Exam   Constitutional: She is oriented to person, place, and time. She appears well-developed and well-nourished. No distress.   HENT:   Head: Normocephalic and atraumatic.   Eyes: EOM are normal. Pupils are equal, round, and reactive to light.   Neck: Normal range of motion. Neck supple.   Cardiovascular: Normal rate and regular rhythm.    Pulmonary/Chest: Effort normal. No respiratory distress. She has no wheezes. She has no rales.   Abdominal: Soft. Bowel sounds are normal. She exhibits no distension. There is no tenderness.   Musculoskeletal: Normal range of motion. She exhibits no deformity.   Leg wound   Neurological: She is alert and oriented to person, place, and time.   Skin: Skin is warm and dry. She is not diaphoretic.       Significant Labs:   BMP:     Recent Labs  Lab 07/19/17  0348   GLU 79   *   K 3.7      CO2 25   BUN 39*   CREATININE 2.3*   CALCIUM 8.2*     CBC:     Recent Labs  Lab 07/18/17  0625 07/19/17  0348   WBC 17.27* 10.55   HGB 8.4* 7.6*   HCT 24.5* 23.1*    * 247         Assessment/Plan:      * Traumatic hematoma of left lower leg    Traumatic hematoma with wound opening.  Appreciate Ortho input  S/P I&D on 7/8 with wound vac placement.  Skin grafting per Plastic Surgery on 7/11.  Patient very hesitant about going home with wound vac.  Wound vac removed.  Per plastics:   Patient can leave donor site dressing intact until follow up.  Dressing over skin graft needs to be changed daily with bacitracin ointment directly on skin graft, followed by xeroform then kerlex wrap.  Will follow up with Dr. Tomas 7/26/27 or sooner if there are any issues.          Clostridium difficile infection    +Cdiff. Sepsis (not POA) probably secondary to Cdiff infection. Empirically started on PO Vanc 7/16 as severe disease with >15K WBC. Needs to continue to complete 14 days.         Acute blood loss anemia    Acute blood loss anemia as expected from hematoma and surgery.  Transfused 2 units of blood and monitor H/H.  Adequate correction of H/H post transfusion.  H/H stable.        Type 2 diabetes mellitus without complication    Last A1c 4.5 June 2017. Well controlled on no medications at present. Will monitor with POCT checks and cover with SSI PRN.           Chronic kidney disease, stage III (moderate)    Avoid nephrotoxic agents and renally dose medications when possible  Baseline Creat around 1.8  Stable.        Essential hypertension, benign    BP elevated on presentation.   Will resume home anti hypertensive regimen and monitor.   BP remained elevated. Increased Hydralazine with much better BP control.        CAD (coronary artery disease)    Last 2D echo showed 40% LVED with DD and mild-->moderate AVS - Stable disease at this time.  Monitor fluid volume closely  Continue ASA and statin  Daily Weight - strict I&O's  Low Na diet  1.5L fluid restriction        Hypercholesteremia    Continue statin.         Vitamin D deficiency              Hypothyroidism    TSH 4.414 April 2017.  Continue synthroid.             VTE Risk Mitigation         Ordered     enoxaparin injection 30 mg  Daily     Route:  Subcutaneous        07/08/17 0856     Medium Risk of VTE  Once      07/07/17 1901          Rosi Link MD  Department of Hospital Medicine   [facility]

## 2017-07-19 NOTE — PROGRESS NOTES
JUAN f/u with patient with daughter at the bedside to discuss discharge plan.  Plan remains for patient discharge to home with HH.  Daughter wants to know how many transportation visits patient has left with [facility].  JUAN left voice message for Diana HERNANDEZ, Hospital Navigator to determine if she might be able to assist.  Kajal Burris LMSw, RADHA-Juan, [facility]  07/19/2017

## 2017-07-19 NOTE — UM SECONDARY REVIEW
[facility] Medical Affairs    IP Extended Stay > 10     85 y.o.female with left leg open wound and full thickness skin necrosis - with severe pain, moans with movement, doesn't like pain medications - seen by otho/surgery and taken for wound debridement, I&D and wound vac on 7/8. - Patient may require skin grafting for future and Plastic Surgery consulted.  Skin graft on 7/11.  Wound vac removed 7/16.  Fever spike 7/16.  Watery diarrhea.     7/17: c.diff antigen and toxin positive. blood cx drawn on 7/16: NGTD     Plan to dc today w/ PO vancomycin     LOS: approved an agreement with D/C plan

## 2017-07-19 NOTE — PLAN OF CARE
Problem: Patient Care Overview  Goal: Plan of Care Review  07/19/2017    Recommendations    Recommendation/Intervention: 1) Encourage po intake 2) Boost Glucose Control x1 daily 3) Monitor Renal labs 4) RD to follow-up   Goals: 1) Patient to consume >=75% of meals 2) Patient to utilize oral supplement  Nutrition Goal Status: new  Communication of RD Recs: reviewed with SHERRI Mcmillan, MPH, RD, LDN

## 2017-07-19 NOTE — ASSESSMENT & PLAN NOTE
Traumatic hematoma with wound opening.  Appreciate Ortho input  S/P I&D on 7/8 with wound vac placement.  Skin grafting per Plastic Surgery on 7/11.  Patient very hesitant about going home with wound vac.  Wound vac removed.  Per plastics:   Patient can leave donor site dressing intact until follow up.  Dressing over skin graft needs to be changed daily with bacitracin ointment directly on skin graft, followed by xeroform then kerlex wrap.  Will follow up with Dr. Tomas 7/26/27 or sooner if there are any issues.

## 2017-07-19 NOTE — ASSESSMENT & PLAN NOTE
+Cdiff. Sepsis (not POA) probably secondary to Cdiff infection. Empirically started on PO Vanc 7/16 as severe disease with >15K WBC. Needs to continue to complete 14 days.

## 2017-07-19 NOTE — PLAN OF CARE
Problem: Patient Care Overview  Goal: Plan of Care Review  Shonna Perry RN Registered Nurse Signed Med/Surg  Plan of Care          Problem: Patient Care Overview  Goal: Plan of Care Review            Shonna Perry RN Registered Nurse Signed Med/Surg   Nursing              []Hide copied text  []Hover for attribution information  Pt has been free from falls this shift. Pt and family refuse for pt to be turned. Pt is awake and oriented but her legs are contracted. Pt states she is having a much better day than yesterday. pts call light is within reach. Will continue to monitor.

## 2017-07-19 NOTE — PROGRESS NOTES
[facility]  Adult Nutrition  Progress Note    SUMMARY     Recommendations    Recommendation/Intervention: 1) Encourage po intake 2) Boost Glucose Control x1 daily 3) Monitor Renal labs 4) RD to follow-up   Goals: 1) Patient to consume >=75% of meals 2) Patient to utilize oral supplement  Nutrition Goal Status: new  Communication of RD Recs: reviewed with RN    Continuum of Care Plan     Patient to discharge on a Diabetic, Cardiac Diet with oral supplements as needed.     Reason for Assessment    Reason for Assessment: length of stay  Diagnosis:  (traumatic hematoma of LL Leg)  Relevent Medical History: DM, CAD, CHF, HTN, CKD   General Information Comments: Patient states she has a poor appetite recently.  Patient not meeting estimated energy needs consuming 25 - 50% of meals. Recommended she try an oral supplement. She agreed to try. Rodrigo SIMENTAL. No call back. Second signed for an oral supplement.     Nutrition Prescription Ordered    Current Diet Order: Diabetic 2000 calorie, Cardiac diet    Evaluation of Received Nutrients/Fluid Intake       Energy Calories Required: not meeting needs  Protein Required: not meeting needs  Fluid Required: other (see comments) (Net I/O +960)     Tolerance: tolerating  % Intake of Estimated Energy Needs: 25 - 50 %  % Meal Intake: 50%     Nutrition Risk Screen     Nutrition Risk Screen: no indicators present    Nutrition/Diet History    Patient Reported Diet/Restrictions/Preferences: general     Food Preferences: No cultural, Mu-ism or ethnic food preferences.     Factors Affecting Nutritional Intake: decreased appetite      Labs/Tests/Procedures/Meds       Pertinent Labs Reviewed: reviewed  Pertinent Labs Comments: Na 133, BUN 40, Creat 2.4, GFR 21, Alb 2.9  Pertinent Medications Reviewed: reviewed  Pertinent Medications Comments: Vitamin E capsure, Mg Oxide, Ferrous Sulfate, Lasix    Physical Findings    Overall Physical Appearance: overweight  Tubes:   "(-)  Oral/Mouth Cavity: dental applicance present (specify) (top and bottom)  Skin:  (incision (2), wound (dry, intact))    Anthropometrics    Temp: 98.3 °F (36.8 °C)     Height: 5' 2" (157.5 cm)  Weight Method: Bed Scale  Weight: 63.6 kg (140 lb 3.4 oz)     Ideal Body Weight (IBW), Female: 110 lb     % Ideal Body Weight, Female (lb): 127.46 lb  BMI (Calculated): 25.7  BMI Grade: 25 - 29.9 - overweight     Estimated/Assessed Needs    Weight Used For Calorie Calculations: 63.6 kg (140 lb 3.4 oz)   Energy Need Method: Kcal/kg (1590 - 1908)  RMR ([place]-St. Jeor Equation): 1034.25  Weight Used For Protein Calculations: 63.6 kg (140 lb 3.4 oz)  Protein Requirements:  50.8 -57.2 g/day  Fluid Need Method: other (see comments) (as restricted by MD)  CHO Requirement: 225 g/day     Assessment and Plan    Nutrition Diagnosis:  Problem:  Inadequate oral intake  Etiology:  Decreased appetite  Signs/Symptoms:  Patient consuming 25-50% of meals  Status:  new    Monitor and Evaluation    Food and Nutrient Intake: energy intake, food and beverage intake  Food and Nutrient Adminstration: diet order, other (specify) (supplement order)  Anthropometric Measurements: weight, weight change, body mass index  Biochemical Data, Medical Tests and Procedures: electrolyte and renal panel, gastrointestinal profile, glucose/endocrine profile, lipid profile  Nutrition-Focused Physical Findings: overall appearance, skin    Nutrition Risk    Level of Risk: other (see comments) (f/u 2x weekly)    Nutrition Follow-Up    RD Follow-up?: Yes    "

## 2017-07-19 NOTE — PROGRESS NOTES
[id number] - [place], [place] - [address]  [address] [facility] [address] [address]  Phone: [phone] Fax: [phone]    Rx faxed to pharmacy for co-payment  Kajal farias LMSW, RADHA-Nissa, CCM  07/19/2017

## 2017-07-19 NOTE — PROGRESS NOTES
Call back received from Leighann at [facility] advising that Vanco mycin susp is not covered by patient's insurance.  Capsules are covered.  cost would be $628.00  Kajal Burris LMSW< ACM-SW, CCM

## 2017-07-19 NOTE — NURSING
Shonna Perry, RN Registered Nurse Signed Med/Surg  Nursing          []Hide copied text  []Hover for attribution information  Pt has been free from falls this shift. Pt and family refuse for pt to be turned. Pt is awake and oriented but her legs are contracted. Pt states she is having a much better day than yesterday. pts call light is within reach. Will continue to monitor.

## 2017-07-20 VITALS
DIASTOLIC BLOOD PRESSURE: 66 MMHG | RESPIRATION RATE: 18 BRPM | WEIGHT: 140.19 LBS | BODY MASS INDEX: 25.8 KG/M2 | SYSTOLIC BLOOD PRESSURE: 150 MMHG | OXYGEN SATURATION: 100 % | HEART RATE: 63 BPM | HEIGHT: 62 IN | TEMPERATURE: 98 F

## 2017-07-20 PROBLEM — D62 ACUTE BLOOD LOSS ANEMIA: Status: RESOLVED | Noted: 2017-06-29 | Resolved: 2017-07-20

## 2017-07-20 LAB
POCT GLUCOSE: 100 MG/DL (ref 70–110)
POCT GLUCOSE: 102 MG/DL (ref 70–110)
POCT GLUCOSE: 87 MG/DL (ref 70–110)

## 2017-07-20 PROCEDURE — 63600175 PHARM REV CODE 636 W HCPCS: Performed by: NURSE PRACTITIONER

## 2017-07-20 PROCEDURE — 25000003 PHARM REV CODE 250: Performed by: HOSPITALIST

## 2017-07-20 PROCEDURE — 25000003 PHARM REV CODE 250: Performed by: EMERGENCY MEDICINE

## 2017-07-20 PROCEDURE — 25000003 PHARM REV CODE 250: Performed by: ORTHOPAEDIC SURGERY

## 2017-07-20 RX ORDER — INDOMETHACIN 25 MG/1
25 CAPSULE ORAL 2 TIMES DAILY PRN
Qty: 4 CAPSULE | Refills: 0 | Status: SHIPPED | OUTPATIENT
Start: 2017-07-20 | End: 2017-07-22

## 2017-07-20 RX ORDER — VANCOMYCIN HYDROCHLORIDE 250 MG/1
250 CAPSULE ORAL 4 TIMES DAILY
Qty: 36 CAPSULE | Refills: 0 | Status: SHIPPED | OUTPATIENT
Start: 2017-07-20 | End: 2017-07-29

## 2017-07-20 RX ORDER — INDOMETHACIN 25 MG/1
25 CAPSULE ORAL 2 TIMES DAILY PRN
Status: DISCONTINUED | OUTPATIENT
Start: 2017-07-20 | End: 2017-07-20 | Stop reason: HOSPADM

## 2017-07-20 RX ADMIN — ENOXAPARIN SODIUM 30 MG: 100 INJECTION SUBCUTANEOUS at 05:07

## 2017-07-20 RX ADMIN — FERROUS SULFATE TAB EC 325 MG (65 MG FE EQUIVALENT) 325 MG: 325 (65 FE) TABLET DELAYED RESPONSE at 08:07

## 2017-07-20 RX ADMIN — Medication 250 MG: at 11:07

## 2017-07-20 RX ADMIN — Medication 400 UNITS: at 08:07

## 2017-07-20 RX ADMIN — FUROSEMIDE 20 MG: 20 TABLET ORAL at 08:07

## 2017-07-20 RX ADMIN — BACITRACIN: 500 OINTMENT TOPICAL at 06:07

## 2017-07-20 RX ADMIN — OXYCODONE HYDROCHLORIDE 5 MG: 5 TABLET ORAL at 07:07

## 2017-07-20 RX ADMIN — Medication 250 MG: at 06:07

## 2017-07-20 RX ADMIN — LABETALOL HYDROCHLORIDE 300 MG: 100 TABLET, FILM COATED ORAL at 08:07

## 2017-07-20 RX ADMIN — LEVOTHYROXINE SODIUM 50 MCG: 50 TABLET ORAL at 06:07

## 2017-07-20 RX ADMIN — Medication 250 MG: at 05:07

## 2017-07-20 RX ADMIN — BACITRACIN: 500 OINTMENT TOPICAL at 01:07

## 2017-07-20 RX ADMIN — OXYCODONE HYDROCHLORIDE 5 MG: 5 TABLET ORAL at 11:07

## 2017-07-20 RX ADMIN — ALLOPURINOL 100 MG: 100 TABLET ORAL at 08:07

## 2017-07-20 RX ADMIN — HYDRALAZINE HYDROCHLORIDE 100 MG: 25 TABLET ORAL at 01:07

## 2017-07-20 RX ADMIN — Medication 250 MG: at 08:07

## 2017-07-20 NOTE — PROGRESS NOTES
JUAN f/u with patient with daughter, Jose Eduardo, at the bedside to advise of cost of medication.  Patient and daughter advised by  of the cost of abx capsules ($628).  SW also advised both that we had an Rx for liquid which was not  covered by insurance.  Patient's daughter wanted to get cost of medication that was not covered by insurance.  SW will obtain and f/u with family.    Kajal Burris LMSW, [place]Juan, [facility]  07/20/17

## 2017-07-20 NOTE — DISCHARGE SUMMARY
"[facility]  Discharge Summary      Patient Name: Christal Goodson  MRN: [id number]  Admission Date: 7/7/2017  Hospital Length of Stay: 12 days  Discharge Date and Time: No discharge date for patient encounter.  Attending Physician: Rosi Link MD   Discharging Provider: Rosi Link MD  Primary Care Provider: Juan Alberto Prieto MD      HPI:   Ms. Platt is a 84 yo woman with HTN, HLD, CAD, CHF (EF 40%+ DD), CKD, and DMII who presents with complaint of leg wound. Patient was recently hospitalized at [facility] from 6/28-7/1/17 for L lower extremity traumatic hematoma resulting in acute blood loss anemia. Was transfused 4 units PRBC and discharged in stable condition with planned outpatient follow up. Per patient today while hanging legs over side of bed the affected leg "popped open" with some blood and drainage at the scene. She denies new trauma to the area. She complains of leg pain but otherwise denies fever/chills, SOB, CP, abdominal pain, N/V, dysuria. On presentation patient with elevated blood pressure but otherwise grossly normal labs. Afebrile in without leukocytosis. Non toxic appearing. H/H stable with hemoglobin of 9.8 (up from 9.0 at recent discharge). Call placed to Dr. Campo with photo of affected area sent with family permission per ED note. He is with recommendation for IV antibiotics and wound care for now. He will see the patient in am. Placed in observation for further monitoring.     Procedure(s) (LRB):  SPLIT THICKNESS SKIN GRAFT LOWER EXTREMITY (Left)      Hospital Course:   85 y.o.female with left leg open wound and full thickness skin necrosis - with severe pain, moans with movement, doesn't like pain medications - seen by otho/surgery and taken for wound debridement, I&D and wound vac on 7/8. Plastic Surgery consulted for skin graft.  Skin graft on 7/11.  Wound vac removed 7/16.  Fever spike 7/16.  Watery diarrhea. C diff+. PO vanc started " 7/16/17. Stable for DC to complete PO vanc - financial arrangements made by [facility] to provide this medicine. Home health arrangements made. Patient and daughter in agreement to go home with home health.     Consults:   Consults         Status Ordering Provider     Inpatient consult to Orthopedic Surgery  Once     Provider:  Jailene Campo MD    Completed YINKA KIRKPATRICK     Inpatient consult to Plastic Surgery  Once     Provider:  (Not yet assigned)    JAILENE Law        Final Active Diagnoses:    Diagnosis Date Noted POA    PRINCIPAL PROBLEM:  Traumatic hematoma of left lower leg [S80.12XA] 06/28/2017 Yes    Clostridium difficile infection [B96.89] 07/18/2017 No    Chronic kidney disease, stage III (moderate) [N18.3] 09/12/2015 Yes    Type 2 diabetes mellitus without complication [E11.9] 09/12/2015 Yes    CAD (coronary artery disease) [I25.10] 02/25/2013 Yes     Chronic    Essential hypertension, benign [I10] 02/25/2013 Yes     Chronic    Hypercholesteremia [E78.00] 11/11/2012 Yes     Chronic    Hypothyroidism [E03.9] 11/09/2012 Yes     Chronic    Vitamin D deficiency [E55.9] 11/09/2012 Yes      Problems Resolved During this Admission:    Diagnosis Date Noted Date Resolved POA    Acute blood loss anemia [D62] 06/29/2017 07/20/2017 Yes      * Traumatic hematoma of left lower leg    Traumatic hematoma with wound opening.  Appreciate Ortho input  S/P I&D on 7/8 with wound vac placement.  Skin grafting per Plastic Surgery on 7/11.  Patient very hesitant about going home with wound vac.  Wound vac removed.  Per plastics:   Patient can leave donor site dressing intact until follow up.  Dressing over skin graft needs to be changed daily with bacitracin ointment directly on skin graft, followed by xeroform then kerlex wrap.  Will follow up with Dr. Tomas 7/26/27 or sooner if there are any issues.          Clostridium difficile infection    +Cdiff. Sepsis (not POA) probably secondary to  Cdiff infection. Empirically started on PO Vanc 7/16 as severe disease with >15K WBC. Needs to continue to complete 14 days.             Discharged Condition: stable    Disposition: Home-Health Care [facility]    Follow Up:  Follow-up Information     Tonio Tomas MD. Go on 7/26/2017.    Specialty:  Plastic Surgery  Why:  For wound re-check: 2:00 p.m. on Wednesday July 26 2017 see Dr. Tomas.  Contact information:  [address] [place] [address]  [id number]  [place] [address]  [phone]             Juan Alberto Prieto MD On 7/27/2017.    Specialty:  Internal Medicine  Why:  10:20 a.m.  on Thursday July 27, 2017 see Dr. Prieto for your hospital followup visit.  Contact information:  [address] [facility]  [place] [address]  [phone]             [facility].    Specialties:  DME Provider, Home Health Services  Contact information:  [address]  [place] [address]  [phone]             [employer] Member Services.    Contact information:  [phone] (Transportation Benefits) opt 2  Members get 10 single transportation trips or 5 round trips to doctor appointments.  Member must be ambulatory.  If not ambulatory, call member services to request an exception which can take 14 days to approve               Patient Instructions:     Diet Diabetic 2000 Calories     Diet Cardiac     Activity as tolerated   Order Comments: Turn Q2H     Call MD for:  temperature >100.4     Call MD for:  persistent nausea and vomiting or diarrhea     Call MD for:  severe uncontrolled pain     Call MD for:  redness, tenderness, or signs of infection (pain, swelling, redness, odor or green/yellow discharge around incision site)     Call MD for:  difficulty breathing or increased cough     Call MD for:  severe persistent headache     Call MD for:  worsening rash     Call MD for:  persistent dizziness, light-headedness, or visual disturbances     Call MD for:  increased confusion or weakness     Change dressing (specify)   Order  Comments: Patient can leave donor site dressing intact until follow up.  Dressing over skin graft needs to be changed daily with bacitracin ointment directly on skin graft, followed by xeroform then kerlex wrap.  Will follow up with Dr. Tomas 7/26/27 or sooner if there are any issues.       Medications:  Reconciled Home Medications:   Current Discharge Medication List      START taking these medications    Details   indomethacin (INDOCIN) 25 MG capsule Take 1 capsule (25 mg total) by mouth 2 (two) times daily as needed (gout).  Qty: 4 capsule, Refills: 0      oxycodone (ROXICODONE) 5 MG immediate release tablet Take 1 tablet (5 mg total) by mouth every 4 (four) hours as needed.  Qty: 30 tablet, Refills: 0      vancomycin (VANCOCIN) 250 MG capsule Take 1 capsule (250 mg total) by mouth 4 (four) times daily.  Qty: 36 capsule, Refills: 0         CONTINUE these medications which have NOT CHANGED    Details   blood sugar diagnostic (TRUE METRIX GLUCOSE TEST STRIP) Strp To test once daily  Qty: 100 each, Refills: 3    Associated Diagnoses: Diabetes mellitus without complication      furosemide (LASIX) 40 MG tablet Take 0.5 tablets (20 mg total) by mouth 2 (two) times daily.  Qty: 15 tablet, Refills: 3      hydrALAZINE (APRESOLINE) 50 MG tablet TAKE 1 TABLET 3 TIMES DAILY.  Refills: 3      labetalol (NORMODYNE) 300 MG tablet TAKE 1 TABLET EVERY 12 HOURS DAILY.  Refills: 3      levothyroxine (SYNTHROID) 50 MCG tablet Take 1 tablet (50 mcg total) by mouth once daily.  Qty: 90 tablet, Refills: 0      vitamin D 1000 units Tab Take 1,000 Units by mouth once daily.      acetaminophen (TYLENOL) 325 MG tablet Take 2 tablets (650 mg total) by mouth every 6 (six) hours as needed (Mild pain/Temp>100.4).  Refills: 0      allopurinol (ZYLOPRIM) 100 MG tablet Take 1 tablet (100 mg total) by mouth once daily.  Qty: 30 tablet, Refills: 2    Associated Diagnoses: Chronic gout without tophus, unspecified cause, unspecified site      aspirin  (ECOTRIN) 81 MG EC tablet Hold for 1 week, and resume taking 81 mg daily on July 7, 2017.  Refills: 0      blood-glucose meter (TRUE METRIX AIR GLUCOSE METER) Misc To test once daily  Qty: 1 each, Refills: 0    Associated Diagnoses: Diabetes mellitus without complication      ferrous sulfate 325 mg (65 mg iron) Tab Take 1 tablet (325 mg total) by mouth daily with breakfast.  Qty: 30 tablet, Refills: 3      flaxseed oil Oil by Misc.(Non-Drug; Combo Route) route Daily.      lancets 28 gauge Misc 1 lancet by Misc.(Non-Drug; Combo Route) route once daily at 6am. True Metrix lancets  Qty: 100 each, Refills: 3    Associated Diagnoses: Diabetes mellitus without complication      magnesium oxide (MAG-OX) 250 mg Tab Take 1 tablet (250 mg total) by mouth once daily.  Qty: 30 tablet, Refills: 2      nystatin (MYCOSTATIN) cream Apply topically 2 (two) times daily.  Qty: 30 g, Refills: 2      polyethylene glycol (GLYCOLAX) 17 gram PwPk Take 17 g by mouth 2 (two) times daily as needed (Constipation).  Qty: 24 each, Refills: 0      triamcinolone acetonide 0.1% (KENALOG) 0.1 % cream Apply topically 2 (two) times daily.  Qty: 80 g, Refills: 0    Associated Diagnoses: Dermatitis      vitamin E 400 UNIT capsule Take 400 Units by mouth once daily.           Time spent on the discharge of patient: 60 minutes    Rosi Link MD  Department of Hospital Medicine  [facility]

## 2017-07-20 NOTE — PROGRESS NOTES
JUAN spoke with pharmacist at [facility] this a.m. to get cash price for Vancomycin Susp.  Pharmacist stated that they don't carry Rx and could not give a price.    [id number] - [place], [place] - [address]  [address] [address]  Phone: [phone] Fax: [phone]    [place] [address] [address] [address]  Phone: [phone] Fax: [phone]    RX for capsules refaxed for price as capsules are on the formulary.    Kajal Burris, SURINDER, RADHA-JUAN, [facility]  07/20/2017

## 2017-07-20 NOTE — PROGRESS NOTES
Written Health Care Instructions:    Follow-up Information     Tonio Tomas MD. Go on 7/26/2017.    Specialty:  Plastic Surgery  Why:  For wound re-check: 2:00 p.m. on Wednesday July 26 2017 see Dr. Tomas.  Contact information:  [address] [place], [place] [address]  [address]  [place] [address]  [phone]             Juan Alberto Prieto MD On 7/27/2017.    Specialty:  Internal Medicine  Why:  10:20 a.m.  on Thursday July 27, 2017 see Dr. Prieto for your hospital followup visit.  Contact information:  [address] [place] [address]  [phone]             [facility].    Specialties:  DME Provider, Home Health Services  Contact information:  [address]  [address]  [place] [address]  [phone]             [employer] Member [employer].    Contact information:  [phone] (Transportation Benefits) opt 2  Members get 10 single transportation trips or 5 round trips to doctor appointments.  Member must be ambulatory.  If not ambulatory, call member services to request an exception which can take 14 days to approve               [facility]  Unless otherwise directed by your provider, please   contact [facility], our nurse care line   that is available for 24/7 assistance.      [phone] (toll-free)      Registered nurses in the [facility]   provide: appointment scheduling, clinical advisement, health education, and other advisory services.    Thank you for choosing [facility] for your care.  Within 48-72 hours after leaving the hospital you will receive a call from [facility] nurses following up to see how you are doing.  The team will ask you a few questions and the call will last approximately 20 minutes.    Please answer any calls you may receive from [facility].  We want to continue to support you as you manage your healthcare needs.  [facility] is happy to have the opportunity to serve you.    Sincerely      Kajal-  Your [facility]  Team  [phone]

## 2017-07-20 NOTE — PLAN OF CARE
Problem: Patient Care Overview  Goal: Plan of Care Review  Shonna Perry RN Registered Nurse Signed Med/Surg  Nursing          []Hide copied text  []Hover for attribution information  Pt has remained free from falls and/or signs of infection this shift. Pt has been treated for pain and has remained within her comfort zone. Will continue to monitor.

## 2017-07-20 NOTE — PLAN OF CARE
Problem: Wound, Traumatic, Nonburn (Adult)  Intervention: Prevent/Manage Infection   07/17/17 0800 07/20/17 0800   Prevent/Manage Colorectal Surgical Infection   Fever Reduction/Comfort Measures medication administered --    Safety Interventions   Infection Prevention --  barrier precautions utilized   Infection Management --  aseptic technique maintained     Intervention: Provide Gentle, Aseptic Wound Care   07/18/17 0800   Skin Interventions   Skin Protection incontinence pads utilized     Intervention: Promote Effective Wound Healing   07/08/17 0336 07/08/17 0913   Nutrition Interventions   Oral Nutrition Promotion rest periods promoted --    Pain/Comfort/Sleep Interventions   Sleep/Rest Enhancement --  awakenings minimized       Goal: Signs and Symptoms of Listed Potential Problems Will be Absent, Minimized or Managed (Wound, Traumatic, Nonburn)  Signs and symptoms of listed potential problems will be absent, minimized or managed by discharge/transition of care (reference Wound, Traumatic, Nonburn (Adult) CPG).   Outcome: Ongoing (interventions implemented as appropriate)   07/20/17 1134   Wound, Traumatic, Nonburn   Problems Assessed (Wound) all   Problems Present (Wound) none

## 2017-07-20 NOTE — PROGRESS NOTES
Courrier/cab went to [facility] and called unit to say that he was told the order had not been released.  SW made a f/u call to [facility] and spoke with pharmacist to determine if there was a problem with Rx.  1st pharmacist stated that there was not a problem and the Rx was ready.  SW advised him that  was told that Rx was not ready.  SW advise pharmacist that cab would return to get Rx.  SW received call for 2nd female pharmacist advising that she was not aware that a cab was coming for the Rx.  SW explained that we call was made to get cost of Rx for cost transfer, at that time SW advised pharmacist that a cab would be sent to pickup Rx.  2nd pharmacist advising that no cost transfer needed because it was covered by patient's insurance.  SW again advised them that a cab would be sent to pickup Rx.  Ambulance transport to home scheduled via [facility] to home.  [employer] lift needed for transfers. Transport by [facility] to [facility].  Awaiting delivery of Rx to  nurses station and arrival of [facility] ambulance.  Kajal Burris, SURINDER, RADHA-Nissa, [facility]  07/20/2017

## 2017-07-20 NOTE — PROGRESS NOTES
SW reviewed pt's written healthcare information with daughter, Jose Eduardo, and patient.  Discussed f/u appt.  Transportation which remains an issue.  Daughter provided with PHN's polciy on transportation.  Daughter encouraged to f/u with [employer] wheelchair van service and to review senior resource guide for transportation providers.      Patient to be transported home with transportation assistance from .  Can patient travel via wheelchair van or [facility], JUAN to f/u with nurse.    Awaiting delivery of vancomycin from [facility] via courrier.    Kajal Burris, SURINDER, ACM-SW, CCM

## 2017-07-20 NOTE — PROGRESS NOTES
OK for patient to discharge when medication is delivered to the unit by cab from [facility] (Curly Burgos).  Transportation pickup scheduled for 7:30 p.m. with [facility].  Patient being transported home.  [employer] needed for transfers.  Kajal Burris LMSW, RADHA-JUAN, [facility]  07/20/2017

## 2017-07-20 NOTE — PROGRESS NOTES
JUAN received call from Ms. Lambert with [facility] advising that the  is enroute to [facility] with patient's medication.    Kajal Burris LMSW, RADHA-Juan, [facility]  07/20/2017

## 2017-07-20 NOTE — PROGRESS NOTES
JUAN f/u with [facility] and cost for capsules is $600 per pharmacist.  JUAN f/u with patient's daughter to advise of cost of capsules still same as previously reported.  Patient and daughter need assistance obtaining medication.  SW will explore assistance options.  Orders for HH sent to [facility].  Patient had [facility] prior to admit.    Patient's daughter wanted to know how many transportation visits patient had left.  JUAN contacted [facility] Member Services at [phone] to obtain information.  Per Member Services:  Patient has 10 one-way visits or 5 round trip visits; however, is patient is not ambulatory, she will have to request an exception by calling Member Services.  It can take up to 14 days to obtain approval.  The patient can also have her doctor complete a medical necessity form.    JUAN obtained cost of both Rx  ( Vanc susp and capsule) from [facility].  Susp.=$199.99 / Capsule=$1749.52    SURINDER Levine-JUAN, [facility]  07/20/2017

## 2017-07-20 NOTE — NURSING
Pt has remained free from falls and/or signs of infection this shift. Pt has been treated for pain and has remained within her comfort zone. Will continue to monitor.

## 2017-07-21 LAB
BACTERIA BLD CULT: NORMAL
BACTERIA BLD CULT: NORMAL

## 2017-07-21 NOTE — PLAN OF CARE
07/20/17 1800   Transport Information   Transport Diagnosis Left Leg Hematoma   Transportation Date 07/20/17   Transported From [facility]   Transported To Home address [facility]   Supporting Clinical Information   Unhealed Fractures/Dislocations Hip fracture  (chronically dislocated left hip)   Amputation BKA Left   Comment ([employer] needed for transfers.)

## 2017-07-21 NOTE — NURSING
Discharge instructions reviewed with pt and family at bedside, both verbalized they understood and will comply. Vanc given to family at bedside and instructed to keep med refrigerated. [facility] on [facility], discharge instruction papers in hand.

## 2017-07-21 NOTE — PLAN OF CARE
07/20/17 1907   Final Note   Assessment Type Final Discharge Note   Discharge Disposition Home-Health   Discharge planning education complete? Yes   Hospital Follow Up  Appt(s) scheduled? Yes   Discharge plans and expectations educations in teach back method with documentation complete? Yes   Offered [facility] -- Bedside Delivery? Yes   Discharge/Hospital Encounter Summary to (non-[facility]) PCP No   Referral to Outpatient Case Management complete? No   Referral to / orders for Home Health Complete? Yes   30 day supply of medicines given at discharge, if documented non-compliance / non-adherence? No   Any social issues identified prior to discharge? Yes  (Provided information on transportation options.)   Right Care Referral Info   Post Acute Recommendation Home-care   Referral Type Home Care   Facility Name [facility] [address] [place] [address]  [id number]

## 2017-07-21 NOTE — PLAN OF CARE
07/20/17 1800   Transport Information   Transport Diagnosis Left Leg Hematoma   Transportation Date 07/20/17   Transported From [facility]   Transported To Home address [facility]   Supporting Clinical Information   Unhealed Fractures/Dislocations Hip fracture  (chronically dislocated left hip)   Comment ([employer] Lift needed for transfers.)   Contractures.    Kajal Burris, SURINDER, RADHA-JUAN, [facility]  07/20/2017

## 2017-07-23 RX ORDER — INDOMETHACIN 25 MG/1
25 CAPSULE ORAL 2 TIMES DAILY PRN
Qty: 4 CAPSULE | Refills: 0 | OUTPATIENT
Start: 2017-07-23 | End: 2017-07-25

## 2017-07-24 ENCOUNTER — PATIENT OUTREACH (OUTPATIENT)
Dept: ADMINISTRATIVE | Facility: CLINIC | Age: 82
End: 2017-07-24

## 2017-07-24 NOTE — PATIENT INSTRUCTIONS
Discharge Instructions: Caring for Your Incision  You are going home with sutures (stitches) or surgical staples in place. Or you may have special strips of tape called Steri-Strips. One of these items was used to close your incision, help stop bleeding, and speed healing. Follow the tips on this sheet to help your incision heal.  Home Care  Always wash your hands before touching your incision.  Keep your incision clean and dry.  Avoid doing things that could cause dirt or sweat to get on your incision.  Dont pick at scabs. They help protect the wound.  Keep your incision out of water.  Bathe or shower only as directed.  To keep the incision dry when around water, cover it with a plastic bag or plastic wrap.  Pat sutures dry if they get wet. Dont rub.  Leave the dressing (bandage) in place until you are told to remove it or change it. Change it only as directed, using clean hands.  After the first 12 hours, change your dressing every 24 hours.  Change your dressing if it gets wet or soiled.  Follow-Up  Make a follow-up appointment as directed by our staff.    When to Call Your Doctor  Call your doctor right away if you have any of the following:  Increased pain, bleeding, redness, swelling, or foul-smelling discharge around the incision area  Fever of 100.4°F or higher  Shaking chills  Vomiting or nausea that doesnt go away  Numbness, coldness, or tingling around the incision area  Opening of the sutures or wound   © 2480-4267 [employer] [address], [place] [address]. All rights reserved. This information is not intended as a substitute for professional medical care. Always follow your healthcare professional's instructions.

## 2017-07-24 NOTE — Clinical Note
Please forward this important TCC information to your provider in order to maximize the post discharge care delivery of this patient.  C3 nurse spoke with Christal Goodson  for a TCC post hospital discharge follow up call. The patient has a scheduled HOSFU appointment with Juan Alberto Prieto MD on 7/27 @ 1020. Jose Eduardo(dtr) inquiring if Dr. Prieto can come to home to see pt. Please reach out to Jose Eduardo at [phone].  Thanking you in advance. Respectfully, Simran Franklin RN  [facility] C3   [email or web address]     Please do not reply to this message, as this inbox is not routinely monitored.

## 2017-07-29 RX ORDER — INDOMETHACIN 25 MG/1
25 CAPSULE ORAL 2 TIMES DAILY PRN
Qty: 4 CAPSULE | Refills: 0 | OUTPATIENT
Start: 2017-07-29 | End: 2017-07-31

## 2017-08-01 ENCOUNTER — OFFICE VISIT (OUTPATIENT)
Dept: FAMILY MEDICINE | Facility: CLINIC | Age: 82
End: 2017-08-01
Payer: MEDICARE

## 2017-08-01 ENCOUNTER — LAB VISIT (OUTPATIENT)
Dept: LAB | Facility: HOSPITAL | Age: 82
End: 2017-08-01
Attending: INTERNAL MEDICINE
Payer: MEDICARE

## 2017-08-01 VITALS
SYSTOLIC BLOOD PRESSURE: 156 MMHG | DIASTOLIC BLOOD PRESSURE: 80 MMHG | BODY MASS INDEX: 23.92 KG/M2 | WEIGHT: 130 LBS | HEART RATE: 56 BPM | HEIGHT: 62 IN | TEMPERATURE: 98 F | OXYGEN SATURATION: 95 % | RESPIRATION RATE: 17 BRPM

## 2017-08-01 DIAGNOSIS — M1A.9XX0 CHRONIC GOUT WITHOUT TOPHUS, UNSPECIFIED CAUSE, UNSPECIFIED SITE: ICD-10-CM

## 2017-08-01 DIAGNOSIS — M62.838 MUSCLE SPASMS OF BOTH LOWER EXTREMITIES: ICD-10-CM

## 2017-08-01 DIAGNOSIS — S80.12XD TRAUMATIC HEMATOMA OF LEFT LOWER LEG, SUBSEQUENT ENCOUNTER: ICD-10-CM

## 2017-08-01 DIAGNOSIS — E11.9 TYPE 2 DIABETES MELLITUS WITHOUT COMPLICATION, WITHOUT LONG-TERM CURRENT USE OF INSULIN: ICD-10-CM

## 2017-08-01 DIAGNOSIS — E03.9 HYPOTHYROIDISM, UNSPECIFIED TYPE: ICD-10-CM

## 2017-08-01 DIAGNOSIS — N18.30 CHRONIC KIDNEY DISEASE, STAGE III (MODERATE): ICD-10-CM

## 2017-08-01 DIAGNOSIS — S80.12XD TRAUMATIC HEMATOMA OF LEFT LOWER LEG, SUBSEQUENT ENCOUNTER: Primary | ICD-10-CM

## 2017-08-01 DIAGNOSIS — A49.8 CLOSTRIDIUM DIFFICILE INFECTION: ICD-10-CM

## 2017-08-01 LAB
ANION GAP SERPL CALC-SCNC: 8 MMOL/L
BASOPHILS # BLD AUTO: 0.01 K/UL
BASOPHILS NFR BLD: 0.1 %
BUN SERPL-MCNC: 34 MG/DL
CALCIUM SERPL-MCNC: 8.9 MG/DL
CHLORIDE SERPL-SCNC: 108 MMOL/L
CO2 SERPL-SCNC: 25 MMOL/L
CREAT SERPL-MCNC: 2.1 MG/DL
DIFFERENTIAL METHOD: ABNORMAL
EOSINOPHIL # BLD AUTO: 0.1 K/UL
EOSINOPHIL NFR BLD: 1.3 %
ERYTHROCYTE [DISTWIDTH] IN BLOOD BY AUTOMATED COUNT: 16.8 %
EST. GFR  (AFRICAN AMERICAN): 24 ML/MIN/1.73 M^2
EST. GFR  (NON AFRICAN AMERICAN): 21 ML/MIN/1.73 M^2
GLUCOSE SERPL-MCNC: 75 MG/DL
HCT VFR BLD AUTO: 25.2 %
HGB BLD-MCNC: 8.4 G/DL
LYMPHOCYTES # BLD AUTO: 0.9 K/UL
LYMPHOCYTES NFR BLD: 13.7 %
MAGNESIUM SERPL-MCNC: 1.9 MG/DL
MCH RBC QN AUTO: 29.7 PG
MCHC RBC AUTO-ENTMCNC: 33.3 G/DL
MCV RBC AUTO: 89 FL
MONOCYTES # BLD AUTO: 0.5 K/UL
MONOCYTES NFR BLD: 7.7 %
NEUTROPHILS # BLD AUTO: 5.2 K/UL
NEUTROPHILS NFR BLD: 77.2 %
PLATELET # BLD AUTO: 225 K/UL
PMV BLD AUTO: 9.9 FL
POTASSIUM SERPL-SCNC: 5.1 MMOL/L
RBC # BLD AUTO: 2.83 M/UL
SODIUM SERPL-SCNC: 141 MMOL/L
T4 FREE SERPL-MCNC: 0.89 NG/DL
TSH SERPL DL<=0.005 MIU/L-ACNC: 77.48 UIU/ML
WBC # BLD AUTO: 6.72 K/UL

## 2017-08-01 PROCEDURE — 84443 ASSAY THYROID STIM HORMONE: CPT

## 2017-08-01 PROCEDURE — 99999 PR PBB SHADOW E&M-EST. PATIENT-LVL III: CPT | Mod: PBBFAC,,, | Performed by: INTERNAL MEDICINE

## 2017-08-01 PROCEDURE — 84439 ASSAY OF FREE THYROXINE: CPT

## 2017-08-01 PROCEDURE — 36415 COLL VENOUS BLD VENIPUNCTURE: CPT | Mod: PN

## 2017-08-01 PROCEDURE — 85025 COMPLETE CBC W/AUTO DIFF WBC: CPT

## 2017-08-01 PROCEDURE — 99496 TRANSJ CARE MGMT HIGH F2F 7D: CPT | Mod: S$GLB,,, | Performed by: INTERNAL MEDICINE

## 2017-08-01 PROCEDURE — 83735 ASSAY OF MAGNESIUM: CPT

## 2017-08-01 PROCEDURE — 99499 UNLISTED E&M SERVICE: CPT | Mod: S$GLB,,, | Performed by: INTERNAL MEDICINE

## 2017-08-01 PROCEDURE — 80048 BASIC METABOLIC PNL TOTAL CA: CPT

## 2017-08-01 RX ORDER — MULTIVITAMIN
1 TABLET ORAL DAILY
COMMUNITY

## 2017-08-01 RX ORDER — OXYCODONE HYDROCHLORIDE 5 MG/1
5 TABLET ORAL EVERY 4 HOURS PRN
COMMUNITY
End: 2017-08-01 | Stop reason: SDUPTHER

## 2017-08-01 RX ORDER — VANCOMYCIN HYDROCHLORIDE 1 G/20ML
INJECTION, POWDER, LYOPHILIZED, FOR SOLUTION INTRAVENOUS
COMMUNITY
Start: 2017-07-20 | End: 2017-09-03

## 2017-08-01 RX ORDER — TRAMADOL HYDROCHLORIDE 50 MG/1
100 TABLET ORAL 3 TIMES DAILY PRN
Refills: 0 | COMMUNITY
Start: 2017-07-01 | End: 2017-09-03

## 2017-08-01 RX ORDER — ALLOPURINOL 100 MG/1
100 TABLET ORAL DAILY
Qty: 30 TABLET | Refills: 2 | Status: SHIPPED | OUTPATIENT
Start: 2017-08-01

## 2017-08-01 RX ORDER — INDOMETHACIN 25 MG/1
25 CAPSULE ORAL
COMMUNITY
End: 2017-08-01

## 2017-08-01 RX ORDER — OXYCODONE HYDROCHLORIDE 5 MG/1
5 TABLET ORAL EVERY 8 HOURS PRN
Qty: 60 TABLET | Refills: 0 | Status: SHIPPED | OUTPATIENT
Start: 2017-08-01 | End: 2017-08-30 | Stop reason: SDUPTHER

## 2017-08-01 RX ORDER — INDOMETHACIN 25 MG/1
25 CAPSULE ORAL
Status: CANCELLED | OUTPATIENT
Start: 2017-08-01

## 2017-08-01 NOTE — PROGRESS NOTES
Transitional Care Note  Subjective:       Patient ID: Christal Goodson is a 85 y.o. female.  Chief Complaint: Hospital Follow Up; Diabetic Foot Exam; and Medication Refill    Family and/or Caretaker present at visit?  Yes.  Diagnostic tests reviewed/disposition: No diagnosic tests pending after this hospitalization.  Disease/illness education:   Home health/community services discussion/referrals: Patient does have home health established from hospital visit.  Establishment or re-establishment of referral orders for community resources: No other necessary community resources.   Discussion with other health care providers: No discussion with other health care providers necessary.   She presents with her daughter today for follow-up after recent admission with traumatic left, to the left leg requiring I&D and skin graft.  She does have home health.  She is requiring physical therapy also.  She does have formed.  Home and sees plastic surgery for maintenance of her thigh wound.  Today she reports right hip pain.  Her left leg is okay.  They are doing dressing changes daily.  She remains on vancomycin for C. difficile.  She reports that her stools are soft.  She has had diarrhea since her cholecystectomy.  She denies any blood in her stools.  She is eating well.  She does note increase in gas.  She's not taken medication for this.  She also complains of spasms in her legs which come and go.  She describes tension when she tries to move.  She did have trouble with gout in her left great toe but this has resolved.  She is restarted aspirin 81 mg daily      Review of Systems   Constitutional: Negative for fever.   Gastrointestinal: Negative for abdominal pain, blood in stool and diarrhea.        Increased flatus   Musculoskeletal: Positive for arthralgias and myalgias.   Skin: Positive for wound.       Objective:      Physical Exam   Constitutional: She is oriented to person, place, and time. She appears well-developed and  well-nourished. No distress.   HENT:   Head: Normocephalic and atraumatic.   Right Ear: External ear normal.   Left Ear: External ear normal.   Eyes: Conjunctivae are normal. No scleral icterus.   Cardiovascular: Normal rate and regular rhythm.  Exam reveals no gallop and no friction rub.    Murmur heard.  Pulmonary/Chest: Effort normal and breath sounds normal. No respiratory distress. She has no wheezes. She has no rales.   Abdominal: Soft. Bowel sounds are normal. She exhibits no distension. There is no tenderness. There is no rebound and no guarding.   Musculoskeletal: She exhibits no edema or tenderness.   Left thigh and lower leg dressed   Neurological: She is alert and oriented to person, place, and time. No cranial nerve deficit.   Skin: Skin is warm and dry.   Psychiatric: She has a normal mood and affect.   Vitals reviewed.      Assessment:       1. Traumatic hematoma of left lower leg, subsequent encounter    2. Chronic kidney disease, stage III (moderate)    3. Type 2 diabetes mellitus without complication, without long-term current use of insulin    4. Clostridium difficile infection    5. Chronic gout without tophus, unspecified cause, unspecified site    6. Muscle spasms of both lower extremities        Plan:       Christal was seen today for hospital follow up, diabetic foot exam and medication refill.    Diagnoses and all orders for this visit:    Traumatic hematoma of left lower leg, subsequent encounter - s/p skin graft.  Followed by Plastic surgery.  Also has wound care.  Continue PT.  Oxycodone prn for pain  -     oxycodone (ROXICODONE) 5 MG immediate release tablet; Take 1 tablet (5 mg total) by mouth every 8 (eight) hours as needed for Pain.  -     CBC auto differential; Future    Chronic kidney disease, stage III (moderate) - repeating chemistry    Type 2 diabetes mellitus without complication, without long-term current use of insulin    Clostridium difficile infection - complete PO  Vancomycin    Chronic gout without tophus, unspecified cause, unspecified site - no acute pain today  Restarting allopurinol.  No indomethacin.  Colcrys if needed  -     allopurinol (ZYLOPRIM) 100 MG tablet; Take 1 tablet (100 mg total) by mouth once daily.    Muscle spasms of both lower extremities - R/o electrolytes  -     Basic metabolic panel; Future  -     Magnesium; Future         f/u 3-4 months

## 2017-08-02 ENCOUNTER — TELEPHONE (OUTPATIENT)
Dept: FAMILY MEDICINE | Facility: CLINIC | Age: 82
End: 2017-08-02

## 2017-08-02 NOTE — TELEPHONE ENCOUNTER
Notify the patient that her thyroid level is not in normal range.  Has she been taking her levothyroxine in the morning at least 30 minutes before eating or taking other mediations?

## 2017-08-14 ENCOUNTER — TELEPHONE (OUTPATIENT)
Dept: FAMILY MEDICINE | Facility: CLINIC | Age: 82
End: 2017-08-14

## 2017-08-14 NOTE — TELEPHONE ENCOUNTER
----- Message from Mary Kate Huffman sent at 8/11/2017 11:44 AM CDT -----  Contact: Yousuf gary home health OT is being ended today.. Please call [phone].. thanks

## 2017-08-24 ENCOUNTER — TELEPHONE (OUTPATIENT)
Dept: FAMILY MEDICINE | Facility: CLINIC | Age: 82
End: 2017-08-24

## 2017-08-24 NOTE — TELEPHONE ENCOUNTER
Spoke with patient and she informed me that she has been taking medication for thyroid everyday 30 min before meal are taking medications.

## 2017-08-29 DIAGNOSIS — E03.9 HYPOTHYROIDISM, UNSPECIFIED TYPE: Primary | Chronic | ICD-10-CM

## 2017-08-29 RX ORDER — LEVOTHYROXINE SODIUM 75 UG/1
75 TABLET ORAL DAILY
Qty: 30 TABLET | Refills: 1 | Status: SHIPPED | OUTPATIENT
Start: 2017-08-29 | End: 2017-10-29 | Stop reason: SDUPTHER

## 2017-08-29 NOTE — TELEPHONE ENCOUNTER
Notify the patient that I have changed her dose of levothyroxine to 75 mcg.  Her thyroid level is out of range with her last lab check.  She should have labs to recheck her thyroid level in 2 months.  Please schedule lab appointment.

## 2017-08-29 NOTE — TELEPHONE ENCOUNTER
Spoke with patient and patient caregiver at the same time with patient's test result and new medication dose for synthroid. Appointment slip in mail for lab.

## 2017-08-30 ENCOUNTER — OFFICE VISIT (OUTPATIENT)
Dept: FAMILY MEDICINE | Facility: CLINIC | Age: 82
End: 2017-08-30
Payer: MEDICARE

## 2017-08-30 VITALS
TEMPERATURE: 99 F | HEART RATE: 69 BPM | RESPIRATION RATE: 17 BRPM | SYSTOLIC BLOOD PRESSURE: 132 MMHG | DIASTOLIC BLOOD PRESSURE: 70 MMHG | OXYGEN SATURATION: 97 % | HEIGHT: 62 IN

## 2017-08-30 DIAGNOSIS — E11.621 DIABETIC ULCER OF LEFT MIDFOOT ASSOCIATED WITH TYPE 2 DIABETES MELLITUS, UNSPECIFIED ULCER STAGE: Primary | ICD-10-CM

## 2017-08-30 DIAGNOSIS — L97.429 DIABETIC ULCER OF LEFT MIDFOOT ASSOCIATED WITH TYPE 2 DIABETES MELLITUS, UNSPECIFIED ULCER STAGE: Primary | ICD-10-CM

## 2017-08-30 DIAGNOSIS — S80.12XD TRAUMATIC HEMATOMA OF LEFT LOWER LEG, SUBSEQUENT ENCOUNTER: ICD-10-CM

## 2017-08-30 PROCEDURE — 3075F SYST BP GE 130 - 139MM HG: CPT | Mod: S$GLB,,, | Performed by: INTERNAL MEDICINE

## 2017-08-30 PROCEDURE — 99499 UNLISTED E&M SERVICE: CPT | Mod: S$GLB,,, | Performed by: INTERNAL MEDICINE

## 2017-08-30 PROCEDURE — 3078F DIAST BP <80 MM HG: CPT | Mod: S$GLB,,, | Performed by: INTERNAL MEDICINE

## 2017-08-30 PROCEDURE — 3008F BODY MASS INDEX DOCD: CPT | Mod: S$GLB,,, | Performed by: INTERNAL MEDICINE

## 2017-08-30 PROCEDURE — 99999 PR PBB SHADOW E&M-EST. PATIENT-LVL III: CPT | Mod: PBBFAC,,, | Performed by: INTERNAL MEDICINE

## 2017-08-30 PROCEDURE — 1125F AMNT PAIN NOTED PAIN PRSNT: CPT | Mod: S$GLB,,, | Performed by: INTERNAL MEDICINE

## 2017-08-30 PROCEDURE — 1159F MED LIST DOCD IN RCRD: CPT | Mod: S$GLB,,, | Performed by: INTERNAL MEDICINE

## 2017-08-30 PROCEDURE — 99214 OFFICE O/P EST MOD 30 MIN: CPT | Mod: S$GLB,,, | Performed by: INTERNAL MEDICINE

## 2017-08-30 RX ORDER — OXYCODONE HYDROCHLORIDE 5 MG/1
5 TABLET ORAL EVERY 8 HOURS PRN
Qty: 60 TABLET | Refills: 0 | Status: SHIPPED | OUTPATIENT
Start: 2017-08-30 | End: 2017-09-07 | Stop reason: SDUPTHER

## 2017-09-03 ENCOUNTER — HOSPITAL ENCOUNTER (EMERGENCY)
Facility: HOSPITAL | Age: 82
Discharge: HOME | End: 2017-09-03
Attending: EMERGENCY MEDICINE
Payer: MEDICARE

## 2017-09-03 VITALS
TEMPERATURE: 98 F | HEIGHT: 63 IN | SYSTOLIC BLOOD PRESSURE: 198 MMHG | BODY MASS INDEX: 23.04 KG/M2 | RESPIRATION RATE: 14 BRPM | DIASTOLIC BLOOD PRESSURE: 84 MMHG | HEART RATE: 70 BPM | OXYGEN SATURATION: 100 % | WEIGHT: 130 LBS

## 2017-09-03 DIAGNOSIS — Z48.89 ENCOUNTER FOR POSTOPERATIVE WOUND CARE: ICD-10-CM

## 2017-09-03 DIAGNOSIS — M79.89 LEG SWELLING: Primary | ICD-10-CM

## 2017-09-03 DIAGNOSIS — I10 HYPERTENSION: ICD-10-CM

## 2017-09-03 LAB
ALBUMIN SERPL BCP-MCNC: 3.1 G/DL
ALP SERPL-CCNC: 99 U/L
ALT SERPL W/O P-5'-P-CCNC: 13 U/L
ANION GAP SERPL CALC-SCNC: 10 MMOL/L
AST SERPL-CCNC: 25 U/L
BASOPHILS # BLD AUTO: 0.03 K/UL
BASOPHILS NFR BLD: 0.5 %
BILIRUB SERPL-MCNC: 0.9 MG/DL
BUN SERPL-MCNC: 40 MG/DL
CALCIUM SERPL-MCNC: 9.2 MG/DL
CHLORIDE SERPL-SCNC: 106 MMOL/L
CO2 SERPL-SCNC: 27 MMOL/L
CREAT SERPL-MCNC: 2 MG/DL
DIFFERENTIAL METHOD: ABNORMAL
EOSINOPHIL # BLD AUTO: 0.4 K/UL
EOSINOPHIL NFR BLD: 7.2 %
ERYTHROCYTE [DISTWIDTH] IN BLOOD BY AUTOMATED COUNT: 17.9 %
EST. GFR  (AFRICAN AMERICAN): 26 ML/MIN/1.73 M^2
EST. GFR  (NON AFRICAN AMERICAN): 22 ML/MIN/1.73 M^2
GLUCOSE SERPL-MCNC: 102 MG/DL
HCT VFR BLD AUTO: 26.9 %
HGB BLD-MCNC: 9.5 G/DL
LACTATE SERPL-SCNC: 1.3 MMOL/L
LYMPHOCYTES # BLD AUTO: 0.8 K/UL
LYMPHOCYTES NFR BLD: 14 %
MCH RBC QN AUTO: 30.7 PG
MCHC RBC AUTO-ENTMCNC: 35.3 G/DL
MCV RBC AUTO: 87 FL
MONOCYTES # BLD AUTO: 0.6 K/UL
MONOCYTES NFR BLD: 9.5 %
NEUTROPHILS # BLD AUTO: 4.1 K/UL
NEUTROPHILS NFR BLD: 68.6 %
PLATELET # BLD AUTO: 228 K/UL
PMV BLD AUTO: 10.1 FL
POTASSIUM SERPL-SCNC: 4.4 MMOL/L
PROT SERPL-MCNC: 7.2 G/DL
RBC # BLD AUTO: 3.09 M/UL
SODIUM SERPL-SCNC: 143 MMOL/L
WBC # BLD AUTO: 6.01 K/UL

## 2017-09-03 PROCEDURE — 96376 TX/PRO/DX INJ SAME DRUG ADON: CPT

## 2017-09-03 PROCEDURE — 99284 EMERGENCY DEPT VISIT MOD MDM: CPT | Mod: 25

## 2017-09-03 PROCEDURE — 96374 THER/PROPH/DIAG INJ IV PUSH: CPT

## 2017-09-03 PROCEDURE — 25000003 PHARM REV CODE 250: Performed by: EMERGENCY MEDICINE

## 2017-09-03 PROCEDURE — 80053 COMPREHEN METABOLIC PANEL: CPT

## 2017-09-03 PROCEDURE — 63600175 PHARM REV CODE 636 W HCPCS: Performed by: EMERGENCY MEDICINE

## 2017-09-03 PROCEDURE — 85025 COMPLETE CBC W/AUTO DIFF WBC: CPT

## 2017-09-03 PROCEDURE — 83605 ASSAY OF LACTIC ACID: CPT

## 2017-09-03 RX ORDER — CEPHALEXIN 500 MG/1
500 CAPSULE ORAL EVERY 8 HOURS
Qty: 21 CAPSULE | Refills: 0 | Status: SHIPPED | OUTPATIENT
Start: 2017-09-03 | End: 2017-09-10

## 2017-09-03 RX ORDER — LABETALOL HYDROCHLORIDE 5 MG/ML
20 INJECTION, SOLUTION INTRAVENOUS
Status: DISCONTINUED | OUTPATIENT
Start: 2017-09-03 | End: 2017-09-04 | Stop reason: HOSPADM

## 2017-09-03 RX ORDER — MUPIROCIN 20 MG/G
OINTMENT TOPICAL 3 TIMES DAILY
Qty: 30 G | Refills: 1 | Status: SHIPPED | OUTPATIENT
Start: 2017-09-03

## 2017-09-03 RX ORDER — CEPHALEXIN 250 MG/1
500 CAPSULE ORAL
Status: COMPLETED | OUTPATIENT
Start: 2017-09-03 | End: 2017-09-03

## 2017-09-03 RX ORDER — MORPHINE SULFATE 10 MG/ML
4 INJECTION INTRAMUSCULAR; INTRAVENOUS; SUBCUTANEOUS ONCE
Status: COMPLETED | OUTPATIENT
Start: 2017-09-03 | End: 2017-09-03

## 2017-09-03 RX ORDER — MUPIROCIN 20 MG/G
1 OINTMENT TOPICAL
Status: COMPLETED | OUTPATIENT
Start: 2017-09-03 | End: 2017-09-03

## 2017-09-03 RX ORDER — MORPHINE SULFATE 10 MG/ML
4 INJECTION INTRAMUSCULAR; INTRAVENOUS; SUBCUTANEOUS ONCE
Status: COMPLETED | OUTPATIENT
Start: 2017-09-04 | End: 2017-09-03

## 2017-09-03 RX ADMIN — MORPHINE SULFATE 4 MG: 10 INJECTION INTRAVENOUS at 09:09

## 2017-09-03 RX ADMIN — MORPHINE SULFATE 4 MG: 10 INJECTION INTRAVENOUS at 11:09

## 2017-09-03 RX ADMIN — MUPIROCIN 22 G: 20 OINTMENT TOPICAL at 10:09

## 2017-09-03 RX ADMIN — CEPHALEXIN 500 MG: 250 CAPSULE ORAL at 10:09

## 2017-09-04 PROCEDURE — 63600175 PHARM REV CODE 636 W HCPCS: Performed by: EMERGENCY MEDICINE

## 2017-09-04 NOTE — ED TRIAGE NOTES
Prior surgery in June for skin graft of left  LE after hematoma developing from hitting her leg on the chair; skin graft on left outer thigh; also, c/o pain to bottom of left foot inner part of foot from bedsore; c/o pain and drainage to both areas; dressing to left thigh and dressing noted to left foot

## 2017-09-04 NOTE — ED PROVIDER NOTES
"Encounter Date: 9/3/2017    SCRIBE #1 NOTE: I, Yung Agrawal, am scribing for, and in the presence of, CANDIDO Blair MD. Other sections scribed: HPI, ROS.       History     Chief Complaint   Patient presents with    Leg Pain     left leg pain; pt reports having a skin graft placed at the beginning of july; pt also has a bed sore at the bottom of the foot; pt pain has been getting worse over the past few days reports that she hasn't been sleeping well.      CC: Leg Pain  HPI: This 85 y.o. female with HTN, HCE, CAD s/p stent, DM, CKD stage III, hypothyroidism and Hx of L hip arthroplasty, cholecystectomy presents to the ED c/o severe ongoing L leg pain that has been worse than usual for the past few days. Daughter states that 2 month ago pt had to have surgery on L lateral calf after a large hematoma developed and then ruptured. Pt had graft harvested from L lateral thigh for this surgery. Daughter states that the graft has healed up well, but the area of her thigh when the skin was harvested has been slow to heal. Daughter reports slightly more drainage from the wound than usual as well as an increased in pt's chronic L leg swelling. Pt states that the wound has become unbearably painful in the past few days. Daughter states that the only people who tend to the wound are home health nurses who come to pt's home every M/W/F. Daughter states that pt was seen in office by Dr. Canales 4 days ago, who had the home health nurses adjust the treatment plan. Daughter also reports a small sore to pt's L medial foot that has developed over the past few months as pt has become immobile; daughter states that family cleans the wound and change the bandages themselves daily. Pt denies any fever, chills, cough, chest pain, SOB.        The history is provided by the patient and a relative.     Review of patient's allergies indicates:   Allergen Reactions    Bystolic [nebivolol] Other (See Comments)     "it made me feel like I " "was going to pass out"    Losartan     Diovan [valsartan] Rash     "I've taken that since then and nothing happened to me."     Past Medical History:   Diagnosis Date    Arthritis     Blood transfusion     CHF (congestive heart failure)     Coronary artery disease     Diabetes mellitus     General anesthetics causing adverse effect in therapeutic use     "woke up as they were putting me on the table"    Hyperlipidemia     Hypertension     Renal disorder     Thyroid disease      Past Surgical History:   Procedure Laterality Date    CATARACT EXTRACTION EXTRACAPSULAR W/ INTRAOCULAR LENS IMPLANTATION  Sept 2012    right eye    CHOLECYSTECTOMY      CORONARY ANGIOPLASTY WITH STENT PLACEMENT  2011    JOINT REPLACEMENT      Left total hip replacement in 1986    TONSILLECTOMY       Family History   Problem Relation Age of Onset    Diabetes Daughter     Hypertension Daughter     Pacemaker/defibrilator Daughter     Alzheimer's disease Sister     Dementia Sister     Alcohol abuse Brother     Diabetes Daughter     Hypertension Daughter     Cancer Sister      liver    Cancer Sister      lung    Kidney disease Sister      dialysis     Social History   Substance Use Topics    Smoking status: Never Smoker    Smokeless tobacco: Never Used    Alcohol use No     Review of Systems   Constitutional: Negative for chills and fever.   HENT: Negative for ear pain, sinus pain and sneezing.    Eyes: Negative for pain.   Respiratory: Negative for cough and shortness of breath.    Cardiovascular: Positive for leg swelling (LLE). Negative for chest pain.   Gastrointestinal: Negative for abdominal pain, diarrhea, nausea and vomiting.   Genitourinary: Negative for dysuria.   Musculoskeletal: Positive for myalgias (L thigh).   Skin: Positive for wound (L lateral thigh, L medial foot).   Neurological: Negative for numbness and headaches.       Physical Exam     Initial Vitals [09/03/17 1858]   BP Pulse Resp Temp SpO2 "   (!) 174/77 85 19 98.7 °F (37.1 °C) 97 %      MAP       109.33         Physical Exam    Vitals reviewed.  Constitutional: She appears well-developed and well-nourished.   HENT:   Head: Normocephalic and atraumatic.   Nose: Nose normal.   Mouth/Throat: No oropharyngeal exudate.   Eyes: EOM are normal. Pupils are equal, round, and reactive to light.   Neck: Normal range of motion. Neck supple. No JVD present.   Cardiovascular: Regular rhythm and normal heart sounds. Exam reveals no gallop and no friction rub.    No murmur heard.  Pulmonary/Chest: Breath sounds normal. No stridor. No respiratory distress. She has no wheezes. She has no rhonchi. She has no rales. She exhibits no tenderness.   Abdominal: Soft. Bowel sounds are normal. She exhibits no distension and no mass. There is no tenderness. There is no rebound and no guarding.   Musculoskeletal: She exhibits edema and tenderness.   Neurological: She is alert and oriented to person, place, and time. She has normal strength. No sensory deficit.   Skin:   See picture of leg wound. No purulent drainage. Pink/red granulation tissue.    Psychiatric: She has a normal mood and affect. Thought content normal.             ED Course   Procedures  Labs Reviewed   CBC W/ AUTO DIFFERENTIAL - Abnormal; Notable for the following:        Result Value    RBC 3.09 (*)     Hemoglobin 9.5 (*)     Hematocrit 26.9 (*)     RDW 17.9 (*)     Lymph # 0.8 (*)     Lymph% 14.0 (*)     All other components within normal limits   COMPREHENSIVE METABOLIC PANEL - Abnormal; Notable for the following:     BUN, Bld 40 (*)     Creatinine 2.0 (*)     Albumin 3.1 (*)     eGFR if  26 (*)     eGFR if non  22 (*)     All other components within normal limits   LACTIC ACID, PLASMA             Medical Decision Making:   History:   Old Medical Records: I decided to obtain old medical records.  Initial Assessment:   Medical decision-making:    The patient received a medical  screening exam. If performed, the EKG was independently evaluated by me and is pending final cardiology evaluation.  If performed, all radiographic studies were independently evaluated by me and are pending final radiology evaluation. If labs were ordered, they were reviewed. Vital signs are independently assessed by me.  If performed, the pulse oximetry was independently evaluated by me.  I decided to obtain the patient's past medical record.  If available, I reviewed the patient's past medical record, including most recent labs and radiology reports.    ED Management:  Patient has swelling to the left lower extremity.  No evidence of DVT on ultrasound.  Skin graft site with no purulent drainage and fair granulation tissue.  Patient does not appear septic.  Patient is afebrile and without leukocytosis.  It does not appear that there is a deep infectious process at play.  Patient may benefit from some antibiotic coverage.  I do not think she needs our warrants admission to the hospital for IV antibiotics at this time.  I will start her on Keflex and have her follow-up in the wound care clinic.  The wound care.  Home health nurse will be at the patient's house tomorrow and can help to reevaluate.  Based on family discussions and review of the wound by the home health nurse on Friday.  The family states that the wound did not appear to be worsening.  Patient's pain was greatly improved in the emergency department.  Patient states that she wants to go home.  Family is in agreement with the assessment and plan.  Return precautions and wound care instructions were given.    The results and physical exam findings were reviewed with the patient. Pt agrees with assessment, disposition and treatment plan and has no further questions or complaints at this time.    CANDIDO Blair M.D. 10:28 PM 9/3/2017              Scribe Attestation:   Scribe #1: I performed the above scribed service and the documentation accurately  describes the services I performed. I attest to the accuracy of the note.    Attending Attestation:           Physician Attestation for Scribe:  Physician Attestation Statement for Scribe #1: I, TOY. Santiago Blair MD, reviewed documentation, as scribed by Yung Agrawal in my presence, and it is both accurate and complete.                 ED Course      Clinical Impression:   The primary encounter diagnosis was Leg swelling. A diagnosis of Encounter for postoperative wound care was also pertinent to this visit.                           Herman Blair MD  09/03/17 2369

## 2017-09-05 PROBLEM — L97.429 DIABETIC ULCER OF LEFT MIDFOOT ASSOCIATED WITH TYPE 2 DIABETES MELLITUS: Status: ACTIVE | Noted: 2017-09-05

## 2017-09-05 PROBLEM — E11.621 DIABETIC ULCER OF LEFT MIDFOOT ASSOCIATED WITH TYPE 2 DIABETES MELLITUS: Status: ACTIVE | Noted: 2017-09-05

## 2017-09-05 NOTE — PROGRESS NOTES
Subjective:       Patient ID: Christal Goodson is a 85 y.o. female.    Chief Complaint: Leg Pain (left ) and Foot Pain (left )    She presents with her daughter for follow up.  She reports a BX on her left foot over the past 2-3 weeks.  The nurse has been dressing it at home but felt that needs to be evaluated by a physician.  Otherwise she still has some pain in her left thigh.  This is still managed by wound care at home.  She has recently increased her Lasix at the instruction of Dr. Linda, her cardiologist.      Review of Systems   Respiratory: Positive for shortness of breath.    Musculoskeletal: Positive for arthralgias.   Skin: Positive for wound.       Objective:      Physical Exam   Constitutional: She is oriented to person, place, and time. She appears well-developed and well-nourished. No distress.   HENT:   Head: Normocephalic and atraumatic.   Eyes: Conjunctivae are normal. No scleral icterus.   Neurological: She is alert and oriented to person, place, and time.   Skin: Skin is warm and dry.   Left medial plantar foot proximal to great toe with 2-3 cm ulceration, mild serosanguineous drainage.  No odor   Psychiatric: She has a normal mood and affect.   Vitals reviewed.      Assessment:       1. Diabetic ulcer of left midfoot associated with type 2 diabetes mellitus, unspecified ulcer stage    2. Traumatic hematoma of left lower leg, subsequent encounter        Plan:       Christal was seen today for leg pain and foot pain.    Diagnoses and all orders for this visit:    Diabetic ulcer of left midfoot associated with type 2 diabetes mellitus, unspecified ulcer stage - Pt has home wound care for her left thigh wound.  I did speak with her home health nurse, Marline.  She will try iodoform.  Will contact her in a week to follow up.  Will refer to inpatient wound care if needed.    Traumatic hematoma of left lower leg, subsequent encounter  -     oxycodone (ROXICODONE) 5 MG immediate release tablet; Take 1 tablet  (5 mg total) by mouth every 8 (eight) hours as needed for Pain.       F/u prn

## 2017-09-07 DIAGNOSIS — S80.12XD TRAUMATIC HEMATOMA OF LEFT LOWER LEG, SUBSEQUENT ENCOUNTER: ICD-10-CM

## 2017-09-07 DIAGNOSIS — E11.621 DIABETIC ULCER OF LEFT MIDFOOT ASSOCIATED WITH TYPE 2 DIABETES MELLITUS, UNSPECIFIED ULCER STAGE: Primary | ICD-10-CM

## 2017-09-07 DIAGNOSIS — L97.429 DIABETIC ULCER OF LEFT MIDFOOT ASSOCIATED WITH TYPE 2 DIABETES MELLITUS, UNSPECIFIED ULCER STAGE: Primary | ICD-10-CM

## 2017-09-07 RX ORDER — OXYCODONE HYDROCHLORIDE 10 MG/1
10 TABLET ORAL EVERY 8 HOURS PRN
Qty: 60 TABLET | Refills: 0 | Status: SHIPPED | OUTPATIENT
Start: 2017-09-15 | End: 2017-11-09 | Stop reason: SDUPTHER

## 2017-09-07 NOTE — TELEPHONE ENCOUNTER
Patient's daughter called stating her wound on her left leg is started to hurt the patient really. She states they went to the ER on Sunday and they told her she wasn't taking her pain meds enough around the clock as she should. She states she double up on the oxycodone 5mg for 2 days then went back to the regular dose,now her mother is complaining about pain again and she wants if they can just change the dose to 10mg.

## 2017-09-07 NOTE — TELEPHONE ENCOUNTER
----- Message from Maddison Arroyo sent at 9/7/2017  9:33 AM CDT -----  Contact: daughter [phone]  Pt declined appt for today, daughter wants to double up on pain meds, pls advice. Pls call Daughter Jose Eduardo  [phone]. Thanks.....Nan

## 2017-09-07 NOTE — TELEPHONE ENCOUNTER
The pt. Would like to have a wound care referral placed. Her daughter would need the location so she can arrange transportation.

## 2017-09-19 ENCOUNTER — HOSPITAL ENCOUNTER (OUTPATIENT)
Dept: WOUND CARE | Facility: HOSPITAL | Age: 82
Discharge: HOME | End: 2017-09-19
Attending: INTERNAL MEDICINE
Payer: MEDICARE

## 2017-09-19 DIAGNOSIS — L89.893 DECUBITUS ULCER OF LEFT FOOT, STAGE 3: ICD-10-CM

## 2017-09-19 DIAGNOSIS — E08.621 DIABETIC ULCER OF LEFT FOOT ASSOCIATED WITH DIABETES MELLITUS DUE TO UNDERLYING CONDITION, WITH FAT LAYER EXPOSED, UNSPECIFIED PART OF FOOT: Primary | ICD-10-CM

## 2017-09-19 DIAGNOSIS — L97.121 DIABETIC ULCER OF LEFT THIGH ASSOCIATED WITH DIABETES MELLITUS DUE TO UNDERLYING CONDITION, LIMITED TO BREAKDOWN OF SKIN: ICD-10-CM

## 2017-09-19 DIAGNOSIS — E08.622 DIABETIC ULCER OF LEFT THIGH ASSOCIATED WITH DIABETES MELLITUS DUE TO UNDERLYING CONDITION, LIMITED TO BREAKDOWN OF SKIN: ICD-10-CM

## 2017-09-19 DIAGNOSIS — L97.522 DIABETIC ULCER OF LEFT FOOT ASSOCIATED WITH DIABETES MELLITUS DUE TO UNDERLYING CONDITION, WITH FAT LAYER EXPOSED, UNSPECIFIED PART OF FOOT: Primary | ICD-10-CM

## 2017-09-19 PROCEDURE — 99215 OFFICE O/P EST HI 40 MIN: CPT | Performed by: FAMILY MEDICINE

## 2017-09-19 PROCEDURE — 99215 OFFICE O/P EST HI 40 MIN: CPT | Mod: ,,, | Performed by: FAMILY MEDICINE

## 2017-09-20 ENCOUNTER — TELEPHONE (OUTPATIENT)
Dept: FAMILY MEDICINE | Facility: CLINIC | Age: 82
End: 2017-09-20

## 2017-09-20 DIAGNOSIS — R53.81 DEBILITY: Primary | ICD-10-CM

## 2017-09-20 DIAGNOSIS — S80.12XD TRAUMATIC HEMATOMA OF LEFT LOWER LEG, SUBSEQUENT ENCOUNTER: ICD-10-CM

## 2017-09-20 RX ORDER — OXYCODONE HYDROCHLORIDE 10 MG/1
10 TABLET ORAL EVERY 8 HOURS PRN
Qty: 60 TABLET | Refills: 0 | Status: CANCELLED | OUTPATIENT
Start: 2017-09-20

## 2017-09-20 NOTE — TELEPHONE ENCOUNTER
----- Message from Hilda Dolores sent at 9/19/2017  4:09 PM CDT -----  Contact: daughter-  Liana  Patient's daughter is requesting a refill for ---oxycodone (ROXICODONE) 10 mg Tab immediate release tablet--  She believes it is effecting her mind, she is asking for a call back to discuss any possible alternative other than Tramadol.     [phone]

## 2017-09-20 NOTE — TELEPHONE ENCOUNTER
----- Message from Rebecca Dalton sent at 9/19/2017 11:55 AM CDT -----  Contact: NOAM Simons called requesting authorization on scooter repair for patient. Please contact her at [phone].    Thanks!

## 2017-09-20 NOTE — TELEPHONE ENCOUNTER
An order is needed to have her scooter repaired. Once order received we must fax to [facility] with a medical necessity form.

## 2017-09-21 ENCOUNTER — TELEPHONE (OUTPATIENT)
Dept: FAMILY MEDICINE | Facility: CLINIC | Age: 82
End: 2017-09-21

## 2017-09-21 DIAGNOSIS — E11.9 DIABETES MELLITUS WITHOUT COMPLICATION: ICD-10-CM

## 2017-09-21 NOTE — TELEPHONE ENCOUNTER
Spoke with Jose Eduardo- patients daughter, to confirm requested medication.Jose Eduardo is requesting Lasix, which wasn't prescribed by MD. Was advised to contact prescribing physician for lasix injection. Jose Eduardo understood.

## 2017-09-21 NOTE — TELEPHONE ENCOUNTER
----- Message from Maddison Arroyo sent at 9/21/2017  9:48 AM CDT -----  Contact: Mirian   daughter [phone]  Mother needs refill on her lancets 40 mg. Pls call [phone] . Pls call Mirina  [phone] when script is called in. Thanks.........Nan

## 2017-09-26 ENCOUNTER — HOSPITAL ENCOUNTER (OUTPATIENT)
Dept: WOUND CARE | Facility: HOSPITAL | Age: 82
Discharge: HOME | End: 2017-09-26
Attending: FAMILY MEDICINE
Payer: MEDICARE

## 2017-09-26 DIAGNOSIS — L97.522 DIABETIC ULCER OF LEFT FOOT ASSOCIATED WITH DIABETES MELLITUS DUE TO UNDERLYING CONDITION, WITH FAT LAYER EXPOSED, UNSPECIFIED PART OF FOOT: Primary | ICD-10-CM

## 2017-09-26 DIAGNOSIS — E08.621 DIABETIC ULCER OF LEFT FOOT ASSOCIATED WITH DIABETES MELLITUS DUE TO UNDERLYING CONDITION, WITH FAT LAYER EXPOSED, UNSPECIFIED PART OF FOOT: Primary | ICD-10-CM

## 2017-09-26 DIAGNOSIS — L89.893 DECUBITUS ULCER OF LEFT FOOT, STAGE 3: ICD-10-CM

## 2017-09-26 DIAGNOSIS — L97.121 DIABETIC ULCER OF LEFT THIGH ASSOCIATED WITH DIABETES MELLITUS DUE TO UNDERLYING CONDITION, LIMITED TO BREAKDOWN OF SKIN: ICD-10-CM

## 2017-09-26 DIAGNOSIS — E08.622 DIABETIC ULCER OF LEFT THIGH ASSOCIATED WITH DIABETES MELLITUS DUE TO UNDERLYING CONDITION, LIMITED TO BREAKDOWN OF SKIN: ICD-10-CM

## 2017-09-26 PROCEDURE — 25000003 PHARM REV CODE 250

## 2017-09-26 PROCEDURE — 11042 DBRDMT SUBQ TIS 1ST 20SQCM/<: CPT | Performed by: FAMILY MEDICINE

## 2017-09-26 PROCEDURE — 11042 DBRDMT SUBQ TIS 1ST 20SQCM/<: CPT | Mod: ,,, | Performed by: FAMILY MEDICINE

## 2017-09-26 PROCEDURE — 99214 OFFICE O/P EST MOD 30 MIN: CPT | Mod: 25,,, | Performed by: FAMILY MEDICINE

## 2017-10-06 ENCOUNTER — TELEPHONE (OUTPATIENT)
Dept: FAMILY MEDICINE | Facility: CLINIC | Age: 82
End: 2017-10-06

## 2017-10-06 NOTE — TELEPHONE ENCOUNTER
----- Message from Maximiliano Anaya sent at 10/6/2017  2:20 PM CDT -----  Contact: Luz Marina/Isaiah /[phone]  Luz Marina states that the patient refused Wound Care today. She states that the patient informed her that she will let her daughter assist her. Thank you.

## 2017-10-10 ENCOUNTER — HOSPITAL ENCOUNTER (OUTPATIENT)
Dept: WOUND CARE | Facility: HOSPITAL | Age: 82
Discharge: HOME | End: 2017-10-10
Attending: FAMILY MEDICINE
Payer: MEDICARE

## 2017-10-10 DIAGNOSIS — E08.622 DIABETIC ULCER OF LEFT THIGH ASSOCIATED WITH DIABETES MELLITUS DUE TO UNDERLYING CONDITION, LIMITED TO BREAKDOWN OF SKIN: ICD-10-CM

## 2017-10-10 DIAGNOSIS — L97.522 DIABETIC ULCER OF LEFT FOOT ASSOCIATED WITH DIABETES MELLITUS DUE TO UNDERLYING CONDITION, WITH FAT LAYER EXPOSED, UNSPECIFIED PART OF FOOT: Primary | ICD-10-CM

## 2017-10-10 DIAGNOSIS — L97.121 DIABETIC ULCER OF LEFT THIGH ASSOCIATED WITH DIABETES MELLITUS DUE TO UNDERLYING CONDITION, LIMITED TO BREAKDOWN OF SKIN: ICD-10-CM

## 2017-10-10 DIAGNOSIS — L89.893 DECUBITUS ULCER OF LEFT FOOT, STAGE 3: ICD-10-CM

## 2017-10-10 DIAGNOSIS — E08.621 DIABETIC ULCER OF LEFT FOOT ASSOCIATED WITH DIABETES MELLITUS DUE TO UNDERLYING CONDITION, WITH FAT LAYER EXPOSED, UNSPECIFIED PART OF FOOT: Primary | ICD-10-CM

## 2017-10-10 PROCEDURE — 99214 OFFICE O/P EST MOD 30 MIN: CPT | Mod: 25,,, | Performed by: FAMILY MEDICINE

## 2017-10-10 PROCEDURE — 25000003 PHARM REV CODE 250

## 2017-10-10 PROCEDURE — 17250 CHEM CAUT OF GRANLTJ TISSUE: CPT | Mod: ,,, | Performed by: FAMILY MEDICINE

## 2017-10-10 PROCEDURE — 17250 CHEM CAUT OF GRANLTJ TISSUE: CPT | Performed by: FAMILY MEDICINE

## 2017-10-29 DIAGNOSIS — E03.9 HYPOTHYROIDISM, UNSPECIFIED TYPE: Chronic | ICD-10-CM

## 2017-10-29 RX ORDER — LEVOTHYROXINE SODIUM 75 UG/1
75 TABLET ORAL DAILY
Qty: 30 TABLET | Refills: 0 | Status: SHIPPED | OUTPATIENT
Start: 2017-10-29 | End: 2017-11-29 | Stop reason: SDUPTHER

## 2017-10-31 ENCOUNTER — HOSPITAL ENCOUNTER (OUTPATIENT)
Dept: WOUND CARE | Facility: HOSPITAL | Age: 82
Discharge: HOME | End: 2017-10-31
Attending: FAMILY MEDICINE
Payer: MEDICARE

## 2017-10-31 DIAGNOSIS — E08.622 DIABETIC ULCER OF LEFT THIGH ASSOCIATED WITH DIABETES MELLITUS DUE TO UNDERLYING CONDITION, LIMITED TO BREAKDOWN OF SKIN: ICD-10-CM

## 2017-10-31 DIAGNOSIS — L97.121 DIABETIC ULCER OF LEFT THIGH ASSOCIATED WITH DIABETES MELLITUS DUE TO UNDERLYING CONDITION, LIMITED TO BREAKDOWN OF SKIN: ICD-10-CM

## 2017-10-31 DIAGNOSIS — L97.522 DIABETIC ULCER OF LEFT FOOT ASSOCIATED WITH DIABETES MELLITUS DUE TO UNDERLYING CONDITION, WITH FAT LAYER EXPOSED, UNSPECIFIED PART OF FOOT: Primary | ICD-10-CM

## 2017-10-31 DIAGNOSIS — E08.621 DIABETIC ULCER OF LEFT FOOT ASSOCIATED WITH DIABETES MELLITUS DUE TO UNDERLYING CONDITION, WITH FAT LAYER EXPOSED, UNSPECIFIED PART OF FOOT: Primary | ICD-10-CM

## 2017-10-31 PROCEDURE — 87070 CULTURE OTHR SPECIMN AEROBIC: CPT

## 2017-10-31 PROCEDURE — 87186 SC STD MICRODIL/AGAR DIL: CPT

## 2017-10-31 PROCEDURE — 11042 DBRDMT SUBQ TIS 1ST 20SQCM/<: CPT | Performed by: FAMILY MEDICINE

## 2017-10-31 PROCEDURE — 99214 OFFICE O/P EST MOD 30 MIN: CPT | Mod: 25,,, | Performed by: FAMILY MEDICINE

## 2017-10-31 PROCEDURE — 11042 DBRDMT SUBQ TIS 1ST 20SQCM/<: CPT | Mod: ,,, | Performed by: FAMILY MEDICINE

## 2017-10-31 PROCEDURE — 17250 CHEM CAUT OF GRANLTJ TISSUE: CPT

## 2017-10-31 PROCEDURE — 87077 CULTURE AEROBIC IDENTIFY: CPT

## 2017-10-31 PROCEDURE — 25000003 PHARM REV CODE 250

## 2017-10-31 PROCEDURE — 87147 CULTURE TYPE IMMUNOLOGIC: CPT

## 2017-11-03 LAB
BACTERIA SPEC AEROBE CULT: NORMAL

## 2017-11-03 RX ORDER — CIPROFLOXACIN 500 MG/1
500 TABLET ORAL 2 TIMES DAILY
Qty: 20 TABLET | Refills: 0 | Status: SHIPPED | OUTPATIENT
Start: 2017-11-03 | End: 2017-11-16 | Stop reason: SDUPTHER

## 2017-11-06 ENCOUNTER — HOSPITAL ENCOUNTER (OUTPATIENT)
Dept: RADIOLOGY | Facility: HOSPITAL | Age: 82
Discharge: HOME | End: 2017-11-06
Attending: FAMILY MEDICINE
Payer: MEDICARE

## 2017-11-06 ENCOUNTER — HOSPITAL ENCOUNTER (OUTPATIENT)
Dept: WOUND CARE | Facility: HOSPITAL | Age: 82
Discharge: HOME | End: 2017-11-06
Attending: FAMILY MEDICINE
Payer: MEDICARE

## 2017-11-06 DIAGNOSIS — L97.422 DIABETIC ULCER OF LEFT MIDFOOT ASSOCIATED WITH DIABETES MELLITUS DUE TO UNDERLYING CONDITION, WITH FAT LAYER EXPOSED: Primary | ICD-10-CM

## 2017-11-06 DIAGNOSIS — E08.621 DIABETIC ULCER OF LEFT FOOT ASSOCIATED WITH DIABETES MELLITUS DUE TO UNDERLYING CONDITION, WITH FAT LAYER EXPOSED, UNSPECIFIED PART OF FOOT: ICD-10-CM

## 2017-11-06 DIAGNOSIS — L97.522 DIABETIC ULCER OF LEFT FOOT ASSOCIATED WITH DIABETES MELLITUS DUE TO UNDERLYING CONDITION, WITH FAT LAYER EXPOSED, UNSPECIFIED PART OF FOOT: ICD-10-CM

## 2017-11-06 DIAGNOSIS — E08.621 DIABETIC ULCER OF LEFT MIDFOOT ASSOCIATED WITH DIABETES MELLITUS DUE TO UNDERLYING CONDITION, WITH FAT LAYER EXPOSED: Primary | ICD-10-CM

## 2017-11-06 PROCEDURE — 93922 UPR/L XTREMITY ART 2 LEVELS: CPT | Mod: 26,,, | Performed by: RADIOLOGY

## 2017-11-06 PROCEDURE — 99214 OFFICE O/P EST MOD 30 MIN: CPT | Mod: 25,,, | Performed by: FAMILY MEDICINE

## 2017-11-06 PROCEDURE — 25000003 PHARM REV CODE 250

## 2017-11-06 PROCEDURE — 17250 CHEM CAUT OF GRANLTJ TISSUE: CPT | Mod: ,,, | Performed by: FAMILY MEDICINE

## 2017-11-06 PROCEDURE — 17250 CHEM CAUT OF GRANLTJ TISSUE: CPT | Performed by: FAMILY MEDICINE

## 2017-11-06 PROCEDURE — 93922 UPR/L XTREMITY ART 2 LEVELS: CPT | Mod: TC

## 2017-11-09 DIAGNOSIS — S80.12XD TRAUMATIC HEMATOMA OF LEFT LOWER LEG, SUBSEQUENT ENCOUNTER: ICD-10-CM

## 2017-11-09 RX ORDER — OXYCODONE HYDROCHLORIDE 10 MG/1
10 TABLET ORAL EVERY 8 HOURS PRN
Qty: 60 TABLET | Refills: 0 | Status: SHIPPED | OUTPATIENT
Start: 2017-11-09

## 2017-11-09 NOTE — TELEPHONE ENCOUNTER
----- Message from Jacqueline Alvarado sent at 11/9/2017 10:59 AM CST -----  Refill: oxycodone (ROXICODONE) 10 mg Tab immediate release tablet    Preferred pharmacy[id number] [place] [facility] [address]    Patient can be reached at [phone] Thank you!

## 2017-11-10 ENCOUNTER — TELEPHONE (OUTPATIENT)
Dept: FAMILY MEDICINE | Facility: CLINIC | Age: 82
End: 2017-11-10

## 2017-11-10 NOTE — TELEPHONE ENCOUNTER
----- Message from Stone Gaspar sent at 11/9/2017 12:42 PM CST -----  Contact: Isaiah Adair  Received order but they do not carry iodosorb gel. Valeri can be reached @ [phone].

## 2017-11-14 ENCOUNTER — HOSPITAL ENCOUNTER (OUTPATIENT)
Dept: WOUND CARE | Facility: HOSPITAL | Age: 82
Discharge: HOME | End: 2017-11-14
Attending: FAMILY MEDICINE
Payer: MEDICARE

## 2017-11-14 DIAGNOSIS — E08.621 DIABETIC ULCER OF LEFT FOOT ASSOCIATED WITH DIABETES MELLITUS DUE TO UNDERLYING CONDITION, WITH FAT LAYER EXPOSED, UNSPECIFIED PART OF FOOT: Primary | ICD-10-CM

## 2017-11-14 DIAGNOSIS — E08.622 DIABETIC ULCER OF LEFT THIGH ASSOCIATED WITH DIABETES MELLITUS DUE TO UNDERLYING CONDITION, LIMITED TO BREAKDOWN OF SKIN: ICD-10-CM

## 2017-11-14 DIAGNOSIS — L97.121 DIABETIC ULCER OF LEFT THIGH ASSOCIATED WITH DIABETES MELLITUS DUE TO UNDERLYING CONDITION, LIMITED TO BREAKDOWN OF SKIN: ICD-10-CM

## 2017-11-14 DIAGNOSIS — L97.522 DIABETIC ULCER OF LEFT FOOT ASSOCIATED WITH DIABETES MELLITUS DUE TO UNDERLYING CONDITION, WITH FAT LAYER EXPOSED, UNSPECIFIED PART OF FOOT: Primary | ICD-10-CM

## 2017-11-14 PROCEDURE — 25000003 PHARM REV CODE 250

## 2017-11-14 PROCEDURE — 97597 DBRDMT OPN WND 1ST 20 CM/<: CPT | Performed by: FAMILY MEDICINE

## 2017-11-14 PROCEDURE — 17250 CHEM CAUT OF GRANLTJ TISSUE: CPT

## 2017-11-14 PROCEDURE — 97597 DBRDMT OPN WND 1ST 20 CM/<: CPT | Mod: ,,, | Performed by: FAMILY MEDICINE

## 2017-11-14 RX ORDER — CEFDINIR 300 MG/1
300 CAPSULE ORAL DAILY
Qty: 10 CAPSULE | Refills: 0 | Status: SHIPPED | OUTPATIENT
Start: 2017-11-14 | End: 2017-11-24

## 2017-11-16 RX ORDER — CIPROFLOXACIN 500 MG/1
500 TABLET ORAL 2 TIMES DAILY
Qty: 20 TABLET | Refills: 0 | Status: SHIPPED | OUTPATIENT
Start: 2017-11-16

## 2017-11-22 ENCOUNTER — HOSPITAL ENCOUNTER (OUTPATIENT)
Dept: WOUND CARE | Facility: HOSPITAL | Age: 82
Discharge: HOME | End: 2017-11-22
Attending: FAMILY MEDICINE
Payer: MEDICARE

## 2017-11-22 DIAGNOSIS — E08.621 DIABETIC ULCER OF LEFT FOOT ASSOCIATED WITH DIABETES MELLITUS DUE TO UNDERLYING CONDITION, WITH FAT LAYER EXPOSED, UNSPECIFIED PART OF FOOT: Primary | ICD-10-CM

## 2017-11-22 DIAGNOSIS — L97.522 DIABETIC ULCER OF LEFT FOOT ASSOCIATED WITH DIABETES MELLITUS DUE TO UNDERLYING CONDITION, WITH FAT LAYER EXPOSED, UNSPECIFIED PART OF FOOT: Primary | ICD-10-CM

## 2017-11-22 DIAGNOSIS — L97.121 DIABETIC ULCER OF LEFT THIGH ASSOCIATED WITH DIABETES MELLITUS DUE TO UNDERLYING CONDITION, LIMITED TO BREAKDOWN OF SKIN: ICD-10-CM

## 2017-11-22 DIAGNOSIS — E08.622 DIABETIC ULCER OF LEFT THIGH ASSOCIATED WITH DIABETES MELLITUS DUE TO UNDERLYING CONDITION, LIMITED TO BREAKDOWN OF SKIN: ICD-10-CM

## 2017-11-22 PROCEDURE — 25000003 PHARM REV CODE 250

## 2017-11-22 PROCEDURE — 99214 OFFICE O/P EST MOD 30 MIN: CPT | Mod: 25,,, | Performed by: FAMILY MEDICINE

## 2017-11-22 PROCEDURE — 17250 CHEM CAUT OF GRANLTJ TISSUE: CPT

## 2017-11-22 PROCEDURE — 11042 DBRDMT SUBQ TIS 1ST 20SQCM/<: CPT | Mod: ,,, | Performed by: FAMILY MEDICINE

## 2017-11-22 PROCEDURE — 11042 DBRDMT SUBQ TIS 1ST 20SQCM/<: CPT | Performed by: FAMILY MEDICINE

## 2017-11-27 DIAGNOSIS — E03.9 HYPOTHYROIDISM, UNSPECIFIED TYPE: Chronic | ICD-10-CM

## 2017-11-27 RX ORDER — LEVOTHYROXINE SODIUM 75 UG/1
75 TABLET ORAL DAILY
Qty: 30 TABLET | Refills: 0 | Status: SHIPPED | OUTPATIENT
Start: 2017-11-27 | End: 2017-11-29 | Stop reason: SDUPTHER

## 2017-11-27 NOTE — TELEPHONE ENCOUNTER
Spoke with patient and she said that she has appointment with the wound doctor on tomorrow and will get lab done.

## 2017-11-28 ENCOUNTER — HOSPITAL ENCOUNTER (OUTPATIENT)
Dept: WOUND CARE | Facility: HOSPITAL | Age: 82
Discharge: HOME | End: 2017-11-28
Attending: FAMILY MEDICINE
Payer: MEDICARE

## 2017-11-28 DIAGNOSIS — E08.621 DIABETIC ULCER OF LEFT FOOT ASSOCIATED WITH DIABETES MELLITUS DUE TO UNDERLYING CONDITION, WITH FAT LAYER EXPOSED, UNSPECIFIED PART OF FOOT: Primary | ICD-10-CM

## 2017-11-28 DIAGNOSIS — L97.121 DIABETIC ULCER OF LEFT THIGH ASSOCIATED WITH DIABETES MELLITUS DUE TO UNDERLYING CONDITION, LIMITED TO BREAKDOWN OF SKIN: ICD-10-CM

## 2017-11-28 DIAGNOSIS — L97.522 DIABETIC ULCER OF LEFT FOOT ASSOCIATED WITH DIABETES MELLITUS DUE TO UNDERLYING CONDITION, WITH FAT LAYER EXPOSED, UNSPECIFIED PART OF FOOT: Primary | ICD-10-CM

## 2017-11-28 DIAGNOSIS — E08.622 DIABETIC ULCER OF LEFT THIGH ASSOCIATED WITH DIABETES MELLITUS DUE TO UNDERLYING CONDITION, LIMITED TO BREAKDOWN OF SKIN: ICD-10-CM

## 2017-11-28 PROCEDURE — 25000003 PHARM REV CODE 250

## 2017-11-28 PROCEDURE — 17250 CHEM CAUT OF GRANLTJ TISSUE: CPT | Performed by: FAMILY MEDICINE

## 2017-11-28 PROCEDURE — 17250 CHEM CAUT OF GRANLTJ TISSUE: CPT | Mod: ,,, | Performed by: FAMILY MEDICINE

## 2017-11-28 PROCEDURE — 99214 OFFICE O/P EST MOD 30 MIN: CPT | Mod: 25,,, | Performed by: FAMILY MEDICINE

## 2017-11-29 ENCOUNTER — TELEPHONE (OUTPATIENT)
Dept: FAMILY MEDICINE | Facility: CLINIC | Age: 82
End: 2017-11-29

## 2017-11-29 DIAGNOSIS — E03.9 HYPOTHYROIDISM, UNSPECIFIED TYPE: Primary | ICD-10-CM

## 2017-11-29 RX ORDER — LEVOTHYROXINE SODIUM 100 UG/1
100 TABLET ORAL DAILY
Qty: 30 TABLET | Refills: 1 | Status: SHIPPED | OUTPATIENT
Start: 2017-11-29 | End: 2018-11-29

## 2017-11-29 NOTE — TELEPHONE ENCOUNTER
Notify the patient/daughter that her thyroid level is still not in normal range.  Has she been taking her thyroid medication daily in the morning without food, milk or other medications?

## 2017-11-29 NOTE — TELEPHONE ENCOUNTER
Ms. Plattll informed of results. Also, she states she has been taking thyroid meds without anything other than water.     Any recommendations for patient.

## 2017-12-05 ENCOUNTER — HOSPITAL ENCOUNTER (OUTPATIENT)
Dept: WOUND CARE | Facility: HOSPITAL | Age: 82
Discharge: HOME | End: 2017-12-05
Attending: FAMILY MEDICINE
Payer: MEDICARE

## 2017-12-05 DIAGNOSIS — E08.621 DIABETIC ULCER OF LEFT FOOT ASSOCIATED WITH DIABETES MELLITUS DUE TO UNDERLYING CONDITION, WITH FAT LAYER EXPOSED, UNSPECIFIED PART OF FOOT: Primary | ICD-10-CM

## 2017-12-05 DIAGNOSIS — L97.522 DIABETIC ULCER OF LEFT FOOT ASSOCIATED WITH DIABETES MELLITUS DUE TO UNDERLYING CONDITION, WITH FAT LAYER EXPOSED, UNSPECIFIED PART OF FOOT: Primary | ICD-10-CM

## 2017-12-05 DIAGNOSIS — L97.121 DIABETIC ULCER OF LEFT THIGH ASSOCIATED WITH DIABETES MELLITUS DUE TO UNDERLYING CONDITION, LIMITED TO BREAKDOWN OF SKIN: ICD-10-CM

## 2017-12-05 DIAGNOSIS — E08.622 DIABETIC ULCER OF LEFT THIGH ASSOCIATED WITH DIABETES MELLITUS DUE TO UNDERLYING CONDITION, LIMITED TO BREAKDOWN OF SKIN: ICD-10-CM

## 2017-12-05 PROCEDURE — 11043 DBRDMT MUSC&/FSCA 1ST 20/<: CPT | Mod: ,,, | Performed by: FAMILY MEDICINE

## 2017-12-05 PROCEDURE — 17250 CHEM CAUT OF GRANLTJ TISSUE: CPT

## 2017-12-05 PROCEDURE — 25000003 PHARM REV CODE 250

## 2017-12-05 PROCEDURE — 93922 UPR/L XTREMITY ART 2 LEVELS: CPT | Mod: 52 | Performed by: FAMILY MEDICINE

## 2017-12-05 PROCEDURE — 99214 OFFICE O/P EST MOD 30 MIN: CPT | Mod: 25,,, | Performed by: FAMILY MEDICINE

## 2017-12-05 PROCEDURE — 11043 DBRDMT MUSC&/FSCA 1ST 20/<: CPT | Performed by: FAMILY MEDICINE

## 2017-12-05 RX ORDER — TIZANIDINE 4 MG/1
4 TABLET ORAL EVERY 6 HOURS PRN
Qty: 90 TABLET | Refills: 1 | Status: SHIPPED | OUTPATIENT
Start: 2017-12-05 | End: 2017-12-15

## 2017-12-12 ENCOUNTER — HOSPITAL ENCOUNTER (OUTPATIENT)
Dept: RADIOLOGY | Facility: HOSPITAL | Age: 82
Discharge: HOME | End: 2017-12-12
Attending: FAMILY MEDICINE
Payer: MEDICARE

## 2017-12-12 ENCOUNTER — HOSPITAL ENCOUNTER (OUTPATIENT)
Dept: WOUND CARE | Facility: HOSPITAL | Age: 82
Discharge: HOME | End: 2017-12-12
Attending: FAMILY MEDICINE
Payer: MEDICARE

## 2017-12-12 DIAGNOSIS — L97.522 DIABETIC ULCER OF LEFT FOOT ASSOCIATED WITH DIABETES MELLITUS DUE TO UNDERLYING CONDITION, WITH FAT LAYER EXPOSED, UNSPECIFIED PART OF FOOT: Primary | ICD-10-CM

## 2017-12-12 DIAGNOSIS — L97.422 DIABETIC ULCER OF LEFT MIDFOOT ASSOCIATED WITH DIABETES MELLITUS DUE TO UNDERLYING CONDITION, WITH FAT LAYER EXPOSED: ICD-10-CM

## 2017-12-12 DIAGNOSIS — M86.9 OSTEOMYELITIS OF LEFT FOOT, UNSPECIFIED TYPE: ICD-10-CM

## 2017-12-12 DIAGNOSIS — E08.621 DIABETIC ULCER OF LEFT MIDFOOT ASSOCIATED WITH DIABETES MELLITUS DUE TO UNDERLYING CONDITION, WITH FAT LAYER EXPOSED: ICD-10-CM

## 2017-12-12 DIAGNOSIS — L97.121 DIABETIC ULCER OF LEFT THIGH ASSOCIATED WITH DIABETES MELLITUS DUE TO UNDERLYING CONDITION, LIMITED TO BREAKDOWN OF SKIN: ICD-10-CM

## 2017-12-12 DIAGNOSIS — E08.622 DIABETIC ULCER OF LEFT THIGH ASSOCIATED WITH DIABETES MELLITUS DUE TO UNDERLYING CONDITION, LIMITED TO BREAKDOWN OF SKIN: ICD-10-CM

## 2017-12-12 DIAGNOSIS — E08.621 DIABETIC ULCER OF LEFT FOOT ASSOCIATED WITH DIABETES MELLITUS DUE TO UNDERLYING CONDITION, WITH FAT LAYER EXPOSED, UNSPECIFIED PART OF FOOT: Primary | ICD-10-CM

## 2017-12-12 PROCEDURE — 99214 OFFICE O/P EST MOD 30 MIN: CPT | Mod: 25,,, | Performed by: FAMILY MEDICINE

## 2017-12-12 PROCEDURE — 17250 CHEM CAUT OF GRANLTJ TISSUE: CPT | Performed by: FAMILY MEDICINE

## 2017-12-12 PROCEDURE — 73630 X-RAY EXAM OF FOOT: CPT | Mod: 26,LT,, | Performed by: RADIOLOGY

## 2017-12-12 PROCEDURE — 17250 CHEM CAUT OF GRANLTJ TISSUE: CPT | Mod: ,,, | Performed by: FAMILY MEDICINE

## 2017-12-12 PROCEDURE — 73630 X-RAY EXAM OF FOOT: CPT | Mod: TC,LT

## 2017-12-12 PROCEDURE — 25000003 PHARM REV CODE 250

## 2017-12-27 ENCOUNTER — HOSPITAL ENCOUNTER (OUTPATIENT)
Dept: WOUND CARE | Facility: HOSPITAL | Age: 82
Discharge: HOME | End: 2017-12-27
Attending: FAMILY MEDICINE
Payer: MEDICARE

## 2017-12-27 DIAGNOSIS — L97.121 DIABETIC ULCER OF LEFT THIGH ASSOCIATED WITH DIABETES MELLITUS DUE TO UNDERLYING CONDITION, LIMITED TO BREAKDOWN OF SKIN: ICD-10-CM

## 2017-12-27 DIAGNOSIS — E08.622 DIABETIC ULCER OF LEFT THIGH ASSOCIATED WITH DIABETES MELLITUS DUE TO UNDERLYING CONDITION, LIMITED TO BREAKDOWN OF SKIN: ICD-10-CM

## 2017-12-27 DIAGNOSIS — E08.621 DIABETIC ULCER OF LEFT FOOT ASSOCIATED WITH DIABETES MELLITUS DUE TO UNDERLYING CONDITION, WITH FAT LAYER EXPOSED, UNSPECIFIED PART OF FOOT: Primary | ICD-10-CM

## 2017-12-27 DIAGNOSIS — L97.522 DIABETIC ULCER OF LEFT FOOT ASSOCIATED WITH DIABETES MELLITUS DUE TO UNDERLYING CONDITION, WITH FAT LAYER EXPOSED, UNSPECIFIED PART OF FOOT: Primary | ICD-10-CM

## 2017-12-27 PROCEDURE — 97602 WOUND(S) CARE NON-SELECTIVE: CPT | Performed by: FAMILY MEDICINE

## 2017-12-27 PROCEDURE — 99214 OFFICE O/P EST MOD 30 MIN: CPT | Mod: ,,, | Performed by: FAMILY MEDICINE

## 2017-12-27 PROCEDURE — 25000003 PHARM REV CODE 250

## 2017-12-29 RX ORDER — HYDRALAZINE HYDROCHLORIDE 50 MG/1
50 TABLET, FILM COATED ORAL 3 TIMES DAILY
Qty: 90 TABLET | Refills: 2 | Status: SHIPPED | OUTPATIENT
Start: 2017-12-29 | End: 2018-12-29

## 2017-12-29 NOTE — TELEPHONE ENCOUNTER
----- Message from Heide Baxter sent at 12/29/2017  2:29 PM CST -----  Contact: daughter/[phone]  REFILL: HYDRALAZINE 50MG

## 2018-01-10 ENCOUNTER — HOSPITAL ENCOUNTER (OUTPATIENT)
Dept: WOUND CARE | Facility: HOSPITAL | Age: 83
Discharge: HOME | End: 2018-01-10
Attending: FAMILY MEDICINE
Payer: MEDICARE

## 2018-01-10 DIAGNOSIS — L97.121 DIABETIC ULCER OF LEFT THIGH ASSOCIATED WITH DIABETES MELLITUS DUE TO UNDERLYING CONDITION, LIMITED TO BREAKDOWN OF SKIN: Primary | ICD-10-CM

## 2018-01-10 DIAGNOSIS — E08.622 DIABETIC ULCER OF LEFT THIGH ASSOCIATED WITH DIABETES MELLITUS DUE TO UNDERLYING CONDITION, LIMITED TO BREAKDOWN OF SKIN: Primary | ICD-10-CM

## 2018-01-10 PROCEDURE — 99214 OFFICE O/P EST MOD 30 MIN: CPT | Performed by: FAMILY MEDICINE

## 2020-10-19 NOTE — TELEPHONE ENCOUNTER
----- Message from Mary Kate Huffman sent at 5/4/2017  2:02 PM CDT -----  Contact: daughter  HH would like an update on pts therapy. Please call [phone]. thanks    · Keep a list of your medicines with you. List all of the prescription medicines, nonprescription medicines, supplements, natural remedies, and vitamins that you take. Tell your healthcare providers who treat you about all of the products you are taking. Your provider can provide you with a form to keep track of them. Just ask. · Follow the directions that come with your medicine, including information about food or alcohol. Make sure you know how and when to take your medicine. Do not take more or less than you are supposed to take. · Keep all medicines out of the reach of children. · Store medicines according to the directions on the label. · Monitor yourself. Learn to know how your body reacts to your new medicine and keep track of how it makes you feel before attempting (If your provider has allowed you to do so) to drive or go to work. · Seek emergency medical attention if you think you have used too much of this medicine. An overdose of any prescription medicine can be fatal. Overdose symptoms may include extreme drowsiness, muscle weakness, confusion, cold and clammy skin, pinpoint pupils, shallow breathing, slow heart rate, fainting, or coma. · Don't share prescription medicines with others, even when they seem to have the same symptoms. What may be good for you may be harmful to others. · If you are no longer taking a prescribed medication and you have pills left please take your pills out of their original containers. Mix crushed pills with an undesirable substance, such as cat litter or used coffee grounds. Put the mixture into a disposable container with a lid, such as an empty margarine tub, or into a sealable bag. Cover up or remove any of your personal information on the empty containers by covering it with black permanent marker or duct tape. Place the sealed container with the mixture, and the empty drug containers, in the trash.    · If you use a medication that is in the form of a patch, dispose of used patches by folding them in half so that the sticky sides meet, and then flushing them down a toilet. They should not be placed in the household trash where children or pets can find them. · If you have any questions, ask your provider or pharmacist for more information. · Be sure to keep all appointments for provider visits or tests. ·   Personalized Preventive Plan for Willie Robles - 10/19/2020  Medicare offers a range of preventive health benefits. Some of the tests and screenings are paid in full while other may be subject to a deductible, co-insurance, and/or copay. Some of these benefits include a comprehensive review of your medical history including lifestyle, illnesses that may run in your family, and various assessments and screenings as appropriate. After reviewing your medical record and screening and assessments performed today your provider may have ordered immunizations, labs, imaging, and/or referrals for you. A list of these orders (if applicable) as well as your Preventive Care list are included within your After Visit Summary for your review. Other Preventive Recommendations:    A preventive eye exam performed by an eye specialist is recommended every 1-2 years to screen for glaucoma; cataracts, macular degeneration, and other eye disorders. A preventive dental visit is recommended every 6 months. Try to get at least 150 minutes of exercise per week or 10,000 steps per day on a pedometer . Order or download the FREE \"Exercise & Physical Activity: Your Everyday Guide\" from [employer]. Call 2-879.177.3426 or search [employer] online. You need 1917-7890 mg of calcium and 6486-0018 IU of vitamin D per day.  It is possible to meet your calcium requirement with diet alone, but a vitamin D supplement is usually necessary to meet this goal.  When exposed to the sun, use a sunscreen that protects against both UVA and UVB radiation with an SPF of 30 or greater. Reapply every 2 to 3 hours or after sweating, drying off with a towel, or swimming. Always wear a seat belt when traveling in a car. Always wear a helmet when riding a bicycle or motorcycle. Personalized Preventive Plan for John Montana - 10/19/2020  Medicare offers a range of preventive health benefits. Some of the tests and screenings are paid in full while other may be subject to a deductible, co-insurance, and/or copay. Some of these benefits include a comprehensive review of your medical history including lifestyle, illnesses that may run in your family, and various assessments and screenings as appropriate. After reviewing your medical record and screening and assessments performed today your provider may have ordered immunizations, labs, imaging, and/or referrals for you. A list of these orders (if applicable) as well as your Preventive Care list are included within your After Visit Summary for your review. Other Preventive Recommendations:    A preventive eye exam performed by an eye specialist is recommended every 1-2 years to screen for glaucoma; cataracts, macular degeneration, and other eye disorders. A preventive dental visit is recommended every 6 months. Try to get at least 150 minutes of exercise per week or 10,000 steps per day on a pedometer . Order or download the FREE \"Exercise & Physical Activity: Your Everyday Guide\" from [employer]. Call 7-503.832.6736 or search [employer] online. You need 2405-7239 mg of calcium and 7117-1335 IU of vitamin D per day. It is possible to meet your calcium requirement with diet alone, but a vitamin D supplement is usually necessary to meet this goal.  When exposed to the sun, use a sunscreen that protects against both UVA and UVB radiation with an SPF of 30 or greater. Reapply every 2 to 3 hours or after sweating, drying off with a towel, or swimming.   Always

## 2022-08-01 NOTE — ASSESSMENT & PLAN NOTE
· Clinically, patient is euthyroid   · Chemically, undetermined  · Obtain TSH, free T3, and free T4  · Continue current regimen  
  · Renal dose medications  · Avoid nephrotoxic agents  · Maintain euvolemic state  
 Due to hematoma formation as noted above  · Hemoglobin decreased from 10.4 to 6.8  · Given the patient's multiple chronic problems including a CAD and CKD, the patient will be transfused at least a hemoglobin of 8.  · Monitor CBC closely.  
Aspirin on hold due to hematoma as noted above, continue statin.    
BG goal while in patient is <180mg/dL. Closely monitor blood glucose and make adjustments to insulin regimen as necessary. HgbA1C 4.5.    
Continue current care.      
Continue home regimen.  
Creatinine remains stable, monitor.  
Due to hematoma, drop in H/H noted today likely reflecting previous acute loss, will transfuse 2 units of PRBC and monitor. No other sources of loss.  
Due to trauma while on antiplatelet medication, no signs of compartment syndrome. Appreciate Ortho input. Monitor.    
· BG in acceptable range at this time  · Maintain w/ subcutaneous insulin management order set  · Hold oral diabetic meds  · ADA 1800 kcal diet  · BG goal while in patient is <180mg/dL  · HgA1c = Pending    
· Due to trauma while on antiplatelet medication  · Evaluated by orthopedic surgery for possible compartment syndrome; felt this was not likely compartment syndrome but the patient should be admitted for observation including elevation, ice, and frequent neurovascular checks  · Monitor H&H and platelet levels closely  · INR is 1.2.    
· Goal while inpatient is a systolic blood pressure less than 160mmHg  · BP in acceptable range at this time  · Continue current home regimen with hold parameters  · PRN antihypertensives available      
· No evidence of acute coronary syndrome at this time  · Maintain adequate blood pressure control  · Heart healthy diet  · Aspirin on hold due to hematoma as noted above  · Statin    
0

## 2024-10-25 NOTE — PLAN OF CARE
"[facility]  HOME  HEALTH ORDERS     07/20/2017    Admit to Home Health    Diagnoses:  Active Hospital Problems    Diagnosis  POA    *Traumatic hematoma of left lower leg [S80.12XA]  Yes     Priority: 1 - High    Clostridium difficile infection [B96.89]  No     Priority: 2     Chronic kidney disease, stage III (moderate) [N18.3]  Yes     Followed by Dr. Hartley      Type 2 diabetes mellitus without complication [E11.9]  Yes     Followed by Dr. Gandhi      CAD (coronary artery disease) [I25.10]  Yes     Chronic    Essential hypertension, benign [I10]  Yes     Chronic    Hypercholesteremia [E78.00]  Yes     Chronic    Hypothyroidism [E03.9]  Yes     Chronic     Followed by Dr. Gandhi      Vitamin D deficiency [E55.9]  Yes      Resolved Hospital Problems    Diagnosis Date Resolved POA    Acute blood loss anemia [D62] 07/20/2017 Yes       Patient is homebound due to:  Traumatic hematoma of left lower leg    Allergies:  Review of patient's allergies indicates:   Allergen Reactions    Bystolic [nebivolol] Other (See Comments)     "it made me feel like I was going to pass out"    Losartan     Diovan [valsartan] Rash     "I've taken that since then and nothing happened to me."       Diet: 2000 mata ADA    Acitivities: Turn Q2H    Nursing:   SN to complete comprehensive assessment including routine vital signs. Instruct on disease process and s/s of complications to report to MD. Review/verify medication list sent home with the patient at time of discharge  and instruct patient/caregiver as needed. Frequency may be adjusted depending on start of care date.    Notify MD if SBP > 160 or < 90; DBP > 90 or < 50; HR > 120 or < 50; Temp > 101    CONSULTS:   PT to evaluate and treat. Evaluate for home safety and equipment needs; Establish/upgrade home exercise program. Perform / instruct on therapeutic exercises, gait training, transfer training, and Range of Motion.    OT to evaluate and treat. Evaluate " Keith August discharged to home accompanied by family member.   Patient provided with the following educational materials upon discharge:  post op instructions.   Valuables and belongings sent with patient.   discharge summary, discharge instructions, medications, and follow up appointments reviewed with patient.  Patient and verbalized understanding. Patient was brought down by wheelchair.   home environment for safety and equipment needs. Perform/Instruct on transfers, ADL training, ROM, and therapeutic exercises.      Other Wounds:   Surgical                   Location:  Left thigh  Patient can leave donor site dressing intact until follow up.  Dressing over skin graft needs to be changed daily with bacitracin ointment directly on skin graft, followed by xeroform then kerlex wrap.  Will follow up with Dr. Tomas 7/26/27 or sooner if there are any issues.       Medications: Review discharge medications with patient and family and provide education.       Christal Goodson   Home Medication Instructions ROBINSON:[id number]    Printed on:07/20/17 7885   Medication Information                      acetaminophen (TYLENOL) 325 MG tablet  Take 2 tablets (650 mg total) by mouth every 6 (six) hours as needed (Mild pain/Temp>100.4).             allopurinol (ZYLOPRIM) 100 MG tablet  Take 1 tablet (100 mg total) by mouth once daily.             aspirin (ECOTRIN) 81 MG EC tablet  Hold for 1 week, and resume taking 81 mg daily on July 7, 2017.             blood sugar diagnostic (TRUE METRIX GLUCOSE TEST STRIP) Strp  To test once daily             blood-glucose meter (TRUE METRIX AIR GLUCOSE METER) Misc  To test once daily             ferrous sulfate 325 mg (65 mg iron) Tab  Take 1 tablet (325 mg total) by mouth daily with breakfast.             flaxseed oil Oil  by Misc.(Non-Drug; Combo Route) route Daily.             furosemide (LASIX) 40 MG tablet  Take 0.5 tablets (20 mg total) by mouth 2 (two) times daily.             hydrALAZINE (APRESOLINE) 50 MG tablet  TAKE 1 TABLET 3 TIMES DAILY.             labetalol (NORMODYNE) 300 MG tablet  TAKE 1 TABLET EVERY 12 HOURS DAILY.             lancets 28 gauge Misc  1 lancet by Misc.(Non-Drug; Combo Route) route once daily at 6am. True Metrix lancets             levothyroxine (SYNTHROID) 50 MCG tablet  Take 1 tablet (50 mcg total) by mouth once daily.             magnesium oxide  (MAG-OX) 250 mg Tab  Take 1 tablet (250 mg total) by mouth once daily.             nystatin (MYCOSTATIN) cream  Apply topically 2 (two) times daily.             oxycodone (ROXICODONE) 5 MG immediate release tablet  Take 1 tablet (5 mg total) by mouth every 4 (four) hours as needed.             polyethylene glycol (GLYCOLAX) 17 gram PwPk  Take 17 g by mouth 2 (two) times daily as needed (Constipation).             triamcinolone acetonide 0.1% (KENALOG) 0.1 % cream  Apply topically 2 (two) times daily.             vancomycin (VANCOCIN) 250 MG capsule  Take 1 capsule (250 mg total) by mouth 4 (four) times daily.             vitamin D 1000 units Tab  Take 1,000 Units by mouth once daily.             vitamin E 400 UNIT capsule  Take 400 Units by mouth once daily.                       _________________________________  Rosi Link MD  07/20/2017

## 2024-12-31 NOTE — PROGRESS NOTES
POD 5 skin graft. No major issues    Tm 102.5 VSS    Wound vac taken down today.   Skin graft appears to have near full take.  Donor site dressing changed, blood clot removed.     Plan: Patient can leave donor site dressing intact until follow up.  Dressing over skin graft needs to be changed daily with bacitracin ointment directly on skin graft, followed by xeroform then kerlex wrap.  Will follow up with me 7/26/27 or sooner if there are any issues.     Spouse Ismail called to confirm appointment
